# Patient Record
Sex: FEMALE | Race: WHITE | Employment: PART TIME | URBAN - METROPOLITAN AREA
[De-identification: names, ages, dates, MRNs, and addresses within clinical notes are randomized per-mention and may not be internally consistent; named-entity substitution may affect disease eponyms.]

---

## 2017-01-26 ENCOUNTER — TELEPHONE (OUTPATIENT)
Dept: FAMILY MEDICINE CLINIC | Facility: CLINIC | Age: 72
End: 2017-01-26

## 2017-01-26 DIAGNOSIS — E78.2 MIXED HYPERLIPIDEMIA: Primary | ICD-10-CM

## 2017-01-26 RX ORDER — ROSUVASTATIN CALCIUM 20 MG/1
20 TABLET, COATED ORAL
Qty: 90 TABLET | Refills: 0 | Status: SHIPPED | OUTPATIENT
Start: 2017-01-26 | End: 2017-04-14

## 2017-01-26 NOTE — TELEPHONE ENCOUNTER
Pt is requesting re-fills for medication listed below  Pt is completely out.    Pt is new to pharmacy ([facility]) Please advise      Current outpatient prescriptions:   •  Rosuvastatin Calcium (CRESTOR) 20 MG Oral Tab, Take 1 tablet (20 mg total) by mouth o

## 2017-01-26 NOTE — TELEPHONE ENCOUNTER
Hypertensive Medications, Refill protocol passed because the patient met the following protocol for ANTI HYPERTENSIVES, Medication refilled for 90 days per protocol.   Protocol Criteria:  · Appointment scheduled in the past 6 months or in the next 3 months

## 2017-02-22 ENCOUNTER — OFFICE VISIT (OUTPATIENT)
Dept: OTOLARYNGOLOGY | Facility: CLINIC | Age: 72
End: 2017-02-22

## 2017-02-22 ENCOUNTER — OFFICE VISIT (OUTPATIENT)
Dept: AUDIOLOGY | Facility: CLINIC | Age: 72
End: 2017-02-22

## 2017-02-22 VITALS
HEIGHT: 64 IN | SYSTOLIC BLOOD PRESSURE: 153 MMHG | DIASTOLIC BLOOD PRESSURE: 69 MMHG | TEMPERATURE: 99 F | WEIGHT: 240 LBS | BODY MASS INDEX: 40.97 KG/M2

## 2017-02-22 DIAGNOSIS — H93.11 TINNITUS, RIGHT: ICD-10-CM

## 2017-02-22 DIAGNOSIS — H91.91 HEARING LOSS OF RIGHT EAR, UNSPECIFIED HEARING LOSS TYPE: Primary | ICD-10-CM

## 2017-02-22 DIAGNOSIS — H90.3 SENSORINEURAL HEARING LOSS, BILATERAL: Primary | ICD-10-CM

## 2017-02-22 PROCEDURE — 92567 TYMPANOMETRY: CPT | Performed by: AUDIOLOGIST

## 2017-02-22 PROCEDURE — 99203 OFFICE O/P NEW LOW 30 MIN: CPT | Performed by: OTOLARYNGOLOGY

## 2017-02-22 PROCEDURE — 92557 COMPREHENSIVE HEARING TEST: CPT | Performed by: AUDIOLOGIST

## 2017-02-22 PROCEDURE — 99212 OFFICE O/P EST SF 10 MIN: CPT | Performed by: OTOLARYNGOLOGY

## 2017-02-22 RX ORDER — OFLOXACIN 3 MG/ML
SOLUTION/ DROPS OPHTHALMIC
Refills: 0 | COMMUNITY
Start: 2016-12-08 | End: 2017-02-22 | Stop reason: ALTCHOICE

## 2017-02-22 RX ORDER — METHYLPREDNISOLONE 4 MG/1
TABLET ORAL
Qty: 1 PACKAGE | Refills: 0 | Status: SHIPPED | OUTPATIENT
Start: 2017-02-22 | End: 2017-03-23

## 2017-02-22 RX ORDER — AMOXICILLIN 500 MG/1
CAPSULE ORAL
Refills: 1 | COMMUNITY
Start: 2016-12-30 | End: 2017-02-22 | Stop reason: ALTCHOICE

## 2017-02-22 RX ORDER — ACETAMINOPHEN AND CODEINE PHOSPHATE 300; 30 MG/1; MG/1
TABLET ORAL
Refills: 0 | COMMUNITY
Start: 2016-12-30 | End: 2017-03-23

## 2017-02-22 NOTE — PROGRESS NOTES
Matheus Win is a 73year old female. Patient presents with:  Hearing Loss: right ear for 1.5 weeks        HISTORY OF PRESENT ILLNESS  2/22/2017   Here for evaluation of right sided  hearing loss.  Patient feels this has worsened over the last 2  week Past Medical History   Diagnosis Date   • Diverticulosis    • Arthritis    • High cholesterol    • Tinnitus    • Unspecified essential hypertension    • SCC (squamous cell carcinoma) 2016     right jawline         Past Surgical History    CHOLECYSTECTO ear canals are nl tympanic membranes are nl     Audiogram was done today revealing no hearing in the left ear and sensorineural hearing loss in the right ear           Current outpatient prescriptions:   •  methylPREDNISolone 4 MG Oral Tablet Therapy Pack, AND PLAN  1.  Hearing loss of right ear, unspecified hearing loss type  I think this does represent a reflection of the salt intake and possibly the slight change in her right hearing loss is because of this I would like to start her on steroids asked her t

## 2017-02-22 NOTE — PROGRESS NOTES
AUDIOGRAM     Ronit Gonzalez was referred for testing by An Gregory for decreased hearing and tinnitus in the right ear.    11/8/1945  [id number]      Patient reports a long standing profound hearing loss in the left ear      Otoscopic inspection: r

## 2017-03-08 ENCOUNTER — OFFICE VISIT (OUTPATIENT)
Dept: AUDIOLOGY | Facility: CLINIC | Age: 72
End: 2017-03-08

## 2017-03-08 ENCOUNTER — OFFICE VISIT (OUTPATIENT)
Dept: OTOLARYNGOLOGY | Facility: CLINIC | Age: 72
End: 2017-03-08

## 2017-03-08 VITALS
WEIGHT: 240 LBS | SYSTOLIC BLOOD PRESSURE: 143 MMHG | TEMPERATURE: 97 F | HEIGHT: 64 IN | DIASTOLIC BLOOD PRESSURE: 68 MMHG | BODY MASS INDEX: 40.97 KG/M2

## 2017-03-08 DIAGNOSIS — H93.11 TINNITUS, RIGHT: Primary | ICD-10-CM

## 2017-03-08 DIAGNOSIS — H90.A21 SENSORINEURAL HEARING LOSS (SNHL) OF RIGHT EAR WITH RESTRICTED HEARING OF LEFT EAR: Primary | ICD-10-CM

## 2017-03-08 PROCEDURE — 92557 COMPREHENSIVE HEARING TEST: CPT | Performed by: AUDIOLOGIST

## 2017-03-08 PROCEDURE — 99212 OFFICE O/P EST SF 10 MIN: CPT | Performed by: OTOLARYNGOLOGY

## 2017-03-08 PROCEDURE — 99213 OFFICE O/P EST LOW 20 MIN: CPT | Performed by: OTOLARYNGOLOGY

## 2017-03-08 NOTE — PROGRESS NOTES
AUDIOGRAM     Chai Quezada was referred for testing by Tati Olivia for follow-up of right sided tinnitus and hearing loss.    11/8/1945  [id number]      Right ear testing only per Dr. Phillips Setting  Patient has a known profound hearing loss in the left ea

## 2017-03-08 NOTE — PROGRESS NOTES
Mary Grossman is a 73year old female. Patient presents with: Follow - Up: 2 wek follow up- right ear hearing loss- per pt she can hear better now        HISTORY OF PRESENT ILLNESS  2/22/2017   Here for evaluation of right sided  hearing loss.  Lydia Age of Onset   • Colon Cancer Father    • Diabetes Mother    • Hypertension Mother    • Diabetes Paternal Grandmother    • Glaucoma Neg      No Family h/o Glaucoma   • Macular degeneration Neg      No Family h/o Macular degeneration     Past Medical Histor inspection   Psychiatric Normal   Appropriate mood and affect. Lymph Detail Normal     Eyes Normal Conjunctiva - Right: Normal, Left: Normal. Pupil - Right: Normal, Left: Normal. EOMI.  RIK   Ears abNormal  normal to inspection ear canals are nl tympanic (unknown strength), Disp: , Rfl:   •  Acetaminophen-Codeine #3 300-30 MG Oral Tab, TK 1 T PO Q 4 TO 6 H PRN FOR PAIN, Disp: , Rfl: 0  •  methylPREDNISolone 4 MG Oral Tablet Therapy Pack, Take by oral route., Disp: 1 Package, Rfl: 0    ASSESSMENT AND PLAN

## 2017-03-23 ENCOUNTER — APPOINTMENT (OUTPATIENT)
Dept: LAB | Age: 72
End: 2017-03-23
Attending: FAMILY MEDICINE
Payer: MEDICARE

## 2017-03-23 ENCOUNTER — OFFICE VISIT (OUTPATIENT)
Dept: FAMILY MEDICINE CLINIC | Facility: CLINIC | Age: 72
End: 2017-03-23

## 2017-03-23 VITALS
DIASTOLIC BLOOD PRESSURE: 63 MMHG | HEIGHT: 64 IN | BODY MASS INDEX: 40.63 KG/M2 | TEMPERATURE: 98 F | SYSTOLIC BLOOD PRESSURE: 136 MMHG | HEART RATE: 70 BPM | WEIGHT: 238 LBS

## 2017-03-23 DIAGNOSIS — R09.82 POST-NASAL DRIP: ICD-10-CM

## 2017-03-23 DIAGNOSIS — R05.9 COUGH: ICD-10-CM

## 2017-03-23 DIAGNOSIS — G89.29 CHRONIC BILATERAL LOW BACK PAIN WITHOUT SCIATICA: ICD-10-CM

## 2017-03-23 DIAGNOSIS — J30.89 OTHER ALLERGIC RHINITIS: ICD-10-CM

## 2017-03-23 DIAGNOSIS — M54.50 CHRONIC BILATERAL LOW BACK PAIN WITHOUT SCIATICA: ICD-10-CM

## 2017-03-23 DIAGNOSIS — J30.1 SEASONAL ALLERGIC RHINITIS DUE TO POLLEN: ICD-10-CM

## 2017-03-23 DIAGNOSIS — R05.9 COUGH: Primary | ICD-10-CM

## 2017-03-23 DIAGNOSIS — M48.061 SPINAL STENOSIS OF LUMBAR REGION: ICD-10-CM

## 2017-03-23 PROCEDURE — 86003 ALLG SPEC IGE CRUDE XTRC EA: CPT

## 2017-03-23 PROCEDURE — 99214 OFFICE O/P EST MOD 30 MIN: CPT | Performed by: FAMILY MEDICINE

## 2017-03-23 PROCEDURE — 36415 COLL VENOUS BLD VENIPUNCTURE: CPT

## 2017-03-23 PROCEDURE — 99212 OFFICE O/P EST SF 10 MIN: CPT | Performed by: FAMILY MEDICINE

## 2017-03-23 PROCEDURE — 82785 ASSAY OF IGE: CPT

## 2017-03-23 RX ORDER — PROMETHAZINE HYDROCHLORIDE AND CODEINE PHOSPHATE 6.25; 1 MG/5ML; MG/5ML
5 SYRUP ORAL EVERY 6 HOURS PRN
Qty: 240 ML | Refills: 0 | Status: SHIPPED | OUTPATIENT
Start: 2017-03-23 | End: 2017-05-16

## 2017-03-23 RX ORDER — FLUTICASONE PROPIONATE 50 MCG
2 SPRAY, SUSPENSION (ML) NASAL DAILY
Qty: 1 BOTTLE | Refills: 3 | Status: SHIPPED | OUTPATIENT
Start: 2017-03-23 | End: 2017-06-16

## 2017-03-23 RX ORDER — MONTELUKAST SODIUM 10 MG/1
10 TABLET ORAL
Qty: 90 TABLET | Refills: 3 | Status: SHIPPED | OUTPATIENT
Start: 2017-03-23 | End: 2017-06-16

## 2017-03-23 RX ORDER — HYDROCODONE BITARTRATE AND ACETAMINOPHEN 5; 325 MG/1; MG/1
TABLET ORAL
Qty: 60 TABLET | Refills: 0 | Status: SHIPPED | OUTPATIENT
Start: 2017-03-23 | End: 2017-06-16

## 2017-03-23 RX ORDER — FEXOFENADINE HCL 180 MG/1
180 TABLET ORAL DAILY
Qty: 30 TABLET | Refills: 11 | Status: SHIPPED | OUTPATIENT
Start: 2017-03-23 | End: 2017-07-21

## 2017-03-23 NOTE — PROGRESS NOTES
Patient ID: Leopold Shearer is a 73year old female. HPI  Patient presents with:  Chest Congestion  Runny Nose  Cough    This started on Sunday night, March 19, 2017. NyQuil helped a little bit.         Wt Readings from Last 6 Encounters:  03/23/17 1 tablet (20 mg total) by mouth once daily.  Disp: 90 tablet Rfl: 0   HYDROcodone-acetaminophen (NORCO) 5-325 MG Oral Tab take 1 - 2 tablet by ORAL route 3 times every day as needed for pain Disp: 60 tablet Rfl: 0   Montelukast Sodium 10 MG Oral Tab Take 1 and time. Skin: Skin is warm. Psychiatric: has a normal mood and affect. Vitals reviewed. ASSESSMENT/PLAN:     Diagnoses and all orders for this visit:    Cough  -     ALLERGENS, ZONE 8 [39671]VKS;  Future  -     promethazine-codeine 6.25-10 M Deysi    Seasonal allergic rhinitis due to pollen  -     Montelukast Sodium 10 MG Oral Tab; Take 1 tablet (10 mg total) by mouth once daily.  -     Fexofenadine HCl 180 MG Oral Tab; Take 1 tablet (180 mg total) by mouth daily.         Follow up if symptoms pe

## 2017-03-31 ENCOUNTER — OFFICE VISIT (OUTPATIENT)
Dept: FAMILY MEDICINE CLINIC | Facility: CLINIC | Age: 72
End: 2017-03-31

## 2017-03-31 VITALS
SYSTOLIC BLOOD PRESSURE: 143 MMHG | DIASTOLIC BLOOD PRESSURE: 79 MMHG | HEART RATE: 71 BPM | WEIGHT: 238 LBS | RESPIRATION RATE: 16 BRPM | BODY MASS INDEX: 40.63 KG/M2 | TEMPERATURE: 98 F | HEIGHT: 64 IN

## 2017-03-31 DIAGNOSIS — R05.9 COUGH: Primary | ICD-10-CM

## 2017-03-31 PROCEDURE — G0463 HOSPITAL OUTPT CLINIC VISIT: HCPCS | Performed by: FAMILY MEDICINE

## 2017-03-31 PROCEDURE — 99213 OFFICE O/P EST LOW 20 MIN: CPT | Performed by: FAMILY MEDICINE

## 2017-03-31 RX ORDER — ALBUTEROL SULFATE 90 UG/1
2 AEROSOL, METERED RESPIRATORY (INHALATION) EVERY 6 HOURS PRN
Qty: 1 INHALER | Refills: 1 | Status: SHIPPED | OUTPATIENT
Start: 2017-03-31

## 2017-03-31 RX ORDER — MELOXICAM 15 MG/1
15 TABLET ORAL DAILY
Refills: 0 | COMMUNITY
Start: 2017-03-02 | End: 2017-06-13

## 2017-03-31 RX ORDER — LEVOFLOXACIN 750 MG/1
750 TABLET ORAL DAILY
Qty: 5 TABLET | Refills: 0 | Status: SHIPPED | OUTPATIENT
Start: 2017-03-31 | End: 2017-04-05

## 2017-03-31 NOTE — PROGRESS NOTES
Patient ID: Kerry Olivares is a 73year old female. HPI  Patient presents with:  Cough      She has had a hacking cough since she last saw me.   She states it takes a little while but she is able to finally bring up some white mucus and feels somewh Fluticasone Propionate 50 MCG/ACT Nasal Suspension 2 sprays by Nasal route daily.  Disp: 1 Bottle Rfl: 3   HYDROcodone-acetaminophen (NORCO) 5-325 MG Oral Tab take 1 - 2 tablet by ORAL route 3 times every day as needed for pain Disp: 60 tablet Rfl: 0   Mo normal   Eyes: Conjunctivae are normal.   Neck: Normal range of motion. Neck supple. No thyromegaly present. Cardiovascular: Normal rate, regular rhythm and normal heart sounds. Lymphadenopathy:     Patient has no  cervical adenopathy.     Pulmonary/Danielle

## 2017-04-08 ENCOUNTER — TELEPHONE (OUTPATIENT)
Dept: FAMILY MEDICINE CLINIC | Facility: CLINIC | Age: 72
End: 2017-04-08

## 2017-04-08 RX ORDER — LISINOPRIL AND HYDROCHLOROTHIAZIDE 25; 20 MG/1; MG/1
1 TABLET ORAL
Qty: 90 TABLET | Refills: 0 | Status: SHIPPED | OUTPATIENT
Start: 2017-04-08 | End: 2017-06-16

## 2017-04-08 NOTE — TELEPHONE ENCOUNTER
Pharmacy calling to request refill request for patient.    Pharmacy stating this is a new patient and does not have RX on file     RX for Lisinopril Hydrochlorothiazide 20-25 MG    Confirmed pharmacy Sally/Mohan

## 2017-04-08 NOTE — TELEPHONE ENCOUNTER
Hypertensive Medications  Protocol Criteria:  · Appointment scheduled in the past 6 months or in the next 3 months  · BMP or CMP in the past 12 months  · Creatinine result < 2  Recent Visits       Provider Department Primary Dx    1 week ago Ples Anderson

## 2017-04-17 RX ORDER — ROSUVASTATIN CALCIUM 20 MG/1
TABLET, COATED ORAL
Qty: 90 TABLET | Refills: 1 | Status: SHIPPED | OUTPATIENT
Start: 2017-04-17 | End: 2017-06-16

## 2017-05-08 ENCOUNTER — OFFICE VISIT (OUTPATIENT)
Dept: OTOLARYNGOLOGY | Facility: CLINIC | Age: 72
End: 2017-05-08

## 2017-05-08 ENCOUNTER — OFFICE VISIT (OUTPATIENT)
Dept: AUDIOLOGY | Facility: CLINIC | Age: 72
End: 2017-05-08

## 2017-05-08 VITALS
BODY MASS INDEX: 40.97 KG/M2 | TEMPERATURE: 97 F | DIASTOLIC BLOOD PRESSURE: 73 MMHG | SYSTOLIC BLOOD PRESSURE: 145 MMHG | HEIGHT: 64 IN | WEIGHT: 240 LBS

## 2017-05-08 DIAGNOSIS — H81.01 MENIERE'S DISEASE (COCHLEAR HYDROPS), RIGHT: Primary | ICD-10-CM

## 2017-05-08 DIAGNOSIS — H91.8X3 OTHER SPECIFIED HEARING LOSS, BILATERAL: Primary | ICD-10-CM

## 2017-05-08 PROCEDURE — G0463 HOSPITAL OUTPT CLINIC VISIT: HCPCS | Performed by: OTOLARYNGOLOGY

## 2017-05-08 PROCEDURE — 92557 COMPREHENSIVE HEARING TEST: CPT | Performed by: AUDIOLOGIST

## 2017-05-08 PROCEDURE — 99213 OFFICE O/P EST LOW 20 MIN: CPT | Performed by: OTOLARYNGOLOGY

## 2017-05-08 RX ORDER — PREDNISONE 20 MG/1
TABLET ORAL
Qty: 27 TABLET | Refills: 0 | Status: SHIPPED | OUTPATIENT
Start: 2017-05-08 | End: 2017-06-16 | Stop reason: ALTCHOICE

## 2017-05-08 RX ORDER — TRIAMTERENE AND HYDROCHLOROTHIAZIDE 37.5; 25 MG/1; MG/1
1 CAPSULE ORAL EVERY MORNING
Qty: 30 CAPSULE | Refills: 11 | Status: SHIPPED | OUTPATIENT
Start: 2017-05-08 | End: 2017-06-07

## 2017-05-08 NOTE — PROGRESS NOTES
Bright Jo is a 73year old female. Patient presents with:   Follow - Up: 2 month follow up- hearing loss of right ear- per pt she can not hear again, c/o ringing in the right ear started last [address]  2/22/2017   He Concern    Caffeine Concern Yes    Comment: Coffee, 2 cups daily    Exercise No    Pt has a pacemaker No    Pt has a defibrillator No    Reaction to local anesthetic No     Social History Narrative    The patient does not use an assistive device. .      The suspicious lesions bruises or masses.    Constitutional Normal Overall appearance - Normal.   Head/Face Normal Facial features - Normal. Eyebrows - Normal. Skull - Normal.   Nasopharynx Normal External nose - Normal.  .   Neurological Normal Memory - Normal Oral Tab, Take 1 tablet (10 mg total) by mouth once daily. , Disp: 90 tablet, Rfl: 3  •  Fexofenadine HCl 180 MG Oral Tab, Take 1 tablet (180 mg total) by mouth daily. , Disp: 30 tablet, Rfl: 11  •  Sertraline HCl 100 MG Oral Tab, Take 1 tablet (100 mg total

## 2017-05-09 NOTE — PROGRESS NOTES
AUDIOGRAM     Reginaldo Epps was referred for testing by Mallory Villatoro to evaluate fluctuating hearing loss in the right ear. 11/8/1945  [id number]    Pt has known left profound sensorineural hearing loss. The left ear was not retested today.     Pr

## 2017-05-15 ENCOUNTER — TELEPHONE (OUTPATIENT)
Dept: OTOLARYNGOLOGY | Facility: CLINIC | Age: 72
End: 2017-05-15

## 2017-05-15 DIAGNOSIS — H81.01 MENIERE'S DISEASE (COCHLEAR HYDROPS), RIGHT: Primary | ICD-10-CM

## 2017-05-15 NOTE — TELEPHONE ENCOUNTER
Per Randolph Carvalho ([facility]  @ [facility], Dr. Beth Primes office), pt should contact registration dept @ [phone] to be registered and then scheduled.   If pt is given an appt too far out, pt may call [phone] and ask for Dr. Debi Nicole  to

## 2017-05-15 NOTE — TELEPHONE ENCOUNTER
Referral to Dr. Ardean Buerger entered, Dx:  Meniere's disease. Called and LM w/ [facility]  @ [address] [place] [address], Dr. Yadira Calvillo office, [phone].

## 2017-05-15 NOTE — TELEPHONE ENCOUNTER
, pt seen on 05/08 for menieres diease , pt prescribed prednisone and is still taking it. Per pt states hearing loss is worse, per pt states sounds are distorted.  Per pt states you had advised if steroid did not help pt would need to be seen by  S

## 2017-05-16 ENCOUNTER — OFFICE VISIT (OUTPATIENT)
Dept: NEUROLOGY | Facility: CLINIC | Age: 72
End: 2017-05-16

## 2017-05-16 ENCOUNTER — HOSPITAL ENCOUNTER (OUTPATIENT)
Dept: GENERAL RADIOLOGY | Facility: HOSPITAL | Age: 72
Discharge: HOME | End: 2017-05-16
Attending: PHYSICAL MEDICINE & REHABILITATION
Payer: MEDICARE

## 2017-05-16 VITALS
DIASTOLIC BLOOD PRESSURE: 62 MMHG | RESPIRATION RATE: 17 BRPM | HEIGHT: 64 IN | OXYGEN SATURATION: 95 % | BODY MASS INDEX: 40.12 KG/M2 | HEART RATE: 94 BPM | WEIGHT: 235 LBS | SYSTOLIC BLOOD PRESSURE: 126 MMHG

## 2017-05-16 DIAGNOSIS — M48.061 LUMBAR STENOSIS: ICD-10-CM

## 2017-05-16 DIAGNOSIS — M47.816 LUMBAR FACET ARTHROPATHY: ICD-10-CM

## 2017-05-16 DIAGNOSIS — M41.25 OTHER IDIOPATHIC SCOLIOSIS, THORACOLUMBAR REGION: ICD-10-CM

## 2017-05-16 DIAGNOSIS — M48.061 LUMBAR STENOSIS: Primary | ICD-10-CM

## 2017-05-16 PROCEDURE — 99214 OFFICE O/P EST MOD 30 MIN: CPT | Performed by: PHYSICAL MEDICINE & REHABILITATION

## 2017-05-16 PROCEDURE — 72110 X-RAY EXAM L-2 SPINE 4/>VWS: CPT | Performed by: PHYSICAL MEDICINE & REHABILITATION

## 2017-05-16 NOTE — PATIENT INSTRUCTIONS
- Consider the back injection - epidural.  Stop for 7 days before injection- all aspirin, advil, aleve, fish oil, vitamin E and similar products. Including any medications combined with ibuprofen. Call Dr Kayleen Gordon office to schedule.    You need a  to substances: Written prescriptions  · Written prescriptions must be picked up in office. · Please allow the office 48-72 hours to fill the prescription as our physicians rotate between multiple offices and procedure days in the hospitals.   · Patient must p

## 2017-05-16 NOTE — TELEPHONE ENCOUNTER
MARTHATCB.  TRANSFER TO RN. Spoke w/ Dr. Zoila Villalba office; they are able to hold an appointment slot for pt for Monday 5/22 @ 1:45 in [facility], [address]. Pt is currently scheduled for June appt w/ Dr. Patti Howe.     RN will need to call [phone]

## 2017-05-16 NOTE — PROGRESS NOTES
Patient has been scheduled for a L5-S1 ILESI (local) on 5/26/17 at the [facility]. Medications and allergies reviewed. Patient informed to hold aspirins, nsaids, blood thinners, vitamins and fish oils 5-7 days prior to procedure.  Patient informed we will need ve

## 2017-05-17 NOTE — TELEPHONE ENCOUNTER
Per [facility]), pt was scheduled for Monday 5/22 @ 1:45 pm w/ Dr. Shannan Bauman at [address] [place] [address], in [place]. Pt has been informed. Called [facility] to request fax number for Dr. Arlette Walls office to fax OV notes and HTs.

## 2017-05-22 ENCOUNTER — TELEPHONE (OUTPATIENT)
Dept: OTOLARYNGOLOGY | Facility: CLINIC | Age: 72
End: 2017-05-22

## 2017-05-22 NOTE — TELEPHONE ENCOUNTER
asking for copy of audiogram from May if one was done - [facility] fax to [phone]- [facility] call Rn and let her know if pt did not have one done in May

## 2017-05-26 ENCOUNTER — OFFICE VISIT (OUTPATIENT)
Dept: SURGERY | Facility: CLINIC | Age: 72
End: 2017-05-26

## 2017-05-26 DIAGNOSIS — M48.061 LUMBAR STENOSIS: Primary | ICD-10-CM

## 2017-05-26 PROCEDURE — 62323 NJX INTERLAMINAR LMBR/SAC: CPT | Performed by: PHYSICAL MEDICINE & REHABILITATION

## 2017-05-26 NOTE — PROGRESS NOTES
L5-S1 ilesi local. Under fluoro - right facet arthropathy more obvious and juts into interlaminar space. Patient has worse pain with standing for work. She has half day 4 hr work tomorrow. She will see how the injection worked. -vahid

## 2017-05-26 NOTE — PROGRESS NOTES
History of Present Illness:   Patient presents with:  Low Back Pain: pt here for follow up with continued lower back pain that is localized when standing/walking only. denies pain when sitting.  pt completed physical therapy last year with relief in strengt tablet by mouth once daily. Disp: 90 tablet Rfl: 0   Meloxicam 15 MG Oral Tab Take 15 mg by mouth daily.    Disp:  Rfl: 0   Albuterol Sulfate  (90 Base) MCG/ACT Inhalation Aero Soln Inhale 2 puffs into the lungs every 6 (six) hours as needed for [facility] symptoms  Allergic/Immunologic: negative for anaphylaxis    Physical Exam:  Vital Signs:  /62 mmHg  Pulse 94  Resp 17  Ht 64\"  Wt 235 lb  BMI 40.32 kg/m2  SpO2 95%    General: Alert, oriented x3. Cooperative. No apparent distress.   HEENT:  No disc

## 2017-05-26 NOTE — PROCEDURES
[facility] OUTPATIENT SURGERY CENTER      PATIENT'S NAME:  Mari Irby RECORD #:   [id number]       DATE OF BIRTH:  11/8/1945  PROCEDURE DATE:  5/26/2017  OPERATING PHYSICIAN:  Wyatt Paul MD      OPERATIVE REPORT    PREOPERATIVE RADHA

## 2017-06-04 RX ORDER — SERTRALINE HYDROCHLORIDE 100 MG/1
TABLET, FILM COATED ORAL
Qty: 90 TABLET | Refills: 0 | Status: SHIPPED | OUTPATIENT
Start: 2017-06-04 | End: 2017-06-16

## 2017-06-04 NOTE — TELEPHONE ENCOUNTER
Refilled per protocol.   Refill Protocol Appointment Criteria  · Appointment scheduled in the past 6 months or in the next 3 months  Recent Visits       Provider Department Primary Dx    2 months ago [facility], [place], [address] [place] [address] 04, [facility]

## 2017-06-07 NOTE — PROGRESS NOTES
That should be fine. The Dyazide will only increase the hydrochlorothiazide dose but if it makes her dizziness better, go ahead and try it. Please let her know to follow-up with me about 3-4 weeks after you place her on the Dyazide.

## 2017-06-13 RX ORDER — AMLODIPINE BESYLATE 5 MG/1
TABLET ORAL
Qty: 90 TABLET | Refills: 0 | Status: CANCELLED | OUTPATIENT
Start: 2017-06-13

## 2017-06-16 ENCOUNTER — OFFICE VISIT (OUTPATIENT)
Dept: FAMILY MEDICINE CLINIC | Facility: CLINIC | Age: 72
End: 2017-06-16

## 2017-06-16 VITALS
HEART RATE: 68 BPM | SYSTOLIC BLOOD PRESSURE: 119 MMHG | TEMPERATURE: 98 F | WEIGHT: 240 LBS | RESPIRATION RATE: 16 BRPM | BODY MASS INDEX: 40.97 KG/M2 | HEIGHT: 64 IN | DIASTOLIC BLOOD PRESSURE: 62 MMHG

## 2017-06-16 DIAGNOSIS — E78.2 MIXED HYPERLIPIDEMIA: ICD-10-CM

## 2017-06-16 DIAGNOSIS — J30.1 SEASONAL ALLERGIC RHINITIS DUE TO POLLEN: ICD-10-CM

## 2017-06-16 DIAGNOSIS — I10 ESSENTIAL HYPERTENSION: ICD-10-CM

## 2017-06-16 DIAGNOSIS — Z00.00 ENCOUNTER FOR ANNUAL HEALTH EXAMINATION: ICD-10-CM

## 2017-06-16 DIAGNOSIS — M54.50 CHRONIC BILATERAL LOW BACK PAIN WITHOUT SCIATICA: ICD-10-CM

## 2017-06-16 DIAGNOSIS — R09.82 POST-NASAL DRIP: ICD-10-CM

## 2017-06-16 DIAGNOSIS — J30.89 OTHER ALLERGIC RHINITIS: ICD-10-CM

## 2017-06-16 DIAGNOSIS — Z00.00 ADULT GENERAL MEDICAL EXAM: Primary | ICD-10-CM

## 2017-06-16 DIAGNOSIS — H25.12 AGE-RELATED NUCLEAR CATARACT OF LEFT EYE: ICD-10-CM

## 2017-06-16 DIAGNOSIS — Z78.0 MENOPAUSE PRESENT: ICD-10-CM

## 2017-06-16 DIAGNOSIS — I10 HYPERTENSION, BENIGN: ICD-10-CM

## 2017-06-16 DIAGNOSIS — E55.9 VITAMIN D DEFICIENCY: ICD-10-CM

## 2017-06-16 DIAGNOSIS — Z13.820 SCREENING FOR OSTEOPOROSIS: ICD-10-CM

## 2017-06-16 DIAGNOSIS — R05.9 COUGH: ICD-10-CM

## 2017-06-16 DIAGNOSIS — Z23 NEED FOR VACCINATION: ICD-10-CM

## 2017-06-16 DIAGNOSIS — Z11.59 NEED FOR HEPATITIS C SCREENING TEST: ICD-10-CM

## 2017-06-16 DIAGNOSIS — I70.90 ATHEROSCLEROSIS: ICD-10-CM

## 2017-06-16 DIAGNOSIS — R73.9 HYPERGLYCEMIA: ICD-10-CM

## 2017-06-16 DIAGNOSIS — R91.8 PULMONARY NODULES: ICD-10-CM

## 2017-06-16 DIAGNOSIS — M48.061 SPINAL STENOSIS OF LUMBAR REGION: ICD-10-CM

## 2017-06-16 DIAGNOSIS — G89.29 CHRONIC BILATERAL LOW BACK PAIN WITHOUT SCIATICA: ICD-10-CM

## 2017-06-16 DIAGNOSIS — F41.1 ANXIETY STATE: ICD-10-CM

## 2017-06-16 PROCEDURE — 99213 OFFICE O/P EST LOW 20 MIN: CPT | Performed by: FAMILY MEDICINE

## 2017-06-16 PROCEDURE — 90670 PCV13 VACCINE IM: CPT | Performed by: FAMILY MEDICINE

## 2017-06-16 PROCEDURE — G0009 ADMIN PNEUMOCOCCAL VACCINE: HCPCS | Performed by: FAMILY MEDICINE

## 2017-06-16 PROCEDURE — G0439 PPPS, SUBSEQ VISIT: HCPCS | Performed by: FAMILY MEDICINE

## 2017-06-16 RX ORDER — FLUTICASONE PROPIONATE 50 MCG
2 SPRAY, SUSPENSION (ML) NASAL DAILY
Qty: 1 BOTTLE | Refills: 3 | Status: SHIPPED | OUTPATIENT
Start: 2017-06-16 | End: 2017-10-14

## 2017-06-16 RX ORDER — ROSUVASTATIN CALCIUM 20 MG/1
TABLET, COATED ORAL
Qty: 90 TABLET | Refills: 2 | Status: SHIPPED | OUTPATIENT
Start: 2017-06-16 | End: 2018-04-06

## 2017-06-16 RX ORDER — LISINOPRIL AND HYDROCHLOROTHIAZIDE 25; 20 MG/1; MG/1
1 TABLET ORAL
Qty: 90 TABLET | Refills: 2 | Status: SHIPPED | OUTPATIENT
Start: 2017-06-16 | End: 2018-07-19

## 2017-06-16 RX ORDER — AMLODIPINE BESYLATE 5 MG/1
5 TABLET ORAL
Qty: 90 TABLET | Refills: 2 | Status: SHIPPED | OUTPATIENT
Start: 2017-06-16 | End: 2018-03-15

## 2017-06-16 RX ORDER — SERTRALINE HYDROCHLORIDE 100 MG/1
TABLET, FILM COATED ORAL
Qty: 90 TABLET | Refills: 2 | Status: SHIPPED | OUTPATIENT
Start: 2017-06-16 | End: 2018-03-25

## 2017-06-16 RX ORDER — HYDROCODONE BITARTRATE AND ACETAMINOPHEN 5; 325 MG/1; MG/1
TABLET ORAL
Qty: 60 TABLET | Refills: 0 | Status: SHIPPED | OUTPATIENT
Start: 2017-06-16 | End: 2017-08-28

## 2017-06-16 RX ORDER — MONTELUKAST SODIUM 10 MG/1
10 TABLET ORAL
Qty: 90 TABLET | Refills: 3 | Status: SHIPPED | OUTPATIENT
Start: 2017-06-16 | End: 2018-06-20

## 2017-06-16 NOTE — PATIENT INSTRUCTIONS
Elsie Gaspar's SCREENING SCHEDULE   Tests on this list are recommended by your physician but may not be covered, or covered at this frequency, by your insurer. Please check with your insurance carrier before scheduling to verify coverage.    PREVENT Colonoscopy Screen   Covered every 10 years- more often if abnormal Colonoscopy,5 Years due on 11/07/2018 Update Health Maintenance if applicable    Flex Sigmoidoscopy Screen  Covered every 5 years No results found for this or any previous visit.  No flowsh get once after your 65th birthday    Pneumococcal 23 (Pneumovax)  Covered Once after 65 No orders found for this or any previous visit.  Please get once after your 65th birthday    Hepatitis B for Moderate/High Risk       No orders found for this or any pre

## 2017-06-16 NOTE — PROGRESS NOTES
HPI:   Domingo Estrada is a 73year old female who presents for a Medicare Subsequent Annual Wellness visit (Pt already had Initial Annual Wellness).       Her last annual assessment has been over 1 year: Annual Physical due on 01/27/2016       She Carolin Walker encounter (Office Visit) with Chang More DO:  SERTRALINE  MG Oral Tab TAKE 1 TABLET (100 MG TOTAL) BY MOUTH ONCE DAILY. ROSUVASTATIN CALCIUM 20 MG Oral Tab TAKE 1 TABLET (20 MG TOTAL) BY MOUTH ONCE DAILY.    Lisinopril-Hydrochlorothiazide 20-2 in her mother. There is no history of Glaucoma or Macular degeneration. SOCIAL HISTORY:   She  reports that she quit smoking about 15 years ago. Her smoking use included Cigarettes.  She has never used smokeless tobacco. She reports that she drinks alcoho themselves:  Yes   I especially have trouble understanding the speech of women and children:    Sometimes I have trouble understanding the speaker in a large room such as   at a meeting or place of Mu-ism:  No   Many people I talk to seem to mumble (or do • Pneumococcal Vaccine, Conjugate 12/12/2013       ASSESSMENT AND OTHER RELEVANT CHRONIC CONDITIONS:   Leopold Shearer is a 73year old female who presents for a Medicare Assessment.      PLAN SUMMARY:      Diagnoses and all orders for this visit:    A 20-25 MG Oral Tab; Take 1 tablet by mouth once daily. -     AmLODIPine Besylate 5 MG Oral Tab; Take 1 tablet (5 mg total) by mouth once daily. Vitamin D deficiency  -     Vitamin D, 25-Hydroxy (total) [E];  Future    Screening for osteoporosis  -     XR Pneumococcal?: No     Functional Ability     Bathing or Showering: Able without help    Toileting: Able without help    Dressing: Able without help    Eating: Able without help    Driving: Able without help    Preparing your meals: Able without help    Man review of chart, separate sheet to patient  [address] Internal Lab or Procedure External Lab or Procedure   Diabetes Screening      HbgA1C   Annually   Lab Results  Component Value Date   A1C 5.9 09/26/2014    No flows No vaccine history found Please get once after your 65th birthday    Hepatitis B for Moderate/High Risk No vaccine history found Medium/high risk factors:   End-stage renal disease   Hemophiliacs who received Factor VIII or IX concentrates   Clients of ins

## 2017-06-16 NOTE — TELEPHONE ENCOUNTER
Pharm is calling to check status of request   Pt is out of meds   Pharm will be getting a new computer system on Saturday   Asking to have this faxed manually

## 2017-06-19 RX ORDER — MELOXICAM 15 MG/1
TABLET ORAL
Qty: 90 TABLET | Refills: 0 | Status: SHIPPED | OUTPATIENT
Start: 2017-06-19 | End: 2017-09-17

## 2017-06-22 ENCOUNTER — LAB ENCOUNTER (OUTPATIENT)
Dept: LAB | Age: 72
End: 2017-06-22
Attending: FAMILY MEDICINE
Payer: MEDICARE

## 2017-06-22 DIAGNOSIS — Z11.59 NEED FOR HEPATITIS C SCREENING TEST: ICD-10-CM

## 2017-06-22 DIAGNOSIS — E55.9 VITAMIN D DEFICIENCY: ICD-10-CM

## 2017-06-22 DIAGNOSIS — R73.9 HYPERGLYCEMIA: ICD-10-CM

## 2017-06-22 DIAGNOSIS — Z00.00 ROUTINE GENERAL MEDICAL EXAMINATION AT A HEALTH CARE FACILITY: Primary | ICD-10-CM

## 2017-06-22 DIAGNOSIS — E78.2 MIXED HYPERLIPIDEMIA: ICD-10-CM

## 2017-06-22 DIAGNOSIS — I70.90 ATHEROSCLEROSIS: ICD-10-CM

## 2017-06-22 PROCEDURE — 86803 HEPATITIS C AB TEST: CPT

## 2017-06-22 PROCEDURE — 80053 COMPREHEN METABOLIC PANEL: CPT

## 2017-06-22 PROCEDURE — 83036 HEMOGLOBIN GLYCOSYLATED A1C: CPT

## 2017-06-22 PROCEDURE — 93005 ELECTROCARDIOGRAM TRACING: CPT

## 2017-06-22 PROCEDURE — 36415 COLL VENOUS BLD VENIPUNCTURE: CPT

## 2017-06-22 PROCEDURE — 85025 COMPLETE CBC W/AUTO DIFF WBC: CPT

## 2017-06-22 PROCEDURE — 93010 ELECTROCARDIOGRAM REPORT: CPT | Performed by: FAMILY MEDICINE

## 2017-06-22 PROCEDURE — 80061 LIPID PANEL: CPT

## 2017-06-22 PROCEDURE — 84443 ASSAY THYROID STIM HORMONE: CPT

## 2017-06-22 PROCEDURE — 82306 VITAMIN D 25 HYDROXY: CPT

## 2017-06-28 ENCOUNTER — TELEPHONE (OUTPATIENT)
Dept: NEUROLOGY | Facility: CLINIC | Age: 72
End: 2017-06-28

## 2017-06-28 ENCOUNTER — OFFICE VISIT (OUTPATIENT)
Dept: NEUROLOGY | Facility: CLINIC | Age: 72
End: 2017-06-28

## 2017-06-28 ENCOUNTER — HOSPITAL ENCOUNTER (OUTPATIENT)
Dept: GENERAL RADIOLOGY | Facility: HOSPITAL | Age: 72
Discharge: HOME | End: 2017-06-28
Attending: PHYSICAL MEDICINE & REHABILITATION
Payer: MEDICARE

## 2017-06-28 VITALS
RESPIRATION RATE: 19 BRPM | DIASTOLIC BLOOD PRESSURE: 58 MMHG | BODY MASS INDEX: 40.97 KG/M2 | HEART RATE: 81 BPM | HEIGHT: 64 IN | OXYGEN SATURATION: 94 % | SYSTOLIC BLOOD PRESSURE: 102 MMHG | WEIGHT: 240 LBS

## 2017-06-28 DIAGNOSIS — M25.551 HIP PAIN, BILATERAL: ICD-10-CM

## 2017-06-28 DIAGNOSIS — R10.31 GROIN PAIN, RIGHT: Primary | ICD-10-CM

## 2017-06-28 DIAGNOSIS — M25.552 HIP PAIN, BILATERAL: ICD-10-CM

## 2017-06-28 DIAGNOSIS — M54.16 LUMBAR RADICULOPATHY: ICD-10-CM

## 2017-06-28 DIAGNOSIS — M48.061 LUMBAR STENOSIS: ICD-10-CM

## 2017-06-28 DIAGNOSIS — R10.31 GROIN PAIN, RIGHT: ICD-10-CM

## 2017-06-28 PROCEDURE — 73522 X-RAY EXAM HIPS BI 3-4 VIEWS: CPT | Performed by: PHYSICAL MEDICINE & REHABILITATION

## 2017-06-28 PROCEDURE — 99214 OFFICE O/P EST MOD 30 MIN: CPT | Performed by: PHYSICAL MEDICINE & REHABILITATION

## 2017-06-28 RX ORDER — GABAPENTIN 100 MG/1
100 CAPSULE ORAL 3 TIMES DAILY
Qty: 90 CAPSULE | Refills: 0 | Status: SHIPPED | OUTPATIENT
Start: 2017-06-28 | End: 2017-09-14 | Stop reason: DRUGHIGH

## 2017-06-28 NOTE — TELEPHONE ENCOUNTER
Medicare Online for insurance coverage of right L5, sS1 TFESIs cpt codes [id number], [id number], [id number]. Insurance was verified and procedure is a covered benefit. Will inform Nursing.

## 2017-06-28 NOTE — PROGRESS NOTES
At end of visit, patient received the AVS and a printed instruction sheet for epidural injection. Patient said she needs to check her schedule, and will call back about scheduling injection.

## 2017-06-28 NOTE — PROGRESS NOTES
History of Present Illness:   Patient presents with:  Low Back Pain: pt here for follow up after L5-S1 interlaminar epidural steroid injection on 5/26/17 with 75% relief for one week. pt had Lumbar Spine Xrays done 5/16/17.  pt c/o lower back pain radiating Disp: 90 tablet Rfl: 0   HYDROcodone-acetaminophen (NORCO) 5-325 MG Oral Tab take 1 - 2 tablet by ORAL route 3 times every day as needed for pain Disp: 60 tablet Rfl: 0   Sertraline HCl 100 MG Oral Tab TAKE 1 TABLET (100 MG TOTAL) BY MOUTH ONCE DAILY.  Disp symptoms  Allergic/Immunologic: negative for anaphylaxis    Physical Exam:  Vital Signs:  /58 (BP Location: Right arm, Patient Position: Sitting, Cuff Size: large)   Pulse 81   Resp 19   Ht 64\"   Wt 240 lb   SpO2 94%   BMI 41.20 kg/m²     General: A

## 2017-06-28 NOTE — TELEPHONE ENCOUNTER
Medicare Online for insurance coverage of right hip steroid injection cpt codes 65978, [id number], . Insurance was verified and procedure is a covered benefit and does not require authorization. Will inform Nursing.

## 2017-06-28 NOTE — PATIENT INSTRUCTIONS
- get hip xrays  - Consider the back injection - epidural.  Stop for 7 days before injection- all aspirin, advil, aleve, fish oil, vitamin E and similar products. Including any medications combined with ibuprofen.    You need a  to get home even if yo for controlled substances: Written prescriptions  · Written prescriptions must be picked up in office. · Please allow the office 48-72 hours to fill the prescription as our physicians rotate between multiple offices and procedure days in the hospitals.   ·

## 2017-06-29 ENCOUNTER — HOSPITAL ENCOUNTER (OUTPATIENT)
Dept: BONE DENSITY | Age: 72
Discharge: HOME | End: 2017-06-29
Attending: FAMILY MEDICINE
Payer: MEDICARE

## 2017-06-29 ENCOUNTER — TELEPHONE (OUTPATIENT)
Dept: NEUROLOGY | Facility: CLINIC | Age: 72
End: 2017-06-29

## 2017-06-29 DIAGNOSIS — Z13.820 SCREENING FOR OSTEOPOROSIS: ICD-10-CM

## 2017-06-29 DIAGNOSIS — M48.061 SPINAL STENOSIS OF LUMBAR REGION: ICD-10-CM

## 2017-06-29 DIAGNOSIS — Z78.0 MENOPAUSE PRESENT: ICD-10-CM

## 2017-06-29 PROCEDURE — 77080 DXA BONE DENSITY AXIAL: CPT | Performed by: FAMILY MEDICINE

## 2017-06-29 NOTE — TELEPHONE ENCOUNTER
Patient was scheduled for right L5 + S1 transforaminal epidural steroid injection under fluoroscopy on Monday, July 10, 2017. Medications and allergies reviewed.  Patient informed to hold aspirins, nsaids, blood thinners, vitamins and fish oils 7 days sen

## 2017-07-07 ENCOUNTER — OFFICE VISIT (OUTPATIENT)
Dept: FAMILY MEDICINE CLINIC | Facility: CLINIC | Age: 72
End: 2017-07-07

## 2017-07-07 VITALS
HEIGHT: 64 IN | WEIGHT: 239.38 LBS | TEMPERATURE: 99 F | SYSTOLIC BLOOD PRESSURE: 137 MMHG | RESPIRATION RATE: 16 BRPM | DIASTOLIC BLOOD PRESSURE: 73 MMHG | BODY MASS INDEX: 40.87 KG/M2 | HEART RATE: 75 BPM

## 2017-07-07 DIAGNOSIS — J02.9 ACUTE PHARYNGITIS, UNSPECIFIED ETIOLOGY: Primary | ICD-10-CM

## 2017-07-07 DIAGNOSIS — J06.9 URI, ACUTE: ICD-10-CM

## 2017-07-07 LAB
CONTROL LINE PRESENT WITH A CLEAR BACKGROUND (YES/NO): YES YES/NO
KIT LOT #: NORMAL NUMERIC
STREP GRP A CUL-SCR: NEGATIVE

## 2017-07-07 PROCEDURE — 99213 OFFICE O/P EST LOW 20 MIN: CPT | Performed by: FAMILY MEDICINE

## 2017-07-07 PROCEDURE — G0463 HOSPITAL OUTPT CLINIC VISIT: HCPCS | Performed by: FAMILY MEDICINE

## 2017-07-07 PROCEDURE — 87880 STREP A ASSAY W/OPTIC: CPT | Performed by: FAMILY MEDICINE

## 2017-07-07 NOTE — PROGRESS NOTES
Patient ID: Taj Rosado is a 73year old female. HPI  Patient presents with:  Sore Throat    July 1, 2017 she started having a very sore throat. She states it was much worse yesterday but think a better last night and even better today.   She st tablet by ORAL route 3 times every day as needed for pain Disp: 60 tablet Rfl: 0   Sertraline HCl 100 MG Oral Tab TAKE 1 TABLET (100 MG TOTAL) BY MOUTH ONCE DAILY.  Disp: 90 tablet Rfl: 2   Rosuvastatin Calcium 20 MG Oral Tab TAKE 1 TABLET (20 MG TOTAL) BY drainage without cobblestoning. Tonsils: normal   Eyes: Conjunctivae are normal.   Neck: Normal range of motion. Neck supple. No thyromegaly present. Cardiovascular: Normal rate, regular rhythm and normal heart sounds.    Lymphadenopathy:     Patient has

## 2017-07-10 ENCOUNTER — OFFICE VISIT (OUTPATIENT)
Dept: SURGERY | Facility: CLINIC | Age: 72
End: 2017-07-10

## 2017-07-10 DIAGNOSIS — M48.061 LUMBAR STENOSIS: Primary | ICD-10-CM

## 2017-07-10 DIAGNOSIS — M54.16 LUMBAR RADICULOPATHY: ICD-10-CM

## 2017-07-10 PROCEDURE — 64484 NJX AA&/STRD TFRM EPI L/S EA: CPT | Performed by: PHYSICAL MEDICINE & REHABILITATION

## 2017-07-10 PROCEDURE — 64483 NJX AA&/STRD TFRM EPI L/S 1: CPT | Performed by: PHYSICAL MEDICINE & REHABILITATION

## 2017-07-10 NOTE — PROCEDURES
[facility] ([place], [place]) OUTPATIENT SURGERY CENTER      PATIENT'S NAME:  Mari Irby RECORD #:   [id number]      DATE OF BIRTH:  11/8/1945  PROCEDURE DATE:  7/10/2017  OPERATING PHYSICIAN:  Sharmila Lofton MD      OPERATIVE REPORT      PREOPERATIVE DI the recovery area and discharged from there when stable.

## 2017-08-22 ENCOUNTER — OFFICE VISIT (OUTPATIENT)
Dept: NEUROLOGY | Facility: CLINIC | Age: 72
End: 2017-08-22

## 2017-08-22 ENCOUNTER — TELEPHONE (OUTPATIENT)
Dept: NEUROLOGY | Facility: CLINIC | Age: 72
End: 2017-08-22

## 2017-08-22 VITALS
RESPIRATION RATE: 19 BRPM | BODY MASS INDEX: 41 KG/M2 | WEIGHT: 241 LBS | DIASTOLIC BLOOD PRESSURE: 72 MMHG | SYSTOLIC BLOOD PRESSURE: 142 MMHG | HEART RATE: 74 BPM

## 2017-08-22 DIAGNOSIS — M54.16 SPINAL STENOSIS OF LUMBAR REGION WITH RADICULOPATHY: Primary | ICD-10-CM

## 2017-08-22 DIAGNOSIS — M47.816 LUMBAR FACET ARTHROPATHY: ICD-10-CM

## 2017-08-22 DIAGNOSIS — M41.25 OTHER IDIOPATHIC SCOLIOSIS, THORACOLUMBAR REGION: ICD-10-CM

## 2017-08-22 DIAGNOSIS — M48.061 SPINAL STENOSIS OF LUMBAR REGION WITH RADICULOPATHY: Primary | ICD-10-CM

## 2017-08-22 PROCEDURE — 99214 OFFICE O/P EST MOD 30 MIN: CPT | Performed by: PHYSICAL MEDICINE & REHABILITATION

## 2017-08-22 RX ORDER — GABAPENTIN 600 MG/1
600 TABLET ORAL EVERY EVENING
Qty: 30 TABLET | Refills: 0 | Status: SHIPPED | OUTPATIENT
Start: 2017-08-22 | End: 2017-09-26

## 2017-08-22 NOTE — PROGRESS NOTES
History of Present Illness:   Patient presents with:  Low Back Pain: pt here for follow up after  right L4 + L5  transforaminal epidural steroid injection on 7/10/17 with 75% relief for 2 weeks. had Hip Xrays done 6/28/17.  pt c/o continued lower back pain Outpatient Prescriptions:  gabapentin 100 MG Oral Cap Take 1 capsule (100 mg total) by mouth 3 (three) times daily.  (Patient taking differently: Take 200 mg by mouth nightly.  ) Disp: 90 capsule Rfl: 0   MELOXICAM 15 MG Oral Tab TAKE ONE TABLET BY MOUTH EV diarrhea  Genitourinary:negative for urinary incontinence  Integument/breast: negative for rash  Hematologic/lymphatic: negative for bleeding  Musculoskeletal: see hpi  Neurological: negative for tremors  Behavioral/Psych: negative for sleep disturbance  E bad and only intermittent. 2. New mri lumbar for new changes. Recheck stenosis  3. Increase gabapentin to 600mg qhs.   4. Follow up after mri.       Lexi Fernández MD  8/22/2017

## 2017-08-22 NOTE — PATIENT INSTRUCTIONS
- take one pill of the gabapentin 600mg at night  - get new mri of the low back  - follow up after mri.   - consider 3rd back injection after mri  - if right groin hurts more, call to schedule injection to right hip      As of October 6th 2014, the Drug Enf will be picking up prescription, office must be given name of individual in advance and they must present an ID as well. · The name of the person picking up your prescription must be documented in your chart.

## 2017-08-22 NOTE — TELEPHONE ENCOUNTER
Pt. informed insurance was verified and procedure MRI L-spine wo is a covered benefit and does not require authorization. L/m advising no authorization is required for MRI. Can proceed with scheduling appt.

## 2017-08-28 ENCOUNTER — HOSPITAL ENCOUNTER (OUTPATIENT)
Dept: MRI IMAGING | Age: 72
Discharge: HOME | End: 2017-08-28
Attending: PHYSICAL MEDICINE & REHABILITATION
Payer: MEDICARE

## 2017-08-28 DIAGNOSIS — M48.061 SPINAL STENOSIS OF LUMBAR REGION WITH RADICULOPATHY: ICD-10-CM

## 2017-08-28 DIAGNOSIS — M41.25 OTHER IDIOPATHIC SCOLIOSIS, THORACOLUMBAR REGION: ICD-10-CM

## 2017-08-28 DIAGNOSIS — M54.50 CHRONIC BILATERAL LOW BACK PAIN WITHOUT SCIATICA: ICD-10-CM

## 2017-08-28 DIAGNOSIS — G89.29 CHRONIC BILATERAL LOW BACK PAIN WITHOUT SCIATICA: ICD-10-CM

## 2017-08-28 DIAGNOSIS — M54.16 SPINAL STENOSIS OF LUMBAR REGION WITH RADICULOPATHY: ICD-10-CM

## 2017-08-28 DIAGNOSIS — M47.816 LUMBAR FACET ARTHROPATHY: ICD-10-CM

## 2017-08-28 PROCEDURE — 72148 MRI LUMBAR SPINE W/O DYE: CPT | Performed by: PHYSICAL MEDICINE & REHABILITATION

## 2017-08-29 RX ORDER — HYDROCODONE BITARTRATE AND ACETAMINOPHEN 5; 325 MG/1; MG/1
TABLET ORAL
Qty: 60 TABLET | Refills: 0 | Status: SHIPPED | OUTPATIENT
Start: 2017-08-29 | End: 2017-11-29

## 2017-08-29 NOTE — TELEPHONE ENCOUNTER
Please advise regarding refill request    Refill Protocol Appointment Criteria  · Appointment scheduled in the past 6 months or in the next 3 months  Recent Outpatient Visits            1 week ago Spinal stenosis of lumbar region with radiculopathy    [facility]

## 2017-08-29 NOTE — TELEPHONE ENCOUNTER
From: Rodriguez Hutchison  Sent: 8/28/2017 11:25 AM CDT  Subject: Medication Renewal Request    Rodriguez Hutchison would like a refill of the following medications:  HYDROcodone-acetaminophen (NORCO) 5-325 MG Oral Tab MAIA Chery    Preferred pharm

## 2017-09-11 ENCOUNTER — TELEPHONE (OUTPATIENT)
Dept: NEUROLOGY | Facility: CLINIC | Age: 72
End: 2017-09-11

## 2017-09-11 DIAGNOSIS — M54.16 LUMBAR RADICULOPATHY: ICD-10-CM

## 2017-09-11 DIAGNOSIS — M51.26 BULGING LUMBAR DISC: Primary | ICD-10-CM

## 2017-09-11 DIAGNOSIS — M48.061 LUMBAR STENOSIS: ICD-10-CM

## 2017-09-11 NOTE — TELEPHONE ENCOUNTER
S/w pt to notify her of results stated below pt would like to schedule f/u w/ Dr. Kevin Reed. Per  route call to [place].

## 2017-09-11 NOTE — TELEPHONE ENCOUNTER
----- Message from Cornelia Saenz MD sent at 9/9/2017 12:41 PM CDT -----  pls call viewed- new right sided disc bulge at L4-5 -mk

## 2017-09-13 ENCOUNTER — TELEPHONE (OUTPATIENT)
Dept: NEUROLOGY | Facility: CLINIC | Age: 72
End: 2017-09-13

## 2017-09-13 NOTE — TELEPHONE ENCOUNTER
Per phone encounter 9/11/17 Dr Mana Chacko will discuss injection at [facility]  9/14/17. Patient not called at this time.

## 2017-09-13 NOTE — TELEPHONE ENCOUNTER
Medicare Online for insurance coverage of injection procedure Right L4 + L5 transforaminal epidural steroid injection under fluoroscopy with CPT code 22834 / 57818 / 29747 .  Insurance was verified and procedure  is a covered benefit and does not require a

## 2017-09-14 ENCOUNTER — OFFICE VISIT (OUTPATIENT)
Dept: NEUROLOGY | Facility: CLINIC | Age: 72
End: 2017-09-14

## 2017-09-14 VITALS
BODY MASS INDEX: 39.61 KG/M2 | RESPIRATION RATE: 16 BRPM | DIASTOLIC BLOOD PRESSURE: 70 MMHG | SYSTOLIC BLOOD PRESSURE: 118 MMHG | WEIGHT: 232 LBS | HEART RATE: 80 BPM | HEIGHT: 64 IN

## 2017-09-14 DIAGNOSIS — M48.061 LUMBAR STENOSIS: Primary | ICD-10-CM

## 2017-09-14 DIAGNOSIS — M47.816 LUMBAR FACET ARTHROPATHY: ICD-10-CM

## 2017-09-14 DIAGNOSIS — M51.26 BULGING LUMBAR DISC: ICD-10-CM

## 2017-09-14 DIAGNOSIS — M16.11 PRIMARY OSTEOARTHRITIS OF RIGHT HIP: ICD-10-CM

## 2017-09-14 DIAGNOSIS — M54.16 LUMBAR RADICULOPATHY: ICD-10-CM

## 2017-09-14 DIAGNOSIS — M41.25 OTHER IDIOPATHIC SCOLIOSIS, THORACOLUMBAR REGION: ICD-10-CM

## 2017-09-14 DIAGNOSIS — M25.552 HIP PAIN, BILATERAL: ICD-10-CM

## 2017-09-14 DIAGNOSIS — M25.551 HIP PAIN, BILATERAL: ICD-10-CM

## 2017-09-14 PROCEDURE — 99214 OFFICE O/P EST MOD 30 MIN: CPT | Performed by: PHYSICAL MEDICINE & REHABILITATION

## 2017-09-14 NOTE — PATIENT INSTRUCTIONS
- call one week after the diverticulitis symptoms are gone and make sure you are off any antibiotics for one week  - if before oct 4, will do right hip in the office. (groin).  You don't have to stop meloxicam for your hip.  - if after oct 4, then schedule prescription is due for a refill, you may be due for a follow up appointment. · To best provide you care, patients receiving routine medications need to be seen at least once a year.        protocol for controlled substances: Written prescriptions

## 2017-09-15 NOTE — PROGRESS NOTES
History of Present Illness:   Patient presents with:  Low Back Pain: LOV: 8/22/17. F/U on lower back pain. Patient states that her right lower back has worsened since last visit.  Patient states that the pain is 10/10 in right lower back when she is up and HISTORY      Comment: Ear surgery  No date: TUBAL LIGATION  2/12/14: YAG CAPSULOTOMY - OD - RIGHT EYE Right      Comment: RJM    No Known Allergies      Current Outpatient Prescriptions:  HYDROcodone-acetaminophen (NORCO) 5-325 MG Oral Tab take 1 - 2 table forconstipation, diarrhea  Genitourinary:negative for urinary incontinence  Integument/breast: negative for rash  Hematologic/lymphatic: negative for bleeding  Musculoskeletal: see hpi  Neurological: negative for tremors  Behavioral/Psych: negative for sle

## 2017-09-21 RX ORDER — MELOXICAM 15 MG/1
TABLET ORAL
Qty: 90 TABLET | Refills: 0 | Status: SHIPPED | OUTPATIENT
Start: 2017-09-21 | End: 2017-12-21

## 2017-09-21 NOTE — TELEPHONE ENCOUNTER
Rx request for Meloxicam 15 mg, PASSED Non-Narcotic Pain med Protocol. Rx filled per protocol.     Refill Protocol Appointment Criteria  · Appointment scheduled in the past 6 months or in the next 3 months  Recent Outpatient Visits            1 week ago Clair Nyhan

## 2017-09-26 RX ORDER — GABAPENTIN 600 MG/1
TABLET ORAL
Qty: 30 TABLET | Refills: 1 | Status: SHIPPED | OUTPATIENT
Start: 2017-09-26 | End: 2017-11-22

## 2017-09-26 NOTE — TELEPHONE ENCOUNTER
Refill request for gabapentin 600 mg, take 1 tab nightly, #30, no refills    LOV: 9/14/17  NOV: None  Last refilled on 8/22/17

## 2017-09-28 ENCOUNTER — TELEPHONE (OUTPATIENT)
Dept: NEUROLOGY | Facility: CLINIC | Age: 72
End: 2017-09-28

## 2017-09-28 NOTE — TELEPHONE ENCOUNTER
Medicare Online for insurance coverage of right hip joint steroid injection cpt codes 94532, [id number], . Insurance was verified and procedure is a covered benefit and does not require authorization. Will inform Nursing.

## 2017-09-28 NOTE — TELEPHONE ENCOUNTER
Spoke to pateint, states still has slight pain from diverticulitis. On no antibiotic, is afebrile. Patient request hip injection before back injection. Hip injection authorization  17.  Will resubmit to insurance, after authorization will sche

## 2017-10-06 ENCOUNTER — OFFICE VISIT (OUTPATIENT)
Dept: NEUROLOGY | Facility: CLINIC | Age: 72
End: 2017-10-06

## 2017-10-06 VITALS
RESPIRATION RATE: 16 BRPM | HEART RATE: 68 BPM | BODY MASS INDEX: 38.93 KG/M2 | SYSTOLIC BLOOD PRESSURE: 132 MMHG | HEIGHT: 64 IN | DIASTOLIC BLOOD PRESSURE: 70 MMHG | WEIGHT: 228 LBS

## 2017-10-06 DIAGNOSIS — M25.551 HIP PAIN, BILATERAL: ICD-10-CM

## 2017-10-06 DIAGNOSIS — M25.552 HIP PAIN, BILATERAL: ICD-10-CM

## 2017-10-06 DIAGNOSIS — M16.11 PRIMARY OSTEOARTHRITIS OF RIGHT HIP: Primary | ICD-10-CM

## 2017-10-06 DIAGNOSIS — R10.31 GROIN PAIN, RIGHT: ICD-10-CM

## 2017-10-06 PROCEDURE — 20611 DRAIN/INJ JOINT/BURSA W/US: CPT | Performed by: PHYSICAL MEDICINE & REHABILITATION

## 2017-10-06 RX ORDER — TRIAMCINOLONE ACETONIDE 40 MG/ML
60 INJECTION, SUSPENSION INTRA-ARTICULAR; INTRAMUSCULAR ONCE
Status: COMPLETED | OUTPATIENT
Start: 2017-10-06 | End: 2017-10-06

## 2017-10-06 RX ORDER — BUPIVACAINE HYDROCHLORIDE 5 MG/ML
2 INJECTION, SOLUTION EPIDURAL; INTRACAUDAL ONCE
Status: COMPLETED | OUTPATIENT
Start: 2017-10-06 | End: 2017-10-06

## 2017-10-06 RX ORDER — LIDOCAINE HYDROCHLORIDE 10 MG/ML
3 INJECTION, SOLUTION INFILTRATION; PERINEURAL ONCE
Status: COMPLETED | OUTPATIENT
Start: 2017-10-06 | End: 2017-10-06

## 2017-10-06 RX ORDER — LIDOCAINE HYDROCHLORIDE 10 MG/ML
2 INJECTION, SOLUTION INFILTRATION; PERINEURAL ONCE
Status: COMPLETED | OUTPATIENT
Start: 2017-10-06 | End: 2017-10-06

## 2017-10-06 NOTE — PATIENT INSTRUCTIONS
- in 4 weeks if your back and leg are still hurting, schedule the back epidural.  Then schedule 4 weeks follow up after the injection. - otherwise check back if needed. - - ice to injection area today if needed. 10-15 minutes at a time.   - tylenol extra the [facility]. · Patient must present photo ID at time of . If a designated family member will be picking up prescription, office must be given name of individual in advance and they must present an ID as well.   · The name of the person picking up

## 2017-10-07 NOTE — PROGRESS NOTES
Verbal and written consent given. See procedure note. Right hip joint steroid injection under us guidance today. Post inj instructions given. Patient will see how she does.  In 2 weeks she will see whether she wants to do right lumbar tfesi for the ne

## 2017-10-07 NOTE — PROCEDURES
10/6/2017  Verbal and written consent given. The patient is here for a right  hip joint injection done under ultrasound guidance. Patient was placed in supine with pillow under knees for comfort. The skin was cleaned with poviodine swabs x 3.   Under

## 2017-11-06 ENCOUNTER — OFFICE VISIT (OUTPATIENT)
Dept: OBGYN CLINIC | Facility: CLINIC | Age: 72
End: 2017-11-06

## 2017-11-06 VITALS
BODY MASS INDEX: 39 KG/M2 | SYSTOLIC BLOOD PRESSURE: 145 MMHG | HEART RATE: 68 BPM | WEIGHT: 226 LBS | DIASTOLIC BLOOD PRESSURE: 77 MMHG

## 2017-11-06 DIAGNOSIS — Z12.31 ENCOUNTER FOR SCREENING MAMMOGRAM FOR BREAST CANCER: ICD-10-CM

## 2017-11-06 DIAGNOSIS — Z01.419 ENCOUNTER FOR GYNECOLOGICAL EXAMINATION: Primary | ICD-10-CM

## 2017-11-06 PROCEDURE — G0101 CA SCREEN;PELVIC/BREAST EXAM: HCPCS | Performed by: OBSTETRICS & GYNECOLOGY

## 2017-11-06 NOTE — PROGRESS NOTES
Sima Martinez is a 73year old female  No LMP recorded. Patient is postmenopausal. here for annual exam.       Last seen 7/24/15. Last pap 2015 normal with neg HPV. No gyne issues.     OBSTETRICS HISTORY:  Obstetric History     T1  P ONE TIME DAILY IN THE P.M.   Disp: 30 tablet Rfl: 1   MELOXICAM 15 MG Oral Tab TAKE ONE TABLET BY MOUTH EVERY DAY Disp: 90 tablet Rfl: 0   HYDROcodone-acetaminophen (NORCO) 5-325 MG Oral Tab take 1 - 2 tablet by ORAL route 3 times every day as needed for pa urination. Heme/Lymph:  easy bruising or bleeding.     PHYSICAL EXAM:   /77   Pulse 68   Wt 226 lb (102.5 kg)   BMI 38.79 kg/m²   Wt Readings from Last 2 Encounters:  11/06/17 : 226 lb (102.5 kg)  10/06/17 : 228 lb (103.4 kg)    Body mass index is 38

## 2017-11-16 ENCOUNTER — HOSPITAL ENCOUNTER (OUTPATIENT)
Dept: MAMMOGRAPHY | Age: 72
Discharge: HOME | End: 2017-11-16
Attending: OBSTETRICS & GYNECOLOGY
Payer: MEDICARE

## 2017-11-16 DIAGNOSIS — Z12.31 ENCOUNTER FOR SCREENING MAMMOGRAM FOR BREAST CANCER: ICD-10-CM

## 2017-11-16 PROCEDURE — 77067 SCR MAMMO BI INCL CAD: CPT | Performed by: OBSTETRICS & GYNECOLOGY

## 2017-11-22 RX ORDER — GABAPENTIN 600 MG/1
TABLET ORAL
Qty: 90 TABLET | Refills: 0 | Status: SHIPPED | OUTPATIENT
Start: 2017-11-22 | End: 2018-03-21

## 2017-11-22 NOTE — TELEPHONE ENCOUNTER
Refill request for Gabapentin 600 mg take 1 tab once in the evening, qt:90 0 refills    LOV: 10/6/17  NOV: None  Last refill: 9/26/17 qt:30 1 refill

## 2017-11-29 DIAGNOSIS — M54.50 CHRONIC BILATERAL LOW BACK PAIN WITHOUT SCIATICA: ICD-10-CM

## 2017-11-29 DIAGNOSIS — G89.29 CHRONIC BILATERAL LOW BACK PAIN WITHOUT SCIATICA: ICD-10-CM

## 2017-12-01 RX ORDER — HYDROCODONE BITARTRATE AND ACETAMINOPHEN 5; 325 MG/1; MG/1
TABLET ORAL
Qty: 60 TABLET | Refills: 0 | Status: SHIPPED | OUTPATIENT
Start: 2017-12-01 | End: 2018-01-31

## 2017-12-08 ENCOUNTER — OFFICE VISIT (OUTPATIENT)
Dept: DERMATOLOGY CLINIC | Facility: CLINIC | Age: 72
End: 2017-12-08

## 2017-12-08 DIAGNOSIS — L82.1 SEBORRHEIC KERATOSES: ICD-10-CM

## 2017-12-08 DIAGNOSIS — D23.30 BENIGN NEOPLASM OF SKIN OF FACE: ICD-10-CM

## 2017-12-08 DIAGNOSIS — D23.4 BENIGN NEOPLASM OF SCALP AND SKIN OF NECK: ICD-10-CM

## 2017-12-08 DIAGNOSIS — D23.5 BENIGN NEOPLASM OF SKIN OF TRUNK, EXCEPT SCROTUM: ICD-10-CM

## 2017-12-08 DIAGNOSIS — D23.60 BENIGN NEOPLASM OF SKIN OF UPPER LIMB, INCLUDING SHOULDER, UNSPECIFIED LATERALITY: ICD-10-CM

## 2017-12-08 DIAGNOSIS — L82.0 INFLAMED SEBORRHEIC KERATOSIS: Primary | ICD-10-CM

## 2017-12-08 DIAGNOSIS — Z85.828 HISTORY OF SKIN CANCER: ICD-10-CM

## 2017-12-08 PROCEDURE — 99213 OFFICE O/P EST LOW 20 MIN: CPT | Performed by: DERMATOLOGY

## 2017-12-08 PROCEDURE — G0463 HOSPITAL OUTPT CLINIC VISIT: HCPCS | Performed by: DERMATOLOGY

## 2017-12-18 NOTE — PROGRESS NOTES
Thedora Lanes is a 81year old female. HPI:     CC:  Patient presents with:  Upper Body Exam: \"Established pt\"- LOV- 10-28-16.  Pt presenting today with brown lesion of concern to left temple region, raised brown lesions to bilateral hands and right THE EVENING Disp: 90 tablet Rfl: 0   MELOXICAM 15 MG Oral Tab TAKE ONE TABLET BY MOUTH EVERY DAY Disp: 90 tablet Rfl: 0   Sertraline HCl 100 MG Oral Tab TAKE 1 TABLET (100 MG TOTAL) BY MOUTH ONCE DAILY.  Disp: 90 tablet Rfl: 2   Rosuvastatin Calcium 20 MG O file     Social History Main Topics   Smoking status: Former Smoker     Types: Cigarettes    Quit date: 1/1/2002    Smokeless tobacco: Never Used    Alcohol use Yes  0.0 oz/week     Comment: wine 3-4 times week    Drug use: No    Sexual activity: Not on fi legs, palms. Multiple light to medium brown, well marginated, uniformly pigmented, macules and papules 6 mm and less scattered on exam. pigmented lesions examined with dermoscopy benign-appearing patterns.      Waxy tannish keratotic papules scattered,

## 2017-12-27 RX ORDER — MELOXICAM 15 MG/1
TABLET ORAL
Qty: 90 TABLET | Refills: 0 | Status: SHIPPED | OUTPATIENT
Start: 2017-12-27 | End: 2018-03-25

## 2017-12-27 NOTE — TELEPHONE ENCOUNTER
Refill Protocol Appointment Criteria  · Appointment scheduled in the past 6 months or in the next 3 months  Recent Outpatient Visits            2 weeks ago Inflamed seborrheic keratosis    [facility] Lm Salgado MD    Office

## 2018-01-09 ENCOUNTER — OFFICE VISIT (OUTPATIENT)
Dept: FAMILY MEDICINE CLINIC | Facility: CLINIC | Age: 73
End: 2018-01-09

## 2018-01-09 VITALS
DIASTOLIC BLOOD PRESSURE: 77 MMHG | TEMPERATURE: 98 F | BODY MASS INDEX: 36.37 KG/M2 | SYSTOLIC BLOOD PRESSURE: 135 MMHG | WEIGHT: 213 LBS | HEIGHT: 64 IN | HEART RATE: 68 BPM

## 2018-01-09 DIAGNOSIS — R06.2 WHEEZING: ICD-10-CM

## 2018-01-09 DIAGNOSIS — K63.5 POLYP OF COLON, UNSPECIFIED PART OF COLON, UNSPECIFIED TYPE: ICD-10-CM

## 2018-01-09 DIAGNOSIS — R05.9 COUGH: Primary | ICD-10-CM

## 2018-01-09 PROCEDURE — G0463 HOSPITAL OUTPT CLINIC VISIT: HCPCS | Performed by: FAMILY MEDICINE

## 2018-01-09 PROCEDURE — 99213 OFFICE O/P EST LOW 20 MIN: CPT | Performed by: FAMILY MEDICINE

## 2018-01-09 RX ORDER — AZITHROMYCIN 250 MG/1
TABLET, FILM COATED ORAL
Qty: 6 TABLET | Refills: 0 | Status: SHIPPED | OUTPATIENT
Start: 2018-01-09 | End: 2018-01-14

## 2018-01-09 RX ORDER — PREDNISONE 20 MG/1
TABLET ORAL
Qty: 10 TABLET | Refills: 0 | Status: SHIPPED | OUTPATIENT
Start: 2018-01-09 | End: 2018-04-23 | Stop reason: ALTCHOICE

## 2018-01-09 NOTE — PROGRESS NOTES
Patient ID: Arch Sacks is a 81year old female. HPI  Patient presents with:  Cold  Medication Request    Her friend is following a diet with a weight loss specialist.  Patient is following this friend informally.   She states she has been decrea - OD - RIGHT EYE Right      Comment: JOEY       Current Outpatient Prescriptions:  MELOXICAM 15 MG Oral Tab TAKE ONE TABLET BY MOUTH ONE TIME DAILY  Disp: 90 tablet Rfl: 0   HYDROcodone-acetaminophen (NORCO) 5-325 MG Oral Tab take 1 - 2 tablet by ORAL route Nose: Mucosal edema and rhinorrhea present. Nose: mild congestion with clear rhinorrhea. OP: clear post nasal drainage without cobblestoning. Tonsils: normal   Eyes: Conjunctivae are normal.   Neck: Normal range of motion. Neck supple.  No thyromegaly

## 2018-01-10 ENCOUNTER — PATIENT OUTREACH (OUTPATIENT)
Dept: CASE MANAGEMENT | Age: 73
End: 2018-01-10

## 2018-01-10 NOTE — PROGRESS NOTES
Outreached to patient in regards to enrollment to Chronic Care Management program. Patient stated she works full time and considers Dr. Milady Fritz family and no need to enroll at this time.   I informed patient I will mail out letter for future reference and pa

## 2018-01-31 DIAGNOSIS — M54.50 CHRONIC BILATERAL LOW BACK PAIN WITHOUT SCIATICA: ICD-10-CM

## 2018-01-31 DIAGNOSIS — G89.29 CHRONIC BILATERAL LOW BACK PAIN WITHOUT SCIATICA: ICD-10-CM

## 2018-02-06 RX ORDER — HYDROCODONE BITARTRATE AND ACETAMINOPHEN 5; 325 MG/1; MG/1
TABLET ORAL
Qty: 60 TABLET | Refills: 0 | Status: SHIPPED | OUTPATIENT
Start: 2018-02-06 | End: 2018-03-23

## 2018-02-20 ENCOUNTER — PATIENT MESSAGE (OUTPATIENT)
Dept: FAMILY MEDICINE CLINIC | Facility: CLINIC | Age: 73
End: 2018-02-20

## 2018-02-22 NOTE — TELEPHONE ENCOUNTER
From: Bright Jo  To: Carisa Cook DO  Sent: 2/20/2018 11:43 AM CST  Subject: Referral Request    I had an appt. with Dr. Laquetta Severe last Wed. but Monday the called and said she is no longer in this office. I am trying to find her.  Kanu Chen didn't w

## 2018-03-15 DIAGNOSIS — I10 ESSENTIAL HYPERTENSION: ICD-10-CM

## 2018-03-16 RX ORDER — AMLODIPINE BESYLATE 5 MG/1
5 TABLET ORAL
Qty: 90 TABLET | Refills: 1 | Status: SHIPPED | OUTPATIENT
Start: 2018-03-16 | End: 2018-08-02

## 2018-03-16 NOTE — TELEPHONE ENCOUNTER
Hypertensive Medications  Protocol Criteria:  · Appointment scheduled in the past 6 months or in the next 3 months  · BMP or CMP in the past 12 months  · Creatinine result < 2  Recent Outpatient Visits            2 months ago Cough    Aleena Washburn  6-16-17 # 90 + 2    Refill approved per protocol.

## 2018-03-21 ENCOUNTER — MED REC SCAN ONLY (OUTPATIENT)
Dept: NEUROLOGY | Facility: CLINIC | Age: 73
End: 2018-03-21

## 2018-03-21 NOTE — TELEPHONE ENCOUNTER
Former Dr. Avinash Barcenas patient    Medication request: Gabapentin 600mg qhs     LOV 10/6/17  NOV 4/23/18    Last refill: 11/22/17 #90

## 2018-03-22 RX ORDER — GABAPENTIN 600 MG/1
600 TABLET ORAL EVERY EVENING
Qty: 30 TABLET | Refills: 0 | Status: SHIPPED | OUTPATIENT
Start: 2018-03-22 | End: 2018-05-08

## 2018-03-23 DIAGNOSIS — G89.29 CHRONIC BILATERAL LOW BACK PAIN WITHOUT SCIATICA: ICD-10-CM

## 2018-03-23 DIAGNOSIS — M54.50 CHRONIC BILATERAL LOW BACK PAIN WITHOUT SCIATICA: ICD-10-CM

## 2018-03-23 NOTE — TELEPHONE ENCOUNTER
Dr. Olson Distance:  Last rx for norco given 2/6/18.    LOV 1/9/18    Please respond to pool: EM FM ADO LPN/CMA

## 2018-03-23 NOTE — TELEPHONE ENCOUNTER
From: Yanick Champion  Sent: 3/23/2018 7:34 AM CDT  Subject: Medication Renewal Request    Yanick Champion would like a refill of the following medications:     HYDROcodone-acetaminophen ([facility]) 5-325 MG Oral Tab Gertrude Johnson DO]   Patient Comment: need prescription    Preferred pharmacy: [id number] - [address] [phone], [phone]

## 2018-03-25 DIAGNOSIS — F41.1 ANXIETY STATE: ICD-10-CM

## 2018-03-26 RX ORDER — HYDROCODONE BITARTRATE AND ACETAMINOPHEN 5; 325 MG/1; MG/1
TABLET ORAL
Qty: 60 TABLET | Refills: 0 | Status: SHIPPED | OUTPATIENT
Start: 2018-03-26 | End: 2018-05-18

## 2018-03-28 RX ORDER — SERTRALINE HYDROCHLORIDE 100 MG/1
TABLET, FILM COATED ORAL
Qty: 90 TABLET | Refills: 1 | Status: SHIPPED | OUTPATIENT
Start: 2018-03-28 | End: 2018-08-02

## 2018-03-28 NOTE — TELEPHONE ENCOUNTER
Refill Protocol Appointment Criteria  · Appointment scheduled in the past 6 months or in the next 3 months  Recent Outpatient Visits            2 months ago Cough    [address], [address], [place],     Office Visit    3 months ago I

## 2018-03-29 RX ORDER — MELOXICAM 15 MG/1
TABLET ORAL
Qty: 90 TABLET | Refills: 0 | Status: SHIPPED | OUTPATIENT
Start: 2018-03-29 | End: 2018-06-25

## 2018-04-06 DIAGNOSIS — I70.90 ATHEROSCLEROSIS: ICD-10-CM

## 2018-04-09 RX ORDER — ROSUVASTATIN CALCIUM 20 MG/1
TABLET, COATED ORAL
Qty: 90 TABLET | Refills: 0 | Status: SHIPPED | OUTPATIENT
Start: 2018-04-09 | End: 2018-07-04

## 2018-04-09 NOTE — TELEPHONE ENCOUNTER
Refilled per protocol    Cholesterol Medications  Protocol Criteria:  · Appointment scheduled in the past 12 months or in the next 3 months  · ALT & LDL on file in the past 12 months  · ALT result < 80  · LDL result <130   Recent Outpatient Visits

## 2018-04-23 ENCOUNTER — OFFICE VISIT (OUTPATIENT)
Dept: NEUROLOGY | Facility: CLINIC | Age: 73
End: 2018-04-23

## 2018-04-23 ENCOUNTER — TELEPHONE (OUTPATIENT)
Dept: NEUROLOGY | Facility: CLINIC | Age: 73
End: 2018-04-23

## 2018-04-23 VITALS — RESPIRATION RATE: 15 BRPM | SYSTOLIC BLOOD PRESSURE: 120 MMHG | DIASTOLIC BLOOD PRESSURE: 62 MMHG | HEART RATE: 79 BPM

## 2018-04-23 DIAGNOSIS — M25.551 RIGHT HIP PAIN: ICD-10-CM

## 2018-04-23 DIAGNOSIS — M54.16 LUMBAR RADICULOPATHY: ICD-10-CM

## 2018-04-23 DIAGNOSIS — G89.29 CHRONIC BILATERAL LOW BACK PAIN WITH RIGHT-SIDED SCIATICA: Primary | ICD-10-CM

## 2018-04-23 DIAGNOSIS — M51.26 LUMBAR HERNIATED DISC: ICD-10-CM

## 2018-04-23 DIAGNOSIS — M51.9 LUMBAR DISC DISEASE: ICD-10-CM

## 2018-04-23 DIAGNOSIS — M48.061 SPINAL STENOSIS OF LUMBAR REGION WITHOUT NEUROGENIC CLAUDICATION: ICD-10-CM

## 2018-04-23 DIAGNOSIS — M54.41 CHRONIC BILATERAL LOW BACK PAIN WITH RIGHT-SIDED SCIATICA: Primary | ICD-10-CM

## 2018-04-23 DIAGNOSIS — M16.0 PRIMARY OSTEOARTHRITIS OF BOTH HIPS: ICD-10-CM

## 2018-04-23 DIAGNOSIS — M48.061 LUMBAR FORAMINAL STENOSIS: ICD-10-CM

## 2018-04-23 PROCEDURE — 99214 OFFICE O/P EST MOD 30 MIN: CPT | Performed by: PHYSICAL MEDICINE & REHABILITATION

## 2018-04-23 NOTE — TELEPHONE ENCOUNTER
Medicare Online for insurance coverage of right hip injection cpt codes 82974, 29961,. Insurance was verified and procedure is a covered benefit and does not require authorization. Will inform Nursing.

## 2018-04-23 NOTE — PATIENT INSTRUCTIONS
As of October 6th 2014, the Drug Enforcement Agency [facility]) is reclassifying all hydrocodone combination medications from Schedule III to Schedule II. This includes medications such as Norco, Vicodin, Lortab, Zohydro, and Vicoprofen.     What this means for y will do a right hip injection under ultrasound guidance once I have insurance approval.    She will start PT on the lumbar spine and the right hip. She will continue with the Mobic for the pain as needed.     The patient will follow up in 2-3 months, but

## 2018-04-23 NOTE — PROGRESS NOTES
Low Back Pain H & P    Chief Complaint: Patient presents with:  Low Back Pain: former Dr. Guy Kapoor patient. pt c/o lower back pain and right hip pain radiating in the right groin area worse when walking/standing/sitting.  LOV 10/6/17 had right hip injection with SC ( unknown type)  [OTHER] Father    • Diabetes Mother    • Hypertension Mother    • Diabetes Paternal Grandmother    • Glaucoma Neg      No Family h/o Glaucoma   • Macular degeneration Neg      No Family h/o Macular degeneration       Social History bilateral Greater Trochanteric Bursa     Vascular lower extremity:   Dorsalis pedis pulse-RIGHT 2+   Dorsalis pedis pulse-LEFT 2+   Tibialis posterior pulse-RIGHT 2+   Tibialis posterior pulse-LEFT 2+     Neurological Lower Extremity:    Light Touch Sensat a right hip injection under ultrasound guidance once I have insurance approval.    She will start PT on the lumbar spine and the right hip. She will continue with the Mobic for the pain as needed.     The patient will follow up in 2-3 months, but the herson

## 2018-05-01 ENCOUNTER — OFFICE VISIT (OUTPATIENT)
Dept: NEUROLOGY | Facility: CLINIC | Age: 73
End: 2018-05-01

## 2018-05-01 VITALS
BODY MASS INDEX: 38.41 KG/M2 | RESPIRATION RATE: 16 BRPM | SYSTOLIC BLOOD PRESSURE: 126 MMHG | HEART RATE: 68 BPM | WEIGHT: 225 LBS | DIASTOLIC BLOOD PRESSURE: 86 MMHG | HEIGHT: 64 IN

## 2018-05-01 DIAGNOSIS — M54.41 CHRONIC BILATERAL LOW BACK PAIN WITH RIGHT-SIDED SCIATICA: ICD-10-CM

## 2018-05-01 DIAGNOSIS — M16.0 PRIMARY OSTEOARTHRITIS OF BOTH HIPS: ICD-10-CM

## 2018-05-01 DIAGNOSIS — G89.29 CHRONIC BILATERAL LOW BACK PAIN WITH RIGHT-SIDED SCIATICA: ICD-10-CM

## 2018-05-01 DIAGNOSIS — M25.551 RIGHT HIP PAIN: Primary | ICD-10-CM

## 2018-05-01 PROCEDURE — 20611 DRAIN/INJ JOINT/BURSA W/US: CPT | Performed by: PHYSICAL MEDICINE & REHABILITATION

## 2018-05-01 RX ORDER — BUPIVACAINE HYDROCHLORIDE 5 MG/ML
2.5 INJECTION, SOLUTION EPIDURAL; INTRACAUDAL ONCE
Status: COMPLETED | OUTPATIENT
Start: 2018-05-01 | End: 2018-05-01

## 2018-05-01 RX ORDER — TRIAMCINOLONE ACETONIDE 40 MG/ML
60 INJECTION, SUSPENSION INTRA-ARTICULAR; INTRAMUSCULAR ONCE
Status: COMPLETED | OUTPATIENT
Start: 2018-05-01 | End: 2018-05-01

## 2018-05-01 RX ORDER — LIDOCAINE HYDROCHLORIDE 10 MG/ML
3 INJECTION, SOLUTION INFILTRATION; PERINEURAL ONCE
Status: COMPLETED | OUTPATIENT
Start: 2018-05-01 | End: 2018-05-01

## 2018-05-01 RX ORDER — LIDOCAINE HYDROCHLORIDE 10 MG/ML
1 INJECTION, SOLUTION INFILTRATION; PERINEURAL ONCE
Status: COMPLETED | OUTPATIENT
Start: 2018-05-01 | End: 2018-05-01

## 2018-05-03 ENCOUNTER — MED REC SCAN ONLY (OUTPATIENT)
Dept: NEUROLOGY | Facility: CLINIC | Age: 73
End: 2018-05-03

## 2018-05-03 NOTE — PROCEDURES
The patient is here for a right hip injection done under ultrasound guidance. Under ultrasound guidance the right femoral-acetabular joint was identified and a glenroy was placed on the patient's skin. The skin was cleaned with betadyne swabs x 3 and anesthet

## 2018-05-10 RX ORDER — GABAPENTIN 600 MG/1
TABLET ORAL
Qty: 30 TABLET | Refills: 2 | Status: SHIPPED | OUTPATIENT
Start: 2018-05-10 | End: 2018-07-31

## 2018-05-18 DIAGNOSIS — M54.50 CHRONIC BILATERAL LOW BACK PAIN WITHOUT SCIATICA: ICD-10-CM

## 2018-05-18 DIAGNOSIS — G89.29 CHRONIC BILATERAL LOW BACK PAIN WITHOUT SCIATICA: ICD-10-CM

## 2018-05-18 NOTE — TELEPHONE ENCOUNTER
Last Rx: 3/26/18 #60    Medication pending for review. If approved, please print, sign, and ask on-site staff to inform pt when ready for .  Please respond to pool: EM FM ADO LPN/CMA    Recent Outpatient Visits            2 weeks ago Right hip pain

## 2018-05-18 NOTE — TELEPHONE ENCOUNTER
From: Haile Gruber  Sent: 5/18/2018 10:31 AM CDT  Subject: Medication Renewal Request    Haile Gruber would like a refill of the following medications:     HYDROcodone-acetaminophen ([facility]) 5-325 MG Oral Tab Deonte Sorenson DO]   Patient Junior Doll

## 2018-05-21 RX ORDER — HYDROCODONE BITARTRATE AND ACETAMINOPHEN 5; 325 MG/1; MG/1
TABLET ORAL
Qty: 60 TABLET | Refills: 0 | Status: SHIPPED | OUTPATIENT
Start: 2018-05-21 | End: 2018-07-25

## 2018-06-20 DIAGNOSIS — J30.1 SEASONAL ALLERGIC RHINITIS DUE TO POLLEN: ICD-10-CM

## 2018-06-20 RX ORDER — MONTELUKAST SODIUM 10 MG/1
10 TABLET ORAL
Qty: 90 TABLET | Refills: 0 | Status: SHIPPED | OUTPATIENT
Start: 2018-06-20 | End: 2018-08-02

## 2018-06-21 NOTE — TELEPHONE ENCOUNTER
Refill Protocol Appointment Criteria  · Appointment scheduled in the past 6 months or in the next 3 months  Recent Outpatient Visits            1 month ago Right hip pain    [facility], [address], [address], [facility]

## 2018-06-25 RX ORDER — MELOXICAM 15 MG/1
TABLET ORAL
Qty: 90 TABLET | Refills: 0 | Status: ON HOLD | OUTPATIENT
Start: 2018-06-25 | End: 2018-09-20

## 2018-06-26 NOTE — TELEPHONE ENCOUNTER
Refill Protocol Appointment Criteria  · Appointment scheduled in the past 6 months or in the next 3 months  Recent Outpatient Visits            1 month ago Right hip pain    [facility], [address], [address], [place], [place]

## 2018-07-04 DIAGNOSIS — I70.90 ATHEROSCLEROSIS: ICD-10-CM

## 2018-07-08 RX ORDER — ROSUVASTATIN CALCIUM 20 MG/1
TABLET, COATED ORAL
Qty: 90 TABLET | Refills: 0 | Status: SHIPPED | OUTPATIENT
Start: 2018-07-08 | End: 2018-08-02

## 2018-07-17 ENCOUNTER — HOSPITAL ENCOUNTER (OUTPATIENT)
Dept: GENERAL RADIOLOGY | Age: 73
Discharge: HOME | End: 2018-07-17
Attending: ORTHOPAEDIC SURGERY
Payer: MEDICARE

## 2018-07-17 DIAGNOSIS — M25.551 RIGHT HIP PAIN: ICD-10-CM

## 2018-07-17 PROCEDURE — 73502 X-RAY EXAM HIP UNI 2-3 VIEWS: CPT | Performed by: ORTHOPAEDIC SURGERY

## 2018-07-19 DIAGNOSIS — I10 ESSENTIAL HYPERTENSION: ICD-10-CM

## 2018-07-20 RX ORDER — LISINOPRIL AND HYDROCHLOROTHIAZIDE 25; 20 MG/1; MG/1
1 TABLET ORAL
Qty: 30 TABLET | Refills: 0 | Status: SHIPPED | OUTPATIENT
Start: 2018-07-20 | End: 2018-08-02

## 2018-07-21 NOTE — TELEPHONE ENCOUNTER
Hypertensive Medications  Protocol Criteria:  · Appointment scheduled in the past 6 months or in the next 3 months  · BMP or CMP in the past 12 months  · Creatinine result < 2  Recent Outpatient Visits            2 months ago Right hip pain    Berta

## 2018-07-25 DIAGNOSIS — M54.50 CHRONIC BILATERAL LOW BACK PAIN WITHOUT SCIATICA: ICD-10-CM

## 2018-07-25 DIAGNOSIS — G89.29 CHRONIC BILATERAL LOW BACK PAIN WITHOUT SCIATICA: ICD-10-CM

## 2018-07-25 RX ORDER — HYDROCODONE BITARTRATE AND ACETAMINOPHEN 5; 325 MG/1; MG/1
TABLET ORAL
Qty: 60 TABLET | Refills: 0 | Status: ON HOLD | OUTPATIENT
Start: 2018-09-21 | End: 2018-09-20

## 2018-07-25 RX ORDER — HYDROCODONE BITARTRATE AND ACETAMINOPHEN 5; 325 MG/1; MG/1
TABLET ORAL
Qty: 60 TABLET | Refills: 0 | Status: SHIPPED | OUTPATIENT
Start: 2018-07-25 | End: 2018-08-14

## 2018-07-25 RX ORDER — HYDROCODONE BITARTRATE AND ACETAMINOPHEN 5; 325 MG/1; MG/1
TABLET ORAL
Qty: 60 TABLET | Refills: 0 | Status: SHIPPED | OUTPATIENT
Start: 2018-08-23 | End: 2018-08-14

## 2018-07-25 NOTE — TELEPHONE ENCOUNTER
From: Sid Lerma  Sent: 7/25/2018 8:16 AM CDT  Subject: Medication Renewal Request    Sid Lerma would like a refill of the following medications:     HYDROcodone-acetaminophen ([address]) 5-325 MG Oral Tab Lucy Reeves DO]    Preferred pha

## 2018-07-25 NOTE — TELEPHONE ENCOUNTER
LOV: 1-9-18 Last Rx: 5-21-18     Please advise in regards to refill request. Thank You    Please respond to pool: RICHARD BAKERO LPN/NIC

## 2018-07-27 ENCOUNTER — HOSPITAL ENCOUNTER (OUTPATIENT)
Dept: GENERAL RADIOLOGY | Age: 73
Discharge: HOME | End: 2018-07-27
Attending: FAMILY MEDICINE | Admitting: FAMILY MEDICINE
Payer: MEDICARE

## 2018-07-27 ENCOUNTER — OFFICE VISIT (OUTPATIENT)
Dept: FAMILY MEDICINE CLINIC | Facility: CLINIC | Age: 73
End: 2018-07-27
Payer: MEDICARE

## 2018-07-27 VITALS
TEMPERATURE: 98 F | DIASTOLIC BLOOD PRESSURE: 62 MMHG | HEIGHT: 64 IN | WEIGHT: 225 LBS | SYSTOLIC BLOOD PRESSURE: 122 MMHG | BODY MASS INDEX: 38.41 KG/M2 | HEART RATE: 63 BPM

## 2018-07-27 DIAGNOSIS — H25.9 AGE-RELATED CATARACT OF BOTH EYES, UNSPECIFIED AGE-RELATED CATARACT TYPE: ICD-10-CM

## 2018-07-27 DIAGNOSIS — Z00.00 ENCOUNTER FOR ANNUAL HEALTH EXAMINATION: ICD-10-CM

## 2018-07-27 DIAGNOSIS — Z00.00 ADULT GENERAL MEDICAL EXAM: Primary | ICD-10-CM

## 2018-07-27 DIAGNOSIS — Z23 NEED FOR VACCINATION: ICD-10-CM

## 2018-07-27 DIAGNOSIS — R05.9 COUGH: ICD-10-CM

## 2018-07-27 DIAGNOSIS — R09.89 RHONCHI: ICD-10-CM

## 2018-07-27 DIAGNOSIS — R73.9 HYPERGLYCEMIA: ICD-10-CM

## 2018-07-27 DIAGNOSIS — M16.0 PRIMARY OSTEOARTHRITIS OF BOTH HIPS: ICD-10-CM

## 2018-07-27 DIAGNOSIS — I10 HYPERTENSION, BENIGN: ICD-10-CM

## 2018-07-27 DIAGNOSIS — M16.11 ARTHRITIS OF RIGHT HIP: ICD-10-CM

## 2018-07-27 DIAGNOSIS — F41.1 ANXIETY STATE: ICD-10-CM

## 2018-07-27 DIAGNOSIS — K63.5 POLYP OF COLON, UNSPECIFIED PART OF COLON, UNSPECIFIED TYPE: ICD-10-CM

## 2018-07-27 DIAGNOSIS — E78.2 MIXED HYPERLIPIDEMIA: ICD-10-CM

## 2018-07-27 PROCEDURE — 71046 X-RAY EXAM CHEST 2 VIEWS: CPT | Performed by: FAMILY MEDICINE

## 2018-07-27 PROCEDURE — G0463 HOSPITAL OUTPT CLINIC VISIT: HCPCS | Performed by: FAMILY MEDICINE

## 2018-07-27 PROCEDURE — 99213 OFFICE O/P EST LOW 20 MIN: CPT | Performed by: FAMILY MEDICINE

## 2018-07-27 PROCEDURE — 90732 PPSV23 VACC 2 YRS+ SUBQ/IM: CPT | Performed by: FAMILY MEDICINE

## 2018-07-27 PROCEDURE — G0439 PPPS, SUBSEQ VISIT: HCPCS | Performed by: FAMILY MEDICINE

## 2018-07-27 PROCEDURE — G0009 ADMIN PNEUMOCOCCAL VACCINE: HCPCS | Performed by: FAMILY MEDICINE

## 2018-07-27 RX ORDER — AZITHROMYCIN 250 MG/1
TABLET, FILM COATED ORAL
Qty: 6 TABLET | Refills: 0 | Status: SHIPPED | OUTPATIENT
Start: 2018-07-27 | End: 2018-08-01

## 2018-07-27 NOTE — PATIENT INSTRUCTIONS
Paty Gaspar's SCREENING SCHEDULE   Tests on this list are recommended by your physician but may not be covered, or covered at this frequency, by your insurer. Please check with your insurance carrier before scheduling to verify coverage.    PREVENT criteria:   • Men who are 73-68 years old and have smoked more than 100 cigarettes in their lifetime   • Anyone with a family history    Colorectal Cancer Screening  Covered up to Age 76     Colonoscopy Screen   Covered every 10 years- more often if abnorm for this or any previous visit.  Please get every year    Pneumococcal 13 (Prevnar)  Covered Once after 65   Orders placed or performed in visit on 06/16/17  -PNEUMOCOCCAL VACC, 13 RADHA IM    Please get once after your 65th birthday    Pneumococcal 23 (Pneum this list are recommended by your physician but may not be covered, or covered at this frequency, by your insurer. Please check with your insurance carrier before scheduling to verify coverage.    [address] Internal Lab more than 100 cigarettes in their lifetime   • Anyone with a family history    Colorectal Cancer Screening  Covered up to Age 76     Colonoscopy Screen   Covered every 10 years- more often if abnormal Colonoscopy due on 11/07/2018 Update [facility] Pneumococcal 13 (Prevnar)  Covered Once after 65   Orders placed or performed in visit on 06/16/17  -PNEUMOCOCCAL VACC, 13 RADHA IM    Please get once after your 65th birthday    Pneumococcal 23 (Pneumovax)  Covered Once after 65   Orders placed or performed

## 2018-07-27 NOTE — PROGRESS NOTES
HPI:   Janet Ibarra is a 81year old female who presents for a Medicare Subsequent Annual Wellness visit (Pt already had Initial Annual Wellness). She is a sister my father-in-law. She has 1 son who is a grown adult and is .   She is divorc Oralia Griffiths has some abnormal functions as listed below:  She has no issues with dressing and bathing based on screening of functional status. She has Hearing problems based on screening of functional status.    Hearing Probl Atherosclerosis     Hyperglycemia     Low back pain     Senile cataract     Anxiety state     Hypertension, benign     Age-related nuclear cataract of left eye     right L5-S1 radiculopathy     L1-2 left > right mild-mod diffuse, L2-3 left mod & left mod f LISINOPRIL-HYDROCHLOROTHIAZIDE 20-25 MG Oral Tab TAKE 1 TABLET BY MOUTH ONCE DAILY.    ROSUVASTATIN CALCIUM 20 MG Oral Tab TAKE ONE TABLET BY MOUTH ONE TIME DAILY    MELOXICAM 15 MG Oral Tab TAKE ONE TABLET BY MOUTH ONE TIME DAILY    MONTELUKAST SODIUM 10 Negative for congestion, sneezing and sore throat. Respiratory: Positive for cough. Negative for choking, chest tightness, shortness of breath and wheezing. Cardiovascular: Negative for chest pain, palpitations and leg swelling.    Musculoskeletal: Po what they say:  No    I avoid social activities because I cannot hear well and fear I will reply improperly:  No   Family members and friends have told me they think I may have hearing loss:   Yes               Visual Acuity                           Physic Devyn Ward is a 81year old female who presents for a Medicare Assessment. PLAN SUMMARY:      Diagnoses and all orders for this visit:    Adult general medical exam  Medicare exam done.   Mixed hyperlipidemia  -     COMP METABOLIC PANEL (14); treatment.         Referrals (if applicable)    Orders Placed This Encounter      GI- Dr Angel Palumbo Comments:              Jailene Covarrubias          Referral Priority: Routine          Referral Type: OFFICE VISIT          Referred to date06/22/2017     Colorectal Cancer Screening      Colonoscopy Screen every 10 years Colonoscopy due on 11/07/2018 Update Health Maintenance if applicable    Flex Sigmoidoscopy Screen every 10 years No results found for this or any previous visit.  No flow Medicare does not cover unless Medically needed    Zoster   Not covered by Medicare Part B No vaccine history found This may be covered with your pharmacy  prescription benefits      SPECIFIC DISEASE MONITORING Internal Lab or Procedure External Lab or Pro

## 2018-07-31 ENCOUNTER — APPOINTMENT (OUTPATIENT)
Dept: LAB | Age: 73
End: 2018-07-31
Attending: FAMILY MEDICINE
Payer: MEDICARE

## 2018-07-31 ENCOUNTER — LAB ENCOUNTER (OUTPATIENT)
Dept: LAB | Age: 73
End: 2018-07-31
Attending: FAMILY MEDICINE
Payer: MEDICARE

## 2018-07-31 DIAGNOSIS — I10 HYPERTENSION, BENIGN: ICD-10-CM

## 2018-07-31 DIAGNOSIS — E78.2 MIXED HYPERLIPIDEMIA: ICD-10-CM

## 2018-07-31 DIAGNOSIS — R73.9 HYPERGLYCEMIA: ICD-10-CM

## 2018-07-31 LAB
ALBUMIN SERPL BCP-MCNC: 4 G/DL (ref 3.5–4.8)
ALBUMIN/GLOB SERPL: 1.5 {RATIO} (ref 1–2)
ALP SERPL-CCNC: 71 U/L (ref 32–100)
ALT SERPL-CCNC: 34 U/L (ref 14–54)
ANION GAP SERPL CALC-SCNC: 7 MMOL/L (ref 0–18)
AST SERPL-CCNC: 27 U/L (ref 15–41)
BASOPHILS # BLD: 0.1 K/UL (ref 0–0.2)
BASOPHILS NFR BLD: 1 %
BILIRUB SERPL-MCNC: 0.5 MG/DL (ref 0.3–1.2)
BUN SERPL-MCNC: 21 MG/DL (ref 8–20)
BUN/CREAT SERPL: 26.6 (ref 10–20)
CALCIUM SERPL-MCNC: 9.6 MG/DL (ref 8.5–10.5)
CHLORIDE SERPL-SCNC: 105 MMOL/L (ref 95–110)
CHOLEST SERPL-MCNC: 137 MG/DL (ref 110–200)
CO2 SERPL-SCNC: 31 MMOL/L (ref 22–32)
CREAT SERPL-MCNC: 0.79 MG/DL (ref 0.5–1.5)
EOSINOPHIL # BLD: 0.2 K/UL (ref 0–0.7)
EOSINOPHIL NFR BLD: 3 %
ERYTHROCYTE [DISTWIDTH] IN BLOOD BY AUTOMATED COUNT: 14.5 % (ref 11–15)
GLOBULIN PLAS-MCNC: 2.7 G/DL (ref 2.5–3.7)
GLUCOSE SERPL-MCNC: 89 MG/DL (ref 70–99)
HCT VFR BLD AUTO: 38.3 % (ref 35–48)
HDLC SERPL-MCNC: 60 MG/DL
HGB BLD-MCNC: 12.9 G/DL (ref 12–16)
LDLC SERPL CALC-MCNC: 69 MG/DL (ref 0–99)
LYMPHOCYTES # BLD: 1.7 K/UL (ref 1–4)
LYMPHOCYTES NFR BLD: 23 %
MCH RBC QN AUTO: 31.7 PG (ref 27–32)
MCHC RBC AUTO-ENTMCNC: 33.7 G/DL (ref 32–37)
MCV RBC AUTO: 94.1 FL (ref 80–100)
MONOCYTES # BLD: 0.6 K/UL (ref 0–1)
MONOCYTES NFR BLD: 7 %
NEUTROPHILS # BLD AUTO: 5 K/UL (ref 1.8–7.7)
NEUTROPHILS NFR BLD: 66 %
NONHDLC SERPL-MCNC: 77 MG/DL
OSMOLALITY UR CALC.SUM OF ELEC: 298 MOSM/KG (ref 275–295)
PATIENT FASTING: YES
PLATELET # BLD AUTO: 286 K/UL (ref 140–400)
PMV BLD AUTO: 8 FL (ref 7.4–10.3)
POTASSIUM SERPL-SCNC: 3.7 MMOL/L (ref 3.3–5.1)
PROT SERPL-MCNC: 6.7 G/DL (ref 5.9–8.4)
RBC # BLD AUTO: 4.07 M/UL (ref 3.7–5.4)
SODIUM SERPL-SCNC: 143 MMOL/L (ref 136–144)
TRIGL SERPL-MCNC: 38 MG/DL (ref 1–149)
TSH SERPL-ACNC: 1.79 UIU/ML (ref 0.45–5.33)
WBC # BLD AUTO: 7.6 K/UL (ref 4–11)

## 2018-07-31 PROCEDURE — 83036 HEMOGLOBIN GLYCOSYLATED A1C: CPT

## 2018-07-31 PROCEDURE — 93010 ELECTROCARDIOGRAM REPORT: CPT | Performed by: FAMILY MEDICINE

## 2018-07-31 PROCEDURE — 93005 ELECTROCARDIOGRAM TRACING: CPT

## 2018-07-31 PROCEDURE — 36415 COLL VENOUS BLD VENIPUNCTURE: CPT

## 2018-07-31 PROCEDURE — 84443 ASSAY THYROID STIM HORMONE: CPT

## 2018-07-31 PROCEDURE — 85025 COMPLETE CBC W/AUTO DIFF WBC: CPT

## 2018-07-31 PROCEDURE — 80061 LIPID PANEL: CPT

## 2018-07-31 PROCEDURE — 80053 COMPREHEN METABOLIC PANEL: CPT

## 2018-07-31 RX ORDER — GABAPENTIN 600 MG/1
TABLET ORAL
Qty: 30 TABLET | Refills: 1 | Status: SHIPPED | OUTPATIENT
Start: 2018-07-31 | End: 2018-11-12

## 2018-07-31 NOTE — TELEPHONE ENCOUNTER
Medication request: Gabapentin 600mg. Take one tablet by mouth every evening. #30.  Refill 1    LOV-5/1/2018 NOV-8/14/2018    /Last refill:5/10/2018    Order pended  Routed to [facility]

## 2018-08-01 LAB — HBA1C MFR BLD: 5.8 % (ref 4–6)

## 2018-08-14 ENCOUNTER — OFFICE VISIT (OUTPATIENT)
Dept: NEUROLOGY | Facility: CLINIC | Age: 73
End: 2018-08-14
Payer: MEDICARE

## 2018-08-14 VITALS — RESPIRATION RATE: 17 BRPM | HEART RATE: 80 BPM | DIASTOLIC BLOOD PRESSURE: 68 MMHG | SYSTOLIC BLOOD PRESSURE: 124 MMHG

## 2018-08-14 DIAGNOSIS — M51.9 LUMBAR DISC DISEASE: ICD-10-CM

## 2018-08-14 DIAGNOSIS — M54.50 CHRONIC RIGHT-SIDED LOW BACK PAIN WITHOUT SCIATICA: Primary | ICD-10-CM

## 2018-08-14 DIAGNOSIS — M48.061 SPINAL STENOSIS OF LUMBAR REGION WITHOUT NEUROGENIC CLAUDICATION: ICD-10-CM

## 2018-08-14 DIAGNOSIS — M54.16 LUMBAR RADICULOPATHY: ICD-10-CM

## 2018-08-14 DIAGNOSIS — M51.26 LUMBAR HERNIATED DISC: ICD-10-CM

## 2018-08-14 DIAGNOSIS — G89.29 CHRONIC BILATERAL LOW BACK PAIN WITH RIGHT-SIDED SCIATICA: ICD-10-CM

## 2018-08-14 DIAGNOSIS — M25.551 RIGHT HIP PAIN: ICD-10-CM

## 2018-08-14 DIAGNOSIS — G89.29 CHRONIC RIGHT-SIDED LOW BACK PAIN WITHOUT SCIATICA: Primary | ICD-10-CM

## 2018-08-14 DIAGNOSIS — M54.41 CHRONIC BILATERAL LOW BACK PAIN WITH RIGHT-SIDED SCIATICA: ICD-10-CM

## 2018-08-14 PROCEDURE — 99214 OFFICE O/P EST MOD 30 MIN: CPT | Performed by: PHYSICAL MEDICINE & REHABILITATION

## 2018-08-14 RX ORDER — ECHINACEA 400 MG
CAPSULE ORAL DAILY
Status: ON HOLD | COMMUNITY
End: 2019-02-07

## 2018-08-14 NOTE — PATIENT INSTRUCTIONS
As of October 6th 2014, the Drug Enforcement Agency [facility]) is reclassifying all hydrocodone combination medications from Schedule III to Schedule II. This includes medications such as Norco, Vicodin, Lortab, Zohydro, and Vicoprofen.     What this means for y will have the right hip surgery as scheduled. The patient does not need any injections at this time. The patient does not need any pain medications at this time. She will continue with the Neurontin at night time.     She will follow up in 4 months if

## 2018-08-14 NOTE — PROGRESS NOTES
Low Back Pain H & P    Chief Complaint: Patient presents with:  Hip Pain: pt here for follow up after right hip injection on 5/1/18 with 80% relief. patient to have hip replacement 9/17/18 with Dr. King Napoles.  pt c/o lower back pain that is localized increasing History     Social History  Social History   Marital status:    Spouse name: N/A    Years of education: N/A  Number of children: N/A     Occupational History  None on file     Social History Main Topics   Smoking status: Former Smoker     Types: Ciga Tibialis posterior pulse-LEFT 2+     Neurological Lower Extremity:    Light Touch Sensation: Intact in bilateral Lower Extremities   LE Muscle Strength:  All LE strength measurements 5/5 except:  Hamstring RIGHT:   5-/5   RIGHT plantar reflexes: downward

## 2018-08-28 ENCOUNTER — LAB ENCOUNTER (OUTPATIENT)
Dept: LAB | Facility: HOSPITAL | Age: 73
End: 2018-08-28
Attending: ORTHOPAEDIC SURGERY
Payer: MEDICARE

## 2018-08-28 DIAGNOSIS — M16.11 OSTEOARTHRITIS OF RIGHT HIP: Primary | ICD-10-CM

## 2018-08-28 LAB
ANTIBODY SCREEN: NEGATIVE
RH BLOOD TYPE: POSITIVE

## 2018-08-28 PROCEDURE — 87641 MR-STAPH DNA AMP PROBE: CPT

## 2018-08-28 PROCEDURE — 36415 COLL VENOUS BLD VENIPUNCTURE: CPT

## 2018-08-28 PROCEDURE — 86901 BLOOD TYPING SEROLOGIC RH(D): CPT

## 2018-08-28 PROCEDURE — 86850 RBC ANTIBODY SCREEN: CPT

## 2018-08-28 PROCEDURE — 86900 BLOOD TYPING SEROLOGIC ABO: CPT

## 2018-08-29 LAB — MRSA DNA SPEC QL NAA+PROBE: NEGATIVE

## 2018-09-01 RX ORDER — METHYLPREDNISOLONE 4 MG/1
4 TABLET ORAL DAILY
Status: ON HOLD | COMMUNITY
End: 2018-09-20

## 2018-09-01 RX ORDER — TIZANIDINE 2 MG/1
2 TABLET ORAL EVERY 8 HOURS PRN
Status: ON HOLD | COMMUNITY
End: 2018-09-20

## 2018-09-01 RX ORDER — VIT A/VIT C/VIT E/ZINC/COPPER 2148-113
TABLET ORAL
COMMUNITY

## 2018-09-06 ENCOUNTER — HOSPITAL ENCOUNTER (OUTPATIENT)
Dept: GENERAL RADIOLOGY | Age: 73
Discharge: HOME | End: 2018-09-06
Attending: ORTHOPAEDIC SURGERY
Payer: MEDICARE

## 2018-09-06 DIAGNOSIS — M25.562 KNEE PAIN, LEFT: ICD-10-CM

## 2018-09-06 PROCEDURE — 73564 X-RAY EXAM KNEE 4 OR MORE: CPT | Performed by: ORTHOPAEDIC SURGERY

## 2018-09-17 ENCOUNTER — HOSPITAL ENCOUNTER (INPATIENT)
Facility: HOSPITAL | Age: 73
LOS: 3 days | Discharge: NURSING, REHAB OR LONG-TERM CARE | DRG: 470 | End: 2018-09-20
Attending: ORTHOPAEDIC SURGERY | Admitting: ORTHOPAEDIC SURGERY
Payer: MEDICARE

## 2018-09-17 ENCOUNTER — ANESTHESIA (OUTPATIENT)
Dept: SURGERY | Facility: HOSPITAL | Age: 73
DRG: 470 | End: 2018-09-17
Payer: MEDICARE

## 2018-09-17 ENCOUNTER — ANESTHESIA EVENT (OUTPATIENT)
Dept: SURGERY | Facility: HOSPITAL | Age: 73
DRG: 470 | End: 2018-09-17
Payer: MEDICARE

## 2018-09-17 ENCOUNTER — APPOINTMENT (OUTPATIENT)
Dept: GENERAL RADIOLOGY | Facility: HOSPITAL | Age: 73
DRG: 470 | End: 2018-09-17
Attending: ORTHOPAEDIC SURGERY
Payer: MEDICARE

## 2018-09-17 DIAGNOSIS — M16.11 PRIMARY OSTEOARTHRITIS OF RIGHT HIP: Primary | ICD-10-CM

## 2018-09-17 DIAGNOSIS — Z96.641 HISTORY OF TOTAL HIP ARTHROPLASTY, RIGHT: ICD-10-CM

## 2018-09-17 PROBLEM — I10 ESSENTIAL HYPERTENSION: Chronic | Status: ACTIVE | Noted: 2018-09-17

## 2018-09-17 PROCEDURE — 73501 X-RAY EXAM HIP UNI 1 VIEW: CPT | Performed by: ORTHOPAEDIC SURGERY

## 2018-09-17 PROCEDURE — 99232 SBSQ HOSP IP/OBS MODERATE 35: CPT | Performed by: HOSPITALIST

## 2018-09-17 PROCEDURE — 0SR90J9 REPLACEMENT OF RIGHT HIP JOINT WITH SYNTHETIC SUBSTITUTE, CEMENTED, OPEN APPROACH: ICD-10-PCS | Performed by: ORTHOPAEDIC SURGERY

## 2018-09-17 DEVICE — TM MOD CUP 48MM CLUSTER: Type: IMPLANTABLE DEVICE | Site: HIP | Status: FUNCTIONAL

## 2018-09-17 DEVICE — 12/14 COCR FEM HEAD 32MM -3.5: Type: IMPLANTABLE DEVICE | Site: HIP | Status: FUNCTIONAL

## 2018-09-17 DEVICE — IMPLANTABLE DEVICE: Type: IMPLANTABLE DEVICE | Site: HIP | Status: FUNCTIONAL

## 2018-09-17 DEVICE — BONE SCREW 6.5X30 SELF-TAP: Type: IMPLANTABLE DEVICE | Site: HIP | Status: FUNCTIONAL

## 2018-09-17 DEVICE — MOD CUP 20 DEG LNR LG 48X32: Type: IMPLANTABLE DEVICE | Site: HIP | Status: FUNCTIONAL

## 2018-09-17 DEVICE — BONE SCREW 6.5X25 SELF-TAP: Type: IMPLANTABLE DEVICE | Site: HIP | Status: FUNCTIONAL

## 2018-09-17 RX ORDER — MORPHINE SULFATE 4 MG/ML
4 INJECTION, SOLUTION INTRAMUSCULAR; INTRAVENOUS EVERY 10 MIN PRN
Status: DISCONTINUED | OUTPATIENT
Start: 2018-09-17 | End: 2018-09-17 | Stop reason: HOSPADM

## 2018-09-17 RX ORDER — EPHEDRINE SULFATE 50 MG/ML
INJECTION, SOLUTION INTRAVENOUS AS NEEDED
Status: DISCONTINUED | OUTPATIENT
Start: 2018-09-17 | End: 2018-09-17 | Stop reason: SURG

## 2018-09-17 RX ORDER — ONDANSETRON 2 MG/ML
4 INJECTION INTRAMUSCULAR; INTRAVENOUS EVERY 6 HOURS PRN
Status: DISCONTINUED | OUTPATIENT
Start: 2018-09-17 | End: 2018-09-20

## 2018-09-17 RX ORDER — ALBUTEROL SULFATE 90 UG/1
2 AEROSOL, METERED RESPIRATORY (INHALATION) EVERY 6 HOURS PRN
Status: DISCONTINUED | OUTPATIENT
Start: 2018-09-17 | End: 2018-09-20

## 2018-09-17 RX ORDER — MAGNESIUM HYDROXIDE 1200 MG/15ML
LIQUID ORAL CONTINUOUS PRN
Status: COMPLETED | OUTPATIENT
Start: 2018-09-17 | End: 2018-09-17

## 2018-09-17 RX ORDER — FAMOTIDINE 20 MG/1
20 TABLET ORAL ONCE
Status: COMPLETED | OUTPATIENT
Start: 2018-09-17 | End: 2018-09-17

## 2018-09-17 RX ORDER — SODIUM CHLORIDE 0.9 % (FLUSH) 0.9 %
10 SYRINGE (ML) INJECTION AS NEEDED
Status: DISCONTINUED | OUTPATIENT
Start: 2018-09-17 | End: 2018-09-20

## 2018-09-17 RX ORDER — SODIUM CHLORIDE, SODIUM LACTATE, POTASSIUM CHLORIDE, CALCIUM CHLORIDE 600; 310; 30; 20 MG/100ML; MG/100ML; MG/100ML; MG/100ML
INJECTION, SOLUTION INTRAVENOUS CONTINUOUS
Status: DISCONTINUED | OUTPATIENT
Start: 2018-09-17 | End: 2018-09-19

## 2018-09-17 RX ORDER — SERTRALINE HYDROCHLORIDE 100 MG/1
100 TABLET, FILM COATED ORAL DAILY
Status: DISCONTINUED | OUTPATIENT
Start: 2018-09-18 | End: 2018-09-20

## 2018-09-17 RX ORDER — ONDANSETRON 2 MG/ML
4 INJECTION INTRAMUSCULAR; INTRAVENOUS ONCE AS NEEDED
Status: COMPLETED | OUTPATIENT
Start: 2018-09-17 | End: 2018-09-17

## 2018-09-17 RX ORDER — GLYCOPYRROLATE 0.2 MG/ML
INJECTION INTRAMUSCULAR; INTRAVENOUS AS NEEDED
Status: DISCONTINUED | OUTPATIENT
Start: 2018-09-17 | End: 2018-09-17 | Stop reason: SURG

## 2018-09-17 RX ORDER — HYDROCODONE BITARTRATE AND ACETAMINOPHEN 5; 325 MG/1; MG/1
1 TABLET ORAL AS NEEDED
Status: DISCONTINUED | OUTPATIENT
Start: 2018-09-17 | End: 2018-09-17 | Stop reason: HOSPADM

## 2018-09-17 RX ORDER — WARFARIN SODIUM 5 MG/1
5 TABLET ORAL ONCE
Status: COMPLETED | OUTPATIENT
Start: 2018-09-17 | End: 2018-09-17

## 2018-09-17 RX ORDER — DOCUSATE SODIUM 100 MG/1
100 CAPSULE, LIQUID FILLED ORAL 2 TIMES DAILY
Status: DISCONTINUED | OUTPATIENT
Start: 2018-09-17 | End: 2018-09-20

## 2018-09-17 RX ORDER — MORPHINE SULFATE 2 MG/ML
1 INJECTION, SOLUTION INTRAMUSCULAR; INTRAVENOUS EVERY 2 HOUR PRN
Status: DISCONTINUED | OUTPATIENT
Start: 2018-09-17 | End: 2018-09-20

## 2018-09-17 RX ORDER — MORPHINE SULFATE 10 MG/ML
6 INJECTION, SOLUTION INTRAMUSCULAR; INTRAVENOUS EVERY 10 MIN PRN
Status: DISCONTINUED | OUTPATIENT
Start: 2018-09-17 | End: 2018-09-17 | Stop reason: HOSPADM

## 2018-09-17 RX ORDER — LISINOPRIL AND HYDROCHLOROTHIAZIDE 25; 20 MG/1; MG/1
1 TABLET ORAL
Status: DISCONTINUED | OUTPATIENT
Start: 2018-09-17 | End: 2018-09-17

## 2018-09-17 RX ORDER — POLYETHYLENE GLYCOL 3350 17 G/17G
17 POWDER, FOR SOLUTION ORAL DAILY PRN
Status: DISCONTINUED | OUTPATIENT
Start: 2018-09-17 | End: 2018-09-20

## 2018-09-17 RX ORDER — CEFAZOLIN SODIUM/WATER 2 G/20 ML
2 SYRINGE (ML) INTRAVENOUS ONCE
Status: COMPLETED | OUTPATIENT
Start: 2018-09-17 | End: 2018-09-17

## 2018-09-17 RX ORDER — OXYCODONE HCL 20 MG/1
20 TABLET, FILM COATED, EXTENDED RELEASE ORAL EVERY 12 HOURS
Status: DISCONTINUED | OUTPATIENT
Start: 2018-09-17 | End: 2018-09-19

## 2018-09-17 RX ORDER — ROSUVASTATIN CALCIUM 20 MG/1
20 TABLET, COATED ORAL
Status: DISCONTINUED | OUTPATIENT
Start: 2018-09-18 | End: 2018-09-20

## 2018-09-17 RX ORDER — ROCURONIUM BROMIDE 10 MG/ML
INJECTION, SOLUTION INTRAVENOUS AS NEEDED
Status: DISCONTINUED | OUTPATIENT
Start: 2018-09-17 | End: 2018-09-17 | Stop reason: SURG

## 2018-09-17 RX ORDER — CALCIUM CARBONATE 500(1250)
500 TABLET ORAL DAILY
Status: DISCONTINUED | OUTPATIENT
Start: 2018-09-17 | End: 2018-09-20

## 2018-09-17 RX ORDER — NEOSTIGMINE METHYLSULFATE 0.5 MG/ML
INJECTION INTRAVENOUS AS NEEDED
Status: DISCONTINUED | OUTPATIENT
Start: 2018-09-17 | End: 2018-09-17 | Stop reason: SURG

## 2018-09-17 RX ORDER — SODIUM CHLORIDE, SODIUM LACTATE, POTASSIUM CHLORIDE, CALCIUM CHLORIDE 600; 310; 30; 20 MG/100ML; MG/100ML; MG/100ML; MG/100ML
INJECTION, SOLUTION INTRAVENOUS CONTINUOUS
Status: DISCONTINUED | OUTPATIENT
Start: 2018-09-17 | End: 2018-09-17 | Stop reason: HOSPADM

## 2018-09-17 RX ORDER — BISACODYL 10 MG
10 SUPPOSITORY, RECTAL RECTAL
Status: DISCONTINUED | OUTPATIENT
Start: 2018-09-17 | End: 2018-09-20

## 2018-09-17 RX ORDER — MORPHINE SULFATE 4 MG/ML
4 INJECTION, SOLUTION INTRAMUSCULAR; INTRAVENOUS EVERY 2 HOUR PRN
Status: DISCONTINUED | OUTPATIENT
Start: 2018-09-17 | End: 2018-09-20

## 2018-09-17 RX ORDER — DEXAMETHASONE SODIUM PHOSPHATE 4 MG/ML
VIAL (ML) INJECTION AS NEEDED
Status: DISCONTINUED | OUTPATIENT
Start: 2018-09-17 | End: 2018-09-17 | Stop reason: SURG

## 2018-09-17 RX ORDER — HALOPERIDOL 5 MG/ML
0.25 INJECTION INTRAMUSCULAR ONCE AS NEEDED
Status: DISCONTINUED | OUTPATIENT
Start: 2018-09-17 | End: 2018-09-17 | Stop reason: HOSPADM

## 2018-09-17 RX ORDER — CEFAZOLIN SODIUM/WATER 2 G/20 ML
2 SYRINGE (ML) INTRAVENOUS EVERY 8 HOURS
Status: COMPLETED | OUTPATIENT
Start: 2018-09-17 | End: 2018-09-18

## 2018-09-17 RX ORDER — TIZANIDINE 2 MG/1
2 TABLET ORAL 3 TIMES DAILY
Status: DISCONTINUED | OUTPATIENT
Start: 2018-09-17 | End: 2018-09-20

## 2018-09-17 RX ORDER — AMLODIPINE BESYLATE 5 MG/1
5 TABLET ORAL
Status: DISCONTINUED | OUTPATIENT
Start: 2018-09-17 | End: 2018-09-20

## 2018-09-17 RX ORDER — SODIUM PHOSPHATE, DIBASIC AND SODIUM PHOSPHATE, MONOBASIC 7; 19 G/133ML; G/133ML
1 ENEMA RECTAL ONCE AS NEEDED
Status: DISCONTINUED | OUTPATIENT
Start: 2018-09-17 | End: 2018-09-20

## 2018-09-17 RX ORDER — SODIUM CHLORIDE 9 MG/ML
INJECTION, SOLUTION INTRAVENOUS CONTINUOUS PRN
Status: DISCONTINUED | OUTPATIENT
Start: 2018-09-17 | End: 2018-09-17 | Stop reason: SURG

## 2018-09-17 RX ORDER — MONTELUKAST SODIUM 10 MG/1
10 TABLET ORAL NIGHTLY
Status: DISCONTINUED | OUTPATIENT
Start: 2018-09-17 | End: 2018-09-20

## 2018-09-17 RX ORDER — MORPHINE SULFATE 4 MG/ML
2 INJECTION, SOLUTION INTRAMUSCULAR; INTRAVENOUS EVERY 10 MIN PRN
Status: DISCONTINUED | OUTPATIENT
Start: 2018-09-17 | End: 2018-09-17 | Stop reason: HOSPADM

## 2018-09-17 RX ORDER — VITS A,C,E/LUTEIN/MINERALS 300MCG-200
1 TABLET ORAL DAILY
Status: DISCONTINUED | OUTPATIENT
Start: 2018-09-17 | End: 2018-09-20

## 2018-09-17 RX ORDER — ONDANSETRON 2 MG/ML
INJECTION INTRAMUSCULAR; INTRAVENOUS AS NEEDED
Status: DISCONTINUED | OUTPATIENT
Start: 2018-09-17 | End: 2018-09-17 | Stop reason: SURG

## 2018-09-17 RX ORDER — PHENYLEPHRINE HCL 10 MG/ML
VIAL (ML) INJECTION AS NEEDED
Status: DISCONTINUED | OUTPATIENT
Start: 2018-09-17 | End: 2018-09-17 | Stop reason: SURG

## 2018-09-17 RX ORDER — GABAPENTIN 300 MG/1
600 CAPSULE ORAL NIGHTLY
Status: DISCONTINUED | OUTPATIENT
Start: 2018-09-17 | End: 2018-09-20

## 2018-09-17 RX ORDER — NALOXONE HYDROCHLORIDE 0.4 MG/ML
80 INJECTION, SOLUTION INTRAMUSCULAR; INTRAVENOUS; SUBCUTANEOUS AS NEEDED
Status: DISCONTINUED | OUTPATIENT
Start: 2018-09-17 | End: 2018-09-17 | Stop reason: HOSPADM

## 2018-09-17 RX ORDER — MIDAZOLAM HYDROCHLORIDE 1 MG/ML
INJECTION INTRAMUSCULAR; INTRAVENOUS AS NEEDED
Status: DISCONTINUED | OUTPATIENT
Start: 2018-09-17 | End: 2018-09-17 | Stop reason: SURG

## 2018-09-17 RX ORDER — ENOXAPARIN SODIUM 100 MG/ML
30 INJECTION SUBCUTANEOUS EVERY 12 HOURS SCHEDULED
Status: DISCONTINUED | OUTPATIENT
Start: 2018-09-18 | End: 2018-09-20

## 2018-09-17 RX ORDER — HYDROCODONE BITARTRATE AND ACETAMINOPHEN 5; 325 MG/1; MG/1
2 TABLET ORAL AS NEEDED
Status: DISCONTINUED | OUTPATIENT
Start: 2018-09-17 | End: 2018-09-17 | Stop reason: HOSPADM

## 2018-09-17 RX ORDER — METOCLOPRAMIDE 10 MG/1
10 TABLET ORAL ONCE
Status: COMPLETED | OUTPATIENT
Start: 2018-09-17 | End: 2018-09-17

## 2018-09-17 RX ORDER — MORPHINE SULFATE 2 MG/ML
2 INJECTION, SOLUTION INTRAMUSCULAR; INTRAVENOUS
Status: DISCONTINUED | OUTPATIENT
Start: 2018-09-17 | End: 2018-09-20

## 2018-09-17 RX ADMIN — SODIUM CHLORIDE, SODIUM LACTATE, POTASSIUM CHLORIDE, CALCIUM CHLORIDE: 600; 310; 30; 20 INJECTION, SOLUTION INTRAVENOUS at 07:29:00

## 2018-09-17 RX ADMIN — CEFAZOLIN SODIUM/WATER 2 G: 2 G/20 ML SYRINGE (ML) INTRAVENOUS at 07:40:00

## 2018-09-17 RX ADMIN — GLYCOPYRROLATE 1 MG: 0.2 INJECTION INTRAMUSCULAR; INTRAVENOUS at 10:13:00

## 2018-09-17 RX ADMIN — EPHEDRINE SULFATE 5 MG: 50 INJECTION, SOLUTION INTRAVENOUS at 09:00:00

## 2018-09-17 RX ADMIN — ONDANSETRON 4 MG: 2 INJECTION INTRAMUSCULAR; INTRAVENOUS at 10:07:00

## 2018-09-17 RX ADMIN — NEOSTIGMINE METHYLSULFATE 5 MG: 0.5 INJECTION INTRAVENOUS at 10:13:00

## 2018-09-17 RX ADMIN — ROCURONIUM BROMIDE 10 MG: 10 INJECTION, SOLUTION INTRAVENOUS at 07:37:00

## 2018-09-17 RX ADMIN — GLYCOPYRROLATE 0.2 MG: 0.2 INJECTION INTRAMUSCULAR; INTRAVENOUS at 07:34:00

## 2018-09-17 RX ADMIN — PHENYLEPHRINE HCL 100 MCG: 10 MG/ML VIAL (ML) INJECTION at 09:15:00

## 2018-09-17 RX ADMIN — EPHEDRINE SULFATE 10 MG: 50 INJECTION, SOLUTION INTRAVENOUS at 08:00:00

## 2018-09-17 RX ADMIN — SODIUM CHLORIDE, SODIUM LACTATE, POTASSIUM CHLORIDE, CALCIUM CHLORIDE: 600; 310; 30; 20 INJECTION, SOLUTION INTRAVENOUS at 10:23:00

## 2018-09-17 RX ADMIN — SODIUM CHLORIDE: 9 INJECTION, SOLUTION INTRAVENOUS at 08:00:00

## 2018-09-17 RX ADMIN — MIDAZOLAM HYDROCHLORIDE 2 MG: 1 INJECTION INTRAMUSCULAR; INTRAVENOUS at 07:37:00

## 2018-09-17 RX ADMIN — ROCURONIUM BROMIDE 20 MG: 10 INJECTION, SOLUTION INTRAVENOUS at 08:10:00

## 2018-09-17 RX ADMIN — DEXAMETHASONE SODIUM PHOSPHATE 4 MG: 4 MG/ML VIAL (ML) INJECTION at 08:00:00

## 2018-09-17 RX ADMIN — SODIUM CHLORIDE: 9 INJECTION, SOLUTION INTRAVENOUS at 10:23:00

## 2018-09-17 NOTE — ANESTHESIA POSTPROCEDURE EVALUATION
Patient: Ronit Gonzalez    Procedure Summary     Date:  09/17/18 Room / Location:  [address] [place] [address] MAIN OR 12 / [address] [place] [address] MAIN OR    Anesthesia Start:  8566 Anesthesia Stop:  1101    Procedure:  HIP TOTAL REPLACEMENT (Right Hip) Diagnosis:  (Primary osteoarthritis right

## 2018-09-17 NOTE — ANESTHESIA PROCEDURE NOTES
ANESTHESIA INTUBATION  Urgency: elective      General Information and Staff    Patient location during procedure: OR  Anesthesiologist: Hanh Sanchez MD  Resident/CRNA: Dawit Crook CRNA  Performed: CRNA     Indications and Patient Condition  Ind

## 2018-09-17 NOTE — CM/SW NOTE
TRA met with the patient at bedside. The patient lives in a condo with an elevator access. The patient responds being independent prior to admission with all ADL's, ambulation and driving. The patient has family in the area, her son visits her often and her

## 2018-09-17 NOTE — BRIEF OP NOTE
[address] UNIT  Brief Op Note     Lauro Driver Location: OR   [facility] [id number] N [id number]   Admission Date 9/17/2018 Operation Date 9/17/2018   Attending Physician Fernando Mcintosh MD Operating Physician Randal Starkey MD

## 2018-09-17 NOTE — H&P
History & Physical Examination    Patient Name: Sagrario Kwong  MRN: [id number]  [facility]: [id number]  YOB: 1945    Diagnosis: PRIMARY OSTEOARTHRITIS OF RIGHT HIP  Present Illness: [address] DIAGNOSIS WHO HAS ELECTED Veverly Sri (Patient taking differently: Take 1 tablet by mouth daily as needed.  ) Disp: 60 tablet Rfl: 0 9/16/2018 at Unknown time   MELOXICAM 15 MG Oral Tab TAKE ONE TABLET BY MOUTH ONE TIME DAILY  Disp: 90 tablet Rfl: 0 Past Week at Unknown time   Calcium Carbonat surgery  No date: SPINE SURGERY PROCEDURE UNLISTED      Comment:  for right sciatica issue  No date: TUBAL LIGATION  2/12/14: YAG CAPSULOTOMY - OD - RIGHT EYE; Right      Comment:  JOEY  Family History   Problem Relation Age of Onset   • Colon Cancer Father

## 2018-09-17 NOTE — ANESTHESIA PREPROCEDURE EVALUATION
Anesthesia PreOp Note    HPI:     Jaylin Hernandez is a 81year old female who presents for preoperative consultation requested by: Chau Wilkes MD    Date of Surgery: 9/17/2018    Procedure(s):  HIP TOTAL REPLACEMENT  Indication: Primary osteoarthriti discs   • Calculus of kidney    • Deaf     Left ear   • Diverticulosis    • Hearing impairment    • Hearing loss    • High blood pressure    • High cholesterol    • History of shingles     1980's   • Meniere disease    • Osteoarthritis    • Pneumonia due t 9/17/2018 at Unknown time   AmLODIPine Besylate 5 MG Oral Tab Take 1 tablet (5 mg total) by mouth once daily.  Disp: 90 tablet Rfl: 2 9/17/2018 at Unknown time   GABAPENTIN 600 MG Oral Tab TAKE ONE TABLET BY MOUTH ONE TIME DAILY IN THE EVENING (Patient curtis 09/17/18 0737   dexamethasone Sodium Phosphate (DECADRON) 4 MG/ML injection  Intravenous PRN Mylinda Greaser, CRNA 4 mg at 09/17/18 0800   glycopyrrolate (ROBINUL) 0.2 MG/ML injection  Intravenous PRN Mylinda Greaser, CRNA 0.2 mg at 09/17/18 0734     No curr Seat Belt: Not Asked        Self-Exams: Not Asked        Grew up on a farm: Not Asked        History of tanning: Not Asked        Outdoor occupation: Not Asked        Pt has a pacemaker: No        Pt has a defibrillator: No        Breast feeding: Not A General  Airway:  ETT  Post-op Pain Management: IV PCA and IV analgesics  Informed Consent Plan and Risks Discussed With:  Patient  Use of Blood Products Discussed With:  Patient  Blood Product Use Consented    Discussed plan with:  Surgeon and CRNA

## 2018-09-17 NOTE — PLAN OF CARE
DISCHARGE PLANNING    • Discharge to [address] [place] [address]    • Verbalizes/displays adequate comfort level or patient's stated pain goal Progressing        Patient Centered Care    • Patient preferences are identified and integrate

## 2018-09-17 NOTE — PROGRESS NOTES
[facility]P - [facility]    Progress Note    Masoud Dayana Patient Status:  Surgery Admit    1945 MRN [id number]   Location [address] [facility] Attending Ben Johnston MD   Hosp Day # 0 KELLIE Espinosa DO PROPHYLAXIS, PT/OT. Mixed hyperlipidemia  CONT HOME MEDS. Essential hypertension  CONT HOME MEDS, MONITOR.              Results:     Lab Results   Component Value Date    WBC 7.6 07/31/2018    HGB 12.9 07/31/2018    HCT 38.3 07/31/2018    PLT 28

## 2018-09-18 PROCEDURE — 99233 SBSQ HOSP IP/OBS HIGH 50: CPT | Performed by: HOSPITALIST

## 2018-09-18 RX ORDER — 0.9 % SODIUM CHLORIDE 0.9 %
VIAL (ML) INJECTION
Status: COMPLETED
Start: 2018-09-18 | End: 2018-09-18

## 2018-09-18 RX ORDER — WARFARIN SODIUM 5 MG/1
5 TABLET ORAL
Status: COMPLETED | OUTPATIENT
Start: 2018-09-18 | End: 2018-09-18

## 2018-09-18 NOTE — OCCUPATIONAL THERAPY NOTE
OCCUPATIONAL THERAPY EVALUATION - INPATIENT      Room Number: 417/417-A  Evaluation Date: 9/18/2018  Type of Evaluation: Initial  Presenting Problem: R JUAN, posterior    Physician Order: IP Consult to Occupational Therapy  Reason for Therapy: ADL/IADL Dysf Back problem     Hx herniated discs   • Calculus of kidney    • Deaf     Left ear   • Diverticulosis    • Hearing impairment    • Hearing loss    • High blood pressure    • High cholesterol    • History of shingles     1980's   • Meniere disease    • Osteo soreness. Management Techniques:  Activity promotion;Repositioning    ACTIVITY TOLERANCE  O2 Device: Nasal cannula  Liters of O2:  1.5L    COGNITION  WFL for ADL    RANGE OF MOTION   Upper extremity ROM is within functional limits     STRENGTH ASSESSMENT will complete LE dressing with SBA  Comment:     Patient will complete toilet transfer with SBA  Comment:     Patient will complete self care task at sink level with SBA   Comment:    Patient will independently recall posterior hip precautions  Comment:

## 2018-09-18 NOTE — PHYSICAL THERAPY NOTE
PHYSICAL THERAPY HIP EVALUATION - INPATIENT     Room Number: 417/417-A  Evaluation Date: 9/18/2018  Type of Evaluation: Initial  Physician Order: PT Eval and Treat    Presenting Problem: R JUAN  Reason for Therapy: Mobility Dysfunction and Discharge Maryn MEDICAL/SOCIAL HISTORY   Problem List  Principal Problem:    Primary osteoarthritis of right hip  Active Problems:    Mixed hyperlipidemia    Essential hypertension      Past Medical History  Past Medical History:   Diagnosis Date   • Anesthesia complicati abduction pillow  Fall Risk: High fall risk    WEIGHT BEARING RESTRICTION  Weight Bearing Restriction: R lower extremity        R Lower Extremity: Weight Bearing as Tolerated       PAIN ASSESSMENT  Rating: Unable to rate  Location: [facility]  Management Techniqu training  Strengthening  Transfer training    Patient End of Session: Up in chair;Needs met;Call light within reach;RN aware of session/findings; All patient questions and concerns addressed    CURRENT GOALS    Goals to be met by: 10/10/18  Patient Goal Toni Julien

## 2018-09-18 NOTE — PHYSICAL THERAPY NOTE
PHYSICAL THERAPY HIP TREATMENT NOTE - INPATIENT    Room Number: 359/584-H            Presenting Problem: R JUAN    Problem List  Principal Problem:    Primary osteoarthritis of right hip  Active Problems:    Mixed hyperlipidemia    Essential hypertension cannula  Liters of O2:  1.5  Heart Rate: 69    AM-PAC '6-Clicks' INPATIENT SHORT FORM - BASIC MOBILITY  How much difficulty does the patient currently have. ..  -   Turning over in bed (including adjusting bedclothes, sheets and blankets)?: A Little   -   S demonstrates all precautions and safety concerns independently   Goal #5   Current Status In progress   Goal #6 Patient independently performs home exercise program for ROM/strengthening per the instructions provided in preparation for discharge.    Goal #6

## 2018-09-18 NOTE — CM/SW NOTE
Gregorio [place]. [email or web address]) - ROLE_TCS - added a note on 09/18/2018  Met with patient to explain the BPCI/Medicare program. agreed with phone follow up for 3 months from [address] [place] [address] after discharge from [address] [place] [address].  Patient was enrolled under

## 2018-09-18 NOTE — PROGRESS NOTES
ORTHO SURG: PO#1  Tmax = 99.0. AM LABS: INR = 1.1, WBC = 10,400, H/H = 10.1 / 29.4, PLATELETS = 831,779. HEMOVAC OUTPUT OVER LAST TWO 8 HOUR SHIFTS: 10 ML / 100ML. NO NAUSEA. PAIN IS CONTROLLED.  PE: UP IN BEDSIDE CHAIR , ALERT, NAD, ORIENTED X3. ASSESS:

## 2018-09-19 PROCEDURE — 99232 SBSQ HOSP IP/OBS MODERATE 35: CPT | Performed by: HOSPITALIST

## 2018-09-19 RX ORDER — SODIUM CHLORIDE 9 MG/ML
INJECTION, SOLUTION INTRAVENOUS CONTINUOUS
Status: ACTIVE | OUTPATIENT
Start: 2018-09-19 | End: 2018-09-19

## 2018-09-19 RX ORDER — WARFARIN SODIUM 5 MG/1
5 TABLET ORAL NIGHTLY
Status: DISCONTINUED | OUTPATIENT
Start: 2018-09-19 | End: 2018-09-20

## 2018-09-19 RX ORDER — 0.9 % SODIUM CHLORIDE 0.9 %
VIAL (ML) INJECTION
Status: DISCONTINUED
Start: 2018-09-19 | End: 2018-09-20

## 2018-09-19 RX ORDER — MELATONIN
325 2 TIMES DAILY WITH MEALS
Status: DISCONTINUED | OUTPATIENT
Start: 2018-09-19 | End: 2018-09-20

## 2018-09-19 RX ORDER — SODIUM CHLORIDE 9 MG/ML
INJECTION, SOLUTION INTRAVENOUS
Status: COMPLETED
Start: 2018-09-19 | End: 2018-09-19

## 2018-09-19 RX ORDER — HYDROCODONE BITARTRATE AND ACETAMINOPHEN 5; 325 MG/1; MG/1
1 TABLET ORAL EVERY 6 HOURS PRN
Status: DISCONTINUED | OUTPATIENT
Start: 2018-09-19 | End: 2018-09-20

## 2018-09-19 RX ORDER — SODIUM CHLORIDE 9 MG/ML
INJECTION, SOLUTION INTRAVENOUS CONTINUOUS
Status: DISCONTINUED | OUTPATIENT
Start: 2018-09-19 | End: 2018-09-20

## 2018-09-19 NOTE — PHYSICAL THERAPY NOTE
PHYSICAL THERAPY HIP TREATMENT NOTE - INPATIENT    Room Number: 026/644-Y            Presenting Problem: R JUAN    Problem List  Principal Problem:    Primary osteoarthritis of right hip  Active Problems:    Mixed hyperlipidemia    Essential hypertension BEARING RESTRICTION  Weight Bearing Restriction: R lower extremity        R Lower Extremity: Weight Bearing as Tolerated       PAIN ASSESSMENT   Ratin  Location: RLE  Management Techniques: Activity promotion; Body mechanics;Repositioning    BALANCE  St able to demonstrate transfers Sit to/from Stand at assistance level: modified independent with rolling walker       Goal #2  Current Status Min A x 1    Goal #3 Patient is able to ambulate 300 feet with assistive device at assistance level: modified elaina

## 2018-09-19 NOTE — PHYSICAL THERAPY NOTE
PHYSICAL THERAPY HIP TREATMENT NOTE - INPATIENT    Room Number: 121/814-P            Presenting Problem: R JUAN    Problem List  Principal Problem:    Primary osteoarthritis of right hip  Active Problems:    Mixed hyperlipidemia    Essential hypertension promotion; Body mechanics;Repositioning    BALANCE  Static Sitting: Good  Dynamic Sitting: Good  Static Standing: Fair +  Dynamic Standing: Fair -  ACTIVITY TOLERANCE  O2 Device: Nasal cannula  Liters of O2:  1.5    AM-PAC '6-Clicks' INPATIENT SHORT FORM - walker    Goal #3   Current Status 45ft with rolling walker min A X 1 and chair follow   Goal #4     Goal #4   Current Status     Goal #5 Patient verbalizes and/or demonstrates all precautions and safety concerns independently   Goal #5   Current Status In

## 2018-09-19 NOTE — OCCUPATIONAL THERAPY NOTE
OCCUPATIONAL THERAPY TREATMENT NOTE - INPATIENT    Room Number: 417/417-A    Presenting Problem: R JUAN, posterior     Problem List  Principal Problem:    Primary osteoarthritis of right hip  Active Problems:    Mixed hyperlipidemia    Essential hypertensio ASSESSMENT  Ratin  Location: R hip soreness. Management Techniques:  Activity promotion;Repositioning     ACTIVITY TOLERANCE  O2 Saturation: 98%  O2 Device: Nasal cannula  Liters of O2:  1  Shortness of breath  Heart Rate: 81  Blood Pressure: 111/59 at independently recall posterior hip precautions  Comment: recalls 2/3          Goals  on: 18  Frequency: 5-7 x/week    9 [facility] MOT/R  [address] [facility]  #[id number]

## 2018-09-19 NOTE — PROGRESS NOTES
ORTHO SURG: PO#2  Tmax = 99.3. AM LABS: INR = 1.2, WBC = 11,600, H/H = 8.6 / 26.1, PLATELETS = 764,946.  PE: UP IN BEDSIDE CHAIR, SLEEPING, EASILY AWAKENED, C/O DIZZINESS WITH TRANSFERS / GAIT, DRAIN SITE IS CLEAN/ DRY AND WITHOUT ERYTHEMA OR INDURATION,

## 2018-09-19 NOTE — PROGRESS NOTES
[place] [facility]    Progress Note    Tai Larios Patient Status:  Inpatient    1945 MRN [id number]   Location [facility] 4W/SW/SE Attending Erin Sanchez MD   [facility] Day # 2 PCP Cari Guillen DO       Subjective:     Pain

## 2018-09-20 VITALS
TEMPERATURE: 99 F | RESPIRATION RATE: 16 BRPM | HEIGHT: 64 IN | DIASTOLIC BLOOD PRESSURE: 38 MMHG | SYSTOLIC BLOOD PRESSURE: 113 MMHG | BODY MASS INDEX: 38.41 KG/M2 | WEIGHT: 225 LBS | HEART RATE: 71 BPM | OXYGEN SATURATION: 92 %

## 2018-09-20 PROCEDURE — 99239 HOSP IP/OBS DSCHRG MGMT >30: CPT | Performed by: HOSPITALIST

## 2018-09-20 RX ORDER — HYDROCODONE BITARTRATE AND ACETAMINOPHEN 5; 325 MG/1; MG/1
1 TABLET ORAL EVERY 4 HOURS PRN
Qty: 40 TABLET | Refills: 0 | Status: SHIPPED | OUTPATIENT
Start: 2018-09-20 | End: 2018-12-09

## 2018-09-20 RX ORDER — POLYETHYLENE GLYCOL 3350 17 G/17G
17 POWDER, FOR SOLUTION ORAL DAILY PRN
Qty: 30 EACH | Refills: 0 | Status: SHIPPED | OUTPATIENT
Start: 2018-09-20 | End: 2019-01-25

## 2018-09-20 RX ORDER — WARFARIN SODIUM 5 MG/1
5 TABLET ORAL NIGHTLY
Qty: 30 TABLET | Refills: 0 | Status: SHIPPED | OUTPATIENT
Start: 2018-09-20 | End: 2018-11-08

## 2018-09-20 RX ORDER — MELATONIN
325 2 TIMES DAILY WITH MEALS
Qty: 60 TABLET | Refills: 1 | Status: SHIPPED | OUTPATIENT
Start: 2018-09-20 | End: 2019-01-25

## 2018-09-20 RX ORDER — ENOXAPARIN SODIUM 100 MG/ML
30 INJECTION SUBCUTANEOUS EVERY 12 HOURS SCHEDULED
Qty: 3 ML | Refills: 1 | Status: SHIPPED | OUTPATIENT
Start: 2018-09-20 | End: 2018-12-13

## 2018-09-20 NOTE — CM/SW NOTE
The pt. Is scheduled to discharge to [facility] today 9/20 at 3p, via [address]. The pt. Is aware of payment at time of service. The pt. Will pay the  directly ([employer] quote $42). The pt. Is aware and agreeable.       Report [phone]

## 2018-09-20 NOTE — PROGRESS NOTES
ORTHO SURG: PO#3  Tmax = 98.8. AM LABS: INR = 1.6, WBC = 11,80, H/H = 7.8 / 22.8, PLATELETS = 828,339. PAIN CONTROLLED WITH NORCO 5/325. PE: ALERT, NAD, RIGHT HIP WOUND AND DRAIN SITE ARE CLEAN , DRY AND WITHOUT ERYTHEMA OR INDURATION.  NEW WOUND DRESSING

## 2018-09-20 NOTE — PHYSICAL THERAPY NOTE
PHYSICAL THERAPY HIP TREATMENT NOTE - INPATIENT    Room Number: 794/350-C            Presenting Problem: R JUAN    Problem List  Principal Problem:    Primary osteoarthritis of right hip  Active Problems:    Mixed hyperlipidemia    Essential hypertension bed?: A Little   How much help from another person does the patient currently need. ..   -   Moving to and from a bed to a chair (including a wheelchair)?: A Little   -   Need to walk in hospital room?: A Little   -   Climbing 3-5 steps with a railing?: A L Status In progress   Goal #6 Patient independently performs home exercise program for ROM/strengthening per the instructions provided in preparation for discharge.    Goal #6  Current Status In progress

## 2018-09-20 NOTE — PLAN OF CARE
DISCHARGE PLANNING    • Discharge to home or other facility with appropriate resources Progressing        HEMATOLOGIC - ADULT    • Free from bleeding injury Progressing        MUSCULOSKELETAL - ADULT    • Return mobility to safest level of function Progres room air. Voiding freely without difficulties. IVF maintained.

## 2018-09-21 ENCOUNTER — SNF/IP PROF CHARGE ONLY (OUTPATIENT)
Dept: INTERNAL MEDICINE CLINIC | Facility: CLINIC | Age: 73
End: 2018-09-21

## 2018-09-21 DIAGNOSIS — Z96.641 STATUS POST RIGHT HIP REPLACEMENT: ICD-10-CM

## 2018-09-21 DIAGNOSIS — M16.11 PRIMARY OSTEOARTHRITIS OF RIGHT HIP: ICD-10-CM

## 2018-09-21 DIAGNOSIS — I10 ESSENTIAL HYPERTENSION: Chronic | ICD-10-CM

## 2018-09-21 DIAGNOSIS — D64.9 ANEMIA FOLLOWING SURGERY: ICD-10-CM

## 2018-09-21 PROCEDURE — 99305 1ST NF CARE MODERATE MDM 35: CPT | Performed by: INTERNAL MEDICINE

## 2018-09-21 NOTE — DISCHARGE SUMMARY
[place] [facility] - [facility]    Discharge Summary    Cristal Mcdermott Patient Status:  Inpatient    1945 MRN [id number]   Location [facility] 4W/SW/SE Attending No att. providers found   Hosp Day # 3 PCP Anne-Marie Cosme DO     Date of Admis JUAN.  Pt tolerated procedure well. Pain controlled. Worked well with PT/OT. On lovenox transitioning to coumadin for dvt proph. Discharged to SNF in good condition. INR check tomorrow. Goal INR 2-3.   Stop lovenox when INR >2.0       Acute blood lo [address]  What changed:    · how much to take  · how to take this  · when to take this  · reasons to take this  · additional instructions      Take 1 tablet by mouth every 4 (four) hours as needed for Pain.    Quantity:  40 tablet  Refills:  0        CONTINUE MG/0.3ML Soln  · PEG 3350 Pack  · Warfarin Sodium 5 MG Tabs     Please  your prescriptions at the location directed by your doctor or nurse    Bring a paper prescription for each of these medications  · ferrous sulfate 325 (65 FE) MG Tbec  · HYDROco

## 2018-09-24 ENCOUNTER — SNF/IP PROF CHARGE ONLY (OUTPATIENT)
Dept: INTERNAL MEDICINE CLINIC | Facility: CLINIC | Age: 73
End: 2018-09-24

## 2018-09-24 DIAGNOSIS — R26.2 DIFFICULTY WALKING: ICD-10-CM

## 2018-09-24 DIAGNOSIS — D64.9 ANEMIA FOLLOWING SURGERY: ICD-10-CM

## 2018-09-24 DIAGNOSIS — Z96.641 STATUS POST RIGHT HIP REPLACEMENT: ICD-10-CM

## 2018-09-24 DIAGNOSIS — I10 ESSENTIAL HYPERTENSION: Chronic | ICD-10-CM

## 2018-09-24 PROCEDURE — 99308 SBSQ NF CARE LOW MDM 20: CPT | Performed by: INTERNAL MEDICINE

## 2018-09-26 ENCOUNTER — SNF/IP PROF CHARGE ONLY (OUTPATIENT)
Dept: INTERNAL MEDICINE CLINIC | Facility: CLINIC | Age: 73
End: 2018-09-26

## 2018-09-26 DIAGNOSIS — G89.29 CHRONIC BILATERAL LOW BACK PAIN WITH RIGHT-SIDED SCIATICA: ICD-10-CM

## 2018-09-26 DIAGNOSIS — I10 ESSENTIAL HYPERTENSION: Chronic | ICD-10-CM

## 2018-09-26 DIAGNOSIS — M25.551 RIGHT HIP PAIN: ICD-10-CM

## 2018-09-26 DIAGNOSIS — M54.41 CHRONIC BILATERAL LOW BACK PAIN WITH RIGHT-SIDED SCIATICA: ICD-10-CM

## 2018-09-26 DIAGNOSIS — Z96.641 STATUS POST RIGHT HIP REPLACEMENT: ICD-10-CM

## 2018-09-26 PROCEDURE — 99308 SBSQ NF CARE LOW MDM 20: CPT | Performed by: INTERNAL MEDICINE

## 2018-10-03 ENCOUNTER — LAB ENCOUNTER (OUTPATIENT)
Dept: LAB | Age: 73
End: 2018-10-03
Attending: ORTHOPAEDIC SURGERY
Payer: MEDICARE

## 2018-10-03 ENCOUNTER — HOSPITAL ENCOUNTER (OUTPATIENT)
Dept: GENERAL RADIOLOGY | Age: 73
Discharge: HOME | End: 2018-10-03
Attending: ORTHOPAEDIC SURGERY
Payer: MEDICARE

## 2018-10-03 DIAGNOSIS — M25.569 KNEE PAIN: ICD-10-CM

## 2018-10-03 DIAGNOSIS — Z79.01 ENCOUNTER FOR MONITORING COUMADIN THERAPY: Primary | ICD-10-CM

## 2018-10-03 DIAGNOSIS — Z51.81 ENCOUNTER FOR MONITORING COUMADIN THERAPY: Primary | ICD-10-CM

## 2018-10-03 PROCEDURE — 73560 X-RAY EXAM OF KNEE 1 OR 2: CPT | Performed by: ORTHOPAEDIC SURGERY

## 2018-10-03 PROCEDURE — 85610 PROTHROMBIN TIME: CPT

## 2018-10-03 PROCEDURE — 36415 COLL VENOUS BLD VENIPUNCTURE: CPT

## 2018-10-03 PROCEDURE — 73564 X-RAY EXAM KNEE 4 OR MORE: CPT | Performed by: ORTHOPAEDIC SURGERY

## 2018-10-27 DIAGNOSIS — J30.1 SEASONAL ALLERGIC RHINITIS DUE TO POLLEN: ICD-10-CM

## 2018-10-27 RX ORDER — MONTELUKAST SODIUM 10 MG/1
10 TABLET ORAL
Qty: 90 TABLET | Refills: 0 | Status: SHIPPED | OUTPATIENT
Start: 2018-10-27 | End: 2019-04-09

## 2018-10-27 NOTE — TELEPHONE ENCOUNTER
Refill Protocol Appointment Criteria  · Appointment scheduled in the past 6 months or in the next 3 months  Recent Outpatient Visits            2 months ago Chronic right-sided low back pain without sciatica    [address]

## 2018-10-27 NOTE — TELEPHONE ENCOUNTER
Patient stated she has been taking everyday. At the 9/17/18 discharge papers from right hip surgery she was to discontinue. Please advise. The patient stated if she not to take she will NOT take.

## 2018-10-29 RX ORDER — MELOXICAM 15 MG/1
TABLET ORAL
Qty: 90 TABLET | Refills: 0 | Status: SHIPPED | OUTPATIENT
Start: 2018-10-29 | End: 2019-01-11

## 2018-11-08 ENCOUNTER — OFFICE VISIT (OUTPATIENT)
Dept: FAMILY MEDICINE CLINIC | Facility: CLINIC | Age: 73
End: 2018-11-08
Payer: MEDICARE

## 2018-11-08 VITALS
WEIGHT: 227 LBS | SYSTOLIC BLOOD PRESSURE: 135 MMHG | HEART RATE: 65 BPM | DIASTOLIC BLOOD PRESSURE: 76 MMHG | HEIGHT: 64 IN | BODY MASS INDEX: 38.76 KG/M2

## 2018-11-08 DIAGNOSIS — I10 ESSENTIAL HYPERTENSION: Chronic | ICD-10-CM

## 2018-11-08 DIAGNOSIS — J06.9 VIRAL UPPER RESPIRATORY TRACT INFECTION: Primary | ICD-10-CM

## 2018-11-08 PROCEDURE — 99213 OFFICE O/P EST LOW 20 MIN: CPT | Performed by: NURSE PRACTITIONER

## 2018-11-08 RX ORDER — GUAIFENESIN AND CODEINE PHOSPHATE 100; 10 MG/5ML; MG/5ML
SOLUTION ORAL
Qty: 118 ML | Refills: 0 | Status: SHIPPED | OUTPATIENT
Start: 2018-11-08 | End: 2018-12-13

## 2018-11-08 RX ORDER — ALBUTEROL SULFATE 90 UG/1
2 AEROSOL, METERED RESPIRATORY (INHALATION) EVERY 4 HOURS PRN
Qty: 18 G | Refills: 5 | Status: SHIPPED | OUTPATIENT
Start: 2018-11-08 | End: 2018-12-13

## 2018-11-08 RX ORDER — METHYLPREDNISOLONE 4 MG/1
TABLET ORAL
Qty: 1 KIT | Refills: 0 | Status: SHIPPED | OUTPATIENT
Start: 2018-11-08 | End: 2018-12-13

## 2018-11-08 RX ORDER — FLUTICASONE PROPIONATE AND SALMETEROL 232; 14 UG/1; UG/1
1 POWDER, METERED RESPIRATORY (INHALATION) 2 TIMES DAILY
Qty: 1 EACH | Refills: 3 | Status: SHIPPED | OUTPATIENT
Start: 2018-11-08 | End: 2019-01-25

## 2018-11-08 NOTE — PROGRESS NOTES
HPI  Pt here for cough, congestion for 7 days ago. Cough is worse at night-has adjustable bed but coughs all night. Has inhaler at home but thinks it is quite old-doesn't use often. Has h/o uri turning into bronchitis.      Review of Systems   Constitution extraction w/  intraocular lens implant Right 9/12/11    RJM   • Cholecystectomy     • Knee surgery     • Oophorectomy Bilateral     per [employer]:  \"laparoscopic bilateral oophorectomy\"   • Other surgical history      Ear surgery   • Spine surgery procedu Asked        Exercise: No        Bike Helmet: Not Asked        Seat Belt: Not Asked        Self-Exams: Not Asked        Grew up on a farm: Not Asked        History of tanning: Not Asked        Outdoor occupation: Not Asked        Pt has a pacemaker: No Multiple Vitamins-Minerals (PRESERVISION AREDS) Oral Tab Take by mouth. Disp:  Rfl:    Flaxseed, Linseed, (FLAXSEED OIL) 1000 MG Oral Cap Take by mouth daily.  Disp:  Rfl:    Lisinopril-Hydrochlorothiazide 20-25 MG Oral Tab Take 1 tablet by mouth once ronald respiratory distress. She has no wheezes. She has no rales. She exhibits no tenderness. Abdominal: Soft. Bowel sounds are normal. She exhibits no distension. There is no tenderness. Musculoskeletal: Normal range of motion. She exhibits no tenderness.

## 2018-11-09 NOTE — ASSESSMENT & PLAN NOTE
Supportive care discussed  Refill albuterol hfa with spacer  Start airduo 2 puffs bid-rinse mouth after use  cheratussin ac 1-2 tsp po q 6 hrs prn-may cause drowsiness    rx for medrol dose pack give if s/s worsen or not resolving within next 4-5 days    P

## 2018-11-12 NOTE — TELEPHONE ENCOUNTER
Refill request for gabapentin 600 mg, take 1 tab nightly, #30, 1 refill    LOV: 8/14/18  NOV: 12/13/18  Last refilled on 7/31/18 with 1 refill

## 2018-11-13 RX ORDER — GABAPENTIN 600 MG/1
TABLET ORAL
Qty: 30 TABLET | Refills: 1 | Status: SHIPPED | OUTPATIENT
Start: 2018-11-13 | End: 2019-01-28

## 2018-12-11 NOTE — TELEPHONE ENCOUNTER
No Protocol on this med.      Requested Prescriptions     Pending Prescriptions Disp Refills   • HYDROCODONE-ACETAMINOPHEN 5-325 MG Oral Tab [Pharmacy Med Name: Hydrocodone-Acetaminophen Oral Tablet 5-325 MG] 60 tablet 0     Sig: TAKE ONE TO TWO TABLETS BY

## 2018-12-12 RX ORDER — HYDROCODONE BITARTRATE AND ACETAMINOPHEN 5; 325 MG/1; MG/1
TABLET ORAL
Qty: 60 TABLET | Refills: 0 | Status: SHIPPED | OUTPATIENT
Start: 2018-12-12 | End: 2019-01-17

## 2018-12-12 RX ORDER — HYDROCODONE BITARTRATE AND ACETAMINOPHEN 5; 325 MG/1; MG/1
1 TABLET ORAL EVERY 4 HOURS PRN
Qty: 40 TABLET | Refills: 0 | OUTPATIENT
Start: 2018-12-12

## 2018-12-13 ENCOUNTER — OFFICE VISIT (OUTPATIENT)
Dept: NEUROLOGY | Facility: CLINIC | Age: 73
End: 2018-12-13
Payer: MEDICARE

## 2018-12-13 VITALS
DIASTOLIC BLOOD PRESSURE: 64 MMHG | BODY MASS INDEX: 39.27 KG/M2 | HEART RATE: 68 BPM | HEIGHT: 64 IN | WEIGHT: 230 LBS | SYSTOLIC BLOOD PRESSURE: 138 MMHG

## 2018-12-13 DIAGNOSIS — M54.16 LUMBAR RADICULOPATHY: Primary | ICD-10-CM

## 2018-12-13 DIAGNOSIS — M51.9 LUMBAR DISC DISEASE: ICD-10-CM

## 2018-12-13 DIAGNOSIS — G89.29 CHRONIC RIGHT-SIDED LOW BACK PAIN WITHOUT SCIATICA: ICD-10-CM

## 2018-12-13 DIAGNOSIS — M48.061 LUMBAR FORAMINAL STENOSIS: ICD-10-CM

## 2018-12-13 DIAGNOSIS — M54.50 CHRONIC RIGHT-SIDED LOW BACK PAIN WITHOUT SCIATICA: ICD-10-CM

## 2018-12-13 DIAGNOSIS — M48.061 SPINAL STENOSIS OF LUMBAR REGION WITHOUT NEUROGENIC CLAUDICATION: ICD-10-CM

## 2018-12-13 DIAGNOSIS — M51.26 LUMBAR HERNIATED DISC: ICD-10-CM

## 2018-12-13 PROBLEM — M16.11 PRIMARY OSTEOARTHRITIS OF RIGHT HIP: Status: RESOLVED | Noted: 2018-09-17 | Resolved: 2018-12-13

## 2018-12-13 PROBLEM — M25.551 RIGHT HIP PAIN: Status: RESOLVED | Noted: 2018-04-23 | Resolved: 2018-12-13

## 2018-12-13 PROCEDURE — 99214 OFFICE O/P EST MOD 30 MIN: CPT | Performed by: PHYSICAL MEDICINE & REHABILITATION

## 2018-12-13 RX ORDER — AMLODIPINE BESYLATE 5 MG/1
1 TABLET ORAL DAILY
Refills: 1 | COMMUNITY
Start: 2018-11-13 | End: 2019-01-28

## 2018-12-13 NOTE — PATIENT INSTRUCTIONS
Plan  I will perform right L5 TFESI(s). The patient will follow up in 3 months, but the patient will call me 2 weeks after having the injection to let me know how the injection worked.     If the above injection does not help, then she might benefit fr

## 2018-12-13 NOTE — OPERATIVE REPORT
[facility]    PATIENT'S NAME: Barney HUI   ATTENDING PHYSICIAN: Fidel Nino MD   OPERATING PHYSICIAN: Nick Buck.  MD Tomasz   PATIENT ACCOUNT#:   [de-identified]    LOCATION:  [facility] [address] [facility] #:   [id number]       DATE OF BIR skin was then painted with DuraPrep which was allowed to dry thoroughly. A \"time-out\" process was completed in this timeframe.     Planned surgical incision in the pattern of a modified \"southern\" approach was outlined with a skin marker and draping Advanced osteoarthritic involvement of the right hip joint was promptly demonstrated. Soft tissues about the periphery of the femoral neck were released.   A cutting guide from the [employer] Trabecular metal system was applied over the proximal femur a reaming at 9 mm, 10 mm, and 11 mm was carried out. This was followed then by broaching at 10 and 11 mm. Further preparation was indicated and was carried out with manual IM reaming and then broaching at 12 mm, 13 mm, and finally at 14 mm size.   Appropria impacted to a locked position. The hip was reduced and stability was demonstrated to be equal to that of the prior trials. The iliac pin was removed.   A puncture wound through the anterior hip capsule was made with a curved clamp and a wound drain was

## 2019-01-05 ENCOUNTER — HOSPITAL ENCOUNTER (OUTPATIENT)
Age: 74
Discharge: HOME | End: 2019-01-05
Attending: FAMILY MEDICINE
Payer: MEDICARE

## 2019-01-05 VITALS
OXYGEN SATURATION: 94 % | HEIGHT: 64 IN | HEART RATE: 76 BPM | RESPIRATION RATE: 18 BRPM | DIASTOLIC BLOOD PRESSURE: 77 MMHG | WEIGHT: 235 LBS | TEMPERATURE: 99 F | SYSTOLIC BLOOD PRESSURE: 127 MMHG | BODY MASS INDEX: 40.12 KG/M2

## 2019-01-05 DIAGNOSIS — J40 BRONCHITIS: Primary | ICD-10-CM

## 2019-01-05 PROCEDURE — 99203 OFFICE O/P NEW LOW 30 MIN: CPT

## 2019-01-05 PROCEDURE — 99213 OFFICE O/P EST LOW 20 MIN: CPT

## 2019-01-05 RX ORDER — METHYLPREDNISOLONE 4 MG/1
TABLET ORAL
Qty: 1 PACKAGE | Refills: 0 | Status: SHIPPED | OUTPATIENT
Start: 2019-01-05 | End: 2019-01-10

## 2019-01-05 RX ORDER — ALBUTEROL SULFATE 90 UG/1
2 AEROSOL, METERED RESPIRATORY (INHALATION) EVERY 4 HOURS PRN
Qty: 1 INHALER | Refills: 0 | Status: SHIPPED | OUTPATIENT
Start: 2019-01-05 | End: 2019-01-25

## 2019-01-05 RX ORDER — BENZONATATE 100 MG/1
100 CAPSULE ORAL 3 TIMES DAILY PRN
Qty: 30 CAPSULE | Refills: 0 | Status: SHIPPED | OUTPATIENT
Start: 2019-01-05 | End: 2019-01-25

## 2019-01-05 NOTE — ED PROVIDER NOTES
Patient Seen in: [facility] Immediate Care In [address] [place] [address]    History   Patient presents with:  Cough/URI    Stated Complaint: cough    HPI    Pt is a 73 yo with a 2 day h/o nasal congestion and cough and wheezing. Started her mucinex.  Did not start a other systems reviewed and negative except as noted above.     Physical Exam     ED Triage Vitals [01/05/19 1123]   /77   Pulse 76   Resp 18   Temp 98.7 °F (37.1 °C)   Temp src Oral   SpO2 94 %   O2 Device None (Room air)       Current:/77   Pul total) by mouth 3 (three) times daily as needed for cough. Qty: 30 capsule Refills: 0    !! Albuterol Sulfate  (90 Base) MCG/ACT Inhalation Aero Soln  Inhale 2 puffs into the lungs every 4 (four) hours as needed for Wheezing.   Qty: 1 Inhaler Refill

## 2019-01-05 NOTE — ED INITIAL ASSESSMENT (HPI)
Cough with green phlegm since this morning. Denies fever. States she frequently gets bronchitis and is treated with a z-hetal.

## 2019-01-11 RX ORDER — MELOXICAM 15 MG/1
TABLET ORAL
Qty: 30 TABLET | Refills: 0 | Status: ON HOLD | OUTPATIENT
Start: 2019-01-11 | End: 2019-02-07

## 2019-01-17 ENCOUNTER — HOSPITAL ENCOUNTER (OUTPATIENT)
Dept: GENERAL RADIOLOGY | Age: 74
Discharge: HOME | End: 2019-01-17
Attending: FAMILY MEDICINE | Admitting: FAMILY MEDICINE
Payer: MEDICARE

## 2019-01-17 ENCOUNTER — OFFICE VISIT (OUTPATIENT)
Dept: FAMILY MEDICINE CLINIC | Facility: CLINIC | Age: 74
End: 2019-01-17
Payer: MEDICARE

## 2019-01-17 VITALS
HEIGHT: 64 IN | SYSTOLIC BLOOD PRESSURE: 140 MMHG | RESPIRATION RATE: 16 BRPM | DIASTOLIC BLOOD PRESSURE: 68 MMHG | TEMPERATURE: 97 F | BODY MASS INDEX: 39.78 KG/M2 | WEIGHT: 233 LBS | HEART RATE: 67 BPM

## 2019-01-17 DIAGNOSIS — S90.121A CONTUSION OF SECOND TOE OF RIGHT FOOT, INITIAL ENCOUNTER: ICD-10-CM

## 2019-01-17 DIAGNOSIS — R05.9 COUGH: Primary | ICD-10-CM

## 2019-01-17 DIAGNOSIS — R09.89 RHONCHI: ICD-10-CM

## 2019-01-17 DIAGNOSIS — R05.9 COUGH: ICD-10-CM

## 2019-01-17 DIAGNOSIS — K14.8 LESION OF TONGUE: ICD-10-CM

## 2019-01-17 DIAGNOSIS — M16.0 PRIMARY OSTEOARTHRITIS OF BOTH HIPS: ICD-10-CM

## 2019-01-17 DIAGNOSIS — R06.2 WHEEZING: ICD-10-CM

## 2019-01-17 DIAGNOSIS — M17.11 PRIMARY OSTEOARTHRITIS OF RIGHT KNEE: ICD-10-CM

## 2019-01-17 PROCEDURE — 99214 OFFICE O/P EST MOD 30 MIN: CPT | Performed by: FAMILY MEDICINE

## 2019-01-17 PROCEDURE — G0463 HOSPITAL OUTPT CLINIC VISIT: HCPCS | Performed by: FAMILY MEDICINE

## 2019-01-17 PROCEDURE — 71046 X-RAY EXAM CHEST 2 VIEWS: CPT | Performed by: FAMILY MEDICINE

## 2019-01-17 RX ORDER — AZITHROMYCIN 250 MG/1
TABLET, FILM COATED ORAL
Qty: 6 TABLET | Refills: 0 | Status: SHIPPED | OUTPATIENT
Start: 2019-01-17 | End: 2019-01-22

## 2019-01-17 RX ORDER — HYDROCODONE BITARTRATE AND ACETAMINOPHEN 5; 325 MG/1; MG/1
TABLET ORAL
Qty: 60 TABLET | Refills: 0 | Status: SHIPPED | OUTPATIENT
Start: 2019-01-17 | End: 2019-02-27

## 2019-01-17 NOTE — PROGRESS NOTES
Patient ID: Sagrario Kwong is a 72year old female.     HPI  Patient presents with:  Cough: pt was in U/C for cough and cold, doing much better    Was in  immediate care on January 5, 2019 with 2-day history of nasal congestion and cough and some wheez Respiratory: Positive for cough (mild now. ) and wheezing. Negative for shortness of breath and stridor. Cardiovascular: Negative for chest pain.          Past Medical History:   Diagnosis Date   • Anesthesia complication    • Anxiety state    • Arthri GABAPENTIN 600 MG Oral Tab TAKE ONE TABLET BY MOUTH EVERY EVENING Disp: 30 tablet Rfl: 1   Spacer/Aero Chamber Mouthpiece Does not apply Misc Use with HFA inhaler as directed Disp: 1 each Rfl: 0   Fluticasone-Salmeterol (Uday uW 176/78) 914-90 distress. HENT:   Head: Normocephalic. Right Ear: Tympanic membrane, external ear and ear canal normal.   Left Ear: Tympanic membrane, external ear and ear canal normal.   Nose: No mucosal edema or rhinorrhea.    THROAT: Oropharynx is clear without exud azithromycin (ZITHROMAX Z-REJI) 250 MG Oral Tab; Take 2 tabs by mouth on day 1 and then 1 tab by mouth each day for the next 4 days for 5 days of total treatment.     Lesion of tongue  -     ENT - INTERNAL  She does state perhaps 1 month ago she may have b

## 2019-01-28 ENCOUNTER — OFFICE VISIT (OUTPATIENT)
Dept: FAMILY MEDICINE CLINIC | Facility: CLINIC | Age: 74
End: 2019-01-28
Payer: MEDICARE

## 2019-01-28 VITALS
TEMPERATURE: 98 F | SYSTOLIC BLOOD PRESSURE: 120 MMHG | BODY MASS INDEX: 39.55 KG/M2 | DIASTOLIC BLOOD PRESSURE: 64 MMHG | HEIGHT: 64 IN | WEIGHT: 231.63 LBS | HEART RATE: 75 BPM | RESPIRATION RATE: 16 BRPM

## 2019-01-28 DIAGNOSIS — M25.561 CHRONIC PAIN OF RIGHT KNEE: ICD-10-CM

## 2019-01-28 DIAGNOSIS — M17.11 PRIMARY OSTEOARTHRITIS OF RIGHT KNEE: Primary | ICD-10-CM

## 2019-01-28 DIAGNOSIS — E78.2 MIXED HYPERLIPIDEMIA: ICD-10-CM

## 2019-01-28 DIAGNOSIS — Z01.818 PREOP EXAMINATION: ICD-10-CM

## 2019-01-28 DIAGNOSIS — M51.26 LUMBAR HERNIATED DISC: ICD-10-CM

## 2019-01-28 DIAGNOSIS — G89.29 CHRONIC PAIN OF RIGHT KNEE: ICD-10-CM

## 2019-01-28 DIAGNOSIS — I10 ESSENTIAL HYPERTENSION: ICD-10-CM

## 2019-01-28 PROCEDURE — 99214 OFFICE O/P EST MOD 30 MIN: CPT | Performed by: FAMILY MEDICINE

## 2019-01-28 PROCEDURE — G0463 HOSPITAL OUTPT CLINIC VISIT: HCPCS | Performed by: FAMILY MEDICINE

## 2019-01-28 RX ORDER — AMLODIPINE BESYLATE 5 MG/1
5 TABLET ORAL DAILY
Qty: 90 TABLET | Refills: 1 | Status: SHIPPED | OUTPATIENT
Start: 2019-01-28 | End: 2019-07-24

## 2019-01-28 RX ORDER — GABAPENTIN 600 MG/1
TABLET ORAL
Qty: 90 TABLET | Refills: 1 | Status: SHIPPED | OUTPATIENT
Start: 2019-01-28 | End: 2019-07-24

## 2019-01-28 NOTE — PROGRESS NOTES
Patient ID: Jaylin Hernandez is a 72year old female. HPI  Patient presents with:  Pre-Op Exam: right knee replacement feb 4      She is going for a total knee replacement on February 4, 2019 for the right knee.     She is here for preoperative cleara MON 0.5 09/21/2016    EOS 0.2 09/21/2016    BASO 0.1 09/21/2016    NEPERCENT 71 09/20/2018    LYPERCENT 17 09/20/2018    MOPERCENT 9 09/20/2018    EOPERCENT 3 09/20/2018    BAPERCENT 1 09/20/2018    NE 8.4 (H) 09/20/2018    LYMABS 2.0 09/20/2018    MOAB 07/10/2014     TSH (uIU/mL)   Date Value   07/31/2018 1.79     TSH (S) (uIU/mL)   Date Value   09/21/2016 1.95       No results found for: B12, VITB12    No results found for: IRON, IRONTOT    No results found for: SAT    No results found for: Lawson Isaac by Sangita Palmer MD at [facility] MAIN OR   • KNEE SURGERY     • OOPHORECTOMY Bilateral     per [employer]:  \"laparoscopic bilateral oophorectomy\"   • OTHER SURGICAL HISTORY      Ear surgery   • [address]      for right sciatica issue   • T History Narrative      The patient does not use an assistive device. .        The patient does not live in a home with stairs.            Current Outpatient Medications:  HYDROcodone-acetaminophen 5-325 MG Oral Tab TAKE ONE TO TWO TABLETS BY MOUTH THREE TIME round, and reactive to light. Neck: Normal range of motion. Neck supple. No thyromegaly present. Cardiovascular: Normal rate, regular rhythm and no murmur heard. Pulmonary/Chest: Effort normal and breath sounds normal. No respiratory distress.    Abdom gabapentin really does help her pain. Referrals (if applicable)  No orders of the defined types were placed in this encounter. Follow up if symptoms persist.  Take medicine (if given) as prescribed.   Approach to treatment discussed and patient/

## 2019-01-29 ENCOUNTER — LAB ENCOUNTER (OUTPATIENT)
Dept: LAB | Facility: HOSPITAL | Age: 74
End: 2019-01-29
Attending: ORTHOPAEDIC SURGERY
Payer: MEDICARE

## 2019-01-29 DIAGNOSIS — Z01.818 PREOP TESTING: ICD-10-CM

## 2019-01-29 LAB
ALBUMIN SERPL BCP-MCNC: 4.1 G/DL (ref 3.5–4.8)
ALBUMIN/GLOB SERPL: 1.5 {RATIO} (ref 1–2)
ALP SERPL-CCNC: 70 U/L (ref 32–100)
ALT SERPL-CCNC: 21 U/L (ref 14–54)
ANION GAP SERPL CALC-SCNC: 12 MMOL/L (ref 0–18)
ANTIBODY SCREEN: NEGATIVE
AST SERPL-CCNC: 29 U/L (ref 15–41)
BASOPHILS # BLD AUTO: 0.07 X10(3) UL (ref 0–0.2)
BASOPHILS NFR BLD AUTO: 1.1 %
BILIRUB SERPL-MCNC: 0.6 MG/DL (ref 0.3–1.2)
BUN SERPL-MCNC: 11 MG/DL (ref 8–20)
BUN/CREAT SERPL: 14.7 (ref 10–20)
CALCIUM SERPL-MCNC: 9.4 MG/DL (ref 8.5–10.5)
CHLORIDE SERPL-SCNC: 104 MMOL/L (ref 95–110)
CO2 SERPL-SCNC: 26 MMOL/L (ref 22–32)
CREAT SERPL-MCNC: 0.75 MG/DL (ref 0.5–1.5)
DEPRECATED RDW RBC AUTO: 49.7 FL (ref 35.1–46.3)
EOSINOPHIL # BLD AUTO: 0.35 X10(3) UL (ref 0–0.7)
EOSINOPHIL NFR BLD AUTO: 5.6 %
ERYTHROCYTE [DISTWIDTH] IN BLOOD BY AUTOMATED COUNT: 14.1 % (ref 11–15)
GLOBULIN PLAS-MCNC: 2.7 G/DL (ref 2.5–3.7)
GLUCOSE SERPL-MCNC: 95 MG/DL (ref 70–99)
HCT VFR BLD AUTO: 39.7 % (ref 35–48)
HGB BLD-MCNC: 12.8 G/DL (ref 12–16)
IMM GRANULOCYTES # BLD AUTO: 0.02 X10(3) UL (ref 0–1)
IMM GRANULOCYTES NFR BLD: 0.3 %
LYMPHOCYTES # BLD AUTO: 1.97 X10(3) UL (ref 1–4)
LYMPHOCYTES NFR BLD AUTO: 31.7 %
MCH RBC QN AUTO: 30.9 PG (ref 26–34)
MCHC RBC AUTO-ENTMCNC: 32.2 G/DL (ref 31–37)
MCV RBC AUTO: 95.9 FL (ref 80–100)
MONOCYTES # BLD AUTO: 0.55 X10(3) UL (ref 0.1–1)
MONOCYTES NFR BLD AUTO: 8.8 %
MRSA DNA SPEC QL NAA+PROBE: NEGATIVE
NEUTROPHILS # BLD AUTO: 3.26 X10 (3) UL (ref 1.5–7.7)
NEUTROPHILS # BLD AUTO: 3.26 X10(3) UL (ref 1.5–7.7)
NEUTROPHILS NFR BLD AUTO: 52.5 %
OSMOLALITY UR CALC.SUM OF ELEC: 293 MOSM/KG (ref 275–295)
PATIENT FASTING: YES
PLATELET # BLD AUTO: 322 10(3)UL (ref 150–450)
POTASSIUM SERPL-SCNC: 3.2 MMOL/L (ref 3.3–5.1)
PROT SERPL-MCNC: 6.8 G/DL (ref 5.9–8.4)
RBC # BLD AUTO: 4.14 X10(6)UL (ref 3.8–5.3)
RH BLOOD TYPE: POSITIVE
SODIUM SERPL-SCNC: 142 MMOL/L (ref 136–144)
WBC # BLD AUTO: 6.2 X10(3) UL (ref 4–11)

## 2019-01-29 PROCEDURE — 85025 COMPLETE CBC W/AUTO DIFF WBC: CPT

## 2019-01-29 PROCEDURE — 80053 COMPREHEN METABOLIC PANEL: CPT

## 2019-01-29 PROCEDURE — 87641 MR-STAPH DNA AMP PROBE: CPT

## 2019-01-29 PROCEDURE — 36415 COLL VENOUS BLD VENIPUNCTURE: CPT

## 2019-01-29 PROCEDURE — 86900 BLOOD TYPING SEROLOGIC ABO: CPT

## 2019-01-29 PROCEDURE — 86901 BLOOD TYPING SEROLOGIC RH(D): CPT

## 2019-01-29 PROCEDURE — 86850 RBC ANTIBODY SCREEN: CPT

## 2019-02-04 ENCOUNTER — ANESTHESIA (OUTPATIENT)
Dept: SURGERY | Facility: HOSPITAL | Age: 74
DRG: 470 | End: 2019-02-04
Payer: MEDICARE

## 2019-02-04 ENCOUNTER — APPOINTMENT (OUTPATIENT)
Dept: GENERAL RADIOLOGY | Facility: HOSPITAL | Age: 74
DRG: 470 | End: 2019-02-04
Attending: ORTHOPAEDIC SURGERY
Payer: MEDICARE

## 2019-02-04 ENCOUNTER — HOSPITAL ENCOUNTER (INPATIENT)
Facility: HOSPITAL | Age: 74
LOS: 3 days | Discharge: NURSING, REHAB OR LONG-TERM CARE | DRG: 470 | End: 2019-02-07
Attending: ORTHOPAEDIC SURGERY | Admitting: ORTHOPAEDIC SURGERY
Payer: MEDICARE

## 2019-02-04 ENCOUNTER — ANESTHESIA EVENT (OUTPATIENT)
Dept: SURGERY | Facility: HOSPITAL | Age: 74
DRG: 470 | End: 2019-02-04
Payer: MEDICARE

## 2019-02-04 DIAGNOSIS — Z01.818 PREOP TESTING: Primary | ICD-10-CM

## 2019-02-04 PROBLEM — Z96.651 STATUS POST TOTAL RIGHT KNEE REPLACEMENT USING CEMENT: Status: ACTIVE | Noted: 2019-02-04

## 2019-02-04 PROBLEM — M17.11 PRIMARY OSTEOARTHRITIS OF RIGHT KNEE: Status: ACTIVE | Noted: 2019-02-04

## 2019-02-04 PROCEDURE — 73560 X-RAY EXAM OF KNEE 1 OR 2: CPT | Performed by: ORTHOPAEDIC SURGERY

## 2019-02-04 PROCEDURE — 3E0T3BZ INTRODUCTION OF ANESTHETIC AGENT INTO PERIPHERAL NERVES AND PLEXI, PERCUTANEOUS APPROACH: ICD-10-PCS | Performed by: ANESTHESIOLOGY

## 2019-02-04 PROCEDURE — 99232 SBSQ HOSP IP/OBS MODERATE 35: CPT | Performed by: HOSPITALIST

## 2019-02-04 PROCEDURE — 0SRC0JA REPLACEMENT OF RIGHT KNEE JOINT WITH SYNTHETIC SUBSTITUTE, UNCEMENTED, OPEN APPROACH: ICD-10-PCS | Performed by: ORTHOPAEDIC SURGERY

## 2019-02-04 DEVICE — IMPLANTABLE DEVICE: Type: IMPLANTABLE DEVICE | Site: KNEE | Status: FUNCTIONAL

## 2019-02-04 DEVICE — COMPONENT PTLR 28MM 1 PG WRE: Type: IMPLANTABLE DEVICE | Site: KNEE | Status: FUNCTIONAL

## 2019-02-04 DEVICE — IMPLANTABLE DEVICE: Type: IMPLANTABLE DEVICE | Status: FUNCTIONAL

## 2019-02-04 DEVICE — BONE CEMENT HI VISCOSITY R: Type: IMPLANTABLE DEVICE | Site: KNEE | Status: FUNCTIONAL

## 2019-02-04 RX ORDER — MORPHINE SULFATE 4 MG/ML
4 INJECTION, SOLUTION INTRAMUSCULAR; INTRAVENOUS EVERY 2 HOUR PRN
Status: DISCONTINUED | OUTPATIENT
Start: 2019-02-04 | End: 2019-02-05

## 2019-02-04 RX ORDER — CEFAZOLIN SODIUM/WATER 2 G/20 ML
SYRINGE (ML) INTRAVENOUS AS NEEDED
Status: DISCONTINUED | OUTPATIENT
Start: 2019-02-04 | End: 2019-02-04 | Stop reason: SURG

## 2019-02-04 RX ORDER — ACETAMINOPHEN 500 MG
1000 TABLET ORAL EVERY 8 HOURS
Status: DISPENSED | OUTPATIENT
Start: 2019-02-04 | End: 2019-02-05

## 2019-02-04 RX ORDER — BISACODYL 10 MG
10 SUPPOSITORY, RECTAL RECTAL
Status: DISCONTINUED | OUTPATIENT
Start: 2019-02-04 | End: 2019-02-07

## 2019-02-04 RX ORDER — MORPHINE SULFATE 15 MG/1
15 TABLET ORAL EVERY 4 HOURS PRN
Status: DISCONTINUED | OUTPATIENT
Start: 2019-02-04 | End: 2019-02-05

## 2019-02-04 RX ORDER — MIDAZOLAM HYDROCHLORIDE 1 MG/ML
INJECTION INTRAMUSCULAR; INTRAVENOUS
Status: COMPLETED | OUTPATIENT
Start: 2019-02-04 | End: 2019-02-04

## 2019-02-04 RX ORDER — MORPHINE SULFATE 10 MG/ML
6 INJECTION, SOLUTION INTRAMUSCULAR; INTRAVENOUS EVERY 10 MIN PRN
Status: DISCONTINUED | OUTPATIENT
Start: 2019-02-04 | End: 2019-02-04 | Stop reason: HOSPADM

## 2019-02-04 RX ORDER — HYDROCODONE BITARTRATE AND ACETAMINOPHEN 5; 325 MG/1; MG/1
2 TABLET ORAL AS NEEDED
Status: DISCONTINUED | OUTPATIENT
Start: 2019-02-04 | End: 2019-02-04 | Stop reason: HOSPADM

## 2019-02-04 RX ORDER — MONTELUKAST SODIUM 10 MG/1
10 TABLET ORAL DAILY
Status: DISCONTINUED | OUTPATIENT
Start: 2019-02-05 | End: 2019-02-07

## 2019-02-04 RX ORDER — OXYCODONE HYDROCHLORIDE 5 MG/1
10 TABLET ORAL EVERY 4 HOURS PRN
Status: DISCONTINUED | OUTPATIENT
Start: 2019-02-04 | End: 2019-02-05

## 2019-02-04 RX ORDER — ROPIVACAINE HYDROCHLORIDE 5 MG/ML
INJECTION, SOLUTION EPIDURAL; INFILTRATION; PERINEURAL
Status: COMPLETED | OUTPATIENT
Start: 2019-02-04 | End: 2019-02-04

## 2019-02-04 RX ORDER — SODIUM CHLORIDE, SODIUM LACTATE, POTASSIUM CHLORIDE, CALCIUM CHLORIDE 600; 310; 30; 20 MG/100ML; MG/100ML; MG/100ML; MG/100ML
INJECTION, SOLUTION INTRAVENOUS CONTINUOUS
Status: DISCONTINUED | OUTPATIENT
Start: 2019-02-04 | End: 2019-02-04 | Stop reason: HOSPADM

## 2019-02-04 RX ORDER — OXYCODONE HYDROCHLORIDE 5 MG/1
5 TABLET ORAL EVERY 4 HOURS PRN
Status: DISCONTINUED | OUTPATIENT
Start: 2019-02-04 | End: 2019-02-05

## 2019-02-04 RX ORDER — DEXAMETHASONE SODIUM PHOSPHATE 4 MG/ML
VIAL (ML) INJECTION AS NEEDED
Status: DISCONTINUED | OUTPATIENT
Start: 2019-02-04 | End: 2019-02-04 | Stop reason: SURG

## 2019-02-04 RX ORDER — METOCLOPRAMIDE 10 MG/1
10 TABLET ORAL ONCE
Status: DISCONTINUED | OUTPATIENT
Start: 2019-02-04 | End: 2019-02-04 | Stop reason: HOSPADM

## 2019-02-04 RX ORDER — ENOXAPARIN SODIUM 100 MG/ML
30 INJECTION SUBCUTANEOUS EVERY 12 HOURS SCHEDULED
Status: DISCONTINUED | OUTPATIENT
Start: 2019-02-05 | End: 2019-02-07

## 2019-02-04 RX ORDER — LISINOPRIL AND HYDROCHLOROTHIAZIDE 25; 20 MG/1; MG/1
1 TABLET ORAL
Status: DISCONTINUED | OUTPATIENT
Start: 2019-02-04 | End: 2019-02-04

## 2019-02-04 RX ORDER — WARFARIN SODIUM 5 MG/1
5 TABLET ORAL ONCE
Status: COMPLETED | OUTPATIENT
Start: 2019-02-04 | End: 2019-02-04

## 2019-02-04 RX ORDER — FAMOTIDINE 20 MG/1
20 TABLET ORAL ONCE
Status: COMPLETED | OUTPATIENT
Start: 2019-02-04 | End: 2019-02-04

## 2019-02-04 RX ORDER — SODIUM CHLORIDE, SODIUM LACTATE, POTASSIUM CHLORIDE, CALCIUM CHLORIDE 600; 310; 30; 20 MG/100ML; MG/100ML; MG/100ML; MG/100ML
INJECTION, SOLUTION INTRAVENOUS CONTINUOUS
Status: DISCONTINUED | OUTPATIENT
Start: 2019-02-04 | End: 2019-02-07

## 2019-02-04 RX ORDER — HYDROCODONE BITARTRATE AND ACETAMINOPHEN 5; 325 MG/1; MG/1
1 TABLET ORAL AS NEEDED
Status: DISCONTINUED | OUTPATIENT
Start: 2019-02-04 | End: 2019-02-04 | Stop reason: HOSPADM

## 2019-02-04 RX ORDER — DOCUSATE SODIUM 100 MG/1
100 CAPSULE, LIQUID FILLED ORAL 2 TIMES DAILY
Status: DISCONTINUED | OUTPATIENT
Start: 2019-02-04 | End: 2019-02-07

## 2019-02-04 RX ORDER — CEFAZOLIN SODIUM/WATER 2 G/20 ML
2 SYRINGE (ML) INTRAVENOUS EVERY 8 HOURS
Status: COMPLETED | OUTPATIENT
Start: 2019-02-04 | End: 2019-02-05

## 2019-02-04 RX ORDER — MORPHINE SULFATE 4 MG/ML
6 INJECTION, SOLUTION INTRAMUSCULAR; INTRAVENOUS EVERY 2 HOUR PRN
Status: DISCONTINUED | OUTPATIENT
Start: 2019-02-04 | End: 2019-02-05

## 2019-02-04 RX ORDER — ONDANSETRON 2 MG/ML
4 INJECTION INTRAMUSCULAR; INTRAVENOUS EVERY 4 HOURS PRN
Status: DISCONTINUED | OUTPATIENT
Start: 2019-02-04 | End: 2019-02-07

## 2019-02-04 RX ORDER — MAGNESIUM HYDROXIDE 1200 MG/15ML
LIQUID ORAL CONTINUOUS PRN
Status: DISCONTINUED | OUTPATIENT
Start: 2019-02-04 | End: 2019-02-04 | Stop reason: HOSPADM

## 2019-02-04 RX ORDER — ALBUTEROL SULFATE 90 UG/1
2 AEROSOL, METERED RESPIRATORY (INHALATION) EVERY 6 HOURS PRN
Status: DISCONTINUED | OUTPATIENT
Start: 2019-02-04 | End: 2019-02-07

## 2019-02-04 RX ORDER — ONDANSETRON 2 MG/ML
4 INJECTION INTRAMUSCULAR; INTRAVENOUS ONCE AS NEEDED
Status: DISCONTINUED | OUTPATIENT
Start: 2019-02-04 | End: 2019-02-04 | Stop reason: HOSPADM

## 2019-02-04 RX ORDER — CEFAZOLIN SODIUM/WATER 2 G/20 ML
2 SYRINGE (ML) INTRAVENOUS ONCE
Status: DISCONTINUED | OUTPATIENT
Start: 2019-02-04 | End: 2019-02-04 | Stop reason: HOSPADM

## 2019-02-04 RX ORDER — SODIUM PHOSPHATE, DIBASIC AND SODIUM PHOSPHATE, MONOBASIC 7; 19 G/133ML; G/133ML
1 ENEMA RECTAL ONCE AS NEEDED
Status: DISCONTINUED | OUTPATIENT
Start: 2019-02-04 | End: 2019-02-07

## 2019-02-04 RX ORDER — MORPHINE SULFATE 2 MG/ML
2 INJECTION, SOLUTION INTRAMUSCULAR; INTRAVENOUS EVERY 2 HOUR PRN
Status: DISCONTINUED | OUTPATIENT
Start: 2019-02-04 | End: 2019-02-05

## 2019-02-04 RX ORDER — LIDOCAINE HYDROCHLORIDE 10 MG/ML
INJECTION, SOLUTION EPIDURAL; INFILTRATION; INTRACAUDAL; PERINEURAL AS NEEDED
Status: DISCONTINUED | OUTPATIENT
Start: 2019-02-04 | End: 2019-02-04 | Stop reason: SURG

## 2019-02-04 RX ORDER — SERTRALINE HYDROCHLORIDE 100 MG/1
100 TABLET, FILM COATED ORAL DAILY
Status: DISCONTINUED | OUTPATIENT
Start: 2019-02-05 | End: 2019-02-07

## 2019-02-04 RX ORDER — NALOXONE HYDROCHLORIDE 0.4 MG/ML
80 INJECTION, SOLUTION INTRAMUSCULAR; INTRAVENOUS; SUBCUTANEOUS AS NEEDED
Status: DISCONTINUED | OUTPATIENT
Start: 2019-02-04 | End: 2019-02-04 | Stop reason: HOSPADM

## 2019-02-04 RX ORDER — ONDANSETRON 2 MG/ML
INJECTION INTRAMUSCULAR; INTRAVENOUS AS NEEDED
Status: DISCONTINUED | OUTPATIENT
Start: 2019-02-04 | End: 2019-02-04 | Stop reason: SURG

## 2019-02-04 RX ORDER — MORPHINE SULFATE 4 MG/ML
4 INJECTION, SOLUTION INTRAMUSCULAR; INTRAVENOUS EVERY 10 MIN PRN
Status: DISCONTINUED | OUTPATIENT
Start: 2019-02-04 | End: 2019-02-04 | Stop reason: HOSPADM

## 2019-02-04 RX ORDER — DEXAMETHASONE SODIUM PHOSPHATE 10 MG/ML
INJECTION, SOLUTION INTRAMUSCULAR; INTRAVENOUS
Status: COMPLETED | OUTPATIENT
Start: 2019-02-04 | End: 2019-02-04

## 2019-02-04 RX ORDER — SODIUM CHLORIDE 0.9 % (FLUSH) 0.9 %
10 SYRINGE (ML) INJECTION AS NEEDED
Status: DISCONTINUED | OUTPATIENT
Start: 2019-02-04 | End: 2019-02-07

## 2019-02-04 RX ORDER — POLYETHYLENE GLYCOL 3350 17 G/17G
17 POWDER, FOR SOLUTION ORAL DAILY PRN
Status: DISCONTINUED | OUTPATIENT
Start: 2019-02-04 | End: 2019-02-07

## 2019-02-04 RX ORDER — AMLODIPINE BESYLATE 5 MG/1
5 TABLET ORAL DAILY
Status: DISCONTINUED | OUTPATIENT
Start: 2019-02-05 | End: 2019-02-07

## 2019-02-04 RX ORDER — MORPHINE SULFATE 4 MG/ML
2 INJECTION, SOLUTION INTRAMUSCULAR; INTRAVENOUS EVERY 10 MIN PRN
Status: DISCONTINUED | OUTPATIENT
Start: 2019-02-04 | End: 2019-02-04 | Stop reason: HOSPADM

## 2019-02-04 RX ORDER — ROSUVASTATIN CALCIUM 20 MG/1
20 TABLET, COATED ORAL DAILY
Status: DISCONTINUED | OUTPATIENT
Start: 2019-02-05 | End: 2019-02-07

## 2019-02-04 RX ADMIN — SODIUM CHLORIDE, SODIUM LACTATE, POTASSIUM CHLORIDE, CALCIUM CHLORIDE: 600; 310; 30; 20 INJECTION, SOLUTION INTRAVENOUS at 13:55:00

## 2019-02-04 RX ADMIN — CEFAZOLIN SODIUM/WATER 2 G: 2 G/20 ML SYRINGE (ML) INTRAVENOUS at 11:32:00

## 2019-02-04 RX ADMIN — LIDOCAINE HYDROCHLORIDE 50 MG: 10 INJECTION, SOLUTION EPIDURAL; INFILTRATION; INTRACAUDAL; PERINEURAL at 11:16:00

## 2019-02-04 RX ADMIN — DEXAMETHASONE SODIUM PHOSPHATE 10 MG: 10 INJECTION, SOLUTION INTRAMUSCULAR; INTRAVENOUS at 10:14:00

## 2019-02-04 RX ADMIN — SODIUM CHLORIDE, SODIUM LACTATE, POTASSIUM CHLORIDE, CALCIUM CHLORIDE: 600; 310; 30; 20 INJECTION, SOLUTION INTRAVENOUS at 11:40:00

## 2019-02-04 RX ADMIN — SODIUM CHLORIDE, SODIUM LACTATE, POTASSIUM CHLORIDE, CALCIUM CHLORIDE: 600; 310; 30; 20 INJECTION, SOLUTION INTRAVENOUS at 11:12:00

## 2019-02-04 RX ADMIN — DEXAMETHASONE SODIUM PHOSPHATE 4 MG: 4 MG/ML VIAL (ML) INJECTION at 11:25:00

## 2019-02-04 RX ADMIN — ONDANSETRON 4 MG: 2 INJECTION INTRAMUSCULAR; INTRAVENOUS at 13:09:00

## 2019-02-04 RX ADMIN — MIDAZOLAM HYDROCHLORIDE 2 MG: 1 INJECTION INTRAMUSCULAR; INTRAVENOUS at 10:14:00

## 2019-02-04 RX ADMIN — ROPIVACAINE HYDROCHLORIDE 30 ML: 5 INJECTION, SOLUTION EPIDURAL; INFILTRATION; PERINEURAL at 10:14:00

## 2019-02-04 NOTE — ANESTHESIA PROCEDURE NOTES
ANESTHESIA INTUBATION  Date/Time: 2/4/2019 11:19 AM  Urgency: elective    Airway not difficult    General Information and Staff    Patient location during procedure: OR  Anesthesiologist: Ирина Esparza MD  Resident/CRNA: Tyrell Vital CRNA Pe

## 2019-02-04 NOTE — PROGRESS NOTES
[facility] - [facility]    Progress Note    Bright Jo Patient Status:  Surgery Admit    1945 MRN [id number]   Location [address] [facility] Attending Capo Banks MD   Hosp Day # 0 PCP Danielle Barbosa DO PROPHYLAXIS, PT/OT. Mixed hyperlipidemia  CONT HOME MEDS. Essential hypertension  CONT HOME MEDS, MONITOR.              Results:     Lab Results   Component Value Date    WBC 6.2 01/29/2019    HGB 12.8 01/29/2019    HCT 39.7 01/29/2019    PLT 32

## 2019-02-04 NOTE — ANESTHESIA PROCEDURE NOTES
Peripheral Block  Date/Time: 2/4/2019 10:14 AM    Anesthesiologist:  Aracelis Dao MD  Performed by:   Anesthesiologist  Patient Location:  PACU  Start Time:  2/4/2019 10:14 AM  End Time:  2/4/2019 10:20 AM  Site Identification: ultrasound guided, real

## 2019-02-04 NOTE — ANESTHESIA PREPROCEDURE EVALUATION
Anesthesia PreOp Note    HPI:     Ronit Gonzalez is a 72year old female who presents for preoperative consultation requested by: Letitia Feliciano MD    Date of Surgery: 2/4/2019    Procedure(s):  KNEE TOTAL REPLACEMENT  Indication: Primary osteoarthriti • Back problem     Hx herniated discs   • Calculus of kidney    • Deaf     Left ear   • Diverticulosis    • Hearing impairment    • Hearing loss    • High blood pressure    • High cholesterol    • History of shingles     1980's   • Meniere disease    • Annalisa Barreto Lisinopril-Hydrochlorothiazide 20-25 MG Oral Tab Take 1 tablet by mouth once daily. Disp: 90 tablet Rfl: 2 2/4/2019 at 0545   Rosuvastatin Calcium 20 MG Oral Tab Take 1 tablet (20 mg total) by mouth once daily.  Disp: 90 tablet Rfl: 2 2/4/2019 at 0545   Ser Tobacco Use      Smoking status: Former Smoker        Years: 36.00        Types: Cigarettes        Quit date: 2002        Years since quittin.1      Smokeless tobacco: Never Used    Substance and Sexual Activity      Alcohol use:  Yes        Alco height is 1.626 m (5' 4\") and weight is 104.3 kg (230 lb). Her oral temperature is 98.3 °F (36.8 °C). Her blood pressure is 116/55 and her pulse is 64. Her respiration is 22 and oxygen saturation is 95%.     02/04/19  0911 02/04/19  1007 02/04/19  1017 02 2/4/2019 10:30 AM

## 2019-02-04 NOTE — INTERVAL H&P NOTE
Pre-op Diagnosis: Primary osteoarthritis, right knee  ADMITTED FOR ELECTIVE RIGHT TOTAL KNEE REPLACEMENT. HAS BEEN IN [facility] SINCE SEEN BY HER PCP LAST WEEK. PE: ALERT, NAD, NO COUGH, MOTORES TO THE RIGHT ANKLE / HINDFOOT / TOES ARE ALL 5/5.  P

## 2019-02-04 NOTE — ANESTHESIA POSTPROCEDURE EVALUATION
Patient: Carol Cruz    Procedure Summary     Date:  02/04/19 Room / Location:  [facility] OR 06 / [facility] OR    Anesthesia Start:  7048 Anesthesia Stop:  5373    Procedure:  KNEE TOTAL REPLACEMENT (Right Knee) Diagnosis:  (Primary osteoarthritis, ri

## 2019-02-05 LAB
BASOPHILS # BLD AUTO: 0.03 X10(3) UL (ref 0–0.2)
BASOPHILS NFR BLD AUTO: 0.3 %
DEPRECATED RDW RBC AUTO: 49.2 FL (ref 35.1–46.3)
EOSINOPHIL # BLD AUTO: 0 X10(3) UL (ref 0–0.7)
EOSINOPHIL NFR BLD AUTO: 0 %
ERYTHROCYTE [DISTWIDTH] IN BLOOD BY AUTOMATED COUNT: 14 % (ref 11–15)
HCT VFR BLD AUTO: 30.3 % (ref 35–48)
HGB BLD-MCNC: 9.9 G/DL (ref 12–16)
IMM GRANULOCYTES # BLD AUTO: 0.04 X10(3) UL (ref 0–1)
IMM GRANULOCYTES NFR BLD: 0.4 %
INR BLD: 1.2 (ref 0.9–1.2)
LYMPHOCYTES # BLD AUTO: 1.14 X10(3) UL (ref 1–4)
LYMPHOCYTES NFR BLD AUTO: 10.3 %
MCH RBC QN AUTO: 31.1 PG (ref 26–34)
MCHC RBC AUTO-ENTMCNC: 32.7 G/DL (ref 31–37)
MCV RBC AUTO: 95.3 FL (ref 80–100)
MONOCYTES # BLD AUTO: 1.21 X10(3) UL (ref 0.1–1)
MONOCYTES NFR BLD AUTO: 10.9 %
NEUTROPHILS # BLD AUTO: 8.64 X10 (3) UL (ref 1.5–7.7)
NEUTROPHILS # BLD AUTO: 8.64 X10(3) UL (ref 1.5–7.7)
NEUTROPHILS NFR BLD AUTO: 78.1 %
PLATELET # BLD AUTO: 234 10(3)UL (ref 150–450)
PROTHROMBIN TIME: 15.3 SECONDS (ref 11.8–14.5)
RBC # BLD AUTO: 3.18 X10(6)UL (ref 3.8–5.3)
WBC # BLD AUTO: 11.1 X10(3) UL (ref 4–11)

## 2019-02-05 PROCEDURE — 99233 SBSQ HOSP IP/OBS HIGH 50: CPT | Performed by: HOSPITALIST

## 2019-02-05 RX ORDER — MORPHINE SULFATE 2 MG/ML
2 INJECTION, SOLUTION INTRAMUSCULAR; INTRAVENOUS
Status: DISCONTINUED | OUTPATIENT
Start: 2019-02-05 | End: 2019-02-05

## 2019-02-05 RX ORDER — HYDROCODONE BITARTRATE AND ACETAMINOPHEN 5; 325 MG/1; MG/1
1 TABLET ORAL
Status: DISCONTINUED | OUTPATIENT
Start: 2019-02-05 | End: 2019-02-07

## 2019-02-05 RX ORDER — HYDROCODONE BITARTRATE AND ACETAMINOPHEN 10; 325 MG/1; MG/1
1 TABLET ORAL
Status: DISCONTINUED | OUTPATIENT
Start: 2019-02-05 | End: 2019-02-07

## 2019-02-05 RX ORDER — WARFARIN SODIUM 5 MG/1
5 TABLET ORAL NIGHTLY
Status: DISCONTINUED | OUTPATIENT
Start: 2019-02-05 | End: 2019-02-07

## 2019-02-05 RX ORDER — MORPHINE SULFATE 2 MG/ML
2 INJECTION, SOLUTION INTRAMUSCULAR; INTRAVENOUS
Status: DISCONTINUED | OUTPATIENT
Start: 2019-02-05 | End: 2019-02-07

## 2019-02-05 RX ORDER — MORPHINE SULFATE 2 MG/ML
INJECTION, SOLUTION INTRAMUSCULAR; INTRAVENOUS
Status: DISPENSED
Start: 2019-02-05 | End: 2019-02-06

## 2019-02-05 RX ORDER — HYDROCODONE BITARTRATE AND ACETAMINOPHEN 7.5; 325 MG/1; MG/1
1 TABLET ORAL
Status: DISCONTINUED | OUTPATIENT
Start: 2019-02-05 | End: 2019-02-07

## 2019-02-05 NOTE — PROGRESS NOTES
[facility]  Anesthesiology Epidural Follow-up Note  2019    Patient name: Ambrocio Acosta 72year old female  : 1945  MRN: [id number]    Diagnosis: [unfilled]    S/P: total knee arthroplasty right    Pain treatment modality: Femo

## 2019-02-05 NOTE — OCCUPATIONAL THERAPY NOTE
OCCUPATIONAL THERAPY EVALUATION - INPATIENT      Room Number: 636/583-L  Evaluation Date: 2/5/2019  Type of Evaluation: Initial  Presenting Problem: (RT TKA)    Physician Order: IP Consult to Occupational Therapy  Reason for Therapy: ADL/IADL Dysfunction a herniated discs   • Calculus of kidney    • Deaf     Left ear   • Diverticulosis    • Hearing impairment    • Hearing loss    • High blood pressure    • High cholesterol    • History of shingles     1980's   • Meniere disease    • Osteoarthritis    • Pneum ASSESSMENT  Rating: 3  Location: (RT knee)  Management Techniques: Repositioning      RANGE OF MOTION   Upper extremity ROM is within functional limits     STRENGTH ASSESSMENT  Upper extremity strength is within functional limits     ACTIVITIES OF DAILY LI

## 2019-02-05 NOTE — PROGRESS NOTES
ORTHO SURG: PO#1 Tmax = 99.1. AM LABS: INR = 1.2, WBC = 11,100, H/H = 9.9 / 30.3, PLATELETS = 737,430. HEMOVAC OUTPUT OVER LAST TWO 8 HOUR SHIFTS: 50 ML AND 0 ML. PAIN IS CONTROLLED.  PE: ALERT,NAD, MOTORS TO RIGHT ANKLE / HINDFOOT / TOES ARE ALL 5/5, NORM

## 2019-02-05 NOTE — PROGRESS NOTES
[facility]    Progress Note    Masoud Anne Patient Status:  Inpatient    1945 MRN [id number]   Location [facility] 4W/SW/SE Attending Julissa Mac DO   Hosp Day # 1 PCP Ana Laura Espinosa DO       Subjective:   Robin Kenny docusate sodium (COLACE) cap 100 mg 100 mg Oral BID   PEG 3350 (MIRALAX) powder packet 17 g 17 g Oral Daily PRN   magnesium hydroxide (MILK OF MAGNESIA) 400 MG/5ML suspension 30 mL 30 mL Oral Daily PRN   bisacodyl (DULCOLAX) rectal suppository 10 mg 10 m osteoarthritis of right knee  S/P R KNEE ARTHROPLASTY post-op day #1; s/p FEMORAL NERVE BLOCK, PAIN CONTROL, NEURO VASCULAR CHECKS, DVT PROPHYLAXIS, PT/OT. - cont coumadin and lovenox .  inr in am        Mixed hyperlipidemia  CONT HOME MEDS.        Essent

## 2019-02-05 NOTE — PHYSICAL THERAPY NOTE
PHYSICAL THERAPY KNEE EVALUATION - INPATIENT       Room Number: 594/820-P  Evaluation Date: 2/5/2019  Type of Evaluation: Initial  Physician Order: PT Eval and Treat    Presenting Problem: R TKA  Reason for Therapy: Mobility Dysfunction and Discharge Plann Recommendations: Sub-acute rehabilitation    PLAN  PT Treatment Plan: Bed mobility;Transfer training;Gait training;Strengthening;Range of motion;Patient education; Body mechanics  Rehab Potential : Fair  Frequency (Obs): BID       PHYSICAL THERAPY MEDICAL/S UNLISTED      for right sciatica issue   • TUBAL LIGATION     • YAG CAPSULOTOMY - OD - RIGHT EYE Right 2/12/14    RJM       HOME SITUATION  Type of Home: Condo   Home Layout: One level  Stairs to Enter : 0     Stairs to Bedroom: 0       Lives With: Alone Stoop/Curb Assistance: Not tested       Bed Mobility: Min A (in AM)    Transfers: Min A (in AM)    Exercise/Education Provided:  Bed mobility  Gait training  Lower therapeutic exercise:   Ankle pumps  Heel slides  LAQ  Leg slides  Quad sets  Transfer norris

## 2019-02-06 LAB
BASOPHILS # BLD AUTO: 0.06 X10(3) UL (ref 0–0.2)
BASOPHILS NFR BLD AUTO: 0.5 %
DEPRECATED RDW RBC AUTO: 51.1 FL (ref 35.1–46.3)
EOSINOPHIL # BLD AUTO: 0.29 X10(3) UL (ref 0–0.7)
EOSINOPHIL NFR BLD AUTO: 2.6 %
ERYTHROCYTE [DISTWIDTH] IN BLOOD BY AUTOMATED COUNT: 14.5 % (ref 11–15)
HCT VFR BLD AUTO: 30 % (ref 35–48)
HGB BLD-MCNC: 9.7 G/DL (ref 12–16)
IMM GRANULOCYTES # BLD AUTO: 0.06 X10(3) UL (ref 0–1)
IMM GRANULOCYTES NFR BLD: 0.5 %
INR BLD: 1.4 (ref 0.9–1.2)
LYMPHOCYTES # BLD AUTO: 1.94 X10(3) UL (ref 1–4)
LYMPHOCYTES NFR BLD AUTO: 17.7 %
MCH RBC QN AUTO: 31.1 PG (ref 26–34)
MCHC RBC AUTO-ENTMCNC: 32.3 G/DL (ref 31–37)
MCV RBC AUTO: 96.2 FL (ref 80–100)
MONOCYTES # BLD AUTO: 1.33 X10(3) UL (ref 0.1–1)
MONOCYTES NFR BLD AUTO: 12.1 %
NEUTROPHILS # BLD AUTO: 7.31 X10 (3) UL (ref 1.5–7.7)
NEUTROPHILS # BLD AUTO: 7.31 X10(3) UL (ref 1.5–7.7)
NEUTROPHILS NFR BLD AUTO: 66.6 %
PLATELET # BLD AUTO: 247 10(3)UL (ref 150–450)
PROTHROMBIN TIME: 16.4 SECONDS (ref 11.8–14.5)
RBC # BLD AUTO: 3.12 X10(6)UL (ref 3.8–5.3)
WBC # BLD AUTO: 11 X10(3) UL (ref 4–11)

## 2019-02-06 PROCEDURE — 99233 SBSQ HOSP IP/OBS HIGH 50: CPT | Performed by: HOSPITALIST

## 2019-02-06 NOTE — PROGRESS NOTES
[place] [facility]    Progress Note    Thedora Lanes Patient Status:  Inpatient    1945 MRN [id number]   Location [facility] 4W/SW/SE Attending Barrett Long Day # 2 PCP Juliana Min, DO        Subjective: coordinating care with nurse and counseling pt about rehab              Results:     Lab Results   Component Value Date    WBC 11.0 02/06/2019    HGB 9.7 (L) 02/06/2019    HCT 30.0 (L) 02/06/2019    .0 02/06/2019    CREATSERUM 0.75 01/29/2019    BUN

## 2019-02-06 NOTE — PHYSICAL THERAPY NOTE
PHYSICAL THERAPY KNEE TREATMENT NOTE - INPATIENT     Room Number: 577/681-A             Presenting Problem: R TKA    Problem List  Principal Problem:    Primary osteoarthritis of right knee  Active Problems:    Mixed hyperlipidemia    Essential hypertensio chair (including a wheelchair)?: A Little   -   Need to walk in hospital room?: A Little   -   Climbing 3-5 steps with a railing?: A Lot     AM-PAC Score:  Raw Score: 17   Approx Degree of Impairment: 50.57%   Standardized Score (AM-PAC Scale): 42.13   CMS

## 2019-02-06 NOTE — PROGRESS NOTES
ORTHO SURG: PO#2  Tmax = 98.8. AM LABS: INR = 1.4, WBC = 11,000, H/H = 9.7 / 30.0, PLATELETS = 596,962. PAIN IS CONTROLLED. VOIDING WITHOUT DIFFICULTY.   PE: UP IN RECLINER, ALERT, NAD, RIGHT KNEE WOUND AND DRAIN SITE ARE CLEAN, DRY AND WITHOUT ERYTHEMA OR

## 2019-02-06 NOTE — CM/SW NOTE
Patient has been accepted to [facility] for rehab. They will have a bed available when patient is ready for DC. SW will continue to follow up.       Michelle Clarke, DAPHNE., [phone]

## 2019-02-06 NOTE — CM/SW NOTE
Care Management/BPCI:    Met with patient at bedside to explain the BPCI/Medicare program. Patient agreed with phone f/u for 3 months from 0 [facility] after discharge from [facility]. Patient was enrolled under .  BPCI/Medicare Letter and [place]

## 2019-02-07 VITALS
WEIGHT: 230 LBS | HEIGHT: 64 IN | BODY MASS INDEX: 39.27 KG/M2 | OXYGEN SATURATION: 94 % | TEMPERATURE: 99 F | DIASTOLIC BLOOD PRESSURE: 49 MMHG | RESPIRATION RATE: 18 BRPM | SYSTOLIC BLOOD PRESSURE: 147 MMHG | HEART RATE: 89 BPM

## 2019-02-07 LAB
ANION GAP SERPL CALC-SCNC: 10 MMOL/L (ref 0–18)
BASOPHILS # BLD AUTO: 0.06 X10(3) UL (ref 0–0.2)
BASOPHILS NFR BLD AUTO: 0.6 %
BUN SERPL-MCNC: 11 MG/DL (ref 8–20)
BUN/CREAT SERPL: 19.6 (ref 10–20)
CALCIUM SERPL-MCNC: 8.5 MG/DL (ref 8.5–10.5)
CHLORIDE SERPL-SCNC: 98 MMOL/L (ref 95–110)
CO2 SERPL-SCNC: 28 MMOL/L (ref 22–32)
CREAT SERPL-MCNC: 0.56 MG/DL (ref 0.5–1.5)
DEPRECATED RDW RBC AUTO: 50 FL (ref 35.1–46.3)
EOSINOPHIL # BLD AUTO: 0.49 X10(3) UL (ref 0–0.7)
EOSINOPHIL NFR BLD AUTO: 5.2 %
ERYTHROCYTE [DISTWIDTH] IN BLOOD BY AUTOMATED COUNT: 14.4 % (ref 11–15)
GLUCOSE SERPL-MCNC: 114 MG/DL (ref 70–99)
HCT VFR BLD AUTO: 28.9 % (ref 35–48)
HGB BLD-MCNC: 9.3 G/DL (ref 12–16)
IMM GRANULOCYTES # BLD AUTO: 0.08 X10(3) UL (ref 0–1)
IMM GRANULOCYTES NFR BLD: 0.8 %
INR BLD: 1.9 (ref 0.9–1.2)
LYMPHOCYTES # BLD AUTO: 1.4 X10(3) UL (ref 1–4)
LYMPHOCYTES NFR BLD AUTO: 14.7 %
MAGNESIUM SERPL-MCNC: 2.1 MG/DL (ref 1.8–2.5)
MCH RBC QN AUTO: 31 PG (ref 26–34)
MCHC RBC AUTO-ENTMCNC: 32.2 G/DL (ref 31–37)
MCV RBC AUTO: 96.3 FL (ref 80–100)
MONOCYTES # BLD AUTO: 0.88 X10(3) UL (ref 0.1–1)
MONOCYTES NFR BLD AUTO: 9.3 %
NEUTROPHILS # BLD AUTO: 6.6 X10 (3) UL (ref 1.5–7.7)
NEUTROPHILS # BLD AUTO: 6.6 X10(3) UL (ref 1.5–7.7)
NEUTROPHILS NFR BLD AUTO: 69.4 %
OSMOLALITY UR CALC.SUM OF ELEC: 282 MOSM/KG (ref 275–295)
PLATELET # BLD AUTO: 234 10(3)UL (ref 150–450)
POTASSIUM SERPL-SCNC: 3.6 MMOL/L (ref 3.3–5.1)
PROTHROMBIN TIME: 21.5 SECONDS (ref 11.8–14.5)
RBC # BLD AUTO: 3 X10(6)UL (ref 3.8–5.3)
SODIUM SERPL-SCNC: 136 MMOL/L (ref 136–144)
WBC # BLD AUTO: 9.5 X10(3) UL (ref 4–11)

## 2019-02-07 PROCEDURE — 99239 HOSP IP/OBS DSCHRG MGMT >30: CPT | Performed by: HOSPITALIST

## 2019-02-07 RX ORDER — ENOXAPARIN SODIUM 100 MG/ML
30 INJECTION SUBCUTANEOUS EVERY 12 HOURS SCHEDULED
Qty: 4 VIAL | Refills: 0 | Status: SHIPPED | OUTPATIENT
Start: 2019-02-07 | End: 2019-07-25

## 2019-02-07 RX ORDER — PSEUDOEPHEDRINE HCL 30 MG
100 TABLET ORAL 2 TIMES DAILY PRN
Qty: 30 CAPSULE | Refills: 0 | Status: SHIPPED | OUTPATIENT
Start: 2019-02-07 | End: 2019-07-25

## 2019-02-07 RX ORDER — WARFARIN SODIUM 3 MG/1
3 TABLET ORAL NIGHTLY
Qty: 30 TABLET | Refills: 0 | Status: SHIPPED | OUTPATIENT
Start: 2019-02-07 | End: 2019-07-25

## 2019-02-07 NOTE — PROGRESS NOTES
ORTHO SURG: PO#3  Tmax = 98.8. AM LABS: INR = 1.9, WBC = 9,500, H/H = 9.3 / 28.9, PLATELETS  = 226,834. NO COMPLAINTS. PE: ALERT, NAD, RIGHT KNEE WOUND AND DRAIN SITE  ARE CLEAN, DRY AND WITHOUT ERYTHEMA OR INDURATION. TEGADERM TO WOUND SITE.  ASSESS: SATIS

## 2019-02-07 NOTE — PLAN OF CARE
DISCHARGE PLANNING    • Discharge to home or other facility with appropriate resources Progressing      Plan to D[facility]    HEMATOLOGIC - ADULT    • Maintains hematologic stability Progressing      lovenox and coumadin ordered per MD. No s/

## 2019-02-07 NOTE — CM/SW NOTE
Patient has been cleared for DC. Medi-car transfer has been scheduled for 1 pm transfer to [facility]. Quote $39.     Report [phone]    PCS form in the chart.       Jamia Taylor, DAPHNE., [id number]

## 2019-02-07 NOTE — PHYSICAL THERAPY NOTE
PHYSICAL THERAPY KNEE TREATMENT NOTE - INPATIENT     Room Number: 714/277-E             Presenting Problem: R TKA    Problem List  Principal Problem:    Primary osteoarthritis of right knee  Active Problems:    Mixed hyperlipidemia    Essential hypertensio a chair (including a wheelchair)?: A Little   -   Need to walk in hospital room?: A Little   -   Climbing 3-5 steps with a railing?: A Lot     AM-PAC Score:  Raw Score: 17   Approx Degree of Impairment: 50.57%   Standardized Score (AM-PAC Scale): 42.13   C

## 2019-02-07 NOTE — DISCHARGE SUMMARY
Dc summary #[id number]  > 30 min spent on [facility] Discharge Diagnoses: tka    Lace+ Score: 35  59-90 High Risk  29-58 Medium Risk  0-28   Low Risk.     TCM Follow-Up Recommendation:  LACE < 29: Low Risk of readmission after discharge from the hospital;

## 2019-02-08 ENCOUNTER — SNF/IP PROF CHARGE ONLY (OUTPATIENT)
Dept: INTERNAL MEDICINE CLINIC | Facility: CLINIC | Age: 74
End: 2019-02-08

## 2019-02-08 DIAGNOSIS — I10 ESSENTIAL HYPERTENSION: Chronic | ICD-10-CM

## 2019-02-08 DIAGNOSIS — F41.1 ANXIETY STATE: ICD-10-CM

## 2019-02-08 DIAGNOSIS — Z96.651 S/P TOTAL KNEE ARTHROPLASTY, RIGHT: ICD-10-CM

## 2019-02-08 PROCEDURE — 99305 1ST NF CARE MODERATE MDM 35: CPT | Performed by: INTERNAL MEDICINE

## 2019-02-08 NOTE — DISCHARGE SUMMARY
[facility]    PATIENT'S NAME: Danielle Danielwayne HUI   ATTENDING PHYSICIAN: Marybeth Long MD   PATIENT ACCOUNT#:   [id number]    LOCATION:  [address] [place] [address] RECORD #:   [id number]       YOB: 1945  ADMISSION DATE:       0 puffs 4 times a day as needed. 2.   Amlodipine 5 mg daily. 3.   Colace 100 mg twice a day as needed for constipation. 4.   Lovenox 30 mg every 12 hours. Stop when the INR is 2 or more. 5.   Gabapentin 600 mg every evening.     6.   Norco 5/325, 1 to 2

## 2019-02-12 PROCEDURE — 99308 SBSQ NF CARE LOW MDM 20: CPT | Performed by: INTERNAL MEDICINE

## 2019-02-14 ENCOUNTER — SNF/IP PROF CHARGE ONLY (OUTPATIENT)
Dept: INTERNAL MEDICINE CLINIC | Facility: CLINIC | Age: 74
End: 2019-02-14

## 2019-02-14 DIAGNOSIS — M54.50 CHRONIC RIGHT-SIDED LOW BACK PAIN WITHOUT SCIATICA: ICD-10-CM

## 2019-02-14 DIAGNOSIS — M51.26 LUMBAR HERNIATED DISC: ICD-10-CM

## 2019-02-14 DIAGNOSIS — M17.11 PRIMARY OSTEOARTHRITIS OF RIGHT KNEE: ICD-10-CM

## 2019-02-14 DIAGNOSIS — E78.2 MIXED HYPERLIPIDEMIA: ICD-10-CM

## 2019-02-14 DIAGNOSIS — H25.12 AGE-RELATED NUCLEAR CATARACT OF LEFT EYE: ICD-10-CM

## 2019-02-14 DIAGNOSIS — I70.90 ATHEROSCLEROSIS: ICD-10-CM

## 2019-02-14 DIAGNOSIS — G89.29 CHRONIC BILATERAL LOW BACK PAIN WITH RIGHT-SIDED SCIATICA: ICD-10-CM

## 2019-02-14 DIAGNOSIS — I10 ESSENTIAL HYPERTENSION: Chronic | ICD-10-CM

## 2019-02-14 DIAGNOSIS — M54.41 CHRONIC BILATERAL LOW BACK PAIN WITH RIGHT-SIDED SCIATICA: ICD-10-CM

## 2019-02-14 DIAGNOSIS — G89.29 CHRONIC RIGHT-SIDED LOW BACK PAIN WITHOUT SCIATICA: ICD-10-CM

## 2019-02-14 DIAGNOSIS — F41.1 ANXIETY STATE: ICD-10-CM

## 2019-02-14 DIAGNOSIS — M54.16 LUMBAR RADICULOPATHY: ICD-10-CM

## 2019-02-14 DIAGNOSIS — Z96.651 STATUS POST TOTAL RIGHT KNEE REPLACEMENT USING CEMENT: ICD-10-CM

## 2019-02-14 DIAGNOSIS — R73.9 HYPERGLYCEMIA: ICD-10-CM

## 2019-02-14 PROCEDURE — 99308 SBSQ NF CARE LOW MDM 20: CPT | Performed by: INTERNAL MEDICINE

## 2019-02-20 ENCOUNTER — APPOINTMENT (OUTPATIENT)
Dept: LAB | Age: 74
End: 2019-02-20
Attending: ORTHOPAEDIC SURGERY
Payer: MEDICARE

## 2019-02-20 DIAGNOSIS — Z96.651 STATUS POST RIGHT KNEE REPLACEMENT: ICD-10-CM

## 2019-02-20 LAB
INR BLD: 1.8 (ref 0.9–1.2)
PROTHROMBIN TIME: 20.7 SECONDS (ref 11.8–14.5)

## 2019-02-20 PROCEDURE — 85610 PROTHROMBIN TIME: CPT

## 2019-02-20 PROCEDURE — 36415 COLL VENOUS BLD VENIPUNCTURE: CPT

## 2019-02-25 ENCOUNTER — APPOINTMENT (OUTPATIENT)
Dept: LAB | Age: 74
End: 2019-02-25
Attending: ORTHOPAEDIC SURGERY
Payer: MEDICARE

## 2019-02-25 DIAGNOSIS — Z96.651 STATUS POST TOTAL KNEE REPLACEMENT, RIGHT: ICD-10-CM

## 2019-02-25 DIAGNOSIS — Z79.01 WARFARIN ANTICOAGULATION: ICD-10-CM

## 2019-02-25 LAB
INR BLD: 2.3 (ref 0.9–1.2)
PROTHROMBIN TIME: 24.7 SECONDS (ref 11.8–14.5)

## 2019-02-25 PROCEDURE — 85610 PROTHROMBIN TIME: CPT

## 2019-02-25 PROCEDURE — 36415 COLL VENOUS BLD VENIPUNCTURE: CPT

## 2019-02-26 ENCOUNTER — OFFICE VISIT (OUTPATIENT)
Dept: OTOLARYNGOLOGY | Facility: CLINIC | Age: 74
End: 2019-02-26
Payer: MEDICARE

## 2019-02-26 VITALS
WEIGHT: 231.69 LBS | TEMPERATURE: 98 F | BODY MASS INDEX: 39.55 KG/M2 | DIASTOLIC BLOOD PRESSURE: 60 MMHG | SYSTOLIC BLOOD PRESSURE: 112 MMHG | HEIGHT: 64 IN

## 2019-02-26 DIAGNOSIS — K14.8 TONGUE LESION: Primary | ICD-10-CM

## 2019-02-26 PROCEDURE — G0463 HOSPITAL OUTPT CLINIC VISIT: HCPCS | Performed by: OTOLARYNGOLOGY

## 2019-02-26 PROCEDURE — 99213 OFFICE O/P EST LOW 20 MIN: CPT | Performed by: OTOLARYNGOLOGY

## 2019-02-26 RX ORDER — WARFARIN SODIUM 4 MG/1
TABLET ORAL
Refills: 0 | COMMUNITY
Start: 2019-02-16 | End: 2019-07-25

## 2019-02-26 NOTE — PROGRESS NOTES
Sagrario Kwong is a 72year old female. Patient presents with:  Mouth/Lip Problem: bump/lesion at her tongue    HPI:   She was noted by her dentist to have a red spot on the side of her tongue.   She is not having any discomfort or any symptoms related Anxiety state    • Arthritis    • Back problem     Hx herniated discs   • Calculus of kidney    • Deaf     Left ear   • Diverticulosis    • Hearing impairment    • Hearing loss    • High blood pressure    • High cholesterol    • History of shingles     198 Orientation - Oriented to time, place, person & situation. Appropriate mood and affect. Lymph Detail Normal Submental. Submandibular. Anterior cervical. Posterior cervical. Supraclavicular.    Eyes Normal Conjunctiva - Right: Normal, Left: Normal. Pupil -

## 2019-02-27 ENCOUNTER — OFFICE VISIT (OUTPATIENT)
Dept: FAMILY MEDICINE CLINIC | Facility: CLINIC | Age: 74
End: 2019-02-27
Payer: MEDICARE

## 2019-02-27 VITALS
HEIGHT: 64 IN | HEART RATE: 73 BPM | WEIGHT: 231 LBS | SYSTOLIC BLOOD PRESSURE: 111 MMHG | BODY MASS INDEX: 39.44 KG/M2 | DIASTOLIC BLOOD PRESSURE: 65 MMHG

## 2019-02-27 DIAGNOSIS — N30.90 CYSTITIS: Primary | ICD-10-CM

## 2019-02-27 PROBLEM — Z96.651 STATUS POST RIGHT KNEE REPLACEMENT: Status: ACTIVE | Noted: 2019-02-27

## 2019-02-27 LAB
MULTISTIX LOT#: NORMAL NUMERIC
PH, URINE: 7 (ref 4.5–8)
SPECIFIC GRAVITY: 1.03 (ref 1–1.03)
UROBILINOGEN,SEMI-QN: 0.2 MG/DL (ref 0–1.9)

## 2019-02-27 PROCEDURE — 81003 URINALYSIS AUTO W/O SCOPE: CPT | Performed by: NURSE PRACTITIONER

## 2019-02-27 PROCEDURE — G0463 HOSPITAL OUTPT CLINIC VISIT: HCPCS | Performed by: NURSE PRACTITIONER

## 2019-02-27 PROCEDURE — 99213 OFFICE O/P EST LOW 20 MIN: CPT | Performed by: NURSE PRACTITIONER

## 2019-02-27 RX ORDER — AMOXICILLIN AND CLAVULANATE POTASSIUM 875; 125 MG/1; MG/1
1 TABLET, FILM COATED ORAL 2 TIMES DAILY
Qty: 14 TABLET | Refills: 0 | Status: SHIPPED | OUTPATIENT
Start: 2019-02-27 | End: 2019-03-06

## 2019-02-27 NOTE — PROGRESS NOTES
HPI  Pt here for pain and burning with urination for past 4 days. Has knee replacement at beginning of February. Review of Systems   Constitutional: Negative for activity change, appetite change and fever. Genitourinary: Positive for dysuria.  Aba Redd Family History   Problem Relation Age of Onset   • Colon Cancer Father    • Other (hx of SC ( unknown type)) Father    • Diabetes Mother    • Hypertension Mother    • Diabetes Paternal Grandmother    • Glaucoma Neg         No Family h/o Glaucoma   • Coffee, 2 cups daily        Occupational Exposure: Not Asked        Hobby Hazards: Not Asked        Sleep Concern: Not Asked        Stress Concern: Not Asked        Weight Concern: Not Asked        Special Diet: Not Asked        Back Care: Not Asked daily. Disp: 90 tablet Rfl: 2   Sertraline HCl 100 MG Oral Tab TAKE 1 TABLET (100 MG TOTAL) BY MOUTH ONCE DAILY.  Disp: 90 tablet Rfl: 2   Albuterol Sulfate  (90 Base) MCG/ACT Inhalation Aero Soln Inhale 2 puffs into the lungs every 6 (six) hours as ROUTINE                      Discussed plan of care with pt and pt is in agreement. All questions answered. Pt to call with questions or concerns. Encouraged to sign up for My Chart if not already registered.

## 2019-02-27 NOTE — ASSESSMENT & PLAN NOTE
Urine dip and c/s  Start augmentin 875 mg I po bid x 7 days  Azo prn  Supportive care discussed    Please call if symptoms worsen or are not resolving.

## 2019-03-15 NOTE — OPERATIVE REPORT
[facility]    PATIENT'S NAME: Meredith HUI   ATTENDING PHYSICIAN: Marybeth Long MD   OPERATING PHYSICIAN: Luiz Tolbert MD   PATIENT ACCOUNT#:   [de-identified]    LOCATION:  [address] [place] [address] #:   [id number]       DATE OF operating table and general anesthesia with LMA was established. A Stallworth urinary catheter was placed by operating room nursing staff. A left short-leg sequential compression device was placed.   Cast padding and pneumatic tourniquet were secured over th was promptly identified. The anterior horn and body of the medial meniscus was incised at the periphery and transected across the body and excised. The anterior cruciate ligament was released at its tibial insertion and resected proximally.   The anterior of same dimension. This was impacted to a flat finished surface and secured with medial and lateral bone nails.   An oscillating saw then performed a resection cut of the anterior femoral condyle, anterior chamfer, posterior femoral condyles and posterior and accepted. The patellar trial was removed. Drill holes were made in the distal femoral trial.  The final position of the tibial component was referenced with cautery markings on the proximal anterior tibia.     With the knee flexed and the tibia expose was irrigated with pulsed saline lavage. Pneumatic tourniquet was deflated. Total tourniquet time was 67 minutes. All accessible bleeding points were controlled by electrocautery.   Wound drain was placed through a proximal lateral stab wound and was sub

## 2019-03-16 NOTE — TELEPHONE ENCOUNTER
Please advise in regards to refill request. Thank You  Refill Protocol Appointment Criteria  · Appointment scheduled in the past 6 months or in the next 3 months  Recent Outpatient Visits            2 weeks ago [address], [place] [address], [place]

## 2019-03-17 RX ORDER — MELOXICAM 15 MG/1
TABLET ORAL
Qty: 30 TABLET | Refills: 0 | Status: SHIPPED | OUTPATIENT
Start: 2019-03-17 | End: 2019-04-13

## 2019-03-26 DIAGNOSIS — M17.11 PRIMARY OSTEOARTHRITIS OF RIGHT KNEE: ICD-10-CM

## 2019-03-26 DIAGNOSIS — M16.0 PRIMARY OSTEOARTHRITIS OF BOTH HIPS: ICD-10-CM

## 2019-03-26 NOTE — TELEPHONE ENCOUNTER
Pt called requesting refill for   HYDROcodone-acetaminophen (NORCO) 5-325 MG per tab 1 tablet [[id number]]

## 2019-03-27 RX ORDER — HYDROCODONE BITARTRATE AND ACETAMINOPHEN 5; 325 MG/1; MG/1
TABLET ORAL
Qty: 60 TABLET | Refills: 0 | Status: SHIPPED | OUTPATIENT
Start: 2019-03-27 | End: 2019-06-05

## 2019-04-09 DIAGNOSIS — J30.1 SEASONAL ALLERGIC RHINITIS DUE TO POLLEN: ICD-10-CM

## 2019-04-09 RX ORDER — MONTELUKAST SODIUM 10 MG/1
10 TABLET ORAL
Qty: 90 TABLET | Refills: 1 | Status: SHIPPED | OUTPATIENT
Start: 2019-04-09 | End: 2019-12-24

## 2019-04-16 NOTE — TELEPHONE ENCOUNTER
pls advise if rx refill appropriate? 30 day supply only given last month.     Requested Prescriptions   Pending Prescriptions Disp Refills   • MELOXICAM 15 MG Oral Tab [Pharmacy Med Name: Meloxicam 15 Mg Tab Zydu] 30 tablet 0     Sig: TAKE ONE TABLET BY MO

## 2019-04-17 NOTE — TELEPHONE ENCOUNTER
I see that she wants meloxicam but is she on Lovenox or Coumadin at this time still to prevent blood clots per surgeon?

## 2019-04-17 NOTE — TELEPHONE ENCOUNTER
Spoke to pt. Name and  verified. Pt stts that she is no longer taking coumadin since 19. She still has 2 weeks of meloxicam left. Please advise. Thanks.

## 2019-04-18 RX ORDER — MELOXICAM 15 MG/1
TABLET ORAL
Qty: 30 TABLET | Refills: 0 | Status: SHIPPED | OUTPATIENT
Start: 2019-04-18 | End: 2019-05-16

## 2019-05-01 DIAGNOSIS — I10 ESSENTIAL HYPERTENSION: ICD-10-CM

## 2019-05-01 RX ORDER — LISINOPRIL AND HYDROCHLOROTHIAZIDE 25; 20 MG/1; MG/1
TABLET ORAL
Qty: 90 TABLET | Refills: 1 | Status: SHIPPED | OUTPATIENT
Start: 2019-05-01 | End: 2019-07-25

## 2019-05-17 NOTE — TELEPHONE ENCOUNTER
Refill passed per [facility], [facility] protocol.   Refill Protocol Appointment Criteria  · Appointment scheduled in the past 6 months or in the next 3 months  Recent Outpatient Visits            2 months ago Cystitis    [address], [place]

## 2019-05-19 RX ORDER — MELOXICAM 15 MG/1
TABLET ORAL
Qty: 30 TABLET | Refills: 0 | Status: SHIPPED | OUTPATIENT
Start: 2019-05-19 | End: 2019-06-23

## 2019-06-05 DIAGNOSIS — M16.0 PRIMARY OSTEOARTHRITIS OF BOTH HIPS: ICD-10-CM

## 2019-06-05 DIAGNOSIS — M17.11 PRIMARY OSTEOARTHRITIS OF RIGHT KNEE: ICD-10-CM

## 2019-06-05 NOTE — TELEPHONE ENCOUNTER
Pt would like a refill on her hydrocodone medication. Please, call pt when the script is ready for .        Current Outpatient Medications:  HYDROcodone-acetaminophen 5-325 MG Oral Tab TAKE ONE TO TWO TABLETS BY MOUTH THREE TIMES A DAY AS NEEDED Disp: 60 tablet Rfl: 0

## 2019-06-07 RX ORDER — HYDROCODONE BITARTRATE AND ACETAMINOPHEN 5; 325 MG/1; MG/1
TABLET ORAL
Qty: 60 TABLET | Refills: 0 | Status: SHIPPED | OUTPATIENT
Start: 2019-06-07 | End: 2019-07-22

## 2019-06-25 RX ORDER — MELOXICAM 15 MG/1
TABLET ORAL
Qty: 30 TABLET | Refills: 0 | Status: SHIPPED | OUTPATIENT
Start: 2019-06-25 | End: 2019-07-19

## 2019-06-25 NOTE — TELEPHONE ENCOUNTER
Refill passed per [facility], [facility] protocol.   Refill Protocol Appointment Criteria  · Appointment scheduled in the past 6 months or in the next 3 months  Recent Outpatient Visits            3 months ago [place], [place]

## 2019-07-20 RX ORDER — MELOXICAM 15 MG/1
TABLET ORAL
Qty: 30 TABLET | Refills: 1 | Status: SHIPPED | OUTPATIENT
Start: 2019-07-20 | End: 2019-09-11

## 2019-07-20 NOTE — TELEPHONE ENCOUNTER
6-25-19 #30    Refill Protocol Appointment Criteria  · Appointment scheduled in the past 12 months or in the next 3 months  Recent Outpatient Visits            4 months ago Cystitis    [address] APN, FNP-C Assessment  1  Psoriasis (696 1) (L40 9)   2  Bilateral hip pain (719 45) (M25 551,M25 552)   3  Chronic pain of both knees (928 00,136 64) (M25 561,M25 562,G89 29)   4  Depression with anxiety (300 4) (F41 8)   5  Obesity surgery status (V45 86) (Z98 84)   6  History of Cellulitis of unspecified part of limb (L03 119)   7  Iron deficiency (280 9) (E61 1)    Plan  Bilateral hip pain    · * XR HIPS BILATERAL 2 VW W PELVIS IF PERFORMED; Status:Active; Requested  for:20Oct2017;   Chronic pain of both knees    · * XR KNEE 3 VW LEFT NON INJURY; Status:Active; Requested for:20Oct2017;    · * XR KNEE 3 VW RIGHT NON INJURY; Status:Active; Requested for:20Oct2017;    · XR KNEE BILATERAL AP STANDING; Status:Active; Requested FHH:[id number];   Need for influenza vaccination    · Fluzone Quadrivalent Intramuscular Suspension  Screening for genitourinary condition    · *VB - Urinary Incontinence Screen (Dx Z13 89 Screen for UI); Status:Complete -  Retrospective Authorization;   Done: 01GZJ5277 09:56AM    Discussion/Summary  Counseling Documentation With Imm: The patient was counseled regarding diagnostic results,-- prognosis  Medication SE Review and Pt Understands Tx: Possible side effects of new medications were reviewed with the patient/guardian today  Chief Complaint  Chief Complaint Free Text Note Form: New patient here to establish relationship  Med list reviewed  All info entered/up todate  CR      History of Present Illness  HPI: new pt due to insurance issues- [place]- last year had slight drop in ferritin- now on vitrin c 2 tabs daily for iron had surgery in august 2012-rare dumping issues with any rich foods      Review of Systems  Complete-Female:   ENT: nasal discharge-- and-- chronic seasonal allergies- spring and worse in fall- using prn nasal sprays  Cardiovascular: had stress test before surgery- had fals e positive- had normal cath   sister had aortic dissection at age 36, but-- no chest pain-- and-- no palpitations  Respiratory: no cough-- and-- no shortness of breath during exertion  Gastrointestinal: had colonoscopy at age 50- father had hx of colonic polyps- had small diverticuli on scope, but-- No complaints of abdominal pain, no constipation, no nausea or vomiting, no diarrhea, no bloody stools,-- no constipation-- and-- no diarrhea  Genitourinary: perimenopausal- May was last period  rare hot flashes- using vinflaxin- for PMS, but-- no dysuria-- and-- no incontinence  Musculoskeletal: joint stiffness-- and-- has hx of psoriasi- has some hip pain in am - has bilateral hip pain most times  Integumentary: skin lesion-- and-- some chornic hand and elbows- intial breakout in teens- uses gold bond  Active Problems  1  Arthralgia (719 40) (M25 50)   2  Depression with anxiety (300 4) (F41 8)   3  Iron deficiency (280 9) (E61 1)   4  Obesity surgery status (V45 86) (Z98 84)   5  Polycystic ovarian syndrome (256 4) (E28 2)   6  Postgastrectomy malabsorption (579 3) (K91 2,Z90 3)   7  Psoriasis (696 1) (L40 9)   8  Screening for genitourinary condition (V81 6) (Z13 89)   9  Superficial thrombophlebitis of leg, unspecified laterality   10  Takes iron supplements (V49 89) (Z78 9)   11  Vitamin A deficiency (264 9) (E50 9)   12  Vitamin B12 deficiency (266 2) (E53 8)   13  Vitamin D deficiency (268 9) (E55 9)    Past Medical History  1  History of Cellulitis of unspecified part of limb (L03 119)   2  History of edema (V13 89) (Z87 898)   3  History of hypercholesterolemia (V12 29) (Z86 39)   4  History of shortness of breath (V13 89) (Z87 898)   5  History of sinus problem   6  History of Morbid or severe obesity due to excess calories (278 01) (E66 01)   7  History of Sleep apnea (780 57) (G47 30)    Surgical History  1  History of Ablation Of Vaginal Lesion(S)   2  History of  Section   3   History of Gastric Surgery For Morbid Obesity Gastric Bypass    Family History  Mother    1  Family history of Congenital Heart Disease  Father    2  Family history of Glaucoma  Sister    3  Family history of Asthma (V17 5)   4  Family history of Congestive Heart Failure   5  Family history of Nonorganic Sleep Apnea    Social History   · Always uses seat belt   · Being A Social Drinker   · Does not use illicit drugs (O37 58) (Z78 9)   · Drinks coffee   · Denied: History of Drug Use   · Employed   · Former smoker (I52 71) (G16 642)   · Lives with family   · No advance directives (V49 89) (Z78 9)   · No living will   · No secondhand smoke exposure (V49 89) (Z78 9)   · Non-smoker (V49 89) (Z78 9)   · Takes iron supplements (V49 89) (Z78 9)  Social History Reviewed: The social history was reviewed and updated today  Current Meds   1  [employer] Calcium Citrate TABS; take 2 tablet twice daily; Therapy: (Recorded:34Dkf9476) to Recorded   2  D 1000 1000 UNIT Oral Tablet; TAKE 1 TABLET DAILY; Therapy: (Recorded:21Itm5465) to Recorded   3  MiraLax PACK; Therapy: (30-62-69-73) to Recorded   4  Multi-Vitamin TABS; TAKE 1 TABLET DAILY; Therapy: (Recorded:87Rwd3126) to Recorded   5  Opurity Bypass Optimized CHEW;   Therapy: (FIFFBIBU:86DMR8837) to Recorded   6  Venlafaxine HCl - 37 5 MG Oral Tablet; TAKE 1 TABLET TWICE DAILY; Therapy: 80SZA2854 to Recorded   7  Vitamin A 8000 UNIT Oral Capsule; Therapy: (Recorded:28Nft1489) to Recorded   8  Vitamin B-12 1000 MCG Sublingual Tablet Sublingual; DISSOLVE MCG; Therapy: (Recorded:12Xaf4654) to Recorded   9  Vitron-C  MG Oral Tablet; take 2 tablet daily; Therapy: (Recorded:34Uph4777) to Recorded    Allergies  1   No Known Drug Allergies    Vitals  Signs   Recorded: 82BML8666 10:10AM   Heart Rate: 78, L Radial  Pulse Quality: Normal, L Radial  Systolic: 742, LUE, Sitting  Diastolic: 78, LUE, Sitting  Height: 5 ft 7 in  Weight: 245 lb   BMI Calculated: 38 37  BSA Calculated: 2 2    Physical Exam    Constitutional General appearance: No acute distress, well appearing and well nourished  Ears, Nose, Mouth, and Throat   External inspection of ears and nose: Normal     Nasal mucosa, septum, and turbinates: Normal without edema or erythema  Oropharynx: Normal with no erythema, edema, exudate or lesions  Pulmonary   Auscultation of lungs: Clear to auscultation  Cardiovascular   Auscultation of heart: Normal rate and rhythm, normal S1 and S2, without murmurs  Examination of extremities for edema and/or varicosities: Normal     Abdomen   Abdomen: Non-tender, no masses  Musculoskeletal   Gait and station: Normal     Inspection/palpation of joints, bones, and muscles: Abnormal     Skin   Skin and subcutaneous tissue: Abnormal  -- some rash on hands and elbows  Neurologic   Cranial nerves: Cranial nerves 2-12 intact  Reflexes: 2+ and symmetric  Sensation: No sensory loss  Psychiatric   Orientation to person, place, and time: Normal     Mood and affect: Normal          Results/Data  *VB - Urinary Incontinence Screen (Dx Z13 89 Screen for UI) 20Oct2017 09:56AM Stapleton Chimera     Test Name Result Flag Reference   Urinary Incontinence Assessment 20Oct2017       PHQ-2 Adult Depression Screening 44AYJ3457 09:55AM User, Ahs     Test Name Result Flag Reference   PHQ-2 Adult Depression Score 0     Over the last two weeks, how often have you been bothered by any of the following problems?   Little interest or pleasure in doing things: Not at all - 0  Feeling down, depressed, or hopeless: Not at all - 0   PHQ-2 Adult Depression Screening Negative         Future Appointments    Date/Time Provider Specialty Site   12/05/2017 03:00 PM Charissa Franz, [address] [facility]     Signatures   Electronically signed by : Sven Kurtz DO; Oct 20 2017 11:01AM EST                       (Author)

## 2019-07-22 DIAGNOSIS — M16.0 PRIMARY OSTEOARTHRITIS OF BOTH HIPS: ICD-10-CM

## 2019-07-22 DIAGNOSIS — M17.11 PRIMARY OSTEOARTHRITIS OF RIGHT KNEE: ICD-10-CM

## 2019-07-22 RX ORDER — HYDROCODONE BITARTRATE AND ACETAMINOPHEN 5; 325 MG/1; MG/1
TABLET ORAL
Qty: 60 TABLET | Refills: 0 | Status: SHIPPED | OUTPATIENT
Start: 2019-07-22 | End: 2019-09-18

## 2019-07-24 DIAGNOSIS — I10 ESSENTIAL HYPERTENSION: ICD-10-CM

## 2019-07-24 DIAGNOSIS — M51.26 LUMBAR HERNIATED DISC: ICD-10-CM

## 2019-07-24 RX ORDER — GABAPENTIN 600 MG/1
TABLET ORAL
Qty: 90 TABLET | Refills: 1 | Status: SHIPPED | OUTPATIENT
Start: 2019-07-24 | End: 2019-07-25

## 2019-07-24 RX ORDER — AMLODIPINE BESYLATE 5 MG/1
5 TABLET ORAL DAILY
Qty: 90 TABLET | Refills: 1 | Status: SHIPPED | OUTPATIENT
Start: 2019-07-24 | End: 2019-07-25

## 2019-07-24 NOTE — TELEPHONE ENCOUNTER
Refill passed per [facility], [facility] protocol.   Hypertensive Medications  Protocol Criteria:  · Appointment scheduled in the past 6 months or in the next 3 months  · BMP or CMP in the past 12 months  · Creatinine result < 2  Recent Outpatient Visits ago Primary osteoarthritis of right knee    [facility], [facility], [address], [address], [facility]    Office Visit    6 months ago [place]    [address], [place], [place]    Office Visit    7 months ago right L5-S1 radiculopathy

## 2019-07-25 ENCOUNTER — OFFICE VISIT (OUTPATIENT)
Dept: FAMILY MEDICINE CLINIC | Facility: CLINIC | Age: 74
End: 2019-07-25
Payer: MEDICARE

## 2019-07-25 VITALS
HEIGHT: 64 IN | SYSTOLIC BLOOD PRESSURE: 143 MMHG | BODY MASS INDEX: 39.78 KG/M2 | TEMPERATURE: 97 F | WEIGHT: 233 LBS | HEART RATE: 62 BPM | DIASTOLIC BLOOD PRESSURE: 70 MMHG

## 2019-07-25 DIAGNOSIS — R60.0 BILATERAL LEG EDEMA: ICD-10-CM

## 2019-07-25 DIAGNOSIS — M25.50 ARTHRALGIA, UNSPECIFIED JOINT: ICD-10-CM

## 2019-07-25 DIAGNOSIS — H25.9 AGE-RELATED CATARACT OF BOTH EYES, UNSPECIFIED AGE-RELATED CATARACT TYPE: ICD-10-CM

## 2019-07-25 DIAGNOSIS — Z12.11 SCREENING FOR COLON CANCER: ICD-10-CM

## 2019-07-25 DIAGNOSIS — J30.89 OTHER ALLERGIC RHINITIS: ICD-10-CM

## 2019-07-25 DIAGNOSIS — Z00.00 ENCOUNTER FOR ANNUAL HEALTH EXAMINATION: ICD-10-CM

## 2019-07-25 DIAGNOSIS — F41.1 ANXIETY STATE: ICD-10-CM

## 2019-07-25 DIAGNOSIS — Z12.31 VISIT FOR SCREENING MAMMOGRAM: ICD-10-CM

## 2019-07-25 DIAGNOSIS — E78.2 MIXED HYPERLIPIDEMIA: ICD-10-CM

## 2019-07-25 DIAGNOSIS — I70.90 ATHEROSCLEROSIS: ICD-10-CM

## 2019-07-25 DIAGNOSIS — K63.5 POLYP OF COLON, UNSPECIFIED PART OF COLON, UNSPECIFIED TYPE: ICD-10-CM

## 2019-07-25 DIAGNOSIS — I10 ESSENTIAL HYPERTENSION: ICD-10-CM

## 2019-07-25 DIAGNOSIS — M51.26 LUMBAR HERNIATED DISC: ICD-10-CM

## 2019-07-25 DIAGNOSIS — Z00.00 ADULT GENERAL MEDICAL EXAM: Primary | ICD-10-CM

## 2019-07-25 PROCEDURE — G0439 PPPS, SUBSEQ VISIT: HCPCS | Performed by: FAMILY MEDICINE

## 2019-07-25 RX ORDER — ROSUVASTATIN CALCIUM 20 MG/1
20 TABLET, COATED ORAL
Qty: 90 TABLET | Refills: 2 | Status: SHIPPED | OUTPATIENT
Start: 2019-07-25 | End: 2020-03-13

## 2019-07-25 RX ORDER — GABAPENTIN 600 MG/1
600 TABLET ORAL 2 TIMES DAILY
Qty: 180 TABLET | Refills: 1 | Status: ON HOLD | OUTPATIENT
Start: 2019-07-25 | End: 2020-01-10

## 2019-07-25 RX ORDER — SERTRALINE HYDROCHLORIDE 100 MG/1
TABLET, FILM COATED ORAL
Qty: 90 TABLET | Refills: 2 | Status: SHIPPED | OUTPATIENT
Start: 2019-07-25 | End: 2020-03-13

## 2019-07-25 RX ORDER — TELMISARTAN AND HYDROCHLORTHIAZIDE 80; 25 MG/1; MG/1
1 TABLET ORAL DAILY
Qty: 90 TABLET | Refills: 1 | Status: SHIPPED | OUTPATIENT
Start: 2019-07-25 | End: 2019-12-27

## 2019-07-25 RX ORDER — FLUTICASONE PROPIONATE 50 MCG
2 SPRAY, SUSPENSION (ML) NASAL DAILY
Qty: 3 BOTTLE | Refills: 1 | Status: SHIPPED | OUTPATIENT
Start: 2019-07-25 | End: 2019-11-22

## 2019-07-25 NOTE — PATIENT INSTRUCTIONS
Ask your pharmacist for the new Shingrix injection.    ========================================================================    Continue the 600 mg of gabapentin at bedtime but you may want to cut it in half and only take 300 mg in the morning for the n and non-screening if indicated for medical reasons Electrocardiogram date07/31/2018 Routine EKG is not a screening covered service except at the [place] to Medicare Visit    Abdominal aortic aneurysm screening (once between ages 73-68) IPPE only No results for this patient. Chlamydia  Annually if high risk No results found for: CHLAMYDIA No flowsheet data found.     Screening Mammogram      Mammogram    Recommend Annually to at least age 76, and as needed after 76 Mammogram due on 11/16/2019 Please get th also has the State forms available on it's website for anyone to review and print using their home computer and printer. (the forms are also available in [address])  [email or web address]  This link also has information from the [address]

## 2019-07-25 NOTE — PROGRESS NOTES
HPI:   Kerry Olivares is a 72year old female who presents for a Medicare Subsequent Annual Wellness visit (Pt already had Initial Annual Wellness). Pt quit smoking in 2002. She works in an office.  Her son, Garfield Ryan will take medical responsibility of th Tamika Oneal has some abnormal functions as listed below:  She has no issues with dressing and bathing based on screening of functional status. She has Walking problems based on screening of functional status.    Difficulty walking?: L2-3 left mod & left mod foraminal, L3-4 mod diffuse, L4-5 right large far lateral & right mod diffuse, L5-S1 right mod-large far lateral bulging discs     L5-S1 right paracentral mild herniated disc     L5-S1 right mod, L4-5 right mod, L3-4 left mod forrest DAILY    MONTELUKAST SODIUM 10 MG Oral Tab Take 1 tablet (10 mg total) by mouth once daily. Multiple Vitamins-Minerals (PRESERVISION AREDS) Oral Tab Take by mouth. Rosuvastatin Calcium 20 MG Oral Tab Take 1 tablet (20 mg total) by mouth once daily.    Citlaly Quezada tightness and shortness of breath. Cardiovascular: Negative for chest pain. Gastrointestinal: Negative for abdominal pain and blood in stool. Genitourinary: Negative for hematuria.    Musculoskeletal: Positive for joint swelling (bilateral ankles) an avoid social activities because I cannot hear well and fear I will reply improperly:  No   Family members and friends have told me they think I may have hearing loss:   No                Physical Exam     Physical Exam   Constitutional: Patient is oriented recent past.  We will hold off on doing labs now as we need to switch her blood pressure medications as Norvasc is causing edema. We will take her off the Zestoretic and try Micardis HCT has that could be stronger.   Essential hypertension  -     Telmisart Encounter      GI- Dr Gordon Grace          Referral Priority:Routine          Referral Type:OFFICE VISIT          Referred to Provider:Seamus Puri MD          Requested Specialty:G Glucose (mg/dL)   Date Value   02/07/2019 114 (H)     GLUCOSE (P) (mg/dL)   Date Value   09/21/2016 108 (H)    Medicare covers annually or at 6-month intervals for prediabetic patients        Cardiovascular Disease Screening     Cholesterol, covere term Glucocorticoid medication (Steroids) Requires diagnosis related to osteoporosis or estrogen deficiency.    Biennial benefit unless patient has history of long-term glucocorticoid use for medical condition    Last Dexa Scan:   XR DEXA BONE DENSITOMETRY Zoster (Not covered by Medicare Part B) No orders found for this or any previous visit. This may be covered with your pharmacy  prescription benefits     Recommended Websites for Advanced Directives    [email or web address] Date Value   02/07/2019 114 (H)     GLUCOSE (P) (mg/dL)   Date Value   09/21/2016 108 (H)          Cardiovascular Disease Screening     LDL Annually LDL Cholesterol (mg/dL)   Date Value   07/31/2018 69   09/21/2016 88        EKG - w/ Initial Preventative of institutions for the mentally retarded   Persons who live in the same house as a HepB virus carrier   Homosexual men   Illicit injectable drug abusers     Tetanus Toxoid  Only covered with a cut with metal- TD and TDaP Not covered by Medicare Part B) No

## 2019-08-20 ENCOUNTER — HOSPITAL ENCOUNTER (OUTPATIENT)
Dept: MAMMOGRAPHY | Age: 74
Discharge: HOME | End: 2019-08-20
Attending: FAMILY MEDICINE
Payer: MEDICARE

## 2019-08-20 DIAGNOSIS — Z12.31 VISIT FOR SCREENING MAMMOGRAM: ICD-10-CM

## 2019-08-20 PROCEDURE — 77067 SCR MAMMO BI INCL CAD: CPT | Performed by: FAMILY MEDICINE

## 2019-08-20 PROCEDURE — 77063 BREAST TOMOSYNTHESIS BI: CPT | Performed by: FAMILY MEDICINE

## 2019-08-28 ENCOUNTER — NURSE ONLY (OUTPATIENT)
Dept: GASTROENTEROLOGY | Facility: CLINIC | Age: 74
End: 2019-08-28

## 2019-08-28 DIAGNOSIS — Z80.0 FAMILY HISTORY OF COLON CANCER: Primary | ICD-10-CM

## 2019-08-28 RX ORDER — GLUCOSAMINE HCL 500 MG
3000 TABLET ORAL DAILY
COMMUNITY

## 2019-08-28 NOTE — PROGRESS NOTES
Last Procedure:  Colonoscopy on 11/07/2013  Last Diagnosis:  Single 6 mm ascending colon polyp,mild ascending and left sided diverticulosis,small internal hemorrhoids  Recalled for (months or years): 5 years  Sedation used previously:  MAC  Last Prep Used

## 2019-09-04 RX ORDER — POLYETHYLENE GLYCOL 3350, SODIUM CHLORIDE, POTASSIUM CHLORIDE, SODIUM BICARBONATE, AND SODIUM SULFATE 240; 5.84; 2.98; 6.72; 22.72 G/4L; G/4L; G/4L; G/4L; G/4L
POWDER, FOR SOLUTION ORAL
Qty: 1 BOTTLE | Refills: 0 | Status: SHIPPED | OUTPATIENT
Start: 2019-09-04 | End: 2019-12-27

## 2019-09-04 NOTE — PROGRESS NOTES
Thank you Quoc Goncalves!!! Recent adult general medical exam with Dr. Iveth Medellin of 7/25/2019 reviewed. Please Schedule colonoscopy exam at [facility])/ [place]    This patient IS?  appropriate for the [facility] endoscopy cent

## 2019-09-05 ENCOUNTER — TELEPHONE (OUTPATIENT)
Dept: GASTROENTEROLOGY | Facility: CLINIC | Age: 74
End: 2019-09-05

## 2019-09-05 NOTE — TELEPHONE ENCOUNTER
Pt requesting Prep instructions for 10/1/2019 CLN to be sent to [address] For add'l questions pls call. Thank you.

## 2019-09-05 NOTE — PROGRESS NOTES
Scheduled for:  Colonoscopy - [address]  Provider Name:  Dr. Elvie Duran  Date:  10/1/19  Location:  [facility]  Sedation:  MAC  Time:  1:30 pm (pt is aware to arrive at 12:30 pm)  Prep:  Golytely, Prep instructions were given to pt over the phone, pt verbalized [place]

## 2019-09-12 RX ORDER — MELOXICAM 15 MG/1
TABLET ORAL
Qty: 90 TABLET | Refills: 0 | Status: SHIPPED | OUTPATIENT
Start: 2019-09-12 | End: 2019-12-24

## 2019-09-13 NOTE — TELEPHONE ENCOUNTER
VS please advise on High alert message per epic, pended #90, usually #30 sent    Toxic effects may be increased with concurrent administration of NSAIDs and Selective Serotonin Reuptake Inhibitors.  The risk of upper gastrointestinal bleeding may be increas

## 2019-09-18 DIAGNOSIS — M17.11 PRIMARY OSTEOARTHRITIS OF RIGHT KNEE: ICD-10-CM

## 2019-09-18 DIAGNOSIS — M16.0 PRIMARY OSTEOARTHRITIS OF BOTH HIPS: ICD-10-CM

## 2019-09-19 RX ORDER — HYDROCODONE BITARTRATE AND ACETAMINOPHEN 5; 325 MG/1; MG/1
TABLET ORAL
Qty: 60 TABLET | Refills: 0 | Status: SHIPPED | OUTPATIENT
Start: 2019-09-19 | End: 2019-11-13

## 2019-09-19 NOTE — TELEPHONE ENCOUNTER
Call was placed to inform patient that Rx for Hydrocodone-acetaminophen 5-325mg is available for  at .  Patient stated that she will  Rx today 9-19-19

## 2019-09-19 NOTE — TELEPHONE ENCOUNTER
Controlled medication pending for review. If approved needs to be called in or faxed by on-site staff.      Last Rx: 7/22/19  LOV: 1 month ago

## 2019-10-01 ENCOUNTER — TELEPHONE (OUTPATIENT)
Dept: GASTROENTEROLOGY | Facility: CLINIC | Age: 74
End: 2019-10-01

## 2019-10-01 ENCOUNTER — HOSPITAL ENCOUNTER (OUTPATIENT)
Facility: HOSPITAL | Age: 74
Setting detail: HOSPITAL OUTPATIENT SURGERY
Discharge: HOME | End: 2019-10-01
Attending: INTERNAL MEDICINE | Admitting: INTERNAL MEDICINE
Payer: MEDICARE

## 2019-10-01 ENCOUNTER — ANESTHESIA (OUTPATIENT)
Dept: ENDOSCOPY | Facility: HOSPITAL | Age: 74
End: 2019-10-01
Payer: MEDICARE

## 2019-10-01 ENCOUNTER — ANESTHESIA EVENT (OUTPATIENT)
Dept: ENDOSCOPY | Facility: HOSPITAL | Age: 74
End: 2019-10-01
Payer: MEDICARE

## 2019-10-01 DIAGNOSIS — Z80.0 FAMILY HISTORY OF COLON CANCER: ICD-10-CM

## 2019-10-01 PROCEDURE — G0105 COLORECTAL SCRN; HI RISK IND: HCPCS | Performed by: INTERNAL MEDICINE

## 2019-10-01 PROCEDURE — 0DJD8ZZ INSPECTION OF LOWER INTESTINAL TRACT, VIA NATURAL OR ARTIFICIAL OPENING ENDOSCOPIC: ICD-10-PCS | Performed by: INTERNAL MEDICINE

## 2019-10-01 RX ORDER — SODIUM CHLORIDE, SODIUM LACTATE, POTASSIUM CHLORIDE, CALCIUM CHLORIDE 600; 310; 30; 20 MG/100ML; MG/100ML; MG/100ML; MG/100ML
INJECTION, SOLUTION INTRAVENOUS CONTINUOUS
Status: DISCONTINUED | OUTPATIENT
Start: 2019-10-01 | End: 2019-10-01

## 2019-10-01 RX ORDER — LIDOCAINE HYDROCHLORIDE 10 MG/ML
INJECTION, SOLUTION EPIDURAL; INFILTRATION; INTRACAUDAL; PERINEURAL AS NEEDED
Status: DISCONTINUED | OUTPATIENT
Start: 2019-10-01 | End: 2019-10-01 | Stop reason: SURG

## 2019-10-01 RX ORDER — NALOXONE HYDROCHLORIDE 0.4 MG/ML
80 INJECTION, SOLUTION INTRAMUSCULAR; INTRAVENOUS; SUBCUTANEOUS AS NEEDED
Status: DISCONTINUED | OUTPATIENT
Start: 2019-10-01 | End: 2019-10-01 | Stop reason: HOSPADM

## 2019-10-01 RX ADMIN — SODIUM CHLORIDE, SODIUM LACTATE, POTASSIUM CHLORIDE, CALCIUM CHLORIDE: 600; 310; 30; 20 INJECTION, SOLUTION INTRAVENOUS at 14:29:00

## 2019-10-01 RX ADMIN — LIDOCAINE HYDROCHLORIDE 50 MG: 10 INJECTION, SOLUTION EPIDURAL; INFILTRATION; INTRACAUDAL; PERINEURAL at 13:47:00

## 2019-10-01 RX ADMIN — SODIUM CHLORIDE, SODIUM LACTATE, POTASSIUM CHLORIDE, CALCIUM CHLORIDE: 600; 310; 30; 20 INJECTION, SOLUTION INTRAVENOUS at 13:47:00

## 2019-10-01 NOTE — ANESTHESIA PREPROCEDURE EVALUATION
Anesthesia PreOp Note    HPI:     Carol Cruz is a 72year old female who presents for preoperative consultation requested by: Tash Barrera MD    Date of Surgery: 10/1/2019    Procedure(s):  COLONOSCOPY  Indication: Family history of co 07/18/2014      Low back pain         Date Noted: 07/18/2014      Anxiety state         Date Noted: 08/06/2013      Senile cataract         Date Noted: 08/09/2011        Past Medical History:   Diagnosis Date   • Anesthesia complication    • Anxiety state 600 MG Oral Tab Take 1 tablet (600 mg total) by mouth 2 (two) times daily. Disp: 180 tablet Rfl: 1 Taking   Telmisartan-HCTZ 80-25 MG Oral Tab Take 1 tablet by mouth daily. Stop taking the lisinopril/hydrochlorothiazide and the amlodipine.   This will be yo education level: Not on file    Occupational History      Not on file    Social Needs      Financial resource strain: Not on file      Food insecurity:        Worry: Not on file        Inability: Not on file      Transportation needs:        Medical: Not o Asked        Pt has a pacemaker: No        Pt has a defibrillator: No        Breast feeding: Not Asked        Reaction to local anesthetic: No    Social History Narrative      The patient does not use an assistive device. .        The patient does not live

## 2019-10-01 NOTE — H&P
History & Physical Examination    Patient Name: Lesli Willis  MRN: [id number]  CSN: [id number]  YOB: 1945    Diagnosis:   1. Personal history of adenomatous colon polyps. 2.  Family history of colon cancer.     Present Illness:     Sharla 3350-KCl-NaBcb-NaCl-NaSulf (PEG 3350/ELECTROLYTES) 240 g Oral Recon Soln Take as directed according to colonoscopy instructions Disp: 1 Bottle Rfl: 0    Rosuvastatin Calcium 20 MG Oral Tab Take 1 tablet (20 mg total) by mouth once daily.  Disp: 90 tablet Rf Cancer Father    • Other (hx of SC ( unknown type)) Father    • Diabetes Mother    • Hypertension Mother    • Diabetes Paternal Grandmother    • Cancer Self    • Glaucoma Neg         No Family h/o Glaucoma   • Macular degeneration Neg         No Family h/o

## 2019-10-01 NOTE — OPERATIVE REPORT
COLONOSCOPY PROCEDURE REPORT     DATE OF PROCEDURE:  10/1/2019     PCP: Barbie Huff DO     PREOPERATIVE DIAGNOSIS:    1. Personal history of adenomatous colon polyps. 2.  Family history of colon cancer. POSTOPERATIVE DIAGNOSIS:  See impression.

## 2019-10-01 NOTE — ANESTHESIA POSTPROCEDURE EVALUATION
Patient: Manny Chavez    Procedure Summary     Date:  10/01/19 Room / Location:  [facility] ENDOSCOPY 05 / [facility] ENDOSCOPY    Anesthesia Start:  8081 Anesthesia Stop:  4958    Procedure:  COLONOSCOPY (N/A ) Diagnosis:       Family history of colon cancer

## 2019-10-01 NOTE — TELEPHONE ENCOUNTER
[address] [facility] [address] Tyrel Candelaria  MR #:   [id number] :   1945 [facility]  PHYSICIAN:  Kaya New M.D. Operative Report    DATE OF PROCEDURE: 2013    PREOPERATIVE DIAGNOSIS:    1.   Personal history of adenom the descending and sigmoid. Retroflexion of the rectum showed small internal hemorrhoids. The scope was straightened, air was suctioned out, and the scope withdrawn from the patient. She tolerated the procedure well. IMPRESSION:    1.   Single 6-

## 2019-10-02 VITALS
HEIGHT: 65 IN | BODY MASS INDEX: 38.32 KG/M2 | OXYGEN SATURATION: 97 % | HEART RATE: 55 BPM | WEIGHT: 230 LBS | DIASTOLIC BLOOD PRESSURE: 54 MMHG | SYSTOLIC BLOOD PRESSURE: 158 MMHG | RESPIRATION RATE: 14 BRPM

## 2019-11-13 DIAGNOSIS — M16.0 PRIMARY OSTEOARTHRITIS OF BOTH HIPS: ICD-10-CM

## 2019-11-13 DIAGNOSIS — M17.11 PRIMARY OSTEOARTHRITIS OF RIGHT KNEE: ICD-10-CM

## 2019-11-15 RX ORDER — HYDROCODONE BITARTRATE AND ACETAMINOPHEN 5; 325 MG/1; MG/1
TABLET ORAL
Qty: 60 TABLET | Refills: 0 | Status: SHIPPED | OUTPATIENT
Start: 2019-11-15 | End: 2019-12-30

## 2019-11-15 NOTE — TELEPHONE ENCOUNTER
Controlled medication pending for review. Please change to phone in, fax, or print script if not being sent electronically.     Requested Prescriptions     Pending Prescriptions Disp Refills   • HYDROcodone-acetaminophen 5-325 MG Oral Tab 60 tablet 0     S

## 2019-12-02 ENCOUNTER — TELEPHONE (OUTPATIENT)
Dept: FAMILY MEDICINE CLINIC | Facility: CLINIC | Age: 74
End: 2019-12-02

## 2019-12-02 NOTE — TELEPHONE ENCOUNTER
Patient is scheduled to have knee replacement surgery on 1-7-20 with Dr. Emmanuel Parks. Pt did  Make an appointment to see Dr. Vinny Negron on 12-12-19 for her pre op clearance.

## 2019-12-12 ENCOUNTER — HOSPITAL ENCOUNTER (OUTPATIENT)
Dept: GENERAL RADIOLOGY | Age: 74
Discharge: HOME | End: 2019-12-12
Attending: FAMILY MEDICINE
Payer: MEDICARE

## 2019-12-12 ENCOUNTER — OFFICE VISIT (OUTPATIENT)
Dept: FAMILY MEDICINE CLINIC | Facility: CLINIC | Age: 74
End: 2019-12-12
Payer: MEDICARE

## 2019-12-12 ENCOUNTER — APPOINTMENT (OUTPATIENT)
Dept: LAB | Age: 74
End: 2019-12-12
Attending: FAMILY MEDICINE
Payer: MEDICARE

## 2019-12-12 ENCOUNTER — LAB ENCOUNTER (OUTPATIENT)
Dept: LAB | Age: 74
End: 2019-12-12
Attending: FAMILY MEDICINE
Payer: MEDICARE

## 2019-12-12 VITALS
SYSTOLIC BLOOD PRESSURE: 154 MMHG | DIASTOLIC BLOOD PRESSURE: 60 MMHG | WEIGHT: 236 LBS | BODY MASS INDEX: 39.32 KG/M2 | TEMPERATURE: 97 F | HEART RATE: 72 BPM | HEIGHT: 65 IN

## 2019-12-12 DIAGNOSIS — Z79.01 MONITORING FOR ANTICOAGULANT USE: ICD-10-CM

## 2019-12-12 DIAGNOSIS — R60.0 BILATERAL LEG EDEMA: ICD-10-CM

## 2019-12-12 DIAGNOSIS — I70.90 ATHEROSCLEROSIS: ICD-10-CM

## 2019-12-12 DIAGNOSIS — G89.29 CHRONIC PAIN OF LEFT KNEE: Primary | ICD-10-CM

## 2019-12-12 DIAGNOSIS — M25.562 CHRONIC PAIN OF LEFT KNEE: ICD-10-CM

## 2019-12-12 DIAGNOSIS — Z51.81 MONITORING FOR ANTICOAGULANT USE: ICD-10-CM

## 2019-12-12 DIAGNOSIS — I10 ESSENTIAL HYPERTENSION: ICD-10-CM

## 2019-12-12 DIAGNOSIS — M25.562 CHRONIC PAIN OF LEFT KNEE: Primary | ICD-10-CM

## 2019-12-12 DIAGNOSIS — M17.12 PRIMARY OSTEOARTHRITIS OF LEFT KNEE: ICD-10-CM

## 2019-12-12 DIAGNOSIS — Z01.818 PREOP EXAMINATION: ICD-10-CM

## 2019-12-12 DIAGNOSIS — G89.29 CHRONIC PAIN OF LEFT KNEE: ICD-10-CM

## 2019-12-12 DIAGNOSIS — E78.2 MIXED HYPERLIPIDEMIA: ICD-10-CM

## 2019-12-12 PROCEDURE — 93005 ELECTROCARDIOGRAM TRACING: CPT

## 2019-12-12 PROCEDURE — 85025 COMPLETE CBC W/AUTO DIFF WBC: CPT

## 2019-12-12 PROCEDURE — 87081 CULTURE SCREEN ONLY: CPT

## 2019-12-12 PROCEDURE — 93010 ELECTROCARDIOGRAM REPORT: CPT | Performed by: FAMILY MEDICINE

## 2019-12-12 PROCEDURE — 36415 COLL VENOUS BLD VENIPUNCTURE: CPT

## 2019-12-12 PROCEDURE — 85610 PROTHROMBIN TIME: CPT

## 2019-12-12 PROCEDURE — 71046 X-RAY EXAM CHEST 2 VIEWS: CPT | Performed by: FAMILY MEDICINE

## 2019-12-12 PROCEDURE — 99215 OFFICE O/P EST HI 40 MIN: CPT | Performed by: FAMILY MEDICINE

## 2019-12-12 PROCEDURE — 85730 THROMBOPLASTIN TIME PARTIAL: CPT

## 2019-12-12 PROCEDURE — G0463 HOSPITAL OUTPT CLINIC VISIT: HCPCS | Performed by: FAMILY MEDICINE

## 2019-12-12 PROCEDURE — 81003 URINALYSIS AUTO W/O SCOPE: CPT

## 2019-12-12 PROCEDURE — 80053 COMPREHEN METABOLIC PANEL: CPT

## 2019-12-12 RX ORDER — TELMISARTAN AND HYDROCHLORTHIAZIDE 80; 25 MG/1; MG/1
1 TABLET ORAL DAILY
Qty: 90 TABLET | Refills: 1 | Status: SHIPPED | OUTPATIENT
Start: 2019-12-12 | End: 2020-03-13

## 2019-12-12 NOTE — PATIENT INSTRUCTIONS
I Increased the telmisartan hydrochlorothiazide dose to the 80/25 mg. Take this every day. See me back in about 2 to 3 weeks so we can see if your blood pressure is better.

## 2019-12-12 NOTE — PROGRESS NOTES
Patient ID: Sid Lerma is a 69year old female. HPI  Patient presents with:  Pre-Op Exam    Pt is present today for pre-operative clearance.  She is scheduled for left knee replacement surgery on 1/7/19 with Dr. Sylwia Sanders at [facility] pain) and joint swelling (right ankle). Skin: Negative for color change, rash and wound. Neurological: Negative for dizziness and speech difficulty. Hematological: Negative for adenopathy. Does not bruise/bleed easily.    Psychiatric/Behavioral: Negat BY MOUTH ONE TIME DAILY 90 tablet 0   • Cholecalciferol (VITAMIN D3) 3000 units Oral Tab Take by mouth. • aspirin 81 MG Oral Tab Take 81 mg by mouth daily. • Sertraline HCl 100 MG Oral Tab TAKE 1 TABLET (100 MG TOTAL) BY MOUTH ONCE DAILY.  90 tablet pulses. Pulmonary/Chest: Effort normal and breath sounds normal. No respiratory distress. Lymphadenopathy: Patient has no cervical adenopathy. Lower legs: 1+ edema of the right leg. Trace to 1+ edema of the left leg.   No calf tenderness or redness of Future  -     PROTHROMBIN TIME (PT); Future  -     XR CHEST PA + LAT CHEST (CPT=71046); Future  -     EKG 12-LEAD; Future  -     MRSA CULTURE ONLY; Future    Mixed hyperlipidemia  Continue your cholesterol medication.   Bilateral leg edema  -     PTT, ACTIV

## 2019-12-19 ENCOUNTER — HOSPITAL ENCOUNTER (OUTPATIENT)
Dept: NUCLEAR MEDICINE | Facility: HOSPITAL | Age: 74
Discharge: HOME | End: 2019-12-19
Attending: FAMILY MEDICINE
Payer: MEDICARE

## 2019-12-19 ENCOUNTER — HOSPITAL ENCOUNTER (OUTPATIENT)
Dept: CV DIAGNOSTICS | Facility: HOSPITAL | Age: 74
Discharge: HOME | End: 2019-12-19
Attending: FAMILY MEDICINE
Payer: MEDICARE

## 2019-12-19 DIAGNOSIS — Z96.651 STATUS POST TOTAL RIGHT KNEE REPLACEMENT USING CEMENT: ICD-10-CM

## 2019-12-19 DIAGNOSIS — I70.90 ATHEROSCLEROSIS: ICD-10-CM

## 2019-12-19 DIAGNOSIS — I10 ESSENTIAL HYPERTENSION: ICD-10-CM

## 2019-12-19 DIAGNOSIS — R94.31 ABNORMAL EKG: ICD-10-CM

## 2019-12-19 DIAGNOSIS — E78.2 MIXED HYPERLIPIDEMIA: ICD-10-CM

## 2019-12-19 PROCEDURE — 93018 CV STRESS TEST I&R ONLY: CPT | Performed by: FAMILY MEDICINE

## 2019-12-19 PROCEDURE — 93017 CV STRESS TEST TRACING ONLY: CPT | Performed by: FAMILY MEDICINE

## 2019-12-19 PROCEDURE — 78452 HT MUSCLE IMAGE SPECT MULT: CPT | Performed by: FAMILY MEDICINE

## 2019-12-19 PROCEDURE — 93016 CV STRESS TEST SUPVJ ONLY: CPT | Performed by: FAMILY MEDICINE

## 2019-12-24 DIAGNOSIS — J30.1 SEASONAL ALLERGIC RHINITIS DUE TO POLLEN: ICD-10-CM

## 2019-12-24 RX ORDER — MONTELUKAST SODIUM 10 MG/1
10 TABLET ORAL
Qty: 90 TABLET | Refills: 1 | Status: SHIPPED | OUTPATIENT
Start: 2019-12-24 | End: 2020-03-13

## 2019-12-25 NOTE — TELEPHONE ENCOUNTER
HIGH WARNING between Meloxicam and Sertraline. Refill passed per [facility] protocol.     Refill Protocol Appointment Criteria  · Appointment scheduled in the past 6 months or in the next 3 months  Recent Outpatient Visits            1 week ago Ch

## 2019-12-26 RX ORDER — MELOXICAM 15 MG/1
TABLET ORAL
Qty: 90 TABLET | Refills: 1 | Status: SHIPPED | OUTPATIENT
Start: 2019-12-26 | End: 2020-03-13

## 2019-12-26 NOTE — TELEPHONE ENCOUNTER
Do not take the meloxicam the 1 week prior to her procedure for her knee. Does she have a follow-up appointment in the near future to recheck blood pressure? If not make sure she does.

## 2019-12-27 ENCOUNTER — OFFICE VISIT (OUTPATIENT)
Dept: FAMILY MEDICINE CLINIC | Facility: CLINIC | Age: 74
End: 2019-12-27
Payer: MEDICARE

## 2019-12-27 ENCOUNTER — TELEPHONE (OUTPATIENT)
Dept: FAMILY MEDICINE CLINIC | Facility: CLINIC | Age: 74
End: 2019-12-27

## 2019-12-27 VITALS
SYSTOLIC BLOOD PRESSURE: 136 MMHG | TEMPERATURE: 98 F | WEIGHT: 230.81 LBS | BODY MASS INDEX: 38.45 KG/M2 | HEIGHT: 65 IN | DIASTOLIC BLOOD PRESSURE: 65 MMHG | HEART RATE: 77 BPM

## 2019-12-27 DIAGNOSIS — M17.12 PRIMARY OSTEOARTHRITIS OF LEFT KNEE: ICD-10-CM

## 2019-12-27 DIAGNOSIS — I10 ESSENTIAL HYPERTENSION: Primary | ICD-10-CM

## 2019-12-27 DIAGNOSIS — R60.0 BILATERAL LEG EDEMA: ICD-10-CM

## 2019-12-27 PROCEDURE — 99214 OFFICE O/P EST MOD 30 MIN: CPT | Performed by: FAMILY MEDICINE

## 2019-12-27 PROCEDURE — G0463 HOSPITAL OUTPT CLINIC VISIT: HCPCS | Performed by: FAMILY MEDICINE

## 2019-12-27 NOTE — PROGRESS NOTES
Patient ID: Lesli Willis is a 69year old female. HPI  Patient presents with:  Hypertension: follow up    Pt is present today for a pre-operative follow up visit.  Last saw me on 12/12/19 for pre-operative clearance and had uncontrolled hypertensi color change. Neurological: Negative for speech difficulty. Hematological: Does not bruise/bleed easily. Psychiatric/Behavioral: The patient is not nervous/anxious.           Past Medical History:   Diagnosis Date   • Anxiety state    • Arthritis    • HYDROcodone-acetaminophen 5-325 MG Oral Tab TAKE ONE TO TWO TABLETS BY MOUTH THREE TIMES A DAY AS NEEDED 60 tablet 0   • Cholecalciferol (VITAMIN D3) 3000 units Oral Tab Take by mouth. • aspirin 81 MG Oral Tab Take 81 mg by mouth daily.      • Sertralin hypertension  Doing very well with the 2 blood pressure medications. Even her edema is better. Patient is medically optimized for her procedure now and can proceed. We will send a message to her surgeon.   We discussed what medications to hold off prior

## 2019-12-27 NOTE — PATIENT INSTRUCTIONS
Hold all aspirin products and meloxicam along with vitamins 7 days prior to your procedure. Do not take the blood pressure pills as they have a diuretic in them the morning of your procedure.

## 2019-12-29 ENCOUNTER — LAB ENCOUNTER (OUTPATIENT)
Dept: LAB | Facility: HOSPITAL | Age: 74
End: 2019-12-29
Attending: ORTHOPAEDIC SURGERY
Payer: MEDICARE

## 2019-12-29 DIAGNOSIS — M17.12 PRIMARY OSTEOARTHRITIS OF LEFT KNEE: ICD-10-CM

## 2019-12-29 DIAGNOSIS — Z01.818 PREOP TESTING: ICD-10-CM

## 2019-12-29 PROCEDURE — 86900 BLOOD TYPING SEROLOGIC ABO: CPT

## 2019-12-29 PROCEDURE — 36415 COLL VENOUS BLD VENIPUNCTURE: CPT

## 2019-12-29 PROCEDURE — 86850 RBC ANTIBODY SCREEN: CPT

## 2019-12-29 PROCEDURE — 86901 BLOOD TYPING SEROLOGIC RH(D): CPT

## 2019-12-30 ENCOUNTER — TELEPHONE (OUTPATIENT)
Dept: FAMILY MEDICINE CLINIC | Facility: CLINIC | Age: 74
End: 2019-12-30

## 2019-12-30 DIAGNOSIS — M17.11 PRIMARY OSTEOARTHRITIS OF RIGHT KNEE: ICD-10-CM

## 2019-12-30 DIAGNOSIS — M16.0 PRIMARY OSTEOARTHRITIS OF BOTH HIPS: ICD-10-CM

## 2019-12-30 RX ORDER — HYDROCODONE BITARTRATE AND ACETAMINOPHEN 5; 325 MG/1; MG/1
TABLET ORAL
Qty: 60 TABLET | Refills: 0 | Status: ON HOLD | OUTPATIENT
Start: 2019-12-30 | End: 2020-01-08

## 2019-12-30 NOTE — TELEPHONE ENCOUNTER
Esme Malik from Dr. Remus Primrose requesting medical clearance for surgery 01/07.  Office notes ok but they have to say patient is clear for surgery    Fax  [phone]

## 2019-12-31 NOTE — TELEPHONE ENCOUNTER
Controlled medication pending for review. If approved needs to be called in or faxed by on-site staff.      Last Rx: 11/15/19  LOV: 3 days ago

## 2020-01-06 NOTE — H&P
[facility] HOSP - [facility]    History & Physical    Dana Ori Patient Status:  Surgery Admit - Inpt    1945 MRN [id number]   Location [place] [address] Attending Taras Osman, *   Hosp Day # 0 PCP Vladimir Coombs DO TOTAL KNEE REPLACEMENT Right    • TUBAL LIGATION     • YAG CAPSULOTOMY - OD - RIGHT EYE Right 2/12/14    RJM     Family History   Problem Relation Age of Onset   • Colon Cancer Father    • Other (hx of SC ( unknown type)) Father    • Diabetes Mother    • H 12/12/2019    BILT 0.5 12/12/2019    TP 7.0 12/12/2019    AST 23 12/12/2019    ALT 32 12/12/2019    PTT 29.8 12/12/2019    INR 0.92 12/12/2019    TSH 1.79 07/31/2018    MG 2.1 02/07/2019         X-RAYS:  Standing AP of both knees with lateral & skyline vie

## 2020-01-07 ENCOUNTER — HOSPITAL ENCOUNTER (INPATIENT)
Facility: HOSPITAL | Age: 75
LOS: 3 days | Discharge: NURSING, REHAB OR LONG-TERM CARE | DRG: 470 | End: 2020-01-10
Attending: ORTHOPAEDIC SURGERY | Admitting: ORTHOPAEDIC SURGERY
Payer: MEDICARE

## 2020-01-07 ENCOUNTER — APPOINTMENT (OUTPATIENT)
Dept: GENERAL RADIOLOGY | Facility: HOSPITAL | Age: 75
DRG: 470 | End: 2020-01-07
Attending: PHYSICIAN ASSISTANT
Payer: MEDICARE

## 2020-01-07 ENCOUNTER — ANESTHESIA (OUTPATIENT)
Dept: SURGERY | Facility: HOSPITAL | Age: 75
DRG: 470 | End: 2020-01-07
Payer: MEDICARE

## 2020-01-07 ENCOUNTER — ANESTHESIA EVENT (OUTPATIENT)
Dept: SURGERY | Facility: HOSPITAL | Age: 75
DRG: 470 | End: 2020-01-07
Payer: MEDICARE

## 2020-01-07 DIAGNOSIS — Z01.818 PREOP TESTING: ICD-10-CM

## 2020-01-07 DIAGNOSIS — M17.12 PRIMARY OSTEOARTHRITIS OF LEFT KNEE: Primary | ICD-10-CM

## 2020-01-07 LAB
DEPRECATED RDW RBC AUTO: 48.5 FL (ref 35.1–46.3)
ERYTHROCYTE [DISTWIDTH] IN BLOOD BY AUTOMATED COUNT: 13.4 % (ref 11–15)
HCT VFR BLD AUTO: 34.4 % (ref 35–48)
HGB BLD-MCNC: 11.2 G/DL (ref 12–16)
MCH RBC QN AUTO: 32.1 PG (ref 26–34)
MCHC RBC AUTO-ENTMCNC: 32.6 G/DL (ref 31–37)
MCV RBC AUTO: 98.6 FL (ref 80–100)
PLATELET # BLD AUTO: 231 10(3)UL (ref 150–450)
RBC # BLD AUTO: 3.49 X10(6)UL (ref 3.8–5.3)
WBC # BLD AUTO: 6.9 X10(3) UL (ref 4–11)

## 2020-01-07 PROCEDURE — 73560 X-RAY EXAM OF KNEE 1 OR 2: CPT | Performed by: PHYSICIAN ASSISTANT

## 2020-01-07 PROCEDURE — 0SRD0J9 REPLACEMENT OF LEFT KNEE JOINT WITH SYNTHETIC SUBSTITUTE, CEMENTED, OPEN APPROACH: ICD-10-PCS | Performed by: ORTHOPAEDIC SURGERY

## 2020-01-07 PROCEDURE — 99232 SBSQ HOSP IP/OBS MODERATE 35: CPT | Performed by: HOSPITALIST

## 2020-01-07 DEVICE — FEMUR SPHERE CEMENTED LEFT, SIZE 3+
Type: IMPLANTABLE DEVICE | Site: KNEE | Status: FUNCTIONAL
Brand: GMK SPHERE TOTAL KNEE SYSTEM

## 2020-01-07 DEVICE — RESURFACING PATELLA SIZE 2
Type: IMPLANTABLE DEVICE | Site: KNEE | Status: FUNCTIONAL
Brand: GMK PRIMARY TOTAL KNEE SYSTEM

## 2020-01-07 DEVICE — GMK SPHERE KNEE: Type: IMPLANTABLE DEVICE | Status: FUNCTIONAL

## 2020-01-07 DEVICE — FIXED TIBIAL TRAY CEMENTED LEFT, SIZE 2
Type: IMPLANTABLE DEVICE | Site: KNEE | Status: FUNCTIONAL
Brand: GMK PRIMARY TOTAL KNEE SYSTEM

## 2020-01-07 DEVICE — TIBIAL INSERT FIXED SPHERE FLEX #2/12 MM L
Type: IMPLANTABLE DEVICE | Site: KNEE | Status: FUNCTIONAL
Brand: GMK SPHERE TOTAL KNEE SYSTEM

## 2020-01-07 DEVICE — REFOBACIN BC R 1X40 US: Type: IMPLANTABLE DEVICE | Site: KNEE | Status: FUNCTIONAL

## 2020-01-07 RX ORDER — CELECOXIB 200 MG/1
400 CAPSULE ORAL ONCE
Status: COMPLETED | OUTPATIENT
Start: 2020-01-07 | End: 2020-01-07

## 2020-01-07 RX ORDER — GABAPENTIN 300 MG/1
300 CAPSULE ORAL NIGHTLY
Status: DISCONTINUED | OUTPATIENT
Start: 2020-01-07 | End: 2020-01-10

## 2020-01-07 RX ORDER — SODIUM PHOSPHATE, DIBASIC AND SODIUM PHOSPHATE, MONOBASIC 7; 19 G/133ML; G/133ML
1 ENEMA RECTAL ONCE AS NEEDED
Status: DISCONTINUED | OUTPATIENT
Start: 2020-01-07 | End: 2020-01-10

## 2020-01-07 RX ORDER — CEFAZOLIN SODIUM/WATER 2 G/20 ML
2 SYRINGE (ML) INTRAVENOUS ONCE
Status: COMPLETED | OUTPATIENT
Start: 2020-01-07 | End: 2020-01-07

## 2020-01-07 RX ORDER — MELATONIN
325
Status: DISCONTINUED | OUTPATIENT
Start: 2020-01-08 | End: 2020-01-10

## 2020-01-07 RX ORDER — FAMOTIDINE 10 MG/ML
20 INJECTION, SOLUTION INTRAVENOUS 2 TIMES DAILY
Status: DISCONTINUED | OUTPATIENT
Start: 2020-01-07 | End: 2020-01-10

## 2020-01-07 RX ORDER — GLYCOPYRROLATE 0.2 MG/ML
INJECTION, SOLUTION INTRAMUSCULAR; INTRAVENOUS AS NEEDED
Status: DISCONTINUED | OUTPATIENT
Start: 2020-01-07 | End: 2020-01-07 | Stop reason: SURG

## 2020-01-07 RX ORDER — DIPHENHYDRAMINE HYDROCHLORIDE 50 MG/ML
12.5 INJECTION INTRAMUSCULAR; INTRAVENOUS EVERY 4 HOURS PRN
Status: DISCONTINUED | OUTPATIENT
Start: 2020-01-07 | End: 2020-01-10

## 2020-01-07 RX ORDER — MORPHINE SULFATE 2 MG/ML
2 INJECTION, SOLUTION INTRAMUSCULAR; INTRAVENOUS EVERY 2 HOUR PRN
Status: DISCONTINUED | OUTPATIENT
Start: 2020-01-07 | End: 2020-01-10

## 2020-01-07 RX ORDER — LIDOCAINE HYDROCHLORIDE 20 MG/ML
INJECTION, SOLUTION EPIDURAL; INFILTRATION; INTRACAUDAL; PERINEURAL AS NEEDED
Status: DISCONTINUED | OUTPATIENT
Start: 2020-01-07 | End: 2020-01-07 | Stop reason: SURG

## 2020-01-07 RX ORDER — ALBUTEROL SULFATE 90 UG/1
2 AEROSOL, METERED RESPIRATORY (INHALATION) EVERY 6 HOURS PRN
Status: DISCONTINUED | OUTPATIENT
Start: 2020-01-07 | End: 2020-01-10

## 2020-01-07 RX ORDER — SODIUM CHLORIDE, SODIUM LACTATE, POTASSIUM CHLORIDE, CALCIUM CHLORIDE 600; 310; 30; 20 MG/100ML; MG/100ML; MG/100ML; MG/100ML
INJECTION, SOLUTION INTRAVENOUS CONTINUOUS
Status: DISCONTINUED | OUTPATIENT
Start: 2020-01-07 | End: 2020-01-10

## 2020-01-07 RX ORDER — MONTELUKAST SODIUM 10 MG/1
10 TABLET ORAL
Status: DISCONTINUED | OUTPATIENT
Start: 2020-01-08 | End: 2020-01-10

## 2020-01-07 RX ORDER — DIPHENHYDRAMINE HYDROCHLORIDE 50 MG/ML
25 INJECTION INTRAMUSCULAR; INTRAVENOUS ONCE AS NEEDED
Status: ACTIVE | OUTPATIENT
Start: 2020-01-07 | End: 2020-01-07

## 2020-01-07 RX ORDER — MIDAZOLAM HYDROCHLORIDE 1 MG/ML
INJECTION INTRAMUSCULAR; INTRAVENOUS AS NEEDED
Status: DISCONTINUED | OUTPATIENT
Start: 2020-01-07 | End: 2020-01-07 | Stop reason: SURG

## 2020-01-07 RX ORDER — HYDROMORPHONE HYDROCHLORIDE 1 MG/ML
0.2 INJECTION, SOLUTION INTRAMUSCULAR; INTRAVENOUS; SUBCUTANEOUS EVERY 5 MIN PRN
Status: DISCONTINUED | OUTPATIENT
Start: 2020-01-07 | End: 2020-01-07 | Stop reason: HOSPADM

## 2020-01-07 RX ORDER — MORPHINE SULFATE 4 MG/ML
4 INJECTION, SOLUTION INTRAMUSCULAR; INTRAVENOUS EVERY 10 MIN PRN
Status: DISCONTINUED | OUTPATIENT
Start: 2020-01-07 | End: 2020-01-07 | Stop reason: HOSPADM

## 2020-01-07 RX ORDER — HYDROCODONE BITARTRATE AND ACETAMINOPHEN 5; 325 MG/1; MG/1
2 TABLET ORAL EVERY 4 HOURS PRN
Status: DISCONTINUED | OUTPATIENT
Start: 2020-01-07 | End: 2020-01-10

## 2020-01-07 RX ORDER — POLYETHYLENE GLYCOL 3350 17 G/17G
17 POWDER, FOR SOLUTION ORAL DAILY PRN
Status: DISCONTINUED | OUTPATIENT
Start: 2020-01-07 | End: 2020-01-10

## 2020-01-07 RX ORDER — CELECOXIB 200 MG/1
200 CAPSULE ORAL EVERY 12 HOURS SCHEDULED
Status: DISCONTINUED | OUTPATIENT
Start: 2020-01-07 | End: 2020-01-10

## 2020-01-07 RX ORDER — ONDANSETRON 2 MG/ML
4 INJECTION INTRAMUSCULAR; INTRAVENOUS EVERY 4 HOURS PRN
Status: DISPENSED | OUTPATIENT
Start: 2020-01-07 | End: 2020-01-09

## 2020-01-07 RX ORDER — ACETAMINOPHEN 500 MG
1000 TABLET ORAL ONCE
Status: DISCONTINUED | OUTPATIENT
Start: 2020-01-07 | End: 2020-01-07

## 2020-01-07 RX ORDER — ROSUVASTATIN CALCIUM 20 MG/1
20 TABLET, COATED ORAL
Status: DISCONTINUED | OUTPATIENT
Start: 2020-01-08 | End: 2020-01-10

## 2020-01-07 RX ORDER — BUPIVACAINE HYDROCHLORIDE 7.5 MG/ML
INJECTION, SOLUTION INTRASPINAL AS NEEDED
Status: DISCONTINUED | OUTPATIENT
Start: 2020-01-07 | End: 2020-01-07 | Stop reason: SURG

## 2020-01-07 RX ORDER — MORPHINE SULFATE 1 MG/ML
INJECTION, SOLUTION EPIDURAL; INTRATHECAL; INTRAVENOUS AS NEEDED
Status: DISCONTINUED | OUTPATIENT
Start: 2020-01-07 | End: 2020-01-07 | Stop reason: SURG

## 2020-01-07 RX ORDER — SODIUM CHLORIDE 0.9 % (FLUSH) 0.9 %
10 SYRINGE (ML) INJECTION AS NEEDED
Status: DISCONTINUED | OUTPATIENT
Start: 2020-01-07 | End: 2020-01-10

## 2020-01-07 RX ORDER — HYDROCODONE BITARTRATE AND ACETAMINOPHEN 5; 325 MG/1; MG/1
2 TABLET ORAL AS NEEDED
Status: DISCONTINUED | OUTPATIENT
Start: 2020-01-07 | End: 2020-01-07 | Stop reason: HOSPADM

## 2020-01-07 RX ORDER — DEXTROSE, SODIUM CHLORIDE, AND POTASSIUM CHLORIDE 5; .45; .15 G/100ML; G/100ML; G/100ML
INJECTION INTRAVENOUS CONTINUOUS
Status: DISCONTINUED | OUTPATIENT
Start: 2020-01-07 | End: 2020-01-10

## 2020-01-07 RX ORDER — HYDROMORPHONE HYDROCHLORIDE 1 MG/ML
0.4 INJECTION, SOLUTION INTRAMUSCULAR; INTRAVENOUS; SUBCUTANEOUS EVERY 5 MIN PRN
Status: DISCONTINUED | OUTPATIENT
Start: 2020-01-07 | End: 2020-01-07 | Stop reason: HOSPADM

## 2020-01-07 RX ORDER — ACETAMINOPHEN 325 MG/1
650 TABLET ORAL EVERY 4 HOURS PRN
Status: DISCONTINUED | OUTPATIENT
Start: 2020-01-07 | End: 2020-01-10

## 2020-01-07 RX ORDER — MORPHINE SULFATE 4 MG/ML
4 INJECTION, SOLUTION INTRAMUSCULAR; INTRAVENOUS EVERY 2 HOUR PRN
Status: DISCONTINUED | OUTPATIENT
Start: 2020-01-07 | End: 2020-01-10

## 2020-01-07 RX ORDER — ASPIRIN 325 MG
325 TABLET ORAL 2 TIMES DAILY
Status: DISCONTINUED | OUTPATIENT
Start: 2020-01-07 | End: 2020-01-10

## 2020-01-07 RX ORDER — HYDROCODONE BITARTRATE AND ACETAMINOPHEN 5; 325 MG/1; MG/1
1 TABLET ORAL EVERY 4 HOURS PRN
Status: DISCONTINUED | OUTPATIENT
Start: 2020-01-07 | End: 2020-01-10

## 2020-01-07 RX ORDER — PROCHLORPERAZINE EDISYLATE 5 MG/ML
10 INJECTION INTRAMUSCULAR; INTRAVENOUS EVERY 6 HOURS PRN
Status: ACTIVE | OUTPATIENT
Start: 2020-01-07 | End: 2020-01-09

## 2020-01-07 RX ORDER — METOCLOPRAMIDE HYDROCHLORIDE 5 MG/ML
10 INJECTION INTRAMUSCULAR; INTRAVENOUS EVERY 6 HOURS PRN
Status: DISPENSED | OUTPATIENT
Start: 2020-01-07 | End: 2020-01-09

## 2020-01-07 RX ORDER — DEXAMETHASONE SODIUM PHOSPHATE 4 MG/ML
VIAL (ML) INJECTION AS NEEDED
Status: DISCONTINUED | OUTPATIENT
Start: 2020-01-07 | End: 2020-01-07 | Stop reason: SURG

## 2020-01-07 RX ORDER — BUPIVACAINE HYDROCHLORIDE 5 MG/ML
INJECTION, SOLUTION EPIDURAL; INTRACAUDAL AS NEEDED
Status: DISCONTINUED | OUTPATIENT
Start: 2020-01-07 | End: 2020-01-07 | Stop reason: HOSPADM

## 2020-01-07 RX ORDER — NALOXONE HYDROCHLORIDE 0.4 MG/ML
80 INJECTION, SOLUTION INTRAMUSCULAR; INTRAVENOUS; SUBCUTANEOUS AS NEEDED
Status: DISCONTINUED | OUTPATIENT
Start: 2020-01-07 | End: 2020-01-07 | Stop reason: HOSPADM

## 2020-01-07 RX ORDER — PROCHLORPERAZINE EDISYLATE 5 MG/ML
5 INJECTION INTRAMUSCULAR; INTRAVENOUS ONCE AS NEEDED
Status: DISCONTINUED | OUTPATIENT
Start: 2020-01-07 | End: 2020-01-07 | Stop reason: HOSPADM

## 2020-01-07 RX ORDER — GABAPENTIN 600 MG/1
600 TABLET ORAL 2 TIMES DAILY
Status: DISCONTINUED | OUTPATIENT
Start: 2020-01-07 | End: 2020-01-10

## 2020-01-07 RX ORDER — BISACODYL 10 MG
10 SUPPOSITORY, RECTAL RECTAL
Status: DISCONTINUED | OUTPATIENT
Start: 2020-01-07 | End: 2020-01-10

## 2020-01-07 RX ORDER — MAGNESIUM HYDROXIDE 1200 MG/15ML
LIQUID ORAL CONTINUOUS PRN
Status: COMPLETED | OUTPATIENT
Start: 2020-01-07 | End: 2020-01-07

## 2020-01-07 RX ORDER — MORPHINE SULFATE 10 MG/ML
6 INJECTION, SOLUTION INTRAMUSCULAR; INTRAVENOUS EVERY 10 MIN PRN
Status: DISCONTINUED | OUTPATIENT
Start: 2020-01-07 | End: 2020-01-07 | Stop reason: HOSPADM

## 2020-01-07 RX ORDER — ONDANSETRON 2 MG/ML
INJECTION INTRAMUSCULAR; INTRAVENOUS AS NEEDED
Status: DISCONTINUED | OUTPATIENT
Start: 2020-01-07 | End: 2020-01-07 | Stop reason: SURG

## 2020-01-07 RX ORDER — FAMOTIDINE 20 MG/1
20 TABLET ORAL 2 TIMES DAILY
Status: DISCONTINUED | OUTPATIENT
Start: 2020-01-07 | End: 2020-01-10

## 2020-01-07 RX ORDER — ACETAMINOPHEN 500 MG
1000 TABLET ORAL ONCE
Status: COMPLETED | OUTPATIENT
Start: 2020-01-07 | End: 2020-01-07

## 2020-01-07 RX ORDER — SENNOSIDES 8.6 MG
17.2 TABLET ORAL NIGHTLY
Status: DISCONTINUED | OUTPATIENT
Start: 2020-01-07 | End: 2020-01-10

## 2020-01-07 RX ORDER — SODIUM CHLORIDE, SODIUM LACTATE, POTASSIUM CHLORIDE, CALCIUM CHLORIDE 600; 310; 30; 20 MG/100ML; MG/100ML; MG/100ML; MG/100ML
INJECTION, SOLUTION INTRAVENOUS CONTINUOUS
Status: DISCONTINUED | OUTPATIENT
Start: 2020-01-07 | End: 2020-01-07 | Stop reason: HOSPADM

## 2020-01-07 RX ORDER — MORPHINE SULFATE 2 MG/ML
1 INJECTION, SOLUTION INTRAMUSCULAR; INTRAVENOUS EVERY 2 HOUR PRN
Status: DISCONTINUED | OUTPATIENT
Start: 2020-01-07 | End: 2020-01-10

## 2020-01-07 RX ORDER — HYDROMORPHONE HYDROCHLORIDE 1 MG/ML
0.6 INJECTION, SOLUTION INTRAMUSCULAR; INTRAVENOUS; SUBCUTANEOUS EVERY 5 MIN PRN
Status: DISCONTINUED | OUTPATIENT
Start: 2020-01-07 | End: 2020-01-07 | Stop reason: HOSPADM

## 2020-01-07 RX ORDER — DIPHENHYDRAMINE HCL 25 MG
25 CAPSULE ORAL EVERY 4 HOURS PRN
Status: DISCONTINUED | OUTPATIENT
Start: 2020-01-07 | End: 2020-01-10

## 2020-01-07 RX ORDER — SERTRALINE HYDROCHLORIDE 100 MG/1
100 TABLET, FILM COATED ORAL DAILY
Status: DISCONTINUED | OUTPATIENT
Start: 2020-01-08 | End: 2020-01-10

## 2020-01-07 RX ORDER — DOCUSATE SODIUM 100 MG/1
100 CAPSULE, LIQUID FILLED ORAL 2 TIMES DAILY
Status: DISCONTINUED | OUTPATIENT
Start: 2020-01-07 | End: 2020-01-10

## 2020-01-07 RX ORDER — GABAPENTIN 600 MG/1
600 TABLET ORAL ONCE
Status: COMPLETED | OUTPATIENT
Start: 2020-01-07 | End: 2020-01-07

## 2020-01-07 RX ORDER — HALOPERIDOL 5 MG/ML
0.25 INJECTION INTRAMUSCULAR ONCE AS NEEDED
Status: DISCONTINUED | OUTPATIENT
Start: 2020-01-07 | End: 2020-01-07 | Stop reason: HOSPADM

## 2020-01-07 RX ORDER — FAMOTIDINE 20 MG/1
20 TABLET ORAL ONCE
Status: COMPLETED | OUTPATIENT
Start: 2020-01-07 | End: 2020-01-07

## 2020-01-07 RX ORDER — HYDROCODONE BITARTRATE AND ACETAMINOPHEN 5; 325 MG/1; MG/1
1 TABLET ORAL AS NEEDED
Status: DISCONTINUED | OUTPATIENT
Start: 2020-01-07 | End: 2020-01-07 | Stop reason: HOSPADM

## 2020-01-07 RX ORDER — TRANEXAMIC ACID 10 MG/ML
1000 INJECTION, SOLUTION INTRAVENOUS ONCE
Status: COMPLETED | OUTPATIENT
Start: 2020-01-07 | End: 2020-01-07

## 2020-01-07 RX ORDER — SPIRONOLACTONE 25 MG/1
25 TABLET ORAL DAILY
Status: DISCONTINUED | OUTPATIENT
Start: 2020-01-08 | End: 2020-01-10

## 2020-01-07 RX ORDER — MORPHINE SULFATE 4 MG/ML
2 INJECTION, SOLUTION INTRAMUSCULAR; INTRAVENOUS EVERY 10 MIN PRN
Status: DISCONTINUED | OUTPATIENT
Start: 2020-01-07 | End: 2020-01-07 | Stop reason: HOSPADM

## 2020-01-07 RX ORDER — TELMISARTAN AND HYDROCHLORTHIAZIDE 80; 25 MG/1; MG/1
1 TABLET ORAL DAILY
Status: DISCONTINUED | OUTPATIENT
Start: 2020-01-08 | End: 2020-01-07

## 2020-01-07 RX ORDER — ONDANSETRON 2 MG/ML
4 INJECTION INTRAMUSCULAR; INTRAVENOUS ONCE AS NEEDED
Status: DISCONTINUED | OUTPATIENT
Start: 2020-01-07 | End: 2020-01-07 | Stop reason: HOSPADM

## 2020-01-07 RX ADMIN — MORPHINE SULFATE 0.3 MG: 1 INJECTION, SOLUTION EPIDURAL; INTRATHECAL; INTRAVENOUS at 13:02:00

## 2020-01-07 RX ADMIN — BUPIVACAINE HYDROCHLORIDE 1.6 ML: 7.5 INJECTION, SOLUTION INTRASPINAL at 13:02:00

## 2020-01-07 RX ADMIN — MIDAZOLAM HYDROCHLORIDE 1 MG: 1 INJECTION INTRAMUSCULAR; INTRAVENOUS at 12:57:00

## 2020-01-07 RX ADMIN — GLYCOPYRROLATE 0.2 MG: 0.2 INJECTION, SOLUTION INTRAMUSCULAR; INTRAVENOUS at 12:57:00

## 2020-01-07 RX ADMIN — DEXAMETHASONE SODIUM PHOSPHATE 4 MG: 4 MG/ML VIAL (ML) INJECTION at 12:57:00

## 2020-01-07 RX ADMIN — MIDAZOLAM HYDROCHLORIDE 1 MG: 1 INJECTION INTRAMUSCULAR; INTRAVENOUS at 12:54:00

## 2020-01-07 RX ADMIN — TRANEXAMIC ACID 1000 MG: 10 INJECTION, SOLUTION INTRAVENOUS at 13:19:00

## 2020-01-07 RX ADMIN — ONDANSETRON 4 MG: 2 INJECTION INTRAMUSCULAR; INTRAVENOUS at 12:57:00

## 2020-01-07 RX ADMIN — LIDOCAINE HYDROCHLORIDE 20 MG: 20 INJECTION, SOLUTION EPIDURAL; INFILTRATION; INTRACAUDAL; PERINEURAL at 13:10:00

## 2020-01-07 RX ADMIN — SODIUM CHLORIDE, SODIUM LACTATE, POTASSIUM CHLORIDE, CALCIUM CHLORIDE: 600; 310; 30; 20 INJECTION, SOLUTION INTRAVENOUS at 14:25:00

## 2020-01-07 RX ADMIN — CEFAZOLIN SODIUM/WATER 2 G: 2 G/20 ML SYRINGE (ML) INTRAVENOUS at 13:03:00

## 2020-01-07 NOTE — ANESTHESIA PROCEDURE NOTES
Spinal Block  Performed by: Andrey Holland CRNA  Authorized by: Magda Arriola MD       General Information and Staff    Start Time:  1/7/2020 12:56 PM  End Time:  1/7/2020 1:02 PM  Anesthesiologist:  Magda Arriola MD  CRNA:  Andrey Holland CRNA

## 2020-01-07 NOTE — ANESTHESIA POSTPROCEDURE EVALUATION
Patient: Sid Plater    Procedure Summary     Date:  01/07/20 Room / Location:  [facility] OR  / [facility] OR    Anesthesia Start:  5985 Anesthesia Stop:      Procedure:  KNEE TOTAL REPLACEMENT (Left Knee) Diagnosis:  (osteoarthritis lef knee)    Mere

## 2020-01-07 NOTE — ANESTHESIA PREPROCEDURE EVALUATION
Anesthesia PreOp Note    HPI:     Sima Martinez is a 69year old female who presents for preoperative consultation requested by: Adelfo Malave MD    Date of Surgery: 1/7/2020    Procedure(s):  KNEE TOTAL REPLACEMENT  Indication: osteoarthrit Atherosclerosis         Date Noted: 07/18/2014      Hyperglycemia         Date Noted: 07/18/2014      Low back pain         Date Noted: 07/18/2014      Anxiety state         Date Noted: 08/06/2013      Senile cataract         Date Noted: 08/09/2011 MONTELUKAST SODIUM 10 MG Oral Tab, Take 1 tablet (10 mg total) by mouth once daily. , Disp: 90 tablet, Rfl: 1, 1/6/2020 at 0900  spironolactone 25 MG Oral Tab, Take 1 tablet (25 mg total) by mouth daily. , Disp: 90 tablet, Rfl: 0, 1/6/2020 at 0900  Telmisart No Family h/o Glaucoma   • Macular degeneration Neg         No Family h/o Macular degeneration     Social History    Socioeconomic History      Marital status:        Spouse name: Not on file      Number of children: Not on file      Years of ed Weight Concern: Not Asked        Special Diet: Not Asked        Back Care: Not Asked        Exercise: No        Bike Helmet: Not Asked        Seat Belt: Not Asked        Self-Exams: Not Asked        Grew up on a farm: Not Asked        History of tann (+)  neuromuscular disease, anxiety/panic attacks,        GI/Hepatic/Renal - negative ROS     Endo/Other - negative ROS   Abdominal                Anesthesia Plan:   ASA:  3  Plan:   Spinal and MAC  Informed Consent Plan and Risks Discussed With:  Patient

## 2020-01-07 NOTE — BRIEF OP NOTE
Pre-Operative Diagnosis: osteoarthritis lef knee     Post-Operative Diagnosis: osteoarthritis lef knee      Procedure Performed:   Procedure(s):  left total knee arthroplasty    Surgeon(s) and Role:     * April Simpson MD - Primary    Assistant(s

## 2020-01-07 NOTE — CM/SW NOTE
SW received MDO for discharge planning. PT/OT to eval. Tentative referral made to [facility] per chart review and pre op referrals. SW will open pt tomorrow to complete full assessment and send PT/OT notes once availible.  SW/CM to remain availab

## 2020-01-07 NOTE — PLAN OF CARE
Problem: PAIN - ADULT  Goal: Verbalizes/displays adequate comfort level or patient's stated pain goal  Description  INTERVENTIONS:  - Encourage pt to monitor pain and request assistance  - Assess pain using appropriate pain scale  - Administer analgesics patient's ability to be responsible for managing their own health  - Refer to Case Management Department for coordinating discharge planning if the patient needs post-hospital services based on physician/LIP order or complex needs related to functional sta highest/safest level of mobility/gait  Description  Interventions:  - Assess patient's functional ability and stability  - Promote increasing activity/tolerance for mobility and gait  - Educate and engage patient/family in tolerated activity level and prec

## 2020-01-07 NOTE — PROGRESS NOTES
Per P&T policy, telmisartan 63TZ/QEBU 25 mg po daily has been substituted for losartan 100mg/HCTZ 25mg po ana Silver

## 2020-01-07 NOTE — INTERVAL H&P NOTE
Pre-op Diagnosis: osteoarthritis lef knee    The above referenced H&P was reviewed by Mao Layne MD on 1/7/2020, the patient was examined and no significant changes have occurred in the patient's condition since the H&P was performed.   I discus

## 2020-01-07 NOTE — PROGRESS NOTES
[facility] HOSP - [facility]    Progress Note    Bhumi Concepcion Patient Status:  Surgery Admit - Inpt    1945 MRN [id number]   Location [facility] UNIT Attending Govind Jackson MD   Hosp Day # 0 PCP Bernie Johnson, HOME MEDS. Essential hypertension  CONT HOME MEDS, MONITOR.              Results:     Lab Results   Component Value Date    WBC 7.6 12/12/2019    HGB 11.3 (L) 12/12/2019    HCT 35.6 12/12/2019    .0 12/12/2019    CREATSERUM 0.75 12/12/2019    B

## 2020-01-08 LAB
DEPRECATED RDW RBC AUTO: 47.8 FL (ref 35.1–46.3)
ERYTHROCYTE [DISTWIDTH] IN BLOOD BY AUTOMATED COUNT: 13.3 % (ref 11–15)
HCT VFR BLD AUTO: 33 % (ref 35–48)
HGB BLD-MCNC: 10.3 G/DL (ref 12–16)
MCH RBC QN AUTO: 30.7 PG (ref 26–34)
MCHC RBC AUTO-ENTMCNC: 31.2 G/DL (ref 31–37)
MCV RBC AUTO: 98.5 FL (ref 80–100)
PLATELET # BLD AUTO: 245 10(3)UL (ref 150–450)
RBC # BLD AUTO: 3.35 X10(6)UL (ref 3.8–5.3)
WBC # BLD AUTO: 10.2 X10(3) UL (ref 4–11)

## 2020-01-08 PROCEDURE — 99232 SBSQ HOSP IP/OBS MODERATE 35: CPT | Performed by: HOSPITALIST

## 2020-01-08 RX ORDER — MELATONIN
325
Qty: 20 TABLET | Refills: 0 | Status: SHIPPED | OUTPATIENT
Start: 2020-01-09 | End: 2021-03-18

## 2020-01-08 RX ORDER — HYDROCODONE BITARTRATE AND ACETAMINOPHEN 5; 325 MG/1; MG/1
1 TABLET ORAL EVERY 4 HOURS PRN
Qty: 50 TABLET | Refills: 0 | Status: SHIPPED | OUTPATIENT
Start: 2020-01-08 | End: 2020-05-07

## 2020-01-08 RX ORDER — ASPIRIN 325 MG
325 TABLET ORAL 2 TIMES DAILY
Qty: 60 TABLET | Refills: 0 | Status: SHIPPED | OUTPATIENT
Start: 2020-01-08

## 2020-01-08 RX ORDER — CELECOXIB 200 MG/1
200 CAPSULE ORAL EVERY 12 HOURS SCHEDULED
Qty: 60 CAPSULE | Refills: 0 | Status: SHIPPED | OUTPATIENT
Start: 2020-01-08 | End: 2020-03-13

## 2020-01-08 RX ORDER — PSEUDOEPHEDRINE HCL 30 MG
100 TABLET ORAL 2 TIMES DAILY
Qty: 20 CAPSULE | Refills: 0 | Status: SHIPPED | OUTPATIENT
Start: 2020-01-08 | End: 2021-01-15 | Stop reason: ALTCHOICE

## 2020-01-08 NOTE — PHYSICAL THERAPY NOTE
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 SUBJECTIVE  I feel pretty good walking I do not have any pain right now but I know when the surgical medical wears off that may change.  I think I will need NIRMAL I lives alone and CJ    FUNCTIONAL ABILITY STATUS  Gait Assessment   Gait Assistance: Supervision  Distance (ft): 250  Assistive Device: Rolling walker  Pattern: L Decreased stance time(decreased step length and heel strike)  Stoop/Curb Assistance: Not tested  Comment : Up

## 2020-01-08 NOTE — PHYSICAL THERAPY NOTE
PT PM session: Pt education with bed mobility and therex in bed as per written TKR handouts x 10 reps each. Pt education with gait progression ambulating 250' with RW with a step through gait pattern with SBA.  Pt denies dizziness and reports pain 3/10 with

## 2020-01-08 NOTE — PROGRESS NOTES
[address] [facility] Patient Status:  Inpatient    1945 MRN [id number]   Location [facility] 4W/SW/SE Attending Marnie Chou MD   Hosp Day # 1 PCP Tarik Garcia DO     Subjective:  Post-Operative Day: 1 Status Post l

## 2020-01-08 NOTE — OCCUPATIONAL THERAPY NOTE
OCCUPATIONAL THERAPY EVALUATION - INPATIENT     Room Number: 415/415-A  Evaluation Date: 1/8/2020  Type of Evaluation: Initial  Presenting Problem: (L TKA )    Physician Order: IP Consult to Occupational Therapy  Reason for Therapy: ADL/IADL Dysfunction an state    • Arthritis    • Back problem     Hx herniated discs   • Calculus of kidney    • Deaf     Left ear   • Diverticulosis    • Hearing loss    • High blood pressure    • High cholesterol    • History of shingles     1980's   • Meniere disease    • Aaliyah Hernandez hip surgery too. \"     OCCUPATIONAL THERAPY EXAMINATION      OBJECTIVE  Precautions: Limb alert - left  Fall Risk: Standard fall risk    PAIN ASSESSMENT  Ratin          COGNITION  Overall Cognitive Status:  WFL - within functional limits    VISION  Cur patient questions and concerns addressed    OT Goals  Patients self stated goal is: Go to rehab and recover from surgery      Patient will complete functional transfer with modified independent with RW.    Comment:     Patient will complete toileting with m

## 2020-01-08 NOTE — PROGRESS NOTES
[facility]    Progress Note    Reginaldo Epps Patient Status:  Surgery Admit - Inpt    1945 MRN [id number]   Location [facility] UNIT Attending [place] MD   [address] Day # 1 PCP Andrea Mak PROPHYLAXIS  TOLERATING PT/OT. Mixed hyperlipidemia  CONT HOME MEDS. Essential hypertension  CONT HOME MEDS, MONITOR.              Results:     Lab Results   Component Value Date    WBC 10.2 01/08/2020    HGB 10.3 (L) 01/08/2020    HCT 33.0 (L)

## 2020-01-08 NOTE — PLAN OF CARE
Problem: Patient Centered Care  Goal: Patient preferences are identified and integrated in the patient's plan of care  Description  Interventions:  - What would you like us to know as we care for you?   - Provide timely, complete, and accurate informatio Progressing     Problem: DISCHARGE PLANNING  Goal: Discharge to home or other facility with appropriate resources  Description  INTERVENTIONS:  - Identify barriers to discharge w/pt and caregiver  - Include patient/family/discharge partner in discharge michael ADULT  Goal: Return mobility to safest level of function  Description  INTERVENTIONS:  - Assess patient stability and activity tolerance for standing, transferring and ambulating w/ or w/o assistive devices  - Assist with transfers and ambulation using saf

## 2020-01-08 NOTE — CM/SW NOTE
TRA received MDO for s/p joint. SW to discuss discharge planning with pt. TRA met with pt at bedside to complete initial assessment. SW confirmed pt's address. Pt lives in a 1 level condo w/ elevators alone.  There is/are 0  steps into the home and 0  steps t

## 2020-01-09 LAB
DEPRECATED RDW RBC AUTO: 49.7 FL (ref 35.1–46.3)
ERYTHROCYTE [DISTWIDTH] IN BLOOD BY AUTOMATED COUNT: 13.7 % (ref 11–15)
HCT VFR BLD AUTO: 29.5 % (ref 35–48)
HGB BLD-MCNC: 9.4 G/DL (ref 12–16)
MCH RBC QN AUTO: 31.3 PG (ref 26–34)
MCHC RBC AUTO-ENTMCNC: 31.9 G/DL (ref 31–37)
MCV RBC AUTO: 98.3 FL (ref 80–100)
PLATELET # BLD AUTO: 185 10(3)UL (ref 150–450)
RBC # BLD AUTO: 3 X10(6)UL (ref 3.8–5.3)
WBC # BLD AUTO: 6.4 X10(3) UL (ref 4–11)

## 2020-01-09 PROCEDURE — 99232 SBSQ HOSP IP/OBS MODERATE 35: CPT | Performed by: HOSPITALIST

## 2020-01-09 RX ORDER — FLUTICASONE PROPIONATE 50 MCG
1 SPRAY, SUSPENSION (ML) NASAL DAILY
Status: DISCONTINUED | OUTPATIENT
Start: 2020-01-09 | End: 2020-01-10

## 2020-01-09 NOTE — PROGRESS NOTES
[facility] - [facility]    Progress Note    Luis M Fuentes Patient Status:  Inpatient    1945 MRN [id number]   Location [facility] 4W/SW/SE Attending Kenia Bowling MD   [address] Day # 2 PCP Luis Whitten DO       Subjective:   Kate Waddell (CST):  Sima Lobo MD on 1/07/2020 at 15:40                  [address] Mishel Prasad PA-C  1/9/2020

## 2020-01-09 NOTE — PHYSICAL THERAPY NOTE
PHYSICAL THERAPY KNEE TREATMENT NOTE - INPATIENT     Room Number: 919/471-S             Presenting Problem: L TKR    Problem List  Principal Problem:    Primary osteoarthritis of left knee  Active Problems:    Mixed hyperlipidemia    Essential hypertension Activity promotion;Relaxation;Repositioning(received pain meds prior to session)    BALANCE  Static Sitting: Good  Dynamic Sitting: Fair +  Static Standing: Fair  Dynamic Standing: Fair -        AM-PAC '6-Clicks' INPATIENT SHORT FORM - BASIC MOBILITY  How independent     Goal #1   Current Status SBA   Goal #2 Patient is able to demonstrate transfers Sit to/from Stand at assistance level: modified independent     Goal #2  Current Status SBA/supervision   Goal #3 Patient is able to ambulate 300 feet with assi

## 2020-01-09 NOTE — PLAN OF CARE
Patient alert and oriented. Vitals stable. Room air. SCD to R leg. Voiding. Up with one and a walker. Dressing C/D/I. CPM at night; patient tolerated well. Pain controlled. Plan for patient to discharge to [address] [place] [address]. Will continue to monitor. call for assistance with activity based on assessment  - Modify environment to reduce risk of injury  - Provide assistive devices as appropriate  - Consider OT/PT consult to assist with strengthening/mobility  - Encourage toileting schedule  Outcome: Progr dressing/incision, wound bed, drain sites and surrounding tissue  - Implement wound care per orders  - Initiate isolation precautions as appropriate  - Initiate Pressure Ulcer prevention bundle as indicated  Outcome: Progressing     Problem: Santa Martinez

## 2020-01-09 NOTE — PROGRESS NOTES
[facility] HOSP - [facility]    Progress Note    Carol Cruz Patient Status:  Surgery Admit - Inpt    1945 MRN [id number]   Location [facility] UNIT Attending Pj Gardner MD   [address] Day # 2 PCP Bridget Lee PROPHYLAXIS  TOLERATING PT/OT. Mixed hyperlipidemia  CONT HOME MEDS. Essential hypertension  CONT HOME MEDS, MONITOR.      DISPO: SNF IN AM          Results:     Lab Results   Component Value Date    WBC 6.4 01/09/2020    HGB 9.4 (L) 01/09/2020

## 2020-01-09 NOTE — PLAN OF CARE
Problem: PAIN - ADULT  Goal: Verbalizes/displays adequate comfort level or patient's stated pain goal  Description  INTERVENTIONS:  - Encourage pt to monitor pain and request assistance  - Assess pain using appropriate pain scale  - Administer analgesics sites and surrounding tissue  - Implement wound care per orders  - Initiate isolation precautions as appropriate  - Initiate Pressure Ulcer prevention bundle as indicated  Outcome: Progressing    Left knee incision with silver mepilex cdi.      Problem: MUS

## 2020-01-10 VITALS
HEIGHT: 64 IN | TEMPERATURE: 98 F | OXYGEN SATURATION: 93 % | BODY MASS INDEX: 39.27 KG/M2 | WEIGHT: 230 LBS | DIASTOLIC BLOOD PRESSURE: 36 MMHG | SYSTOLIC BLOOD PRESSURE: 129 MMHG | HEART RATE: 65 BPM | RESPIRATION RATE: 18 BRPM

## 2020-01-10 LAB
DEPRECATED RDW RBC AUTO: 48.6 FL (ref 35.1–46.3)
ERYTHROCYTE [DISTWIDTH] IN BLOOD BY AUTOMATED COUNT: 13.5 % (ref 11–15)
HCT VFR BLD AUTO: 30.2 % (ref 35–48)
HGB BLD-MCNC: 9.9 G/DL (ref 12–16)
MCH RBC QN AUTO: 32 PG (ref 26–34)
MCHC RBC AUTO-ENTMCNC: 32.8 G/DL (ref 31–37)
MCV RBC AUTO: 97.7 FL (ref 80–100)
PLATELET # BLD AUTO: 206 10(3)UL (ref 150–450)
RBC # BLD AUTO: 3.09 X10(6)UL (ref 3.8–5.3)
WBC # BLD AUTO: 6.7 X10(3) UL (ref 4–11)

## 2020-01-10 PROCEDURE — 99239 HOSP IP/OBS DSCHRG MGMT >30: CPT | Performed by: HOSPITALIST

## 2020-01-10 RX ORDER — GABAPENTIN 600 MG/1
600 TABLET ORAL 2 TIMES DAILY
Qty: 60 TABLET | Refills: 0 | Status: SHIPPED | OUTPATIENT
Start: 2020-01-10 | End: 2020-01-27

## 2020-01-10 RX ORDER — FLUTICASONE PROPIONATE 50 MCG
1 SPRAY, SUSPENSION (ML) NASAL DAILY
Qty: 1 BOTTLE | Refills: 0 | Status: SHIPPED | OUTPATIENT
Start: 2020-01-11 | End: 2020-03-13

## 2020-01-10 RX ORDER — HYDROCODONE BITARTRATE AND ACETAMINOPHEN 5; 325 MG/1; MG/1
1 TABLET ORAL EVERY 4 HOURS PRN
Qty: 50 TABLET | Refills: 0 | OUTPATIENT
Start: 2020-01-10

## 2020-01-10 NOTE — CM/SW NOTE
TRA confirmed with RN that pt is medically ready for discharge today. TRA called and spoke with Robbie Davison, hospital liaison for [facility] to arrange a time for discharge. RN is aware of discharge time and location and will inform family.  TRA left  for DCSS at [id number]

## 2020-01-10 NOTE — PLAN OF CARE
Problem: PAIN - ADULT  Goal: Verbalizes/displays adequate comfort level or patient's stated pain goal  Description  INTERVENTIONS:  - Encourage pt to monitor pain and request assistance  - Assess pain using appropriate pain scale  - Administer analgesics dressing/incision, wound bed, drain sites and surrounding tissue  - Implement wound care per orders  - Initiate isolation precautions as appropriate  - Initiate Pressure Ulcer prevention bundle as indicated  Outcome: Adequate for Discharge    Left knee inc

## 2020-01-10 NOTE — PHYSICAL THERAPY NOTE
PHYSICAL THERAPY KNEE TREATMENT NOTE - INPATIENT     Room Number: 441/698-C             Presenting Problem: L TKR    Problem List  Principal Problem:    Primary osteoarthritis of left knee  Active Problems:    Mixed hyperlipidemia    Essential hypertension [address] '6-Clicks' INPATIENT SHORT FORM - BASIC MOBILITY  How much difficulty does the patient currently have. ..  -   Turning over in bed (including adjusting bedclothes, sheets and blankets)?: A Little   -   Sitting down on and maged #2  Current Status Supervision   Goal #3 Patient is able to ambulate 300 feet with assistive device at assistance level: modified independent    Goal #3   Current Status X300' with RW, supervision   Goal #4    Goal #4   Current Status    Goal #5  AROM 0 de

## 2020-01-10 NOTE — DISCHARGE SUMMARY
[facility]    Discharge Summary    Domingo Estrada Patient Status:  Inpatient    1945 MRN [id number]   Location [facility] 4W/SW/SE Attending Tiffany Chirinos MD   Hosp Day # 3 PCP Marquita Fuchs,      Date of Admiss well-nourished. HENT:   Head: Normocephalic and atraumatic. Nose: Nose normal.   Mouth/Throat: Oropharynx is clear and moist.   Eyes: Pupils are equal, round, and reactive to light. Conjunctivae and EOM are normal.   Neck: Normal range of motion.  Neck Prescription details   celecoxib 200 MG Caps  Commonly known as:  CeleBREX      Take 1 capsule (200 mg total) by mouth every 12 (twelve) hours.    Quantity:  60 capsule  Refills:  0     DM-guaiFENesin ER  MG Tb12  Commonly known as:  Addy 598 DM      T Sodium 10 MG Tabs  Commonly known as:  SINGULAIR      Take 1 tablet (10 mg total) by mouth once daily. Quantity:  90 tablet  Refills:  1     PRESERVISION AREDS Tabs      Take by mouth.    Refills:  0     Rosuvastatin Calcium 20 MG Tabs  Commonly known as:

## 2020-01-10 NOTE — PLAN OF CARE
VSS, no acute events overnight. Pt resting in bed, CPM -2 - 50, pt reported having gone up to 40 last night. Pain managed with Norco 5mg x2. Polar care in place. Disease process discussed with pt.  Bed in lowest position, call light and personal possessions indicated by assessment.  - Educate pt/family on patient safety including physical limitations  - Instruct pt to call for assistance with activity based on assessment  - Modify environment to reduce risk of injury  - Provide assistive devices as appropriat factors for pressure ulcer development  - Assess and document skin integrity  - Assess and document dressing/incision, wound bed, drain sites and surrounding tissue  - Implement wound care per orders  - Initiate isolation precautions as appropriate  - Init

## 2020-01-13 ENCOUNTER — EXTERNAL FACILITY (OUTPATIENT)
Dept: INTERNAL MEDICINE CLINIC | Facility: CLINIC | Age: 75
End: 2020-01-13

## 2020-01-13 DIAGNOSIS — I10 ESSENTIAL HYPERTENSION: Chronic | ICD-10-CM

## 2020-01-13 DIAGNOSIS — Z96.652 STATUS POST LEFT KNEE REPLACEMENT: ICD-10-CM

## 2020-01-13 DIAGNOSIS — E78.2 MIXED HYPERLIPIDEMIA: ICD-10-CM

## 2020-01-13 DIAGNOSIS — F41.1 ANXIETY STATE: ICD-10-CM

## 2020-01-13 DIAGNOSIS — G89.29 CHRONIC BILATERAL LOW BACK PAIN WITH RIGHT-SIDED SCIATICA: ICD-10-CM

## 2020-01-13 DIAGNOSIS — M17.12 PRIMARY OSTEOARTHRITIS OF LEFT KNEE: ICD-10-CM

## 2020-01-13 DIAGNOSIS — M54.41 CHRONIC BILATERAL LOW BACK PAIN WITH RIGHT-SIDED SCIATICA: ICD-10-CM

## 2020-01-13 PROCEDURE — 99306 1ST NF CARE HIGH MDM 50: CPT | Performed by: INTERNAL MEDICINE

## 2020-01-20 ENCOUNTER — OFFICE VISIT (OUTPATIENT)
Dept: FAMILY MEDICINE CLINIC | Facility: CLINIC | Age: 75
End: 2020-01-20
Payer: MEDICARE

## 2020-01-20 ENCOUNTER — HOSPITAL ENCOUNTER (OUTPATIENT)
Dept: GENERAL RADIOLOGY | Age: 75
Discharge: HOME | End: 2020-01-20
Attending: FAMILY MEDICINE | Admitting: FAMILY MEDICINE
Payer: MEDICARE

## 2020-01-20 VITALS
DIASTOLIC BLOOD PRESSURE: 72 MMHG | TEMPERATURE: 97 F | WEIGHT: 230 LBS | HEART RATE: 78 BPM | SYSTOLIC BLOOD PRESSURE: 142 MMHG | HEIGHT: 64 IN | BODY MASS INDEX: 39.27 KG/M2

## 2020-01-20 DIAGNOSIS — R09.89 CHEST CONGESTION: ICD-10-CM

## 2020-01-20 DIAGNOSIS — R05.9 COUGH: ICD-10-CM

## 2020-01-20 DIAGNOSIS — Z96.652 S/P TKR (TOTAL KNEE REPLACEMENT), LEFT: ICD-10-CM

## 2020-01-20 DIAGNOSIS — R06.2 WHEEZING: ICD-10-CM

## 2020-01-20 DIAGNOSIS — R05.9 COUGH: Primary | ICD-10-CM

## 2020-01-20 PROCEDURE — 71046 X-RAY EXAM CHEST 2 VIEWS: CPT | Performed by: FAMILY MEDICINE

## 2020-01-20 PROCEDURE — G0463 HOSPITAL OUTPT CLINIC VISIT: HCPCS | Performed by: FAMILY MEDICINE

## 2020-01-20 PROCEDURE — 99214 OFFICE O/P EST MOD 30 MIN: CPT | Performed by: FAMILY MEDICINE

## 2020-01-20 RX ORDER — AZITHROMYCIN 250 MG/1
TABLET, FILM COATED ORAL
Qty: 6 TABLET | Refills: 0 | Status: SHIPPED | OUTPATIENT
Start: 2020-01-20 | End: 2020-01-25

## 2020-01-27 RX ORDER — GABAPENTIN 600 MG/1
600 TABLET ORAL 2 TIMES DAILY
Qty: 180 TABLET | Refills: 0 | Status: SHIPPED | OUTPATIENT
Start: 2020-01-27 | End: 2020-03-13

## 2020-03-13 ENCOUNTER — OFFICE VISIT (OUTPATIENT)
Dept: FAMILY MEDICINE CLINIC | Facility: CLINIC | Age: 75
End: 2020-03-13
Payer: MEDICARE

## 2020-03-13 VITALS
WEIGHT: 238 LBS | SYSTOLIC BLOOD PRESSURE: 135 MMHG | HEIGHT: 64 IN | DIASTOLIC BLOOD PRESSURE: 62 MMHG | BODY MASS INDEX: 40.63 KG/M2 | TEMPERATURE: 97 F | HEART RATE: 67 BPM

## 2020-03-13 DIAGNOSIS — J30.89 OTHER ALLERGIC RHINITIS: ICD-10-CM

## 2020-03-13 DIAGNOSIS — I10 ESSENTIAL HYPERTENSION: ICD-10-CM

## 2020-03-13 DIAGNOSIS — Z00.00 ENCOUNTER FOR ANNUAL HEALTH EXAMINATION: ICD-10-CM

## 2020-03-13 DIAGNOSIS — Z96.641 HISTORY OF RIGHT HIP REPLACEMENT: ICD-10-CM

## 2020-03-13 DIAGNOSIS — Z96.652 S/P TKR (TOTAL KNEE REPLACEMENT), LEFT: ICD-10-CM

## 2020-03-13 DIAGNOSIS — E66.01 MORBID OBESITY DUE TO EXCESS CALORIES (HCC): ICD-10-CM

## 2020-03-13 DIAGNOSIS — I70.90 ATHEROSCLEROSIS: ICD-10-CM

## 2020-03-13 DIAGNOSIS — E78.2 MIXED HYPERLIPIDEMIA: ICD-10-CM

## 2020-03-13 DIAGNOSIS — M51.26 LUMBAR HERNIATED DISC: ICD-10-CM

## 2020-03-13 DIAGNOSIS — Z96.651 STATUS POST RIGHT KNEE REPLACEMENT: ICD-10-CM

## 2020-03-13 DIAGNOSIS — F41.1 ANXIETY STATE: ICD-10-CM

## 2020-03-13 DIAGNOSIS — J30.1 SEASONAL ALLERGIC RHINITIS DUE TO POLLEN: Primary | ICD-10-CM

## 2020-03-13 PROCEDURE — G0463 HOSPITAL OUTPT CLINIC VISIT: HCPCS | Performed by: FAMILY MEDICINE

## 2020-03-13 PROCEDURE — 99214 OFFICE O/P EST MOD 30 MIN: CPT | Performed by: FAMILY MEDICINE

## 2020-03-13 RX ORDER — MELOXICAM 15 MG/1
15 TABLET ORAL
Qty: 90 TABLET | Refills: 2 | Status: SHIPPED | OUTPATIENT
Start: 2020-03-13 | End: 2021-01-08

## 2020-03-13 RX ORDER — FLUTICASONE PROPIONATE 50 MCG
1 SPRAY, SUSPENSION (ML) NASAL DAILY
Qty: 1 BOTTLE | Refills: 2 | Status: SHIPPED | OUTPATIENT
Start: 2020-03-13 | End: 2021-07-11

## 2020-03-13 RX ORDER — SPIRONOLACTONE 25 MG/1
25 TABLET ORAL DAILY
Qty: 90 TABLET | Refills: 2 | Status: SHIPPED | OUTPATIENT
Start: 2020-03-13 | End: 2020-12-29

## 2020-03-13 RX ORDER — TELMISARTAN AND HYDROCHLORTHIAZIDE 80; 25 MG/1; MG/1
1 TABLET ORAL DAILY
Qty: 90 TABLET | Refills: 2 | Status: SHIPPED | OUTPATIENT
Start: 2020-03-13 | End: 2021-04-02

## 2020-03-13 RX ORDER — GABAPENTIN 600 MG/1
600 TABLET ORAL 2 TIMES DAILY
Qty: 180 TABLET | Refills: 2 | Status: SHIPPED | OUTPATIENT
Start: 2020-03-13 | End: 2021-01-16

## 2020-03-13 RX ORDER — MONTELUKAST SODIUM 10 MG/1
10 TABLET ORAL
Qty: 90 TABLET | Refills: 2 | Status: SHIPPED | OUTPATIENT
Start: 2020-03-13 | End: 2021-01-08

## 2020-03-13 RX ORDER — SERTRALINE HYDROCHLORIDE 100 MG/1
TABLET, FILM COATED ORAL
Qty: 90 TABLET | Refills: 2 | Status: SHIPPED | OUTPATIENT
Start: 2020-03-13 | End: 2021-02-15

## 2020-03-13 RX ORDER — ROSUVASTATIN CALCIUM 20 MG/1
20 TABLET, COATED ORAL
Qty: 90 TABLET | Refills: 2 | Status: SHIPPED | OUTPATIENT
Start: 2020-03-13 | End: 2020-12-31

## 2020-03-13 NOTE — PROGRESS NOTES
HPI:   Lesli Willis is a 69year old female who presents for a Medicare Subsequent Annual Wellness visit (Pt already had Initial Annual Wellness). Pt quit smoking in 2002. She works in an office.  She thinks her Buddhist will be canceling masses due Advanced Directive:   She does NOT have a Living Will on file in [address] [place] [address] Rd.    Advance care planning including the explanation and discussion of advance directives standard forms performed Face to Face with patient and Family/surrogate (if present), and forms respiratory tract infection     Status post total right knee replacement using cement     Primary osteoarthritis of right knee     Preop testing     Cystitis     Status post right knee replacement     Primary osteoarthritis of left knee     S/P TKR (total Anxiety state, Arthritis, Back problem, Calculus of kidney, Deaf, Diverticulosis, Hearing loss, High blood pressure, High cholesterol, History of shingles, Meniere disease, Osteoarthritis, Pneumonia due to organism, SCC (squamous cell carcinoma) (2016), Ti is 40.85 kg/m² as calculated from the following:    Height as of this encounter: 5' 4\" (1.626 m). Weight as of this encounter: 238 lb (108 kg).     Medicare Hearing Assessment  (Required for AWV/SWV)    Hearing Screening    Time taken:  3/13/2020  1:51 No thyromegaly present. Cardiovascular: Normal rate, regular rhythm and normal heart sounds. Good radial pulses. Pulmonary/Chest: Effort normal and breath sounds normal. No respiratory distress. Lymphadenopathy: Patient has no cervical adenopathy. Oral Tab; Take 1 tablet (20 mg total) by mouth once daily.  -     ASSAY, THYROID STIM HORMONE; Future    Other allergic rhinitis  Stable  L5-S1 right paracentral mild herniated disc  -     gabapentin 600 MG Oral Tab;  Take 1 tablet (600 mg total) by mouth 2 Internal Lab or Procedure External Lab or Procedure   Diabetes Screening      HbgA1C    At Least  Annually for Diabetics GLYCOHEMOGLOBIN (HgA1c) (L) (%)   Date Value   09/26/2014 5.9     Glycohemoglobin (HgA1c) (%)   Date Value   07/31/2018 5.8    No flows found for this or any previous visit. No flowsheet data found. Fecal Occult Blood   Covered Annually No results found for: FOB, OCCULTSTOOL No flowsheet data found.      Barium Enema-   uncomfortable but covered  Covered but uncomfortable   Glaucoma Scr your 65th birthday    Hepatitis B for Moderate/High Risk       No orders found for this or any previous visit.  Medium/high risk factors:   End-stage renal disease   Hemophiliacs who received Factor VIII or IX concentrates   Clients of institutions for the activity?: Moderate  How would you describe your current health state?: Good  How do you maintain positive mental well-being?: Games      This section provided for quick review of chart, separate sheet to patient  Pavel Trevizo Immunization Activity if applicable)     Influenza  Covered Annually 10/01/2019 Please get every year    Pneumococcal 13 (Prevnar)  Covered Once after 65 06/16/2017 Please get once after your 65th birthday    Pneumococcal 23 (Pneumovax)  Covered Once after presence. I have reviewed the chart and discharge instructions (if applicable) and agree that the record reflects my personal performance and is accurate and complete.   Stephanie Palmer DO, 3/13/2020, 11:49 PM

## 2020-03-13 NOTE — PATIENT INSTRUCTIONS
Eva Gaspar's SCREENING SCHEDULE   Tests on this list are recommended by your physician but may not be covered, or covered at this frequency, by your insurer. Please check with your insurance carrier before scheduling to verify coverage.    PREVENT lifetime   • Anyone with a family history    Colorectal Cancer Screening  Covered up to Age 76     Colonoscopy Screen   Covered every 10 years- more often if abnormal Colonoscopy due on 10/01/2024 Update [facility] if applicable    Flex Sigmoidosco Once after 65 Orders placed or performed in visit on 06/16/17   • PNEUMOCOCCAL VACC, 13 RADHA IM    Please get once after your 65th birthday    Pneumococcal 23 (Pneumovax)  Covered Once after 65 Orders placed or performed in visit on 07/27/18   • Wolfgang Shah

## 2020-04-29 ENCOUNTER — LAB ENCOUNTER (OUTPATIENT)
Dept: LAB | Age: 75
End: 2020-04-29
Attending: FAMILY MEDICINE
Payer: MEDICARE

## 2020-04-29 DIAGNOSIS — I70.90 ATHEROSCLEROSIS: ICD-10-CM

## 2020-04-29 DIAGNOSIS — E78.2 MIXED HYPERLIPIDEMIA: ICD-10-CM

## 2020-04-29 DIAGNOSIS — I10 ESSENTIAL HYPERTENSION: ICD-10-CM

## 2020-04-29 PROCEDURE — 80061 LIPID PANEL: CPT

## 2020-04-29 PROCEDURE — 36415 COLL VENOUS BLD VENIPUNCTURE: CPT

## 2020-04-29 PROCEDURE — 84443 ASSAY THYROID STIM HORMONE: CPT

## 2020-04-29 PROCEDURE — 85025 COMPLETE CBC W/AUTO DIFF WBC: CPT

## 2020-04-29 PROCEDURE — 80053 COMPREHEN METABOLIC PANEL: CPT

## 2020-05-07 RX ORDER — HYDROCODONE BITARTRATE AND ACETAMINOPHEN 5; 325 MG/1; MG/1
1 TABLET ORAL EVERY 4 HOURS PRN
Qty: 50 TABLET | Refills: 0 | OUTPATIENT
Start: 2020-05-07

## 2020-05-07 RX ORDER — HYDROCODONE BITARTRATE AND ACETAMINOPHEN 5; 325 MG/1; MG/1
1 TABLET ORAL EVERY 4 HOURS PRN
Qty: 50 TABLET | Refills: 0 | Status: SHIPPED | OUTPATIENT
Start: 2020-05-07 | End: 2020-07-07

## 2020-05-07 NOTE — TELEPHONE ENCOUNTER
Called Emily Duran from [facility] office as this is their pt and notified her of refill request.

## 2020-07-06 NOTE — TELEPHONE ENCOUNTER
HYDROcodone-acetaminophen 5-325 MG Oral Tab    Patient calling for a refill on the medication   She states she have to call on this one because it is a narcotic     Please advise [phone]

## 2020-07-08 RX ORDER — HYDROCODONE BITARTRATE AND ACETAMINOPHEN 5; 325 MG/1; MG/1
1 TABLET ORAL EVERY 4 HOURS PRN
Qty: 50 TABLET | Refills: 0 | Status: SHIPPED | OUTPATIENT
Start: 2020-07-08 | End: 2020-08-24

## 2020-08-26 RX ORDER — HYDROCODONE BITARTRATE AND ACETAMINOPHEN 5; 325 MG/1; MG/1
1 TABLET ORAL EVERY 4 HOURS PRN
Qty: 50 TABLET | Refills: 0 | OUTPATIENT
Start: 2020-08-26

## 2020-08-26 RX ORDER — HYDROCODONE BITARTRATE AND ACETAMINOPHEN 5; 325 MG/1; MG/1
1 TABLET ORAL EVERY 4 HOURS PRN
Qty: 50 TABLET | Refills: 0 | Status: SHIPPED | OUTPATIENT
Start: 2020-08-26 | End: 2020-10-13

## 2020-09-21 ENCOUNTER — TELEMEDICINE (OUTPATIENT)
Dept: FAMILY MEDICINE CLINIC | Facility: CLINIC | Age: 75
End: 2020-09-21
Payer: MEDICARE

## 2020-09-21 ENCOUNTER — NURSE TRIAGE (OUTPATIENT)
Dept: FAMILY MEDICINE CLINIC | Facility: CLINIC | Age: 75
End: 2020-09-21

## 2020-09-21 DIAGNOSIS — R05.9 COUGH: Primary | ICD-10-CM

## 2020-09-21 PROCEDURE — 99213 OFFICE O/P EST LOW 20 MIN: CPT | Performed by: PHYSICIAN ASSISTANT

## 2020-09-21 RX ORDER — ALBUTEROL SULFATE 90 UG/1
2 AEROSOL, METERED RESPIRATORY (INHALATION) EVERY 4 HOURS PRN
Qty: 1 INHALER | Refills: 0 | Status: SHIPPED | OUTPATIENT
Start: 2020-09-21 | End: 2021-01-15

## 2020-09-21 RX ORDER — AZITHROMYCIN 250 MG/1
TABLET, FILM COATED ORAL
Qty: 6 TABLET | Refills: 0 | Status: SHIPPED | OUTPATIENT
Start: 2020-09-21 | End: 2020-09-26

## 2020-09-21 NOTE — PROGRESS NOTES
Please note that the following visit was completed using two-way, real-time interactive audio and/or video communication.   This has been done in good pierre to provide continuity of care in the best interest of the provider-patient relationship, due to the The patient was made aware of where to find [facility] notice of privacy practices, telehealth consent form and other related consent forms and documents. which are located on the [facility] website.  The patient verbally agreed to telehealth consent form, related c 0   • HYDROcodone-acetaminophen 5-325 MG Oral Tab Take 1 tablet by mouth every 4 (four) hours as needed. 50 tablet 0   • gabapentin 600 MG Oral Tab Take 1 tablet (600 mg total) by mouth 2 (two) times daily.  180 tablet 2   • Fluticasone Propionate 50 MCG/AC hours as needed for Wheezing., Disp: 1 Inhaler, Rfl: 0  •  HYDROcodone-acetaminophen 5-325 MG Oral Tab, Take 1 tablet by mouth every 4 (four) hours as needed. , Disp: 50 tablet, Rfl: 0  •  gabapentin 600 MG Oral Tab, Take 1 tablet (600 mg total) by mouth 2 herniated discs   • Calculus of kidney    • Deaf     Left ear   • Diverticulosis    • Hearing loss    • High blood pressure    • High cholesterol    • History of shingles     1980's   • Meniere disease    • Osteoarthritis    • Pneumonia due to organism THEN 1 tablet (250 mg total) daily for 4 days. • Albuterol Sulfate  (90 Base) MCG/ACT Inhalation Aero Soln 1 Inhaler 0     Sig: Inhale 2 puffs into the lungs every 4 (four) hours as needed for Wheezing.        Imaging & Referrals:  None

## 2020-09-21 NOTE — TELEPHONE ENCOUNTER
Action Requested: Summary for Provider     []  Critical Lab, Recommendations Needed  [] Need Additional Advice  []   FYI    []   Need Orders  [] Need Medications Sent to Pharmacy  []  Other     SUMMARY: Spoke with the patient who reports since last week sh

## 2020-10-14 RX ORDER — HYDROCODONE BITARTRATE AND ACETAMINOPHEN 5; 325 MG/1; MG/1
1 TABLET ORAL EVERY 4 HOURS PRN
Qty: 50 TABLET | Refills: 0 | Status: SHIPPED | OUTPATIENT
Start: 2020-10-14 | End: 2020-12-16

## 2020-11-04 ENCOUNTER — APPOINTMENT (OUTPATIENT)
Dept: CT IMAGING | Facility: HOSPITAL | Age: 75
End: 2020-11-04
Attending: EMERGENCY MEDICINE
Payer: MEDICARE

## 2020-11-04 ENCOUNTER — HOSPITAL ENCOUNTER (EMERGENCY)
Facility: HOSPITAL | Age: 75
Discharge: HOME | End: 2020-11-04
Attending: EMERGENCY MEDICINE
Payer: MEDICARE

## 2020-11-04 VITALS
HEIGHT: 64 IN | RESPIRATION RATE: 22 BRPM | BODY MASS INDEX: 40.63 KG/M2 | WEIGHT: 238 LBS | SYSTOLIC BLOOD PRESSURE: 104 MMHG | OXYGEN SATURATION: 98 % | TEMPERATURE: 98 F | DIASTOLIC BLOOD PRESSURE: 42 MMHG | HEART RATE: 66 BPM

## 2020-11-04 DIAGNOSIS — N20.0 KIDNEY STONE: Primary | ICD-10-CM

## 2020-11-04 PROCEDURE — 81001 URINALYSIS AUTO W/SCOPE: CPT

## 2020-11-04 PROCEDURE — 85025 COMPLETE CBC W/AUTO DIFF WBC: CPT | Performed by: EMERGENCY MEDICINE

## 2020-11-04 PROCEDURE — 99284 EMERGENCY DEPT VISIT MOD MDM: CPT

## 2020-11-04 PROCEDURE — 96374 THER/PROPH/DIAG INJ IV PUSH: CPT

## 2020-11-04 PROCEDURE — 80048 BASIC METABOLIC PNL TOTAL CA: CPT | Performed by: EMERGENCY MEDICINE

## 2020-11-04 PROCEDURE — 74176 CT ABD & PELVIS W/O CONTRAST: CPT | Performed by: EMERGENCY MEDICINE

## 2020-11-04 PROCEDURE — 81001 URINALYSIS AUTO W/SCOPE: CPT | Performed by: EMERGENCY MEDICINE

## 2020-11-04 PROCEDURE — 96361 HYDRATE IV INFUSION ADD-ON: CPT

## 2020-11-04 RX ORDER — KETOROLAC TROMETHAMINE 15 MG/ML
15 INJECTION, SOLUTION INTRAMUSCULAR; INTRAVENOUS ONCE
Status: COMPLETED | OUTPATIENT
Start: 2020-11-04 | End: 2020-11-04

## 2020-11-04 RX ORDER — TAMSULOSIN HYDROCHLORIDE 0.4 MG/1
0.4 CAPSULE ORAL DAILY
Qty: 7 CAPSULE | Refills: 0 | Status: SHIPPED | OUTPATIENT
Start: 2020-11-04 | End: 2020-11-11

## 2020-11-04 RX ORDER — MORPHINE SULFATE 4 MG/ML
4 INJECTION, SOLUTION INTRAMUSCULAR; INTRAVENOUS ONCE
Status: DISCONTINUED | OUTPATIENT
Start: 2020-11-04 | End: 2020-11-04

## 2020-11-04 RX ORDER — HYDROCODONE BITARTRATE AND ACETAMINOPHEN 5; 325 MG/1; MG/1
1 TABLET ORAL EVERY 6 HOURS PRN
Qty: 16 TABLET | Refills: 0 | Status: SHIPPED | OUTPATIENT
Start: 2020-11-04 | End: 2020-11-11

## 2020-11-04 RX ORDER — KETOROLAC TROMETHAMINE 10 MG/1
10 TABLET, FILM COATED ORAL EVERY 6 HOURS PRN
Qty: 30 TABLET | Refills: 0 | Status: SHIPPED | OUTPATIENT
Start: 2020-11-04 | End: 2020-11-11

## 2020-11-04 RX ORDER — ONDANSETRON 4 MG/1
4 TABLET, ORALLY DISINTEGRATING ORAL EVERY 4 HOURS PRN
Qty: 10 TABLET | Refills: 0 | Status: SHIPPED | OUTPATIENT
Start: 2020-11-04 | End: 2020-11-11

## 2020-11-04 NOTE — ED INITIAL ASSESSMENT (HPI)
PATIENT AOX3 TO ED VIA PRIVATE VEHICLE PATIENT CO OF FLANK PAIN X FEW DAYS WORSENING X THIS AM HX OF KIDNEY STONE. PAIN 6/10 +N/V.     TOOK 2 TYLENOL X 30MINS PTA

## 2020-11-04 NOTE — ED PROVIDER NOTES
He does not have a  Patient Seen in: [facility] Emergency Department      History   Patient presents with:  Abdomen/Flank Pain    Stated Complaint: r flank pain      HPI    79-year-old female with past medical history significant for anxiety, kidney • SPINE SURGERY PROCEDURE UNLISTED      for right sciatica issue   • TOTAL HIP REPLACEMENT Right    • TOTAL KNEE REPLACEMENT Right    • TUBAL LIGATION     • YAG CAPSULOTOMY - OD - RIGHT EYE Right 2/12/14    JOEY                    Social History    Tobacc noted. No erythema.    Psychiatric:    ED Course     Labs Reviewed   URINALYSIS WITH CULTURE REFLEX - Abnormal; Notable for the following components:       Result Value    Blood Urine Large (*)     RBC URINE 61 (*)     All other components within normal gibson Zechariah Snyder MD on 11/04/2020 at 1:46 PM     Finalized by (CST): Zechariah Snyder MD on 11/04/2020 at 2:07 PM                  MDM      Patient is pain-free at this time. She does have a 4 mm right UPJ stone. Sign of infection.   Patient is comforta

## 2020-12-21 RX ORDER — HYDROCODONE BITARTRATE AND ACETAMINOPHEN 5; 325 MG/1; MG/1
1 TABLET ORAL EVERY 4 HOURS PRN
Qty: 50 TABLET | Refills: 0 | Status: SHIPPED | OUTPATIENT
Start: 2020-12-21 | End: 2021-02-11

## 2020-12-27 DIAGNOSIS — I10 ESSENTIAL HYPERTENSION: ICD-10-CM

## 2020-12-29 DIAGNOSIS — E78.2 MIXED HYPERLIPIDEMIA: ICD-10-CM

## 2020-12-29 DIAGNOSIS — I70.90 ATHEROSCLEROSIS: ICD-10-CM

## 2020-12-29 RX ORDER — SPIRONOLACTONE 25 MG/1
25 TABLET ORAL DAILY
Qty: 90 TABLET | Refills: 0 | Status: SHIPPED | OUTPATIENT
Start: 2020-12-29 | End: 2021-03-29

## 2020-12-31 RX ORDER — ROSUVASTATIN CALCIUM 20 MG/1
20 TABLET, COATED ORAL
Qty: 90 TABLET | Refills: 0 | Status: SHIPPED | OUTPATIENT
Start: 2020-12-31 | End: 2021-04-02

## 2021-01-08 DIAGNOSIS — M51.26 LUMBAR HERNIATED DISC: ICD-10-CM

## 2021-01-08 DIAGNOSIS — J30.1 SEASONAL ALLERGIC RHINITIS DUE TO POLLEN: ICD-10-CM

## 2021-01-08 RX ORDER — MONTELUKAST SODIUM 10 MG/1
10 TABLET ORAL
Qty: 90 TABLET | Refills: 0 | Status: SHIPPED | OUTPATIENT
Start: 2021-01-08 | End: 2021-04-02

## 2021-01-08 RX ORDER — MELOXICAM 15 MG/1
15 TABLET ORAL
Qty: 90 TABLET | Refills: 0 | Status: SHIPPED | OUTPATIENT
Start: 2021-01-08 | End: 2021-04-02

## 2021-01-15 ENCOUNTER — OFFICE VISIT (OUTPATIENT)
Dept: FAMILY MEDICINE CLINIC | Facility: CLINIC | Age: 76
End: 2021-01-15
Payer: MEDICARE

## 2021-01-15 VITALS
BODY MASS INDEX: 41.32 KG/M2 | WEIGHT: 242 LBS | SYSTOLIC BLOOD PRESSURE: 134 MMHG | DIASTOLIC BLOOD PRESSURE: 77 MMHG | HEART RATE: 76 BPM | HEIGHT: 64 IN

## 2021-01-15 DIAGNOSIS — R35.0 URINARY FREQUENCY: Primary | ICD-10-CM

## 2021-01-15 PROBLEM — N30.90 CYSTITIS: Status: RESOLVED | Noted: 2019-02-27 | Resolved: 2021-01-15

## 2021-01-15 PROBLEM — J06.9 VIRAL UPPER RESPIRATORY TRACT INFECTION: Status: RESOLVED | Noted: 2018-11-08 | Resolved: 2021-01-15

## 2021-01-15 LAB
APPEARANCE: CLEAR
BILIRUBIN: NEGATIVE
GLUCOSE (URINE DIPSTICK): NEGATIVE MG/DL
KETONES (URINE DIPSTICK): NEGATIVE MG/DL
LEUKOCYTES: NEGATIVE
MULTISTIX LOT#: 1044 NUMERIC
NITRITE, URINE: NEGATIVE
OCCULT BLOOD: NEGATIVE
PH, URINE: 5.5 (ref 4.5–8)
PROTEIN (URINE DIPSTICK): NEGATIVE MG/DL
SPECIFIC GRAVITY: 1.02 (ref 1–1.03)
URINE-COLOR: YELLOW
UROBILINOGEN,SEMI-QN: 0.2 MG/DL (ref 0–1.9)

## 2021-01-15 PROCEDURE — 99213 OFFICE O/P EST LOW 20 MIN: CPT | Performed by: NURSE PRACTITIONER

## 2021-01-15 PROCEDURE — 81003 URINALYSIS AUTO W/O SCOPE: CPT | Performed by: NURSE PRACTITIONER

## 2021-01-15 NOTE — PROGRESS NOTES
HPI  Pt presents for c/o urinary frequency. Does not feel as if she is fully emptying her bladder. Denies urinary incontinence. Drinks a lot of diet soda but not much water. Finds that she drinks very little during the day agustín water.      Review of Syst issue   • Total hip replacement Right    • Total knee replacement Right    • Tubal ligation     • Yag capsulotomy - od - right eye Right 2/12/14    RJM       Family History   Problem Relation Age of Onset   • Colon Cancer Father    • Other (hx of SC ( unkn Physically abused: Not on file        Forced sexual activity: Not on file    Other Topics      Concerns:         Service: Not Asked        Blood Transfusions: Not Asked        Caffeine Concern: No          Coffee, 2 cups daily        Occupatio (325 mg total) by mouth 2 (two) times daily.  60 tablet 0   • ferrous sulfate 325 (65 FE) MG Oral Tab EC Take 1 tablet (325 mg total) by mouth daily with breakfast. 20 tablet 0   • Cholecalciferol (VITAMIN D3) 3000 units Oral Tab Take 3,000 Units by mouth d

## 2021-01-16 DIAGNOSIS — M51.26 LUMBAR HERNIATED DISC: ICD-10-CM

## 2021-01-16 RX ORDER — GABAPENTIN 600 MG/1
600 TABLET ORAL 2 TIMES DAILY
Qty: 180 TABLET | Refills: 0 | Status: SHIPPED | OUTPATIENT
Start: 2021-01-16 | End: 2021-05-20

## 2021-02-11 NOTE — TELEPHONE ENCOUNTER
Patient requesting refill hydrocodone for back pain,. Last filled December. [facility] annual visit scheduled 3/15/21. Please advise on refill controlled substance. Pended for signature.

## 2021-02-12 RX ORDER — HYDROCODONE BITARTRATE AND ACETAMINOPHEN 5; 325 MG/1; MG/1
1 TABLET ORAL EVERY 4 HOURS PRN
Qty: 30 TABLET | Refills: 0 | Status: SHIPPED | OUTPATIENT
Start: 2021-02-12 | End: 2021-03-27

## 2021-02-12 NOTE — PROGRESS NOTES
Delivery Note    Gina is a 33 year old  at 38w6d with history significant for  who presented to labor and delivery in active labor. She had SROM.  She progressed well through labor. For pain relief she had nothing.  She progressed to complete. She pushed well for approximately 1 contraction and had a spontaneous vaginal delivery over an intact perineum. There was no nuchal cord.  The infant was suctioned thoroughly after delivery and was passed to the mother.  The cord was clamped and cut after delayed cord clamping. There was spontaneous delivery of an intact placenta with three-vessel cord. Lacerations: none.   The delivery resulted in an infant girl who was found to weigh 2932 grams or 6 pounds and 7.4 ounces and was assigned Apgars of 8 and 9.  Estimated blood loss was 150 cc.  Complications: None  The patient and her child were left stable in the labor and delivery room.      Mother's Information    Lacerations    Episiotomy: None  Perineal laceration: None  Other lacerations?: No  Vaginal delivery est. blood loss (mL): 150  Repair suture: None  Number of repair packets: 0     IO Blood Loss  21 0145 - 21 0426    Vaginal delivery EBL (mL) Hospital Encounter 150    Total  150         Charisse Arzola [[id number]]    [place] Delivery    Time head delivered: 2021 04:04:00  Birth date/time: 2019 04:04:00  Delivery type: Vaginal, Spontaneous     Labor Events     labor?: No   steroids?: None  Antibiotics during labor?: No  Sac identifier: Sac 1  Rupture date/time: 2021 0324  Rupture type: Spontaneous  Fluid color: Clear  Fluid odor: Normal  Augmentation: None  Labor/Delivery complications: None     Anesthesia    Method: Epidural     Operative Delivery    Forceps attempted?: No     Shoulder Dystocia    Shoulder dystocia present?: No     Procedures    Procedures: None     Presentation    Presentation: Vertex  Position: Occiput Anterior     Placenta    Placenta  Patient ID: Leopold Shearer is a 69year old female. HPI  Patient presents with:  Cough: x 2 weeks  Chest Congestion: x 2 weeks    Pt came in with her friend Jose Raul Jenn who was here for an office visit.  She wanted to be seen for sickness so I worked he delivery date/time: 2/12/2021 0407  Placenta removal: Spontaneous  Placenta appearance: Intact  Placenta disposition: discarded     Cord    Vessels: 3 Vessels  Complications: None  Delayed cord clamping?: Yes  Cord clamped date/time: 2/12/2021 0405  Cord blood disposition: Lab  Gases sent?: No  Stem cell collection (by provider): No     Apgars    Living status: Living  Apgars:  1 min.:  5 min.:  10 min.:  15 min.:  20 min.:    Skin color:  0  1       Heart rate:  2  2       Reflex irritability:  2  2       Muscle tone:  2  2       Respiratory effort:  2  2       Total:  8  9       Apgars assigned by: CITLALLI RN     Resuscitation    Method: None     Measurements    Weight: 2932 g  Length: 48.3 cm  Head circumference: 30 cm     Delivery Providers    Delivering clinician:    Provider Role     Tech     Delivery Nurse     Baby Nurse                Review the Delivery Report for details       Esperanza Rios MD       herniated discs   • Calculus of kidney    • Deaf     Left ear   • Diverticulosis    • Hearing loss    • High blood pressure    • High cholesterol    • History of shingles     1980's   • Meniere disease    • Osteoarthritis    • Pneumonia due to organism daily. 90 tablet 1   • spironolactone 25 MG Oral Tab Take 1 tablet (25 mg total) by mouth daily. 90 tablet 0   • Telmisartan-HCTZ 80-25 MG Oral Tab Take 1 tablet by mouth daily.  90 tablet 1   • Cholecalciferol (VITAMIN D3) 3000 units Oral Tab Take 3,000 Un Cardiovascular: Normal rate, regular rhythm and normal heart sounds. Pulmonary/Chest: Effort normal. No respiratory distress. Expiratory rhonchi and wheezing in all lung fields. Good air entry. Lymphadenopathy: Patient has no cervical adenopathy. under the direction and in the presence of Ana Laura Espinosa DO. Electronically Signed: Huma Balderrama, 1/20/2020, 9:55 AM.    I, Ana Laura Espinosa DO,  personally performed the services described in this documentation.  All medical record entries made by the

## 2021-02-15 DIAGNOSIS — F41.1 ANXIETY STATE: ICD-10-CM

## 2021-02-15 RX ORDER — SERTRALINE HYDROCHLORIDE 100 MG/1
TABLET, FILM COATED ORAL
Qty: 90 TABLET | Refills: 0 | Status: SHIPPED | OUTPATIENT
Start: 2021-02-15 | End: 2021-08-27

## 2021-03-08 DIAGNOSIS — Z23 NEED FOR VACCINATION: ICD-10-CM

## 2021-03-10 ENCOUNTER — IMMUNIZATION (OUTPATIENT)
Dept: LAB | Age: 76
End: 2021-03-10
Attending: HOSPITALIST
Payer: MEDICARE

## 2021-03-10 DIAGNOSIS — Z23 NEED FOR VACCINATION: Primary | ICD-10-CM

## 2021-03-10 PROCEDURE — 0001A SARSCOV2 VAC 30MCG/0.3ML IM: CPT

## 2021-03-18 ENCOUNTER — OFFICE VISIT (OUTPATIENT)
Dept: FAMILY MEDICINE CLINIC | Facility: CLINIC | Age: 76
End: 2021-03-18
Payer: MEDICARE

## 2021-03-18 VITALS
HEART RATE: 66 BPM | WEIGHT: 243.19 LBS | TEMPERATURE: 99 F | SYSTOLIC BLOOD PRESSURE: 134 MMHG | HEIGHT: 64 IN | BODY MASS INDEX: 41.52 KG/M2 | DIASTOLIC BLOOD PRESSURE: 66 MMHG

## 2021-03-18 DIAGNOSIS — E66.01 MORBID OBESITY DUE TO EXCESS CALORIES (HCC): ICD-10-CM

## 2021-03-18 DIAGNOSIS — Z78.0 MENOPAUSE: ICD-10-CM

## 2021-03-18 DIAGNOSIS — E78.2 MIXED HYPERLIPIDEMIA: ICD-10-CM

## 2021-03-18 DIAGNOSIS — J30.1 SEASONAL ALLERGIC RHINITIS DUE TO POLLEN: ICD-10-CM

## 2021-03-18 DIAGNOSIS — Z12.31 VISIT FOR SCREENING MAMMOGRAM: ICD-10-CM

## 2021-03-18 DIAGNOSIS — E55.9 VITAMIN D DEFICIENCY: ICD-10-CM

## 2021-03-18 DIAGNOSIS — Z00.00 ENCOUNTER FOR ANNUAL HEALTH EXAMINATION: ICD-10-CM

## 2021-03-18 DIAGNOSIS — I70.90 ATHEROSCLEROSIS: ICD-10-CM

## 2021-03-18 DIAGNOSIS — Z00.00 ADULT GENERAL MEDICAL EXAM: ICD-10-CM

## 2021-03-18 DIAGNOSIS — R09.82 POST-NASAL DRIP: ICD-10-CM

## 2021-03-18 PROCEDURE — G0439 PPPS, SUBSEQ VISIT: HCPCS | Performed by: FAMILY MEDICINE

## 2021-03-18 RX ORDER — FEXOFENADINE HCL 180 MG/1
180 TABLET ORAL DAILY
Qty: 90 TABLET | Refills: 1 | Status: SHIPPED | OUTPATIENT
Start: 2021-03-18 | End: 2021-03-27

## 2021-03-18 NOTE — PATIENT INSTRUCTIONS
Justin Gaspar's SCREENING SCHEDULE   Tests on this list are recommended by your physician but may not be covered, or covered at this frequency, by your insurer. Please check with your insurance carrier before scheduling to verify coverage.    PREVENT Colorectal Cancer Screening  Covered up to Age 76     Colonoscopy Screen   Covered every 10 years- more often if abnormal Colonoscopy due on 10/01/2024 Update Health Maintenance if applicable    Flex Sigmoidoscopy Screen  Covered every 5 years No results visit on 06/16/17   • PNEUMOCOCCAL VACC, 13 RADHA IM    Please get once after your 65th birthday    Pneumococcal 23 (Pneumovax)  Covered Once after 65 Orders placed or performed in visit on 07/27/18   • PNEUMOCOCCAL IMM (PNEUMOVAX)    Please get once after y

## 2021-03-18 NOTE — PROGRESS NOTES
HPI:   Elyse Linton is a 70year old female who presents for a Medicare Subsequent Annual Wellness visit (Pt already had Initial Annual Wellness).     Her last annual assessment has been over 1 year: Annual Physical due on 07/25/2020      Last jazmine  for [employer] on file in [address] [place] [address] Rd.    Advance care planning including the explanation and discussion of advance directives standard forms performed Face to Face with patient and Family/surrogate (if present), and forms available to patient in AVS Encounters:  03/18/21 : 243 lb 3.2 oz  01/15/21 : 242 lb  11/04/20 : 238 lb     Last Cholesterol Labs:   Lab Results   Component Value Date    CHOLEST 171 04/29/2020    HDL 64 (H) 04/29/2020    LDL 96 04/29/2020    TRIG 57 04/29/2020          Last Chemistr Breath.        MEDICAL INFORMATION:   She  has a past medical history of Anxiety state, Arthritis, Back problem, Calculus of kidney, Deaf, Diverticulosis, Hearing loss, High blood pressure, High cholesterol, History of shingles, Meniere disease, Rosiland Timothy (1.626 m)          Medicare Hearing Assessment  (Required for AWV/SWV)    Hearing Screening    Screening Method: Questionnaire  I have a problem hearing over the telephone: Yes I have trouble following the conversations when two or more people are talking distress. Lymphadenopathy: Patient has no cervical adenopathy. Neurological: Patient is alert and oriented to person, place, and time. Skin: Skin is warm. Psychiatry: Normal mood and affect. Lower legs: No edema of the legs bilaterally.     Vitals r mg total) by mouth daily.  For allergies    Vitamin D deficiency  -     VITAMIN D, 25-HYDROXY; Future    Encounter for annual health examination         Diet assessment: fair     PLAN:  The patient indicates understanding of these issues and agrees to the p Pelvic      Pap: Every 3 yrs age 21-68 or Pap+HPV every 5 yrs age 33-67, age 72 and older at high risk There are no preventive care reminders to display for this patient.  Update Health Maintenance if applicable    Chlamydia  Annually if high risk No result Drug Serum Conc  Annually No results found for: DIGOXIN, DIG, VALP No flowsheet data found.                By signing my name below, Lauren De,  attest that this documentation has been prepared under the direction and in the presence of Anant Lipscomb

## 2021-03-27 DIAGNOSIS — I10 ESSENTIAL HYPERTENSION: ICD-10-CM

## 2021-03-27 DIAGNOSIS — R09.82 POST-NASAL DRIP: ICD-10-CM

## 2021-03-27 DIAGNOSIS — J30.1 SEASONAL ALLERGIC RHINITIS DUE TO POLLEN: ICD-10-CM

## 2021-03-29 RX ORDER — SPIRONOLACTONE 25 MG/1
25 TABLET ORAL DAILY
Qty: 90 TABLET | Refills: 0 | Status: SHIPPED | OUTPATIENT
Start: 2021-03-29 | End: 2021-06-15

## 2021-03-29 RX ORDER — FEXOFENADINE HCL 180 MG/1
180 TABLET ORAL DAILY
Qty: 90 TABLET | Refills: 1 | Status: SHIPPED | OUTPATIENT
Start: 2021-03-29 | End: 2021-09-12

## 2021-03-29 RX ORDER — HYDROCODONE BITARTRATE AND ACETAMINOPHEN 5; 325 MG/1; MG/1
1 TABLET ORAL EVERY 4 HOURS PRN
Qty: 30 TABLET | Refills: 0 | Status: SHIPPED | OUTPATIENT
Start: 2021-03-29 | End: 2021-04-24

## 2021-03-31 ENCOUNTER — IMMUNIZATION (OUTPATIENT)
Dept: LAB | Age: 76
End: 2021-03-31
Attending: HOSPITALIST
Payer: MEDICARE

## 2021-03-31 DIAGNOSIS — Z23 NEED FOR VACCINATION: Primary | ICD-10-CM

## 2021-03-31 PROCEDURE — 0002A SARSCOV2 VAC 30MCG/0.3ML IM: CPT

## 2021-04-01 DIAGNOSIS — J30.1 SEASONAL ALLERGIC RHINITIS DUE TO POLLEN: ICD-10-CM

## 2021-04-01 DIAGNOSIS — M51.26 LUMBAR HERNIATED DISC: ICD-10-CM

## 2021-04-01 DIAGNOSIS — I10 ESSENTIAL HYPERTENSION: ICD-10-CM

## 2021-04-01 DIAGNOSIS — I70.90 ATHEROSCLEROSIS: ICD-10-CM

## 2021-04-01 DIAGNOSIS — E78.2 MIXED HYPERLIPIDEMIA: ICD-10-CM

## 2021-04-02 RX ORDER — ROSUVASTATIN CALCIUM 20 MG/1
20 TABLET, COATED ORAL
Qty: 90 TABLET | Refills: 0 | Status: SHIPPED | OUTPATIENT
Start: 2021-04-02 | End: 2021-08-28

## 2021-04-02 RX ORDER — MELOXICAM 15 MG/1
TABLET ORAL
Qty: 90 TABLET | Refills: 0 | Status: SHIPPED | OUTPATIENT
Start: 2021-04-02 | End: 2021-07-11

## 2021-04-02 RX ORDER — TELMISARTAN AND HYDROCHLORTHIAZIDE 80; 25 MG/1; MG/1
TABLET ORAL
Qty: 90 TABLET | Refills: 0 | Status: SHIPPED | OUTPATIENT
Start: 2021-04-02 | End: 2021-07-01

## 2021-04-02 RX ORDER — MONTELUKAST SODIUM 10 MG/1
TABLET ORAL
Qty: 90 TABLET | Refills: 0 | Status: SHIPPED | OUTPATIENT
Start: 2021-04-02 | End: 2021-06-15

## 2021-04-12 ENCOUNTER — HOSPITAL ENCOUNTER (OUTPATIENT)
Dept: MAMMOGRAPHY | Facility: HOSPITAL | Age: 76
Discharge: HOME | End: 2021-04-12
Attending: FAMILY MEDICINE
Payer: MEDICARE

## 2021-04-12 DIAGNOSIS — Z12.31 VISIT FOR SCREENING MAMMOGRAM: ICD-10-CM

## 2021-04-17 ENCOUNTER — HOSPITAL ENCOUNTER (OUTPATIENT)
Dept: MRI IMAGING | Facility: HOSPITAL | Age: 76
Discharge: HOME | End: 2021-04-17
Attending: ORTHOPAEDIC SURGERY
Payer: MEDICARE

## 2021-04-17 ENCOUNTER — HOSPITAL ENCOUNTER (OUTPATIENT)
Dept: BONE DENSITY | Age: 76
Discharge: HOME | End: 2021-04-17
Attending: FAMILY MEDICINE
Payer: MEDICARE

## 2021-04-17 DIAGNOSIS — Z78.0 MENOPAUSE: ICD-10-CM

## 2021-04-17 DIAGNOSIS — M54.16 LUMBAR RADICULOPATHY: ICD-10-CM

## 2021-04-17 PROCEDURE — 72148 MRI LUMBAR SPINE W/O DYE: CPT | Performed by: ORTHOPAEDIC SURGERY

## 2021-04-17 PROCEDURE — 77080 DXA BONE DENSITY AXIAL: CPT | Performed by: FAMILY MEDICINE

## 2021-04-24 ENCOUNTER — LAB ENCOUNTER (OUTPATIENT)
Dept: LAB | Age: 76
End: 2021-04-24
Attending: FAMILY MEDICINE
Payer: MEDICARE

## 2021-04-24 DIAGNOSIS — E55.9 VITAMIN D DEFICIENCY: ICD-10-CM

## 2021-04-24 DIAGNOSIS — I70.90 ATHEROSCLEROSIS: ICD-10-CM

## 2021-04-24 DIAGNOSIS — E78.2 MIXED HYPERLIPIDEMIA: ICD-10-CM

## 2021-04-24 PROCEDURE — 80053 COMPREHEN METABOLIC PANEL: CPT

## 2021-04-24 PROCEDURE — 84443 ASSAY THYROID STIM HORMONE: CPT

## 2021-04-24 PROCEDURE — 80061 LIPID PANEL: CPT

## 2021-04-24 PROCEDURE — 85025 COMPLETE CBC W/AUTO DIFF WBC: CPT

## 2021-04-24 PROCEDURE — 36415 COLL VENOUS BLD VENIPUNCTURE: CPT

## 2021-04-24 PROCEDURE — 82306 VITAMIN D 25 HYDROXY: CPT

## 2021-04-27 RX ORDER — HYDROCODONE BITARTRATE AND ACETAMINOPHEN 5; 325 MG/1; MG/1
1 TABLET ORAL EVERY 4 HOURS PRN
Qty: 30 TABLET | Refills: 0 | Status: SHIPPED | OUTPATIENT
Start: 2021-04-27 | End: 2021-06-15

## 2021-04-27 RX ORDER — HYDROCODONE BITARTRATE AND ACETAMINOPHEN 5; 325 MG/1; MG/1
1 TABLET ORAL EVERY 4 HOURS PRN
Qty: 30 TABLET | Refills: 0 | OUTPATIENT
Start: 2021-04-27

## 2021-04-29 ENCOUNTER — TELEPHONE (OUTPATIENT)
Dept: FAMILY MEDICINE CLINIC | Facility: CLINIC | Age: 76
End: 2021-04-29

## 2021-05-15 ENCOUNTER — HOSPITAL ENCOUNTER (OUTPATIENT)
Dept: MAMMOGRAPHY | Age: 76
Discharge: HOME | End: 2021-05-15
Attending: FAMILY MEDICINE
Payer: MEDICARE

## 2021-05-15 PROCEDURE — 77063 BREAST TOMOSYNTHESIS BI: CPT | Performed by: FAMILY MEDICINE

## 2021-05-15 PROCEDURE — 77067 SCR MAMMO BI INCL CAD: CPT | Performed by: FAMILY MEDICINE

## 2021-05-20 ENCOUNTER — OFFICE VISIT (OUTPATIENT)
Dept: FAMILY MEDICINE CLINIC | Facility: CLINIC | Age: 76
End: 2021-05-20
Payer: MEDICARE

## 2021-05-20 VITALS
DIASTOLIC BLOOD PRESSURE: 77 MMHG | HEIGHT: 64 IN | BODY MASS INDEX: 41.66 KG/M2 | SYSTOLIC BLOOD PRESSURE: 136 MMHG | WEIGHT: 244 LBS | HEART RATE: 76 BPM | TEMPERATURE: 97 F

## 2021-05-20 DIAGNOSIS — E78.2 MIXED HYPERLIPIDEMIA: ICD-10-CM

## 2021-05-20 DIAGNOSIS — M51.9 LUMBAR DISC DISEASE: Primary | ICD-10-CM

## 2021-05-20 DIAGNOSIS — M51.26 LUMBAR HERNIATED DISC: ICD-10-CM

## 2021-05-20 DIAGNOSIS — E66.01 MORBID OBESITY DUE TO EXCESS CALORIES (HCC): ICD-10-CM

## 2021-05-20 DIAGNOSIS — I10 ESSENTIAL HYPERTENSION: ICD-10-CM

## 2021-05-20 DIAGNOSIS — R73.9 HYPERGLYCEMIA: ICD-10-CM

## 2021-05-20 PROCEDURE — 99214 OFFICE O/P EST MOD 30 MIN: CPT | Performed by: FAMILY MEDICINE

## 2021-05-20 RX ORDER — METHYLPREDNISOLONE 4 MG/1
4 TABLET ORAL AS DIRECTED
COMMUNITY
Start: 2021-03-30 | End: 2021-05-20

## 2021-05-20 RX ORDER — GABAPENTIN 600 MG/1
600 TABLET ORAL 3 TIMES DAILY
Qty: 270 TABLET | Refills: 0 | COMMUNITY
Start: 2021-05-20 | End: 2021-06-15

## 2021-05-20 NOTE — PROGRESS NOTES
Patient ID: Fina Arthur is a 70year old female. HPI  Patient presents with: Follow - Up: Lab results    Last seen by me on 3/18/2021. Pt c/o chronic intermittent pain in the upper right abdomen. Pt also c/o back pain.  Pt inquired about taki hydronephrosis secondary to a 0.4 cm calculus at the right ureteropelvic junction.  Additional right renal calculi interpolar lower pole measuring up to 0.3 cm.  Asymmetric right perinephric stranding.       Cholecystectomy.       Colonic diverticulosis. 10/1/2019    Procedure: COLONOSCOPY;  Surgeon: Leo Marcelo MD;  Location: [address] [place] [address] ENDOSCOPY   • KNEE SURGERY     • OOPHORECTOMY Bilateral     per [employer]:  \"laparoscopic bilateral oophorectomy\"   • OTHER SURGICAL HISTORY      Ear surgery   • SP ROSUVASTATIN CALCIUM 20 MG Oral Tab Take 1 tablet (20 mg total) by mouth once daily.  90 tablet 0     Allergies:No Known Allergies     Physical Exam:       Physical Exam  Blood pressure 136/77, pulse 76, temperature 97.4 °F (36.3 °C), height 5' 4\" (1.626 m signing my name below, Debbi Nascimento,  attest that this documentation has been prepared under the direction and in the presence of Danielle Barbosa DO.    Electronically Signed: Samantha Solorio, 5/20/2021, 3:11 PM.    I, Anant Mckeon DO,  personall

## 2021-06-10 ENCOUNTER — OFFICE VISIT (OUTPATIENT)
Dept: SURGERY | Facility: CLINIC | Age: 76
End: 2021-06-10
Payer: MEDICARE

## 2021-06-10 VITALS
BODY MASS INDEX: 43.16 KG/M2 | HEIGHT: 63.4 IN | SYSTOLIC BLOOD PRESSURE: 126 MMHG | WEIGHT: 246.63 LBS | OXYGEN SATURATION: 95 % | HEART RATE: 70 BPM | DIASTOLIC BLOOD PRESSURE: 74 MMHG

## 2021-06-10 DIAGNOSIS — I10 HTN (HYPERTENSION), BENIGN: ICD-10-CM

## 2021-06-10 DIAGNOSIS — E78.5 DYSLIPIDEMIA: ICD-10-CM

## 2021-06-10 DIAGNOSIS — F43.9 STRESS: ICD-10-CM

## 2021-06-10 DIAGNOSIS — E66.01 MORBID OBESITY WITH BMI OF 40.0-44.9, ADULT (HCC): ICD-10-CM

## 2021-06-10 DIAGNOSIS — R63.5 WEIGHT GAIN: Primary | ICD-10-CM

## 2021-06-10 PROCEDURE — 99204 OFFICE O/P NEW MOD 45 MIN: CPT | Performed by: INTERNAL MEDICINE

## 2021-06-15 DIAGNOSIS — I10 ESSENTIAL HYPERTENSION: ICD-10-CM

## 2021-06-15 DIAGNOSIS — M51.26 LUMBAR HERNIATED DISC: ICD-10-CM

## 2021-06-15 DIAGNOSIS — J30.1 SEASONAL ALLERGIC RHINITIS DUE TO POLLEN: ICD-10-CM

## 2021-06-15 RX ORDER — SPIRONOLACTONE 25 MG/1
25 TABLET ORAL DAILY
Qty: 90 TABLET | Refills: 0 | Status: SHIPPED | OUTPATIENT
Start: 2021-06-15 | End: 2021-09-12

## 2021-06-15 RX ORDER — MONTELUKAST SODIUM 10 MG/1
TABLET ORAL
Qty: 90 TABLET | Refills: 0 | Status: SHIPPED | OUTPATIENT
Start: 2021-06-15 | End: 2021-09-12

## 2021-06-15 RX ORDER — HYDROCODONE BITARTRATE AND ACETAMINOPHEN 5; 325 MG/1; MG/1
1 TABLET ORAL EVERY 4 HOURS PRN
Qty: 30 TABLET | Refills: 0 | Status: SHIPPED | OUTPATIENT
Start: 2021-06-15 | End: 2021-07-25

## 2021-06-15 RX ORDER — GABAPENTIN 600 MG/1
TABLET ORAL
Qty: 180 TABLET | Refills: 0 | Status: SHIPPED | OUTPATIENT
Start: 2021-06-15 | End: 2021-09-12

## 2021-07-01 DIAGNOSIS — I10 ESSENTIAL HYPERTENSION: ICD-10-CM

## 2021-07-01 RX ORDER — TELMISARTAN AND HYDROCHLORTHIAZIDE 80; 25 MG/1; MG/1
TABLET ORAL
Qty: 90 TABLET | Refills: 0 | Status: SHIPPED | OUTPATIENT
Start: 2021-07-01 | End: 2021-10-04

## 2021-07-10 DIAGNOSIS — M51.26 LUMBAR HERNIATED DISC: ICD-10-CM

## 2021-07-10 DIAGNOSIS — J30.1 SEASONAL ALLERGIC RHINITIS DUE TO POLLEN: ICD-10-CM

## 2021-07-11 RX ORDER — FLUTICASONE PROPIONATE 50 MCG
SPRAY, SUSPENSION (ML) NASAL
Qty: 16 G | Refills: 0 | Status: SHIPPED | OUTPATIENT
Start: 2021-07-11

## 2021-07-11 RX ORDER — MELOXICAM 15 MG/1
TABLET ORAL
Qty: 90 TABLET | Refills: 0 | Status: SHIPPED | OUTPATIENT
Start: 2021-07-11 | End: 2021-11-06

## 2021-07-16 DIAGNOSIS — M51.26 LUMBAR HERNIATED DISC: ICD-10-CM

## 2021-07-16 RX ORDER — MELOXICAM 15 MG/1
15 TABLET ORAL DAILY
Qty: 90 TABLET | Refills: 0 | OUTPATIENT
Start: 2021-07-16

## 2021-07-25 RX ORDER — HYDROCODONE BITARTRATE AND ACETAMINOPHEN 5; 325 MG/1; MG/1
1 TABLET ORAL EVERY 4 HOURS PRN
Qty: 30 TABLET | Refills: 0 | Status: SHIPPED | OUTPATIENT
Start: 2021-07-25 | End: 2021-09-21

## 2021-08-26 ENCOUNTER — OFFICE VISIT (OUTPATIENT)
Dept: SURGERY | Facility: CLINIC | Age: 76
End: 2021-08-26
Payer: MEDICARE

## 2021-08-26 VITALS
DIASTOLIC BLOOD PRESSURE: 78 MMHG | WEIGHT: 241 LBS | HEIGHT: 63.4 IN | BODY MASS INDEX: 42.17 KG/M2 | SYSTOLIC BLOOD PRESSURE: 122 MMHG

## 2021-08-26 DIAGNOSIS — E66.01 MORBID OBESITY WITH BMI OF 40.0-44.9, ADULT (HCC): ICD-10-CM

## 2021-08-26 DIAGNOSIS — E78.5 DYSLIPIDEMIA: ICD-10-CM

## 2021-08-26 DIAGNOSIS — F43.9 STRESS: ICD-10-CM

## 2021-08-26 DIAGNOSIS — I10 HTN (HYPERTENSION), BENIGN: Primary | ICD-10-CM

## 2021-08-26 PROCEDURE — 99214 OFFICE O/P EST MOD 30 MIN: CPT | Performed by: INTERNAL MEDICINE

## 2021-08-26 RX ORDER — TOPIRAMATE 25 MG/1
25 TABLET ORAL EVERY EVENING
Qty: 30 TABLET | Refills: 2 | Status: SHIPPED | OUTPATIENT
Start: 2021-08-26 | End: 2021-11-22

## 2021-08-26 NOTE — PROGRESS NOTES
[address]  Dept: [phone]       Patient:  Reginaldo Epps  :      1945  MRN:      [id number]    Chief Complaint:  Patient pr TABLET BY MOUTH ONE TIME DAILY  90 tablet 0   • SPIRONOLACTONE 25 MG Oral Tab Take 1 tablet (25 mg total) by mouth daily. 90 tablet 0   • GABAPENTIN 600 MG Oral Tab Take 1 tablet (600 mg total) by mouth 2 (two) times daily.  180 tablet 0   • ROSUVASTATIN CA Special Diet: Not Asked        Back Care: Not Asked        Exercise: No        Bike Helmet: Not Asked        Seat Belt: Not Asked        Self-Exams: Not Asked        Grew up on a farm: Not Asked        History of tanning: Not Asked        Outdoor occupatio REPLACEMENT Right    • TOTAL KNEE REPLACEMENT Right    • TUBAL LIGATION     • YAG CAPSULOTOMY - OD - RIGHT EYE Right 2/12/14    RJVANESSA     Family History:    Family History   Problem Relation Age of Onset   • Colon Cancer Father    • Other (hx of SC ( unknown negative  All other systems were reviewed and are negative    Physical Exam:   General appearance: alert, appears stated age, cooperative and morbidly obese  Head: Normocephalic, without obvious abnormality, atraumatic  Back: symmetric, no curvature.  ROM n minutes per day. 4. Increase fruit and vegetable servings to 5-6 per day.       Doing well    Will start topiramate for cravings  Aware of kidney stone  She still wants to try the medication  Advised to stay hydrated    Diagnoses and all orders for this vi

## 2021-08-27 DIAGNOSIS — F41.1 ANXIETY STATE: ICD-10-CM

## 2021-08-27 NOTE — TELEPHONE ENCOUNTER
Refill passed per [facility] protocol. High level drug interaction with meloxicam, requires provider review.     Requested Prescriptions   Pending Prescriptions Disp Refills    SERTRALINE 100 MG Oral Tab [Pharmacy Med Name: Sertraline Hydrochloride 10

## 2021-08-28 DIAGNOSIS — I70.90 ATHEROSCLEROSIS: ICD-10-CM

## 2021-08-28 DIAGNOSIS — E78.2 MIXED HYPERLIPIDEMIA: ICD-10-CM

## 2021-08-28 RX ORDER — SERTRALINE HYDROCHLORIDE 100 MG/1
TABLET, FILM COATED ORAL
Qty: 90 TABLET | Refills: 1 | Status: SHIPPED | OUTPATIENT
Start: 2021-08-28 | End: 2022-01-07

## 2021-08-28 RX ORDER — ROSUVASTATIN CALCIUM 20 MG/1
20 TABLET, COATED ORAL
Qty: 90 TABLET | Refills: 0 | Status: SHIPPED | OUTPATIENT
Start: 2021-08-28 | End: 2022-01-07

## 2021-09-10 DIAGNOSIS — I10 ESSENTIAL HYPERTENSION: ICD-10-CM

## 2021-09-10 DIAGNOSIS — R09.82 POST-NASAL DRIP: ICD-10-CM

## 2021-09-10 DIAGNOSIS — M51.26 LUMBAR HERNIATED DISC: ICD-10-CM

## 2021-09-10 DIAGNOSIS — J30.1 SEASONAL ALLERGIC RHINITIS DUE TO POLLEN: ICD-10-CM

## 2021-09-12 RX ORDER — SPIRONOLACTONE 25 MG/1
25 TABLET ORAL DAILY
Qty: 90 TABLET | Refills: 0 | Status: SHIPPED | OUTPATIENT
Start: 2021-09-12 | End: 2021-10-30

## 2021-09-12 RX ORDER — MONTELUKAST SODIUM 10 MG/1
TABLET ORAL
Qty: 90 TABLET | Refills: 0 | Status: SHIPPED | OUTPATIENT
Start: 2021-09-12 | End: 2022-02-07

## 2021-09-12 RX ORDER — GABAPENTIN 600 MG/1
TABLET ORAL
Qty: 180 TABLET | Refills: 0 | Status: SHIPPED | OUTPATIENT
Start: 2021-09-12 | End: 2022-02-07

## 2021-09-12 RX ORDER — FEXOFENADINE HCL 180 MG/1
180 TABLET ORAL DAILY
Qty: 90 TABLET | Refills: 0 | Status: SHIPPED | OUTPATIENT
Start: 2021-09-12 | End: 2021-12-28

## 2021-09-21 DIAGNOSIS — J30.1 SEASONAL ALLERGIC RHINITIS DUE TO POLLEN: ICD-10-CM

## 2021-09-21 DIAGNOSIS — R09.82 POST-NASAL DRIP: ICD-10-CM

## 2021-09-21 RX ORDER — FEXOFENADINE HCL 180 MG/1
180 TABLET ORAL DAILY
Qty: 90 TABLET | Refills: 0 | OUTPATIENT
Start: 2021-09-21 | End: 2022-01-19

## 2021-09-21 RX ORDER — HYDROCODONE BITARTRATE AND ACETAMINOPHEN 5; 325 MG/1; MG/1
1 TABLET ORAL EVERY 4 HOURS PRN
Qty: 30 TABLET | Refills: 0 | Status: SHIPPED | OUTPATIENT
Start: 2021-09-21 | End: 2021-10-22

## 2021-09-21 NOTE — TELEPHONE ENCOUNTER
Please review; protocol failed. Requested Prescriptions   Pending Prescriptions Disp Refills    HYDROcodone-acetaminophen 5-325 MG Oral Tab 30 tablet 0     Sig: Take 1 tablet by mouth every 4 (four) hours as needed.         There is no refill protocol in

## 2021-10-04 DIAGNOSIS — I10 ESSENTIAL HYPERTENSION: ICD-10-CM

## 2021-10-04 RX ORDER — TELMISARTAN AND HYDROCHLORTHIAZIDE 80; 25 MG/1; MG/1
1 TABLET ORAL DAILY
Qty: 90 TABLET | Refills: 1 | Status: SHIPPED | OUTPATIENT
Start: 2021-10-04

## 2021-10-04 NOTE — TELEPHONE ENCOUNTER
Refill passed per [facility] protocol.     Red contraindication, please assist.    Requested Prescriptions   Pending Prescriptions Disp Refills    TELMISARTAN-HCTZ 80-25 MG Oral Tab [Pharmacy Med Name: Telmisartan/Hydrochlorothiazide 80-25 Mg Tab Beatris]

## 2021-10-22 DIAGNOSIS — I10 ESSENTIAL HYPERTENSION: ICD-10-CM

## 2021-10-22 RX ORDER — TELMISARTAN AND HYDROCHLORTHIAZIDE 80; 25 MG/1; MG/1
1 TABLET ORAL DAILY
Qty: 90 TABLET | Refills: 1 | OUTPATIENT
Start: 2021-10-22

## 2021-10-25 RX ORDER — HYDROCODONE BITARTRATE AND ACETAMINOPHEN 5; 325 MG/1; MG/1
1 TABLET ORAL EVERY 4 HOURS PRN
Qty: 30 TABLET | Refills: 0 | Status: SHIPPED | OUTPATIENT
Start: 2021-10-25 | End: 2021-12-17

## 2021-10-29 DIAGNOSIS — I10 ESSENTIAL HYPERTENSION: ICD-10-CM

## 2021-10-30 NOTE — TELEPHONE ENCOUNTER
Medication pended due to drug warning. Refill passed per [facility] protocol.   Requested Prescriptions   Pending Prescriptions Disp Refills    SPIRONOLACTONE 25 MG Oral Tab [Pharmacy Med Name: Spironolactone 25 Mg Tab Cadi] 90 tablet 0     Si

## 2021-10-31 RX ORDER — SPIRONOLACTONE 25 MG/1
25 TABLET ORAL DAILY
Qty: 90 TABLET | Refills: 1 | Status: SHIPPED | OUTPATIENT
Start: 2021-10-31 | End: 2021-12-16

## 2021-11-06 DIAGNOSIS — M51.26 LUMBAR HERNIATED DISC: ICD-10-CM

## 2021-11-06 RX ORDER — MELOXICAM 15 MG/1
15 TABLET ORAL DAILY
Qty: 90 TABLET | Refills: 0 | Status: SHIPPED | OUTPATIENT
Start: 2021-11-06 | End: 2022-02-07

## 2021-11-06 NOTE — TELEPHONE ENCOUNTER
Please review; protocol failed/no protocol    Please send, with refills, if appropriate      Requested Prescriptions   Pending Prescriptions Disp Refills    MELOXICAM 15 MG Oral Tab [Pharmacy Med Name: Meloxicam 15 Mg Tab Zydu] 90 tablet 0     Sig: TAKE ON

## 2021-11-16 NOTE — TELEPHONE ENCOUNTER
Appointment Request with Dr. Teresita Hollins.   Message [phone]  From   Mabel Yang To   Osorio Marcum and Delivered   11/11/2021  9:37 AM   Last Read in MyChart   11/11/2021  9:48 AM by Mariya aMjano

## 2021-11-20 DIAGNOSIS — E66.01 MORBID OBESITY WITH BMI OF 40.0-44.9, ADULT (HCC): ICD-10-CM

## 2021-11-22 RX ORDER — TOPIRAMATE 25 MG/1
25 TABLET ORAL EVERY EVENING
Qty: 30 TABLET | Refills: 0 | Status: SHIPPED | OUTPATIENT
Start: 2021-11-22 | End: 2021-12-11

## 2021-11-22 NOTE — TELEPHONE ENCOUNTER
Requesting   Requested Prescriptions     Pending Prescriptions Disp Refills   • TOPIRAMATE 25 MG Oral Tab [Pharmacy Med Name: Topiramate 25 Mg Tab Cipl] 30 tablet 0     Sig: Take 1 tablet (25 mg total) by mouth every evening.      LOV: 8/26/21  Filled: 8/26

## 2021-12-11 DIAGNOSIS — E66.01 MORBID OBESITY WITH BMI OF 40.0-44.9, ADULT (HCC): ICD-10-CM

## 2021-12-11 RX ORDER — HYDROCODONE BITARTRATE AND ACETAMINOPHEN 5; 325 MG/1; MG/1
1 TABLET ORAL EVERY 4 HOURS PRN
Qty: 30 TABLET | Refills: 0 | OUTPATIENT
Start: 2021-12-11

## 2021-12-11 NOTE — TELEPHONE ENCOUNTER
Please review. Protocol Failed or has No Protocol. Requested Prescriptions   Pending Prescriptions Disp Refills    HYDROcodone-acetaminophen 5-325 MG Oral Tab 30 tablet 0     Sig: Take 1 tablet by mouth every 4 (four) hours as needed.         There is no

## 2021-12-11 NOTE — TELEPHONE ENCOUNTER
Patient requesting a return phone call to see why the following medication was denied.     Hydrocodone

## 2021-12-11 NOTE — TELEPHONE ENCOUNTER
KRISTY Bautista 27 minutes ago (10:08 AM)           Hit the wrong button no need see you my next appointment

## 2021-12-12 DIAGNOSIS — I10 ESSENTIAL HYPERTENSION: ICD-10-CM

## 2021-12-13 RX ORDER — TOPIRAMATE 25 MG/1
25 TABLET ORAL EVERY EVENING
Qty: 30 TABLET | Refills: 0 | Status: SHIPPED | OUTPATIENT
Start: 2021-12-13

## 2021-12-16 RX ORDER — SPIRONOLACTONE 25 MG/1
TABLET ORAL
Qty: 90 TABLET | Refills: 0 | Status: SHIPPED | OUTPATIENT
Start: 2021-12-16

## 2021-12-17 RX ORDER — HYDROCODONE BITARTRATE AND ACETAMINOPHEN 5; 325 MG/1; MG/1
1 TABLET ORAL EVERY 4 HOURS PRN
Qty: 30 TABLET | Refills: 0 | Status: SHIPPED | OUTPATIENT
Start: 2021-12-17 | End: 2022-01-16

## 2021-12-17 RX ORDER — HYDROCODONE BITARTRATE AND ACETAMINOPHEN 5; 325 MG/1; MG/1
1 TABLET ORAL EVERY 4 HOURS PRN
Qty: 30 TABLET | Refills: 0 | OUTPATIENT
Start: 2021-12-17

## 2021-12-17 NOTE — TELEPHONE ENCOUNTER
Please review. Protocol Failed or has No Protocol. Pt called regarding refill. See other mychart encounters from 12/11. Pt states that she does need a refill and the message from her was an error last week.     Requested Prescriptions   Pending Prescripti

## 2021-12-28 DIAGNOSIS — J30.1 SEASONAL ALLERGIC RHINITIS DUE TO POLLEN: ICD-10-CM

## 2021-12-28 DIAGNOSIS — R09.82 POST-NASAL DRIP: ICD-10-CM

## 2021-12-28 RX ORDER — FEXOFENADINE HCL 180 MG/1
TABLET ORAL
Qty: 90 TABLET | Refills: 0 | Status: SHIPPED | OUTPATIENT
Start: 2021-12-28

## 2022-01-07 ENCOUNTER — OFFICE VISIT (OUTPATIENT)
Dept: FAMILY MEDICINE CLINIC | Facility: CLINIC | Age: 77
End: 2022-01-07
Payer: MEDICARE

## 2022-01-07 VITALS
DIASTOLIC BLOOD PRESSURE: 66 MMHG | BODY MASS INDEX: 42.35 KG/M2 | HEART RATE: 65 BPM | HEIGHT: 63.4 IN | WEIGHT: 242 LBS | TEMPERATURE: 97 F | SYSTOLIC BLOOD PRESSURE: 128 MMHG

## 2022-01-07 DIAGNOSIS — E78.2 MIXED HYPERLIPIDEMIA: ICD-10-CM

## 2022-01-07 DIAGNOSIS — E66.01 MORBID OBESITY DUE TO EXCESS CALORIES (HCC): ICD-10-CM

## 2022-01-07 DIAGNOSIS — R91.8 PULMONARY NODULES: ICD-10-CM

## 2022-01-07 DIAGNOSIS — F41.1 ANXIETY STATE: ICD-10-CM

## 2022-01-07 DIAGNOSIS — I70.90 ATHEROSCLEROSIS: Primary | ICD-10-CM

## 2022-01-07 PROCEDURE — 99214 OFFICE O/P EST MOD 30 MIN: CPT | Performed by: FAMILY MEDICINE

## 2022-01-07 RX ORDER — ROSUVASTATIN CALCIUM 20 MG/1
20 TABLET, COATED ORAL
Qty: 90 TABLET | Refills: 3 | Status: SHIPPED | OUTPATIENT
Start: 2022-01-07

## 2022-01-07 RX ORDER — SERTRALINE HYDROCHLORIDE 100 MG/1
TABLET, FILM COATED ORAL
Qty: 90 TABLET | Refills: 3 | Status: SHIPPED | OUTPATIENT
Start: 2022-01-07

## 2022-01-07 NOTE — PROGRESS NOTES
Patient ID: Derik Cooper is a 78year old female. HPI  No chief complaint on file. She is here to follow-up on medications. Last seen by me on 5/20/2021. Pt is taking Vit E and zinc and believes these are causing her to have diarrhea.  She 122/78  06/10/21 : 126/74  05/20/21 : 136/77  03/18/21 : 134/66  01/15/21 : 134/77        Review of Systems   Respiratory: Negative for shortness of breath. Cardiovascular: Negative for chest pain. Gastrointestinal: Positive for diarrhea.    Musculoske mg total) by mouth every evening. 30 tablet 0   • Meloxicam 15 MG Oral Tab Take 1 tablet (15 mg total) by mouth daily. 90 tablet 0   • Telmisartan-HCTZ 80-25 MG Oral Tab Take 1 tablet by mouth daily.  90 tablet 1   • GABAPENTIN 600 MG Oral Tab Take 1 tablet Normal mood and affect. Lower legs: No edema of the legs bilaterally. Vitals reviewed. Assessment/Plan:      Diagnoses and all orders for this visit:    Atherosclerosis  -     rosuvastatin 20 MG Oral Tab;  Take 1 tablet (20 mg total) by mouth in my presence. I have reviewed the chart and discharge instructions (if applicable) and agree that the record reflects my personal performance and is accurate and complete.   [address] [place] [address], , 1/7/2022, 3:18 PM

## 2022-01-18 RX ORDER — HYDROCODONE BITARTRATE AND ACETAMINOPHEN 5; 325 MG/1; MG/1
1 TABLET ORAL EVERY 4 HOURS PRN
Qty: 30 TABLET | Refills: 0 | Status: SHIPPED | OUTPATIENT
Start: 2022-01-18

## 2022-01-31 ENCOUNTER — NURSE TRIAGE (OUTPATIENT)
Dept: FAMILY MEDICINE CLINIC | Facility: CLINIC | Age: 77
End: 2022-01-31

## 2022-01-31 RX ORDER — CIPROFLOXACIN 500 MG/1
500 TABLET, FILM COATED ORAL 2 TIMES DAILY
Qty: 6 TABLET | Refills: 0 | Status: SHIPPED | OUTPATIENT
Start: 2022-01-31 | End: 2022-02-03

## 2022-01-31 NOTE — TELEPHONE ENCOUNTER
Cipro was sent to her pharmacy. 1 pill twice daily for 3 days. Only needs to see me if it does not feel better after 3 days.

## 2022-01-31 NOTE — TELEPHONE ENCOUNTER
Action Requested: Summary for Provider     []  Critical Lab, Recommendations Needed  [] Need Additional Advice  []   FYI    []   Need Orders  [x] Need Medications Sent to Pharmacy  []  Other     SUMMARY: Patient is currently in [place] and leaving to the

## 2022-02-07 RX ORDER — GABAPENTIN 600 MG/1
TABLET ORAL
Qty: 180 TABLET | Refills: 0 | Status: SHIPPED | OUTPATIENT
Start: 2022-02-07

## 2022-02-07 RX ORDER — MELOXICAM 15 MG/1
TABLET ORAL
Qty: 90 TABLET | Refills: 0 | Status: SHIPPED | OUTPATIENT
Start: 2022-02-07

## 2022-02-07 RX ORDER — MONTELUKAST SODIUM 10 MG/1
TABLET ORAL
Qty: 90 TABLET | Refills: 0 | Status: SHIPPED | OUTPATIENT
Start: 2022-02-07

## 2022-02-10 ENCOUNTER — OFFICE VISIT (OUTPATIENT)
Dept: FAMILY MEDICINE CLINIC | Facility: CLINIC | Age: 77
End: 2022-02-10
Payer: MEDICARE

## 2022-02-10 VITALS
HEART RATE: 55 BPM | BODY MASS INDEX: 42.35 KG/M2 | WEIGHT: 242 LBS | SYSTOLIC BLOOD PRESSURE: 152 MMHG | DIASTOLIC BLOOD PRESSURE: 81 MMHG | HEIGHT: 63.4 IN

## 2022-02-10 DIAGNOSIS — R30.0 DYSURIA: Primary | ICD-10-CM

## 2022-02-10 LAB
BILIRUBIN: NEGATIVE
GLUCOSE (URINE DIPSTICK): NEGATIVE MG/DL
KETONES (URINE DIPSTICK): NEGATIVE MG/DL
MULTISTIX LOT#: ABNORMAL NUMERIC
NITRITE, URINE: NEGATIVE
OCCULT BLOOD: NEGATIVE
PH, URINE: 6.5 (ref 4.5–8)
PROTEIN (URINE DIPSTICK): NEGATIVE MG/DL
SPECIFIC GRAVITY: 1.01 (ref 1–1.03)
URINE-COLOR: YELLOW
UROBILINOGEN,SEMI-QN: 0.2 MG/DL (ref 0–1.9)

## 2022-02-10 PROCEDURE — 99213 OFFICE O/P EST LOW 20 MIN: CPT | Performed by: NURSE PRACTITIONER

## 2022-02-10 PROCEDURE — 81003 URINALYSIS AUTO W/O SCOPE: CPT | Performed by: NURSE PRACTITIONER

## 2022-02-13 RX ORDER — NITROFURANTOIN 25; 75 MG/1; MG/1
100 CAPSULE ORAL 2 TIMES DAILY
Qty: 14 CAPSULE | Refills: 0 | Status: SHIPPED | OUTPATIENT
Start: 2022-02-13

## 2022-02-16 NOTE — ASSESSMENT & PLAN NOTE
Urine dip shows tr leuks-send out for culture  Supportive care discussed to help alleviate symptoms  Please call if symptoms worsen or are not resolving.

## 2022-02-22 NOTE — PROGRESS NOTES
[facility] MEDICINE PROGRESS NOTE    Patient: Larisa Jolley Encounter Date: 2/22/2022   YOB: 1929 Admission Date: 2/10/2022  7:25 AM   MRN: [id number] Inpatient LOS: 3 day(s)   Room:  105/02 Hospital Day:  Hospital Day: 5       History and Subjective complaints       Interval history and Overnight events:    Patient continues to complain of pain in the left chest.  Is oriented to person, place and time.  Did not work with physical therapy yesterday.  Denies nausea vomiting or abdominal pain.  Has been afebrile.    Hospital Course  Patients interval history reviewed/EHR notes reviewed.   Larisa Jolley is a 89 year old female who presented on 2/10/2022 with complaints of Fall and Rib Pain  89-year-old female with a past medical history of osteopenia, left shoulder pain, right rotator cuff tear, hyperlipidemia, anxiety, depression, gastric ulcer presents to the hospital status post fall.  Patient lives in assisted living facility.  Patient felt a strain sensation over her left side and had a fall followed by pain on the left side.  Denies any loss of consciousness.  Denies any head injury.  Vital signs in the ER revealed the patient to be afebrile hypertensive with normal saturation.  X-ray chest showed acute fracture of the left lower lateral 8th and 9th rib without pneumothorax.  CT of the chest and abdomen pelvis showed left posterior lateral 8-10 fracture with mild displacement with no evidence of hemoperitoneum a hemothorax a splenic laceration.  CT cervical spine no fractures.  CT head no acute injury.  Lab work consistent with mild leukocytosis and hyperkalemia with a potassium of 5.4.  UA unremarkable for infection.  Patient admitted on the hospitalist service for uncontrolled pain secondary to multiple rib fractures.      Reviewed Pertinent Histories: Medical History, Surgical History, Social History, Family History,     ROS: Pertinent systems negative except as  [facility] Consultation Note       Patient:  Sagrario Kwong  :      1945  MRN:      [id number]    Referring Provider: Dr. Azul Clemens       Chief Complaint:  Patient presents with:  Weight Management: Non-surgical weight manageme MG Oral Tab Take 1 tablet by mouth every 4 (four) hours as needed.  30 tablet 0   • MONTELUKAST SODIUM 10 MG Oral Tab TAKE ONE TABLET BY MOUTH ONE TIME DAILY  90 tablet 0   • ROSUVASTATIN CALCIUM 20 MG Oral Tab Take 1 tablet (20 mg total) by mouth once dwight above.  No palpitations, shortness of breath, abdominal pain, changes in bowel habits or dysuria.  No focal acute weakness or numbness.     Physical Examination       Vital 24 Hour Range Most Recent Value   Temperature No data recorded 98.7 °F (37.1 °C)   Pulse No data recorded 84   Respiratory No data recorded 18   Blood Pressure No data recorded (!) 142/73   Pulse Oximetry No data recorded 93 %   Arterial BP No data recorded     O2 No data recorded         Vital Most Recent Value First Value   Weight 78 kg (171 lb 15.3 oz) Weight: 76.6 kg (168 lb 14 oz)   Height 5' 6\" (167.6 cm) Height: 5' 6\" (167.6 cm)   BMI 27.75 N/A     Intake and Output:    No intake or output data in the 24 hours ending 02/22/22 1417    Last Stool Occurrence:       Physical Examination  General  Well developed, Well nourished, minimal distress from chest wall pain.   Head  Normocephalic, Without obvious abnormality, Atraumatic, Non tender.   Neck Supple neck, No lymphadenopathy, No thyromegaly, No jugular venous distension, No carotid bruit.   Eyes  Extraocular movements intact. No Icterus, No scleral injection, No conjunctival pallor.   Nose No deformity, No drainage, No sinus tenderness.   Throat Moist membranes, Uvula midline. No erythema, discharge or thrush   Lungs   Equal air entry bilaterally, No wheezes, No rhonchi, No rales.   Heart  Regular rate and rhythm, No rubs, No gallops, No murmurs.   Abdomen   Soft, Non tender, Non distended, Normoactive bowel sounds, No organomegaly.   Genitourinary No suprapubic tenderness, No flank tenderness, No catheters.   Neurological Alert, awake, Oriented x3, No focal motor weakness, Cranial nerves II-XII intact.   Extremities No cyanosis, No edema, Pulsations palpable and symmetric bilaterally.   Skin No ulcers, No rash, No wounds.        Lab Test Results     The Laboratory values listed below have been reviewed and pertinent findings discussed in the Assessment and Plan.    Laboratory values:   No  Activity      Alcohol use:  Yes        Alcohol/week: 0.0 standard drinks        Comment: social      Drug use: No      Sexual activity: Not on file    Other Topics      Concerns:         Service: Not Asked        Blood Transfusions: Not Asked results found    No results found     No results found    Invalid input(s): CAPTH, ALKPHOS, NH#  Lab Results   Component Value Date    USPG 1.015 02/10/2022    UPROT Negative 02/10/2022    UWBC Negative 02/10/2022    URBC Small (A) 02/10/2022    5UNITR negative 07/30/2013    UPH 8.0 (H) 02/10/2022    UBACTRA None Seen 02/10/2022          Diagnostic Images     Imaging studies have been reviewed and pertinent findings discussed in the Assessment and Plan.    No results found for any visits on 02/10/22 (from the past 48 hour(s)).    Tubes, Devices, Monitoring     Telemetry: On    Hernandez: No     Medications     Reviewed    Home Medications:  No medications prior to admission.        Inpatient Medications:  Scheduled Medications:      Continuous Infusions:        PRN Medications:        Assessment and Plan     - Acute encephalopathy-resolved  Likely secondary to metabolic toxic encephalopathy and polypharmacy, currently improved after medication adjustments including holding hydroxyzine and trazodone  Judicious use of pain medications, use Norco p.r.n.  Workup for other causes of encephalopathy negative-no UTI, normal TSH, B12 and folic acid, CT brain negative  Currently appears back to her baseline     - Mechanical fall  With history of rotator cuff tear and difficulty using walker  Continue fall precautions.    Evaluated with physical therapy, cleared for discharge to assisted living facility with home therapies    - Acute mildly displaced fractures fracture of the left posterior lateral 8 9th and 10th rib  No parenchymal tissue injury, no pneumothorax or hydrothorax on imaging studies.    Saturating well on room air, no respiratory distress.    Evaluated by surgical team, no indication for acute interventions.    Continue Lidoderm patch, incentive spirometry.    Continue pain management and bowel regimen    - Osteopenia  Continue calcium and vitamin-D replacement therapy    - Hyperlipidemia  Continue home dose of  Surgical History:   Procedure Laterality Date   • CATARACT EXTRACTION W/  INTRAOCULAR LENS IMPLANT Right 9/12/11    RJM   • CHOLECYSTECTOMY     • COLONOSCOPY N/A 10/1/2019    Procedure: COLONOSCOPY;  Surgeon: Kala Clements MD;  Location: [address] [place] [address] atorvastatin    - History of gastric ulcers  No signs of active bleeding, within normal serum hemoglobin   Continue Protonix therapy    - Electrolyte abnormalities  Hypokalemia on labs this morning, replete per protocol    - hyperactive urinary bladder  Continue home dose of Myrbetriq    - Anxiety/depression  Continue home dose of Effexor and trazodone       Orders     Consults:  IP CONSULT TO GENERAL SURGERY    Diet:       Therapy Orders:   PT and OT Orders Placed this Encounter   Procedures   • Occupational Therapy   • Occupational Therapy   • Physical Therapy   • Physical Therapy       DVT Prophylaxis:  Heparin 5000 units sub q tid    Quality Indicators:  Smoking status- non smoker    Nutrition status- appropriate  Body mass index is 27.75 kg/m². - Overweight BMI 25-29  Limited English proficiency with :  No    Advanced Directives     Code Status: Prior           Discharge Plan     The patients treatment plans were discussed with patient and RN.     Recommendations for Discharge   SW     PT Assisted living, Home therapy, Other (comment) (return the [facility])   OT Assisted living, Home therapy   SLP       Anticipated discharge destination: Assisted Living Facility [facility]         Barriers to Discharge:  Likely discharge to assisted living facility today          Jonathan España MD  Hospitalist Serviceis  2/22/2022  2:17 PM    (Contact by secure chat or pager )     fatigue  Respiratory: negative  Cardiovascular: negative  Gastrointestinal: positive for constipation  Musculoskeletal:negative  Neurological: negative  Behavioral/Psych: negative  Endocrine: negative   All other systems were reviewed and are negative. good support from her friend Anita Cates        Diagnoses and all orders for this visit:    Weight gain    HTN (hypertension), benign    Stress    Dyslipidemia    Morbid obesity with BMI of 40.0-44.9, adult ([facility]        Mei Hinton MD

## 2022-03-06 RX ORDER — HYDROCODONE BITARTRATE AND ACETAMINOPHEN 5; 325 MG/1; MG/1
1 TABLET ORAL EVERY 4 HOURS PRN
Qty: 30 TABLET | Refills: 0 | Status: SHIPPED | OUTPATIENT
Start: 2022-03-06

## 2022-03-06 NOTE — TELEPHONE ENCOUNTER
Please review; protocol failed/no protocol    Requested Prescriptions   Pending Prescriptions Disp Refills    HYDROcodone-acetaminophen 5-325 MG Oral Tab 30 tablet 0     Sig: Take 1 tablet by mouth every 4 (four) hours as needed.         There is no refill protocol information for this order            Recent Outpatient Visits              3 weeks ago [address], [facility], [address], Jackie Ramos, KRISTY    Office Visit    1 month ago Atherosclerosis    [facility], [facility], [facility], [address], [place], DO    Office Visit    6 months ago HTN (hypertension), benign    Angie Scott MD    Office Visit    8 months ago [address], Kary Davenport MD    Office Visit    9 months ago L1-2 left > right mild-mod diffuse, L2-3 left mod & left mod foraminal, L3-4 mod diffuse, L4-5 right large far lateral & right mod diffuse, L5-S1 right mod-large far lateral bulging discs    [facility], [facility], [facility] [address], [address], [place], DO    Office Visit             Future Appointments         Provider Department Appt Notes    In 4 days Salud Schreiber, [facility], [address], [place] MEDICARE  NON SX F/U

## 2022-03-10 ENCOUNTER — OFFICE VISIT (OUTPATIENT)
Dept: SURGERY | Facility: CLINIC | Age: 77
End: 2022-03-10
Payer: MEDICARE

## 2022-03-10 VITALS
WEIGHT: 239.13 LBS | HEART RATE: 65 BPM | OXYGEN SATURATION: 95 % | DIASTOLIC BLOOD PRESSURE: 62 MMHG | HEIGHT: 63.4 IN | SYSTOLIC BLOOD PRESSURE: 135 MMHG | BODY MASS INDEX: 41.85 KG/M2

## 2022-03-10 DIAGNOSIS — I10 HTN (HYPERTENSION), BENIGN: Primary | ICD-10-CM

## 2022-03-10 DIAGNOSIS — E66.01 MORBID OBESITY WITH BMI OF 40.0-44.9, ADULT (HCC): ICD-10-CM

## 2022-03-10 DIAGNOSIS — F43.9 STRESS: ICD-10-CM

## 2022-03-10 DIAGNOSIS — E78.5 DYSLIPIDEMIA: ICD-10-CM

## 2022-03-10 PROCEDURE — 99214 OFFICE O/P EST MOD 30 MIN: CPT | Performed by: INTERNAL MEDICINE

## 2022-03-10 RX ORDER — DIETHYLPROPION HYDROCHLORIDE 25 MG/1
1 TABLET ORAL
Qty: 30 TABLET | Refills: 1 | Status: SHIPPED | OUTPATIENT
Start: 2022-03-10 | End: 2022-04-09

## 2022-03-10 RX ORDER — TOPIRAMATE 25 MG/1
25 TABLET ORAL EVERY EVENING
Qty: 30 TABLET | Refills: 0 | Status: SHIPPED | OUTPATIENT
Start: 2022-03-10

## 2022-03-20 RX ORDER — SPIRONOLACTONE 25 MG/1
25 TABLET ORAL DAILY
Qty: 90 TABLET | Refills: 1 | Status: SHIPPED | OUTPATIENT
Start: 2022-03-20

## 2022-03-20 NOTE — TELEPHONE ENCOUNTER
Refill passed per [employer], [place] protocol. Unable to refill per protocol due to high drug interaction warning. High    Drug-Drug: spironolactone and Telmisartan-HCTZ  The risk of hyperkalemia may be increased when potassium-sparing diuretics are co-administered with angiotensin II receptor antagonists.     Details             Requested Prescriptions   Pending Prescriptions Disp Refills    SPIRONOLACTONE 25 MG Oral Tab [Pharmacy Med Name: Spironolactone 25 Mg Tab Nort] 90 tablet 0     Sig: TAKE ONE TABLET BY MOUTH ONE TIME DAILY        Hypertensive Medications Protocol Passed - 3/20/2022 11:56 AM        Passed - CMP or BMP in past 12 months        Passed - Appointment in past 6 or next 3 months        Passed - GFR Non- > 50     Lab Results   Component Value Date    GFRNAA 61 (L) 04/24/2021                       Recent Outpatient Visits              1 week ago HTN (hypertension), benign    Emily Shields MD    Office Visit    1 month ago [address], [facility], [address], [facility], APRN    Office Visit    2 months ago Atherosclerosis    [facility], [facility], [facility], [address], [place],     Office Visit    6 months ago HTN (hypertension), benign    Emily Shields MD    Office Visit    9 months ago [facility] gain Rigoberto Leep, Daymon Gaucher, MD    Office Visit            Future Appointments         Provider Department Appt Notes    In 1 month Kinjal Burris MD [facility], [place] MEDICARE  NON SX F/U

## 2022-03-21 NOTE — OPERATIVE REPORT
[facility]    PATIENT'S NAME: Renan Iniguez KORINA   ATTENDING PHYSICIAN: Shruthi Toussaint MD   OPERATING PHYSICIAN: Dony Gloria MD   PATIENT ACCOUNT#:   [id number]    LOCATION:  [address]  MEDICAL RECORD #:   [id number] Urinary Tract Infection, Adult    A urinary tract infection (UTI) is an infection of any part of the urinary tract. The urinary tract includes the kidneys, ureters, bladder, and urethra. These organs make, store, and get rid of urine in the body.  Your health care provider may use other names to describe the infection. An upper UTI affects the ureters and kidneys (pyelonephritis). A lower UTI affects the bladder (cystitis) and urethra (urethritis).  What are the causes?  Most urinary tract infections are caused by bacteria in your genital area, around the entrance to your urinary tract (urethra). These bacteria grow and cause inflammation of your urinary tract.  What increases the risk?  You are more likely to develop this condition if:  You have a urinary catheter that stays in place (indwelling).  You are not able to control when you urinate or have a bowel movement (you have incontinence).  You are female and you:  Use a spermicide or diaphragm for birth control.  Have low estrogen levels.  Are pregnant.  You have certain genes that increase your risk (genetics).  You are sexually active.  You take antibiotic medicines.  You have a condition that causes your flow of urine to slow down, such as:  An enlarged prostate, if you are male.  Blockage in your urethra (stricture).  A kidney stone.  A nerve condition that affects your bladder control (neurogenic bladder).  Not getting enough to drink, or not urinating often.  You have certain medical conditions, such as:  Diabetes.  A weak disease-fighting system (immunesystem).  Sickle cell disease.  Gout.  Spinal cord injury.  What are the signs or symptoms?  Symptoms of this condition include:  Needing to urinate right away (urgently).  Frequent urination or passing small amounts of urine frequently.  Pain or burning with urination.  Blood in the urine.  Urine that smells bad or unusual.  Trouble urinating.  Cloudy urine.  Vaginal discharge, if you are female.  Pain in  made first, and a patella protector was placed over the cut surface of the patella which was then subluxed into the lateral gutter.   The anterior and posterior cruciate ligaments were excised, and the femoral template was placed over the distal femur and s the abdomen or the lower back.  You may also have:  Vomiting or a decreased appetite.  Confusion.  Irritability or tiredness.  A fever.  Diarrhea.  The first symptom in older adults may be confusion. In some cases, they may not have any symptoms until the infection has worsened.  How is this diagnosed?  This condition is diagnosed based on your medical history and a physical exam. You may also have other tests, including:  Urine tests.  Blood tests.  Tests for sexually transmitted infections (STIs).  If you have had more than one UTI, a cystoscopy or imaging studies may be done to determine the cause of the infections.  How is this treated?  Treatment for this condition includes:  Antibiotic medicine.  Over-the-counter medicines to treat discomfort.  Drinking enough water to stay hydrated.  If you have frequent infections or have other conditions such as a kidney stone, you may need to see a health care provider who specializes in the urinary tract (urologist).  In rare cases, urinary tract infections can cause sepsis. Sepsis is a life-threatening condition that occurs when the body responds to an infection. Sepsis is treated in the hospital with IV antibiotics, fluids, and other medicines.  Follow these instructions at home:    Medicines  Take over-the-counter and prescription medicines only as told by your health care provider.  If you were prescribed an antibiotic medicine, take it as told by your health care provider. Do not stop using the antibiotic even if you start to feel better.  General instructions  Make sure you:  Empty your bladder often and completely. Do not hold urine for long periods of time.  Empty your bladder after sex.  Wipe from front to back after a bowel movement if you are female. Use each tissue one time when you wipe.  Drink enough fluid to keep your urine pale yellow.  Keep all follow-up visits as told by your health care provider. This is important.  Contact a health care provider  if:  Your symptoms do not get better after 1-2 days.  Your symptoms go away and then return.  Get help right away if you have:  Severe pain in your back or your lower abdomen.  A fever.  Nausea or vomiting.  Summary  A urinary tract infection (UTI) is an infection of any part of the urinary tract, which includes the kidneys, ureters, bladder, and urethra.  Most urinary tract infections are caused by bacteria in your genital area, around the entrance to your urinary tract (urethra).  Treatment for this condition often includes antibiotic medicines.  If you were prescribed an antibiotic medicine, take it as told by your health care provider. Do not stop using the antibiotic even if you start to feel better.  Keep all follow-up visits as told by your health care provider. This is important.  This information is not intended to replace advice given to you by your health care provider. Make sure you discuss any questions you have with your health care provider.  Document Revised: 12/05/2019 Document Reviewed: 06/27/2019  [employer] Patient Education © 2021 [employer]     preparations were made for the stemmed portion of the tibial component. The trochlear recess was cut for the femur. The trial components were then removed, and the bone was prepared with pulsatile lavage.   All three components were cemented in place jaron

## 2022-03-27 RX ORDER — TELMISARTAN AND HYDROCHLORTHIAZIDE 80; 25 MG/1; MG/1
1 TABLET ORAL DAILY
Qty: 90 TABLET | Refills: 1 | Status: SHIPPED | OUTPATIENT
Start: 2022-03-27

## 2022-03-27 NOTE — TELEPHONE ENCOUNTER
Refill passed per [facility] protocol. Dr Isidro Mayers, this medication passed refill protocol with low GFR 59. Advise on this refill.   Please reply to pool: EM RN TRIAGE    Requested Prescriptions   Pending Prescriptions Disp Refills    TELMISARTAN-HCTZ 80-25 MG Oral Tab [Pharmacy Med Name: Telmisartan/Hydrochlorothiazide 80-25 Mg Tab Beatris] 90 tablet 0     Sig: TAKE ONE TABLET BY MOUTH ONE TIME DAILY        Hypertensive Medications Protocol Passed - 3/27/2022  1:31 AM        Passed - CMP or BMP in past 12 months        Passed - Appointment in past 6 or next 3 months        Passed - GFR Non- > 50     Lab Results   Component Value Date    GFRNAA 61 (L) 04/24/2021                     Recent Outpatient Visits              2 weeks ago HTN (hypertension), benign    Yolie Yang MD    Office Visit    1 month ago [address], [facility], [address], Steff Garza APRN    Office Visit    2 months ago Atherosclerosis    [facility], [facility], [address], [place],     Office Visit    7 months ago HTN (hypertension), benign    Yolie Yang MD    Office Visit    9 months ago [facility]    Jim Pastrana MD    Office Visit          Future Appointments         Provider Department Appt Notes    In 1 month Som Rosenthal MD [facility], [address] [place] [address] Rd,[address], [place] MEDICARE  NON SX F/U

## 2022-04-10 DIAGNOSIS — J30.1 SEASONAL ALLERGIC RHINITIS DUE TO POLLEN: ICD-10-CM

## 2022-04-10 DIAGNOSIS — R09.82 POST-NASAL DRIP: ICD-10-CM

## 2022-04-11 RX ORDER — FEXOFENADINE HCL 180 MG/1
180 TABLET ORAL DAILY
Qty: 90 TABLET | Refills: 1 | Status: SHIPPED | OUTPATIENT
Start: 2022-04-11 | End: 2022-10-04

## 2022-04-11 NOTE — TELEPHONE ENCOUNTER
Refill passed per [facility] protocol.      Requested Prescriptions   Pending Prescriptions Disp Refills    FEXOFENADINE 180 MG Oral Tab [Pharmacy Med Name: Fexofenadine Hydrochloride 180 Mg Tab Nort] 90 tablet 0     Sig: TAKE ONE TABLET BY MOUTH ONE TIME DAILY FOR ALLERGIES        Allergy Medication Protocol Passed - 4/10/2022  9:27 AM        Passed - Appoinment in past 12 or next 3 months                Recent Outpatient Visits              1 month ago HTN (hypertension), rachel Lee MD    Office Visit    2 months ago [address], [facility], [address], [facility], APRN    Office Visit    3 months ago Atherosclerosis    [facility], [facility], [address], [place],     Office Visit    7 months ago HTN (hypertension), rachel Lee MD    Office Visit    10 months ago [facility]    Ab Higuera, Neal Cummings MD    Office Visit             Future Appointments         Provider Department Appt Notes    In 1 month Madhavi Merida MD [address], [address], [place] MEDICARE  NON SX F/U

## 2022-04-21 ENCOUNTER — HOSPITAL ENCOUNTER (OUTPATIENT)
Dept: GENERAL RADIOLOGY | Age: 77
Discharge: HOME | End: 2022-04-21
Attending: NURSE PRACTITIONER
Payer: MEDICARE

## 2022-04-21 ENCOUNTER — OFFICE VISIT (OUTPATIENT)
Dept: FAMILY MEDICINE CLINIC | Facility: CLINIC | Age: 77
End: 2022-04-21
Payer: MEDICARE

## 2022-04-21 ENCOUNTER — TELEPHONE (OUTPATIENT)
Dept: FAMILY MEDICINE CLINIC | Facility: CLINIC | Age: 77
End: 2022-04-21

## 2022-04-21 VITALS
SYSTOLIC BLOOD PRESSURE: 130 MMHG | WEIGHT: 235 LBS | HEIGHT: 63.4 IN | DIASTOLIC BLOOD PRESSURE: 84 MMHG | TEMPERATURE: 99 F | BODY MASS INDEX: 41.12 KG/M2 | HEART RATE: 82 BPM

## 2022-04-21 DIAGNOSIS — I10 HTN (HYPERTENSION), BENIGN: ICD-10-CM

## 2022-04-21 DIAGNOSIS — R05.9 COUGH: ICD-10-CM

## 2022-04-21 DIAGNOSIS — J06.9 VIRAL UPPER RESPIRATORY TRACT INFECTION: ICD-10-CM

## 2022-04-21 DIAGNOSIS — J01.00 ACUTE NON-RECURRENT MAXILLARY SINUSITIS: ICD-10-CM

## 2022-04-21 DIAGNOSIS — J06.9 VIRAL UPPER RESPIRATORY TRACT INFECTION: Primary | ICD-10-CM

## 2022-04-21 LAB — SARS-COV-2 RNA RESP QL NAA+PROBE: NOT DETECTED

## 2022-04-21 PROCEDURE — 99213 OFFICE O/P EST LOW 20 MIN: CPT | Performed by: NURSE PRACTITIONER

## 2022-04-21 PROCEDURE — 71046 X-RAY EXAM CHEST 2 VIEWS: CPT | Performed by: NURSE PRACTITIONER

## 2022-04-21 RX ORDER — AZITHROMYCIN 250 MG/1
TABLET, FILM COATED ORAL
Qty: 6 TABLET | Refills: 0 | Status: SHIPPED | OUTPATIENT
Start: 2022-04-21 | End: 2022-04-26

## 2022-04-21 RX ORDER — GUAIFENESIN AND CODEINE PHOSPHATE 100; 10 MG/5ML; MG/5ML
SOLUTION ORAL
Qty: 180 ML | Refills: 0 | Status: SHIPPED | OUTPATIENT
Start: 2022-04-21

## 2022-04-21 NOTE — TELEPHONE ENCOUNTER
Patient called states she was seen today in office with Franklyn Shone. States she was suppose to send in inhaler and medication for chest congestion/cough. Pt states she is going out of town tomorrow. So need scripts to be sent in tonight.

## 2022-04-23 NOTE — ASSESSMENT & PLAN NOTE
covid test  Chest xray  Supportive care discussed to help alleviate symptoms  Please call if symptoms worsen or are not resolving.

## 2022-04-27 RX ORDER — CODEINE PHOSPHATE AND GUAIFENESIN 10; 100 MG/5ML; MG/5ML
SOLUTION ORAL
Qty: 180 ML | Refills: 0 | OUTPATIENT
Start: 2022-04-27

## 2022-04-28 NOTE — TELEPHONE ENCOUNTER
MyChart message sent. See medication record, was just prescribed on 4/21/22. Encounter changed from refill to condition update .

## 2022-04-29 RX ORDER — CODEINE PHOSPHATE/GUAIFENESIN 10-100MG/5
LIQUID (ML) ORAL
Qty: 118 ML | Refills: 0 | Status: SHIPPED | OUTPATIENT
Start: 2022-04-29 | End: 2022-05-13

## 2022-04-29 NOTE — TELEPHONE ENCOUNTER
1st attempt - [employer] message sent for patient to contact the office to schedule an appointment; see notes below.

## 2022-04-30 DIAGNOSIS — J30.1 SEASONAL ALLERGIC RHINITIS DUE TO POLLEN: ICD-10-CM

## 2022-04-30 RX ORDER — MONTELUKAST SODIUM 10 MG/1
10 TABLET ORAL DAILY
Qty: 90 TABLET | Refills: 1 | Status: SHIPPED | OUTPATIENT
Start: 2022-04-30 | End: 2022-12-05

## 2022-04-30 NOTE — TELEPHONE ENCOUNTER
Refill passed per [address] [facility] protocol.   Requested Prescriptions   Pending Prescriptions Disp Refills    MONTELUKAST 10 MG Oral Tab [Pharmacy Med Name: Montelukast Sodium 10 Mg Tab Auro] 90 tablet 0     Sig: TAKE ONE TABLET BY MOUTH ONE TIME DAILY        Asthma & COPD Medication Protocol Passed - 4/30/2022 10:37 AM        Passed - Appointment in past 6 or next 3 months             Recent Outpatient Visits              1 week ago Viral upper respiratory tract infection    [address] [facility], [facility], Rashaad Fuller, APRN    Office Visit    1 month ago HTN (hypertension), benign    Era Felty, MD    Office Visit    2 months ago [address], [facility] , [address], KRISTY Jimenez    Office Visit    3 months ago Atherosclerosis    [address] [facility], [facility], [address], [address] [facility],     Office Visit    8 months ago HTN (hypertension), benign    [facility], Jozef Leija MD    Office Visit           Future Appointments         Provider Department Appt Notes    In 1 week Jorge Raines MD [facility], [address] [facility],3Rd Floor, [place] MEDICARE  NON SX F/U

## 2022-05-01 NOTE — TELEPHONE ENCOUNTER
Refill passed per [place] [facility] protocol. High drug interaction warning.   Requested Prescriptions   Pending Prescriptions Disp Refills    MELOXICAM 15 MG Oral Tab [Pharmacy Med Name: Meloxicam 15 Mg Tab Zydu] 90 tablet 0     Sig: TAKE ONE TABLET BY MOUTH ONE TIME DAILY        Non-Narcotic Pain Medication Protocol Passed - 4/30/2022 10:39 AM        Passed - Appointment in past 6 or next 3 months              Recent Outpatient Visits              1 week ago Viral upper respiratory tract infection    [facility], [facility], KRISTY Lopez    Office Visit    1 month ago HTN (hypertension), benign    Jared Nicholas MD    Office Visit    2 months ago [address], [facility], [address], Cornelio Daniel, KRISTY    Office Visit    3 months ago Atherosclerosis    [facility], [facility], [facility], [address], [place],     Office Visit    8 months ago HTN (hypertension), benign    [facility], Carmen Lema MD    Office Visit            Future Appointments         Provider Department Appt Notes    In 1 week Manuel Ramirez MD [facility], [address] [facility],3Rd Floor, [place] MEDICARE  NON SX F/U

## 2022-05-03 RX ORDER — MELOXICAM 15 MG/1
15 TABLET ORAL DAILY
Qty: 90 TABLET | Refills: 1 | Status: SHIPPED | OUTPATIENT
Start: 2022-05-03

## 2022-05-12 ENCOUNTER — OFFICE VISIT (OUTPATIENT)
Dept: SURGERY | Facility: CLINIC | Age: 77
End: 2022-05-12
Payer: MEDICARE

## 2022-05-12 VITALS
HEIGHT: 63.4 IN | WEIGHT: 233.56 LBS | BODY MASS INDEX: 40.87 KG/M2 | DIASTOLIC BLOOD PRESSURE: 64 MMHG | HEART RATE: 67 BPM | OXYGEN SATURATION: 96 % | SYSTOLIC BLOOD PRESSURE: 119 MMHG

## 2022-05-12 DIAGNOSIS — E78.5 DYSLIPIDEMIA: ICD-10-CM

## 2022-05-12 DIAGNOSIS — E66.01 MORBID OBESITY WITH BMI OF 40.0-44.9, ADULT (HCC): ICD-10-CM

## 2022-05-12 DIAGNOSIS — Z51.81 ENCOUNTER FOR THERAPEUTIC DRUG MONITORING: ICD-10-CM

## 2022-05-12 DIAGNOSIS — F43.9 STRESS: ICD-10-CM

## 2022-05-12 DIAGNOSIS — I10 HTN (HYPERTENSION), BENIGN: Primary | ICD-10-CM

## 2022-05-12 PROCEDURE — 99214 OFFICE O/P EST MOD 30 MIN: CPT | Performed by: INTERNAL MEDICINE

## 2022-05-12 RX ORDER — TOPIRAMATE 25 MG/1
25 TABLET ORAL EVERY EVENING
Qty: 90 TABLET | Refills: 0 | Status: SHIPPED | OUTPATIENT
Start: 2022-05-12 | End: 2022-08-10

## 2022-05-12 RX ORDER — DIETHYLPROPION HYDROCHLORIDE 25 MG/1
1 TABLET ORAL
Qty: 30 TABLET | Refills: 1 | Status: SHIPPED | OUTPATIENT
Start: 2022-05-12 | End: 2022-06-11

## 2022-05-13 ENCOUNTER — LAB ENCOUNTER (OUTPATIENT)
Dept: LAB | Age: 77
End: 2022-05-13
Attending: FAMILY MEDICINE
Payer: MEDICARE

## 2022-05-13 ENCOUNTER — OFFICE VISIT (OUTPATIENT)
Dept: FAMILY MEDICINE CLINIC | Facility: CLINIC | Age: 77
End: 2022-05-13
Payer: MEDICARE

## 2022-05-13 VITALS
WEIGHT: 233 LBS | BODY MASS INDEX: 40.77 KG/M2 | SYSTOLIC BLOOD PRESSURE: 160 MMHG | DIASTOLIC BLOOD PRESSURE: 70 MMHG | TEMPERATURE: 97 F | HEIGHT: 63.4 IN | HEART RATE: 69 BPM

## 2022-05-13 DIAGNOSIS — I10 WHITE COAT SYNDROME WITH DIAGNOSIS OF HYPERTENSION: ICD-10-CM

## 2022-05-13 DIAGNOSIS — R39.15 URINARY URGENCY: ICD-10-CM

## 2022-05-13 DIAGNOSIS — Z12.31 VISIT FOR SCREENING MAMMOGRAM: ICD-10-CM

## 2022-05-13 DIAGNOSIS — Z87.440 HISTORY OF UTI: ICD-10-CM

## 2022-05-13 DIAGNOSIS — M48.061 SPINAL STENOSIS OF LUMBAR REGION, UNSPECIFIED WHETHER NEUROGENIC CLAUDICATION PRESENT: Primary | ICD-10-CM

## 2022-05-13 LAB
BILIRUB UR QL: NEGATIVE
COLOR UR: YELLOW
GLUCOSE UR-MCNC: NEGATIVE MG/DL
HGB UR QL STRIP.AUTO: NEGATIVE
KETONES UR-MCNC: NEGATIVE MG/DL
NITRITE UR QL STRIP.AUTO: NEGATIVE
PH UR: 6 [PH] (ref 5–8)
PROT UR-MCNC: NEGATIVE MG/DL
SP GR UR STRIP: 1.02 (ref 1–1.03)
UROBILINOGEN UR STRIP-ACNC: <2
VIT C UR-MCNC: NEGATIVE MG/DL

## 2022-05-13 PROCEDURE — 99214 OFFICE O/P EST MOD 30 MIN: CPT | Performed by: FAMILY MEDICINE

## 2022-05-13 PROCEDURE — 87086 URINE CULTURE/COLONY COUNT: CPT

## 2022-05-13 PROCEDURE — 81001 URINALYSIS AUTO W/SCOPE: CPT

## 2022-05-13 RX ORDER — HYDROCODONE BITARTRATE AND ACETAMINOPHEN 5; 325 MG/1; MG/1
1 TABLET ORAL EVERY 4 HOURS PRN
Qty: 30 TABLET | Refills: 0 | Status: SHIPPED | OUTPATIENT
Start: 2022-05-13

## 2022-05-14 RX ORDER — GABAPENTIN 600 MG/1
600 TABLET ORAL 2 TIMES DAILY
Qty: 180 TABLET | Refills: 1 | Status: SHIPPED | OUTPATIENT
Start: 2022-05-14

## 2022-05-14 NOTE — TELEPHONE ENCOUNTER
Refill passed per [facility] protocol    Requested Prescriptions   Pending Prescriptions Disp Refills    GABAPENTIN 600 MG Oral Tab [Pharmacy Med Name: Gabapentin 600 Mg Tab Nort] 180 tablet 0     Sig: TAKE ONE TABLET BY MOUTH TWICE DAILY        Neurology Medications Passed - 5/14/2022 10:43 AM        Passed - Appointment in the past 6 or next 3 months              Future Appointments         Provider Department Appt Notes    In 2 months Bandar Phillips MD [address], [address], [place] MEDICARE  NON SX F/U            Recent Outpatient Visits              Yesterday Spinal stenosis of lumbar region, unspecified whether neurogenic claudication present    [facility], [address], [place],     Office Visit    2 days ago HTN (hypertension), benign    Arthur Toussaint MD    Office Visit    3 weeks ago Viral upper respiratory tract infection    [facility], [facility], [address], Sherill Carlos, APRN    Office Visit    2 months ago HTN (hypertension), rachel Toussaint MD    Office Visit    3 months ago [address], [facility], [address], Sherill Carlos, APRN    Office Visit

## 2022-06-13 DIAGNOSIS — I10 ESSENTIAL HYPERTENSION: ICD-10-CM

## 2022-06-13 DIAGNOSIS — M48.061 SPINAL STENOSIS OF LUMBAR REGION, UNSPECIFIED WHETHER NEUROGENIC CLAUDICATION PRESENT: ICD-10-CM

## 2022-06-13 RX ORDER — HYDROCODONE BITARTRATE AND ACETAMINOPHEN 5; 325 MG/1; MG/1
1 TABLET ORAL EVERY 4 HOURS PRN
Qty: 30 TABLET | Refills: 0 | Status: SHIPPED | OUTPATIENT
Start: 2022-06-13

## 2022-06-13 NOTE — TELEPHONE ENCOUNTER
Please review refill protocol failed/ no protocol  Requested Prescriptions   Pending Prescriptions Disp Refills    HYDROcodone-acetaminophen 5-325 MG Oral Tab 30 tablet 0     Sig: Take 1 tablet by mouth every 4 (four) hours as needed.         There is no refill protocol information for this order

## 2022-06-14 RX ORDER — SPIRONOLACTONE 25 MG/1
25 TABLET ORAL DAILY
Qty: 90 TABLET | Refills: 1 | Status: SHIPPED | OUTPATIENT
Start: 2022-06-14

## 2022-06-29 ENCOUNTER — HOSPITAL ENCOUNTER (OUTPATIENT)
Dept: MAMMOGRAPHY | Age: 77
Discharge: HOME | End: 2022-06-29
Attending: FAMILY MEDICINE
Payer: MEDICARE

## 2022-06-29 DIAGNOSIS — Z12.31 VISIT FOR SCREENING MAMMOGRAM: ICD-10-CM

## 2022-06-29 PROCEDURE — 77063 BREAST TOMOSYNTHESIS BI: CPT | Performed by: FAMILY MEDICINE

## 2022-06-29 PROCEDURE — 77067 SCR MAMMO BI INCL CAD: CPT | Performed by: FAMILY MEDICINE

## 2022-07-14 ENCOUNTER — OFFICE VISIT (OUTPATIENT)
Dept: SURGERY | Facility: CLINIC | Age: 77
End: 2022-07-14
Payer: MEDICARE

## 2022-07-14 VITALS
OXYGEN SATURATION: 94 % | BODY MASS INDEX: 41.44 KG/M2 | WEIGHT: 233.88 LBS | HEART RATE: 48 BPM | HEIGHT: 63 IN | SYSTOLIC BLOOD PRESSURE: 123 MMHG | DIASTOLIC BLOOD PRESSURE: 53 MMHG

## 2022-07-14 DIAGNOSIS — E78.5 DYSLIPIDEMIA: ICD-10-CM

## 2022-07-14 DIAGNOSIS — E66.01 MORBID OBESITY WITH BMI OF 40.0-44.9, ADULT (HCC): ICD-10-CM

## 2022-07-14 DIAGNOSIS — I10 HTN (HYPERTENSION), BENIGN: Primary | ICD-10-CM

## 2022-07-14 DIAGNOSIS — F43.9 STRESS: ICD-10-CM

## 2022-07-14 DIAGNOSIS — Z51.81 ENCOUNTER FOR THERAPEUTIC DRUG MONITORING: ICD-10-CM

## 2022-07-14 PROCEDURE — 99214 OFFICE O/P EST MOD 30 MIN: CPT | Performed by: INTERNAL MEDICINE

## 2022-07-14 RX ORDER — DIETHYLPROPION HYDROCHLORIDE 25 MG/1
1 TABLET ORAL
Qty: 30 TABLET | Refills: 2 | Status: SHIPPED | OUTPATIENT
Start: 2022-07-14 | End: 2022-08-13

## 2022-07-26 DIAGNOSIS — M48.061 SPINAL STENOSIS OF LUMBAR REGION, UNSPECIFIED WHETHER NEUROGENIC CLAUDICATION PRESENT: ICD-10-CM

## 2022-07-26 DIAGNOSIS — E66.01 MORBID OBESITY WITH BMI OF 40.0-44.9, ADULT (HCC): ICD-10-CM

## 2022-07-27 DIAGNOSIS — M48.061 SPINAL STENOSIS OF LUMBAR REGION, UNSPECIFIED WHETHER NEUROGENIC CLAUDICATION PRESENT: ICD-10-CM

## 2022-07-27 RX ORDER — HYDROCODONE BITARTRATE AND ACETAMINOPHEN 5; 325 MG/1; MG/1
1 TABLET ORAL EVERY 4 HOURS PRN
Qty: 30 TABLET | Refills: 0 | Status: SHIPPED | OUTPATIENT
Start: 2022-07-27

## 2022-07-27 RX ORDER — DIETHYLPROPION HYDROCHLORIDE 25 MG/1
1 TABLET ORAL
Qty: 30 TABLET | Refills: 2 | OUTPATIENT
Start: 2022-07-27 | End: 2022-08-26

## 2022-07-27 RX ORDER — HYDROCODONE BITARTRATE AND ACETAMINOPHEN 5; 325 MG/1; MG/1
1 TABLET ORAL EVERY 4 HOURS PRN
Qty: 30 TABLET | Refills: 0 | Status: CANCELLED | OUTPATIENT
Start: 2022-07-27

## 2022-09-02 DIAGNOSIS — M48.061 SPINAL STENOSIS OF LUMBAR REGION, UNSPECIFIED WHETHER NEUROGENIC CLAUDICATION PRESENT: ICD-10-CM

## 2022-09-02 NOTE — TELEPHONE ENCOUNTER
Please review. Protocol failed or has no protocol. Requested Prescriptions   Pending Prescriptions Disp Refills    HYDROcodone-acetaminophen 5-325 MG Oral Tab 30 tablet 0     Sig: Take 1 tablet by mouth every 4 (four) hours as needed.         There is no refill protocol information for this order           Recent Outpatient Visits              1 month ago HTN (hypertension), benign    [facility],2Nd Floor, Konrad Oviedo MD    Office Visit    3 months ago Spinal stenosis of lumbar region, unspecified whether neurogenic claudication present    [facility], [address], [place], DO    Office Visit    3 months ago HTN (hypertension), benign    Gloria Carr MD    Office Visit    4 months ago Viral upper respiratory tract infection    [facility], [address], Osman Aldana, APRN    Office Visit    5 months ago HTN (hypertension), benign    [address] [facility],2Nd Floor, Konrad Oviedo MD    Office Visit            Future Appointments         Provider Department Appt Notes    In 1 month Tino Teixeira MD [address] [place] [address], [facility]

## 2022-09-04 RX ORDER — HYDROCODONE BITARTRATE AND ACETAMINOPHEN 5; 325 MG/1; MG/1
1 TABLET ORAL EVERY 4 HOURS PRN
Qty: 30 TABLET | Refills: 0 | Status: SHIPPED | OUTPATIENT
Start: 2022-09-04

## 2022-10-04 DIAGNOSIS — R09.82 POST-NASAL DRIP: ICD-10-CM

## 2022-10-04 DIAGNOSIS — J30.1 SEASONAL ALLERGIC RHINITIS DUE TO POLLEN: ICD-10-CM

## 2022-10-04 RX ORDER — FEXOFENADINE HCL 180 MG/1
180 TABLET ORAL DAILY
Qty: 90 TABLET | Refills: 1 | Status: SHIPPED | OUTPATIENT
Start: 2022-10-04 | End: 2023-04-03

## 2022-10-04 NOTE — TELEPHONE ENCOUNTER
Refill passed per [employer] [facility] protocol.      Requested Prescriptions   Pending Prescriptions Disp Refills    FEXOFENADINE 180 MG Oral Tab [Pharmacy Med Name: Fexofenadine Hydrochloride 180 Mg Tab Nort] 90 tablet 0     Sig: TAKE ONE TABLET BY MOUTH ONE TIME DAILY        Allergy Medication Protocol Passed - 10/4/2022  1:30 AM        Passed - In person appointment or virtual visit in the past 12 mos or appointment in next 3 mos       Recent Outpatient Visits              2 months ago HTN (hypertension), benign    [address] [place], [place] [address]Ivy MD    Office Visit    4 months ago Spinal stenosis of lumbar region, unspecified whether neurogenic claudication present    [place] [employer] [facility], [address], [place],     Office Visit    4 months ago HTN (hypertension), benign    Kale Abbasi MD    Office Visit    5 months ago Viral upper respiratory tract infection    [place] [facility], [facility] [address], [address], [place] Amaury, APRN    Office Visit    6 months ago HTN (hypertension), benign    [address] [place], [place] [address]Ivy MD    Office Visit     Future Appointments         Provider Department Appt Notes    In 3 weeks Swathi Sethi MD [address]                      [unfilled]      [unfilled]

## 2022-10-10 ENCOUNTER — TELEPHONE (OUTPATIENT)
Dept: FAMILY MEDICINE CLINIC | Facility: CLINIC | Age: 77
End: 2022-10-10

## 2022-10-10 NOTE — TELEPHONE ENCOUNTER
Patient dropped off a handicap placard form for the Provider to fill out. Please mail form when it is completed.

## 2022-10-13 NOTE — TELEPHONE ENCOUNTER
Contacted patient and informed of message below, she verbalized understanding.  Placard form mailed out to  per patient's request.

## 2022-10-16 DIAGNOSIS — M48.061 SPINAL STENOSIS OF LUMBAR REGION, UNSPECIFIED WHETHER NEUROGENIC CLAUDICATION PRESENT: ICD-10-CM

## 2022-10-17 RX ORDER — HYDROCODONE BITARTRATE AND ACETAMINOPHEN 5; 325 MG/1; MG/1
1 TABLET ORAL EVERY 4 HOURS PRN
Qty: 30 TABLET | Refills: 0 | Status: SHIPPED | OUTPATIENT
Start: 2022-10-17

## 2022-10-17 NOTE — TELEPHONE ENCOUNTER
Please review. Protocol failed / No protocol. Requested Prescriptions   Pending Prescriptions Disp Refills    HYDROcodone-acetaminophen 5-325 MG Oral Tab 30 tablet 0     Sig: Take 1 tablet by mouth every 4 (four) hours as needed.         There is no refill protocol information for this order          Future Appointments         Provider Department Appt Notes    In 1 week AdriannaHayden Kapoors, [address], [facility]            Recent Outpatient Visits              3 months ago HTN (hypertension), benign    [address] [facility],2Nd Floor, Karen Garibay MD    Office Visit    5 months ago Spinal stenosis of lumbar region, unspecified whether neurogenic claudication present    [facility] [facility], [facility], [address], [place], DO    Office Visit    5 months ago HTN (hypertension), benign    Lorena Thompson MD    Office Visit    5 months ago Viral upper respiratory tract infection    [facility] [facility], [facility], [address], KRISTY    Office Visit    7 months ago HTN (hypertension), benign    [address] [facility],2Nd Floor, Karen Garibay MD    Office Visit

## 2022-10-27 ENCOUNTER — OFFICE VISIT (OUTPATIENT)
Dept: SURGERY | Facility: CLINIC | Age: 77
End: 2022-10-27
Payer: COMMERCIAL

## 2022-10-27 VITALS
WEIGHT: 234 LBS | DIASTOLIC BLOOD PRESSURE: 59 MMHG | SYSTOLIC BLOOD PRESSURE: 135 MMHG | OXYGEN SATURATION: 99 % | HEART RATE: 60 BPM | HEIGHT: 63 IN | BODY MASS INDEX: 41.46 KG/M2

## 2022-10-27 DIAGNOSIS — F43.9 STRESS: ICD-10-CM

## 2022-10-27 DIAGNOSIS — E66.01 MORBID OBESITY WITH BMI OF 40.0-44.9, ADULT (HCC): ICD-10-CM

## 2022-10-27 DIAGNOSIS — I10 HTN (HYPERTENSION), BENIGN: Primary | ICD-10-CM

## 2022-10-27 DIAGNOSIS — E78.5 DYSLIPIDEMIA: ICD-10-CM

## 2022-10-27 DIAGNOSIS — Z51.81 ENCOUNTER FOR THERAPEUTIC DRUG MONITORING: ICD-10-CM

## 2022-10-27 PROCEDURE — 99214 OFFICE O/P EST MOD 30 MIN: CPT | Performed by: INTERNAL MEDICINE

## 2022-10-27 RX ORDER — DIETHYLPROPION HYDROCHLORIDE 25 MG/1
1 TABLET ORAL
Qty: 30 TABLET | Refills: 2 | Status: SHIPPED | OUTPATIENT
Start: 2022-10-27 | End: 2022-11-26

## 2022-10-27 RX ORDER — TOPIRAMATE 25 MG/1
25 TABLET ORAL EVERY EVENING
Qty: 90 TABLET | Refills: 1 | Status: SHIPPED | OUTPATIENT
Start: 2022-10-27 | End: 2023-01-25

## 2022-11-06 DIAGNOSIS — M51.26 LUMBAR HERNIATED DISC: ICD-10-CM

## 2022-11-07 RX ORDER — GABAPENTIN 600 MG/1
600 TABLET ORAL 2 TIMES DAILY
Qty: 180 TABLET | Refills: 1 | Status: SHIPPED | OUTPATIENT
Start: 2022-11-07

## 2022-11-07 NOTE — TELEPHONE ENCOUNTER
Refill passed per [employer] [facility] protocol. .  Requested Prescriptions   Pending Prescriptions Disp Refills    GABAPENTIN 600 MG Oral Tab [Pharmacy Med Name: Gabapentin 600 Mg Tab Nort] 180 tablet 0     Sig: Take 1 tablet (600 mg total) by mouth 2 (two) times daily.        Neurology Medications Passed - 11/6/2022  1:32 AM        Passed - In person appointment or virtual visit in the past 6 mos or appointment in next 3 mos     Recent Outpatient Visits              1 week ago HTN (hypertension), rachel Nick MD    Office Visit    3 months ago HTN (hypertension), benign    [address] [facility],2Nd Floor, Akilah Hickman MD    Office Visit    5 months ago Spinal stenosis of lumbar region, unspecified whether neurogenic claudication present    [facility], [address], [address], [place], DO    Office Visit    5 months ago HTN (hypertension), rachel Nick MD    Office Visit    6 months ago Viral upper respiratory tract infection    [facility] [facility], [address], KRISTY Johnson    Office Visit          Future Appointments         Provider Department Appt Notes    In 2 months Sg Vasquez MD [address], [address], [place] MEDICARE  NON [address] F/U                    Recent Outpatient Visits              1 week ago HTN (hypertension), benign    [address] [facility],2Nd Floor, Akilah Hickman MD    Office Visit    3 months ago HTN (hypertension), rachel Nick MD    Office Visit    5 months ago Spinal stenosis of lumbar region, unspecified whether neurogenic claudication present    [facility], [address], [address], [place], DO    Office Visit    5 months ago HTN (hypertension), benign    [address], [address], Alistair Sousa MD    Office Visit    6 months ago Viral upper respiratory tract infection    [address] Rashaad John APRN    Office Visit            Future Appointments         Provider Department Appt Notes    In 2 months Allegra Gaxiola MD [address], [address], [place] MEDICARE  NON SX F/U

## 2022-11-16 ENCOUNTER — NURSE TRIAGE (OUTPATIENT)
Dept: FAMILY MEDICINE CLINIC | Facility: CLINIC | Age: 77
End: 2022-11-16

## 2022-11-17 ENCOUNTER — OFFICE VISIT (OUTPATIENT)
Dept: FAMILY MEDICINE CLINIC | Facility: CLINIC | Age: 77
End: 2022-11-17
Payer: MEDICARE

## 2022-11-17 ENCOUNTER — HOSPITAL ENCOUNTER (OUTPATIENT)
Dept: CT IMAGING | Age: 77
Discharge: HOME | End: 2022-11-17
Attending: PHYSICIAN ASSISTANT
Payer: MEDICARE

## 2022-11-17 VITALS
SYSTOLIC BLOOD PRESSURE: 138 MMHG | HEART RATE: 64 BPM | HEIGHT: 63 IN | DIASTOLIC BLOOD PRESSURE: 78 MMHG | BODY MASS INDEX: 41.29 KG/M2 | WEIGHT: 233 LBS

## 2022-11-17 DIAGNOSIS — R10.32 LLQ PAIN: ICD-10-CM

## 2022-11-17 DIAGNOSIS — R10.32 LLQ PAIN: Primary | ICD-10-CM

## 2022-11-17 LAB
APPEARANCE: CLEAR
BILIRUBIN: NEGATIVE
CREAT BLD-MCNC: 1.1 MG/DL
GFR SERPLBLD BASED ON 1.73 SQ M-ARVRAT: 52 ML/MIN/1.73M2 (ref 60–?)
GLUCOSE (URINE DIPSTICK): NEGATIVE MG/DL
KETONES (URINE DIPSTICK): NEGATIVE MG/DL
LEUKOCYTES: NEGATIVE
MULTISTIX LOT#: NORMAL NUMERIC
NITRITE, URINE: NEGATIVE
OCCULT BLOOD: NEGATIVE
PH, URINE: 6 (ref 4.5–8)
PROTEIN (URINE DIPSTICK): NEGATIVE MG/DL
SPECIFIC GRAVITY: 1.02 (ref 1–1.03)
URINE-COLOR: YELLOW
UROBILINOGEN,SEMI-QN: 0.2 MG/DL (ref 0–1.9)

## 2022-11-17 PROCEDURE — 81003 URINALYSIS AUTO W/O SCOPE: CPT | Performed by: PHYSICIAN ASSISTANT

## 2022-11-17 PROCEDURE — 1126F AMNT PAIN NOTED NONE PRSNT: CPT | Performed by: PHYSICIAN ASSISTANT

## 2022-11-17 PROCEDURE — 99213 OFFICE O/P EST LOW 20 MIN: CPT | Performed by: PHYSICIAN ASSISTANT

## 2022-11-17 PROCEDURE — 82565 ASSAY OF CREATININE: CPT

## 2022-11-17 PROCEDURE — 74177 CT ABD & PELVIS W/CONTRAST: CPT | Performed by: PHYSICIAN ASSISTANT

## 2022-11-17 RX ORDER — METRONIDAZOLE 500 MG/1
500 TABLET ORAL 3 TIMES DAILY
Qty: 21 TABLET | Refills: 0 | Status: SHIPPED | OUTPATIENT
Start: 2022-11-17 | End: 2022-11-24

## 2022-11-17 RX ORDER — CIPROFLOXACIN 500 MG/1
500 TABLET, FILM COATED ORAL 2 TIMES DAILY
Qty: 14 TABLET | Refills: 0 | Status: SHIPPED | OUTPATIENT
Start: 2022-11-17 | End: 2022-11-24

## 2022-12-13 DIAGNOSIS — M48.061 SPINAL STENOSIS OF LUMBAR REGION, UNSPECIFIED WHETHER NEUROGENIC CLAUDICATION PRESENT: ICD-10-CM

## 2022-12-15 RX ORDER — HYDROCODONE BITARTRATE AND ACETAMINOPHEN 5; 325 MG/1; MG/1
1 TABLET ORAL EVERY 4 HOURS PRN
Qty: 30 TABLET | Refills: 0 | Status: SHIPPED | OUTPATIENT
Start: 2022-12-15 | End: 2022-12-17

## 2022-12-15 RX ORDER — HYDROCODONE BITARTRATE AND ACETAMINOPHEN 5; 325 MG/1; MG/1
1 TABLET ORAL EVERY 4 HOURS PRN
Qty: 30 TABLET | Refills: 0 | OUTPATIENT
Start: 2022-12-15

## 2022-12-17 DIAGNOSIS — M51.26 LUMBAR HERNIATED DISC: ICD-10-CM

## 2022-12-17 DIAGNOSIS — M48.061 SPINAL STENOSIS OF LUMBAR REGION, UNSPECIFIED WHETHER NEUROGENIC CLAUDICATION PRESENT: ICD-10-CM

## 2022-12-17 NOTE — TELEPHONE ENCOUNTER
Refill passed per [email or web address] [facility] protocol. Please see pended medication for your review. Although Refill passed per [employer] [facility], [facility] protocol, it is an NSAID, requiring doctor review prior to approval per current Refill Protocol    Requested Prescriptions   Pending Prescriptions Disp Refills    Meloxicam 15 MG Oral Tab 90 tablet 1     Sig: Take 1 tablet (15 mg total) by mouth daily.        Non-Narcotic Pain Medication Protocol Passed - 12/17/2022  7:33 AM        Passed - In person appointment or virtual visit in the past 6 mos or appointment in next 3 mos     Recent Outpatient Visits              1 month ago LLQ pain    [facility]    Office Visit    1 month ago HTN (hypertension), benign    [address] [place] [address],2Nd Floor, Carol Smith MD    Office Visit    5 months ago HTN (hypertension), benign    [address] [place] [address],[address] [place] [address], Carol Smith MD    Office Visit    7 months ago Spinal stenosis of lumbar region, unspecified whether neurogenic claudication present    [place] [employer] [facility], [facility], [address], [address], [place], DO    Office Visit    7 months ago HTN (hypertension), rachel Salazar MD    Office Visit          Future Appointments         Provider Department Appt Notes    In 1 week Carolin Salas, [address] [facility], [address], [address] px  [place] [place] informed\"    In 1 month Marilyn Landry MD [employer], [address], [place] MEDICARE  NON SX F/U                  Recent Outpatient Visits              1 month ago LLQ pain    [address] [facility], [facility]    Office Visit    1 month ago HTN (hypertension), rachel Salazar MD    Office Visit    5 months ago HTN (hypertension), benign    [employer], [address], Birdie Nova MD    Office Visit    7 months ago Spinal stenosis of lumbar region, unspecified whether neurogenic claudication present    [facility], [facility], [address], [address], [place], DO    Office Visit    7 months ago HTN (hypertension), benign    Jessica Linda MD    Office Visit          Future Appointments         Provider Department Appt Notes    In 1 week Nathan Calhoun, [address], [address], [place] medicare px  \"policy informed\"    In 1 month Poncho Rosenthal MD [facility], [address] [place] [address] Rd,3Rd Floor, [place] MEDICARE  NON SX F/U

## 2022-12-18 RX ORDER — MELOXICAM 15 MG/1
15 TABLET ORAL DAILY
Qty: 90 TABLET | Refills: 1 | Status: SHIPPED | OUTPATIENT
Start: 2022-12-18

## 2022-12-19 RX ORDER — HYDROCODONE BITARTRATE AND ACETAMINOPHEN 5; 325 MG/1; MG/1
1 TABLET ORAL EVERY 4 HOURS PRN
Qty: 30 TABLET | Refills: 0 | Status: SHIPPED | OUTPATIENT
Start: 2022-12-19

## 2022-12-24 ENCOUNTER — OFFICE VISIT (OUTPATIENT)
Dept: FAMILY MEDICINE CLINIC | Facility: CLINIC | Age: 77
End: 2022-12-24
Payer: MEDICARE

## 2022-12-24 VITALS
DIASTOLIC BLOOD PRESSURE: 68 MMHG | SYSTOLIC BLOOD PRESSURE: 148 MMHG | BODY MASS INDEX: 41.11 KG/M2 | WEIGHT: 232 LBS | HEART RATE: 74 BPM | HEIGHT: 63 IN

## 2022-12-24 DIAGNOSIS — Z00.00 ADULT GENERAL MEDICAL EXAM: Primary | ICD-10-CM

## 2022-12-24 DIAGNOSIS — E66.01 MORBID OBESITY WITH BMI OF 40.0-44.9, ADULT (HCC): ICD-10-CM

## 2022-12-24 DIAGNOSIS — H25.12 AGE-RELATED NUCLEAR CATARACT OF LEFT EYE: ICD-10-CM

## 2022-12-24 DIAGNOSIS — E55.9 VITAMIN D DEFICIENCY: ICD-10-CM

## 2022-12-24 DIAGNOSIS — E78.2 MIXED HYPERLIPIDEMIA: ICD-10-CM

## 2022-12-24 DIAGNOSIS — I10 ESSENTIAL HYPERTENSION: ICD-10-CM

## 2022-12-24 DIAGNOSIS — I10 HTN (HYPERTENSION), BENIGN: ICD-10-CM

## 2022-12-24 DIAGNOSIS — Z00.00 ENCOUNTER FOR ANNUAL HEALTH EXAMINATION: ICD-10-CM

## 2022-12-24 DIAGNOSIS — J30.89 OTHER ALLERGIC RHINITIS: ICD-10-CM

## 2022-12-24 DIAGNOSIS — R91.8 PULMONARY NODULES: ICD-10-CM

## 2022-12-24 DIAGNOSIS — I70.90 ATHEROSCLEROSIS: ICD-10-CM

## 2022-12-24 DIAGNOSIS — M25.50 ARTHRALGIA, UNSPECIFIED JOINT: ICD-10-CM

## 2022-12-24 DIAGNOSIS — F41.1 ANXIETY STATE: ICD-10-CM

## 2022-12-24 PROCEDURE — G0439 PPPS, SUBSEQ VISIT: HCPCS | Performed by: FAMILY MEDICINE

## 2022-12-24 PROCEDURE — 1125F AMNT PAIN NOTED PAIN PRSNT: CPT | Performed by: FAMILY MEDICINE

## 2022-12-24 RX ORDER — SERTRALINE HYDROCHLORIDE 100 MG/1
TABLET, FILM COATED ORAL
Qty: 90 TABLET | Refills: 3 | Status: SHIPPED | OUTPATIENT
Start: 2022-12-24

## 2022-12-24 RX ORDER — TELMISARTAN AND HYDROCHLORTHIAZIDE 80; 25 MG/1; MG/1
1 TABLET ORAL DAILY
Qty: 90 TABLET | Refills: 1 | Status: SHIPPED | OUTPATIENT
Start: 2022-12-24

## 2022-12-24 RX ORDER — ROSUVASTATIN CALCIUM 20 MG/1
20 TABLET, COATED ORAL
Qty: 90 TABLET | Refills: 3 | Status: SHIPPED | OUTPATIENT
Start: 2022-12-24

## 2022-12-24 RX ORDER — SPIRONOLACTONE 25 MG/1
25 TABLET ORAL DAILY
Qty: 90 TABLET | Refills: 1 | Status: SHIPPED | OUTPATIENT
Start: 2022-12-24

## 2023-01-07 ENCOUNTER — LAB ENCOUNTER (OUTPATIENT)
Dept: LAB | Age: 78
End: 2023-01-07
Attending: FAMILY MEDICINE
Payer: MEDICARE

## 2023-01-07 DIAGNOSIS — E78.2 MIXED HYPERLIPIDEMIA: ICD-10-CM

## 2023-01-07 DIAGNOSIS — I10 HTN (HYPERTENSION), BENIGN: ICD-10-CM

## 2023-01-07 DIAGNOSIS — I70.90 ATHEROSCLEROSIS: ICD-10-CM

## 2023-01-07 DIAGNOSIS — E55.9 VITAMIN D DEFICIENCY: ICD-10-CM

## 2023-01-07 LAB
ALBUMIN SERPL-MCNC: 3.7 G/DL (ref 3.4–5)
ALBUMIN/GLOB SERPL: 1.1 {RATIO} (ref 1–2)
ALP LIVER SERPL-CCNC: 99 U/L
ALT SERPL-CCNC: 30 U/L
ANION GAP SERPL CALC-SCNC: 4 MMOL/L (ref 0–18)
AST SERPL-CCNC: 21 U/L (ref 15–37)
BASOPHILS # BLD AUTO: 0.11 X10(3) UL (ref 0–0.2)
BASOPHILS NFR BLD AUTO: 1.4 %
BILIRUB SERPL-MCNC: 0.5 MG/DL (ref 0.1–2)
BUN BLD-MCNC: 31 MG/DL (ref 7–18)
BUN/CREAT SERPL: 29.5 (ref 10–20)
CALCIUM BLD-MCNC: 9 MG/DL (ref 8.5–10.1)
CHLORIDE SERPL-SCNC: 108 MMOL/L (ref 98–112)
CHOLEST SERPL-MCNC: 134 MG/DL (ref ?–200)
CO2 SERPL-SCNC: 29 MMOL/L (ref 21–32)
CREAT BLD-MCNC: 1.05 MG/DL
DEPRECATED RDW RBC AUTO: 50.8 FL (ref 35.1–46.3)
EOSINOPHIL # BLD AUTO: 0.33 X10(3) UL (ref 0–0.7)
EOSINOPHIL NFR BLD AUTO: 4.3 %
ERYTHROCYTE [DISTWIDTH] IN BLOOD BY AUTOMATED COUNT: 13.8 % (ref 11–15)
FASTING PATIENT LIPID ANSWER: YES
FASTING STATUS PATIENT QL REPORTED: YES
GFR SERPLBLD BASED ON 1.73 SQ M-ARVRAT: 55 ML/MIN/1.73M2 (ref 60–?)
GLOBULIN PLAS-MCNC: 3.4 G/DL (ref 2.8–4.4)
GLUCOSE BLD-MCNC: 105 MG/DL (ref 70–99)
HCT VFR BLD AUTO: 34.7 %
HDLC SERPL-MCNC: 54 MG/DL (ref 40–59)
HGB BLD-MCNC: 10.8 G/DL
IMM GRANULOCYTES # BLD AUTO: 0.03 X10(3) UL (ref 0–1)
IMM GRANULOCYTES NFR BLD: 0.4 %
LDLC SERPL CALC-MCNC: 71 MG/DL (ref ?–100)
LYMPHOCYTES # BLD AUTO: 2.23 X10(3) UL (ref 1–4)
LYMPHOCYTES NFR BLD AUTO: 28.7 %
MCH RBC QN AUTO: 31.2 PG (ref 26–34)
MCHC RBC AUTO-ENTMCNC: 31.1 G/DL (ref 31–37)
MCV RBC AUTO: 100.3 FL
MONOCYTES # BLD AUTO: 0.6 X10(3) UL (ref 0.1–1)
MONOCYTES NFR BLD AUTO: 7.7 %
NEUTROPHILS # BLD AUTO: 4.46 X10 (3) UL (ref 1.5–7.7)
NEUTROPHILS # BLD AUTO: 4.46 X10(3) UL (ref 1.5–7.7)
NEUTROPHILS NFR BLD AUTO: 57.5 %
NONHDLC SERPL-MCNC: 80 MG/DL (ref ?–130)
OSMOLALITY SERPL CALC.SUM OF ELEC: 299 MOSM/KG (ref 275–295)
PLATELET # BLD AUTO: 338 10(3)UL (ref 150–450)
POTASSIUM SERPL-SCNC: 3.9 MMOL/L (ref 3.5–5.1)
PROT SERPL-MCNC: 7.1 G/DL (ref 6.4–8.2)
RBC # BLD AUTO: 3.46 X10(6)UL
SODIUM SERPL-SCNC: 141 MMOL/L (ref 136–145)
TRIGL SERPL-MCNC: 38 MG/DL (ref 30–149)
TSI SER-ACNC: 1.74 MIU/ML (ref 0.36–3.74)
VIT D+METAB SERPL-MCNC: 71.8 NG/ML (ref 30–100)
VLDLC SERPL CALC-MCNC: 6 MG/DL (ref 0–30)
WBC # BLD AUTO: 7.8 X10(3) UL (ref 4–11)

## 2023-01-07 PROCEDURE — 80053 COMPREHEN METABOLIC PANEL: CPT

## 2023-01-07 PROCEDURE — 85025 COMPLETE CBC W/AUTO DIFF WBC: CPT

## 2023-01-07 PROCEDURE — 84443 ASSAY THYROID STIM HORMONE: CPT

## 2023-01-07 PROCEDURE — 82306 VITAMIN D 25 HYDROXY: CPT

## 2023-01-07 PROCEDURE — 80061 LIPID PANEL: CPT

## 2023-01-07 PROCEDURE — 36415 COLL VENOUS BLD VENIPUNCTURE: CPT

## 2023-02-02 ENCOUNTER — OFFICE VISIT (OUTPATIENT)
Dept: SURGERY | Facility: CLINIC | Age: 78
End: 2023-02-02
Payer: MEDICARE

## 2023-02-02 VITALS
HEIGHT: 63 IN | WEIGHT: 232.63 LBS | OXYGEN SATURATION: 98 % | DIASTOLIC BLOOD PRESSURE: 57 MMHG | HEART RATE: 57 BPM | SYSTOLIC BLOOD PRESSURE: 140 MMHG | BODY MASS INDEX: 41.22 KG/M2

## 2023-02-02 DIAGNOSIS — F43.9 STRESS: ICD-10-CM

## 2023-02-02 DIAGNOSIS — E78.5 DYSLIPIDEMIA: ICD-10-CM

## 2023-02-02 DIAGNOSIS — I10 HTN (HYPERTENSION), BENIGN: Primary | ICD-10-CM

## 2023-02-02 DIAGNOSIS — Z51.81 ENCOUNTER FOR THERAPEUTIC DRUG MONITORING: ICD-10-CM

## 2023-02-02 DIAGNOSIS — E66.01 MORBID OBESITY WITH BMI OF 40.0-44.9, ADULT (HCC): ICD-10-CM

## 2023-02-02 PROCEDURE — 99214 OFFICE O/P EST MOD 30 MIN: CPT | Performed by: INTERNAL MEDICINE

## 2023-02-02 RX ORDER — TOPIRAMATE 25 MG/1
25 TABLET ORAL 2 TIMES DAILY
Qty: 180 TABLET | Refills: 1 | Status: SHIPPED | OUTPATIENT
Start: 2023-02-02 | End: 2023-05-03

## 2023-02-02 RX ORDER — DIETHYLPROPION HYDROCHLORIDE 75 MG/1
1 TABLET ORAL DAILY
Qty: 30 TABLET | Refills: 2 | Status: SHIPPED | OUTPATIENT
Start: 2023-02-02

## 2023-02-10 ENCOUNTER — HOSPITAL ENCOUNTER (OUTPATIENT)
Dept: GENERAL RADIOLOGY | Facility: HOSPITAL | Age: 78
Discharge: HOME | End: 2023-02-10
Attending: INTERNAL MEDICINE
Payer: MEDICARE

## 2023-02-10 ENCOUNTER — OFFICE VISIT (OUTPATIENT)
Dept: RHEUMATOLOGY | Facility: CLINIC | Age: 78
End: 2023-02-10

## 2023-02-10 VITALS
HEART RATE: 77 BPM | SYSTOLIC BLOOD PRESSURE: 118 MMHG | HEIGHT: 63 IN | BODY MASS INDEX: 41.11 KG/M2 | WEIGHT: 232 LBS | RESPIRATION RATE: 16 BRPM | DIASTOLIC BLOOD PRESSURE: 48 MMHG

## 2023-02-10 DIAGNOSIS — M84.48XS SACRAL INSUFFICIENCY FRACTURE, SEQUELA: ICD-10-CM

## 2023-02-10 DIAGNOSIS — M48.061 SPINAL STENOSIS OF LUMBAR REGION, UNSPECIFIED WHETHER NEUROGENIC CLAUDICATION PRESENT: ICD-10-CM

## 2023-02-10 DIAGNOSIS — M48.061 SPINAL STENOSIS OF LUMBAR REGION, UNSPECIFIED WHETHER NEUROGENIC CLAUDICATION PRESENT: Primary | ICD-10-CM

## 2023-02-10 PROCEDURE — 72202 X-RAY EXAM SI JOINTS 3/> VWS: CPT | Performed by: INTERNAL MEDICINE

## 2023-02-10 PROCEDURE — 72100 X-RAY EXAM L-S SPINE 2/3 VWS: CPT | Performed by: INTERNAL MEDICINE

## 2023-02-10 PROCEDURE — 1125F AMNT PAIN NOTED PAIN PRSNT: CPT | Performed by: INTERNAL MEDICINE

## 2023-02-10 PROCEDURE — 99204 OFFICE O/P NEW MOD 45 MIN: CPT | Performed by: INTERNAL MEDICINE

## 2023-02-10 NOTE — PATIENT INSTRUCTIONS
1. Check xray of si joint and lumbar xray   2. Cont. meloxicam 15mg a day   3. Cont gabapentin 600mg In morning -- also ok to take extra 600mg at night. 4. Physical therapy for lower back   5. Return to clinic in 2 months.    6. Consider switching sertraline to duloxetine - will discuss with dr. Kareem Fernandes

## 2023-02-19 DIAGNOSIS — M48.061 SPINAL STENOSIS OF LUMBAR REGION, UNSPECIFIED WHETHER NEUROGENIC CLAUDICATION PRESENT: ICD-10-CM

## 2023-02-20 RX ORDER — HYDROCODONE BITARTRATE AND ACETAMINOPHEN 5; 325 MG/1; MG/1
1 TABLET ORAL EVERY 4 HOURS PRN
Qty: 30 TABLET | Refills: 0 | Status: SHIPPED | OUTPATIENT
Start: 2023-02-20

## 2023-02-21 ENCOUNTER — TELEPHONE (OUTPATIENT)
Dept: RHEUMATOLOGY | Facility: CLINIC | Age: 78
End: 2023-02-21

## 2023-02-21 NOTE — TELEPHONE ENCOUNTER
Received [facility]I therapy medicare initial evaluation/plan of care that needs physician signature. Placed in Dr. Austin Mederos folder in [place].

## 2023-03-23 ENCOUNTER — MED REC SCAN ONLY (OUTPATIENT)
Dept: RHEUMATOLOGY | Facility: CLINIC | Age: 78
End: 2023-03-23

## 2023-04-02 DIAGNOSIS — R09.82 POST-NASAL DRIP: ICD-10-CM

## 2023-04-02 DIAGNOSIS — J30.1 SEASONAL ALLERGIC RHINITIS DUE TO POLLEN: ICD-10-CM

## 2023-04-03 RX ORDER — FEXOFENADINE HCL 180 MG/1
180 TABLET ORAL DAILY
Qty: 90 TABLET | Refills: 3 | Status: SHIPPED | OUTPATIENT
Start: 2023-04-03

## 2023-04-03 NOTE — TELEPHONE ENCOUNTER
Refill passed per [place] [employer] [place] protocol. Requested Prescriptions   Pending Prescriptions Disp Refills    FEXOFENADINE 180 MG Oral Tab [Pharmacy Med Name: Fexofenadine Hydrochloride 180 Mg Tab Nort] 90 tablet 0     Sig: Take 1 tablet (180 mg total) by mouth daily.        Allergy Medication Protocol Passed - 4/2/2023  1:31 AM        Passed - In person appointment or virtual visit in the past 12 mos or appointment in next 3 mos     Recent Outpatient Visits              1 month ago Spinal stenosis of lumbar region, unspecified whether neurogenic claudication present    [facility], [address] [facility] Luc Orozco MD    Office Visit    2 months ago HTN (hypertension), benign    Yaw Mcclure MD    Office Visit    3 months ago Adult general medical exam    [address] [facility], [address], [facility]    Office Visit    4 months ago LLQ pain    [address], [place], [employer]    Office Visit    5 months ago HTN (hypertension), benign    [address] [facility], [address] MD    Office Visit          Future Appointments         Provider Department Appt Notes    In 2 months Betty Kolb MD [address], [address], [place] MEDICARE  NON SX F/U                     Recent Outpatient Visits              1 month ago Spinal stenosis of lumbar region, unspecified whether neurogenic claudication present    [address] [facility], Rossi Amato MD    Office Visit    2 months ago HTN (hypertension), benign    [address] [facility], [address] MD    Office Visit    3 months ago Adult general medical exam    [address] [facility], Rashaad Duran, DO    Office Visit    4 months ago LLQ pain    [address], [address] [place] [address], Haylee Perez PA-C    Office Visit    5 months ago HTN (hypertension), benign    Carmen Izaguirre MD    Office Visit            Future Appointments         Provider Department Appt Notes    In 2 months Jorge Raines MD [facility], [address] [place] [address] Rd,3Rd Floor, [place] MEDICARE  NON SX F/U

## 2023-04-07 ENCOUNTER — MED REC SCAN ONLY (OUTPATIENT)
Dept: FAMILY MEDICINE CLINIC | Facility: CLINIC | Age: 78
End: 2023-04-07

## 2023-04-22 DIAGNOSIS — M48.061 SPINAL STENOSIS OF LUMBAR REGION, UNSPECIFIED WHETHER NEUROGENIC CLAUDICATION PRESENT: ICD-10-CM

## 2023-04-23 NOTE — TELEPHONE ENCOUNTER
Please review; protocol failed. Requested Prescriptions   Pending Prescriptions Disp Refills    HYDROcodone-acetaminophen 5-325 MG Oral Tab 30 tablet 0     Sig: Take 1 tablet by mouth every 4 (four) hours as needed.        There is no refill protocol information for this order          Future Appointments         Provider Department Appt Notes    In 1 month Norah Bullock MD [address], [address], [place] MEDICARE  NON SX F/U            Recent Outpatient Visits              2 months ago Spinal stenosis of lumbar region, unspecified whether neurogenic claudication present    [address], [address], [place] Odilia Oliveira MD    Office Visit    2 months ago HTN (hypertension), benign    Cyndi Webb MD    Office Visit    4 months ago Adult general medical exam    [address] [facility], [address], [place],     Office Visit    5 months ago LLQ pain    [facility], [address], [place], [employer]    Office Visit    5 months ago HTN (hypertension), benign    [address] [facility], [facility]    Office Visit

## 2023-04-24 RX ORDER — HYDROCODONE BITARTRATE AND ACETAMINOPHEN 5; 325 MG/1; MG/1
1 TABLET ORAL EVERY 4 HOURS PRN
Qty: 30 TABLET | Refills: 0 | Status: SHIPPED | OUTPATIENT
Start: 2023-04-24

## 2023-05-16 ENCOUNTER — OFFICE VISIT (OUTPATIENT)
Dept: FAMILY MEDICINE CLINIC | Facility: CLINIC | Age: 78
End: 2023-05-16

## 2023-05-16 VITALS
DIASTOLIC BLOOD PRESSURE: 75 MMHG | HEART RATE: 88 BPM | WEIGHT: 230 LBS | HEIGHT: 63 IN | BODY MASS INDEX: 40.75 KG/M2 | SYSTOLIC BLOOD PRESSURE: 131 MMHG

## 2023-05-16 DIAGNOSIS — R30.0 DYSURIA: Primary | ICD-10-CM

## 2023-05-16 LAB
BILIRUBIN: NEGATIVE
GLUCOSE (URINE DIPSTICK): NEGATIVE MG/DL
KETONES (URINE DIPSTICK): NEGATIVE MG/DL
MULTISTIX LOT#: ABNORMAL NUMERIC
NITRITE, URINE: NEGATIVE
OCCULT BLOOD: NEGATIVE
PH, URINE: 7 (ref 4.5–8)
PROTEIN (URINE DIPSTICK): NEGATIVE MG/DL
SPECIFIC GRAVITY: 1.02 (ref 1–1.03)
URINE-COLOR: YELLOW
UROBILINOGEN,SEMI-QN: 0.2 MG/DL (ref 0–1.9)

## 2023-05-16 PROCEDURE — 99213 OFFICE O/P EST LOW 20 MIN: CPT | Performed by: NURSE PRACTITIONER

## 2023-05-16 PROCEDURE — 81003 URINALYSIS AUTO W/O SCOPE: CPT | Performed by: NURSE PRACTITIONER

## 2023-05-16 RX ORDER — NITROFURANTOIN 25; 75 MG/1; MG/1
100 CAPSULE ORAL 2 TIMES DAILY
Qty: 14 CAPSULE | Refills: 0 | Status: SHIPPED | OUTPATIENT
Start: 2023-05-16

## 2023-05-16 NOTE — ASSESSMENT & PLAN NOTE
Urine dip and culture  Will presumptively start macrobid 100 mg I po twice a day   Supportive care discussed to help alleviate symptoms  Please call if symptoms worsen or are not resolving.

## 2023-05-28 DIAGNOSIS — M48.061 SPINAL STENOSIS OF LUMBAR REGION, UNSPECIFIED WHETHER NEUROGENIC CLAUDICATION PRESENT: ICD-10-CM

## 2023-05-28 DIAGNOSIS — R30.0 DYSURIA: ICD-10-CM

## 2023-05-30 RX ORDER — NITROFURANTOIN 25; 75 MG/1; MG/1
100 CAPSULE ORAL 2 TIMES DAILY
Qty: 14 CAPSULE | Refills: 0 | OUTPATIENT
Start: 2023-05-30

## 2023-05-30 RX ORDER — HYDROCODONE BITARTRATE AND ACETAMINOPHEN 5; 325 MG/1; MG/1
1 TABLET ORAL EVERY 4 HOURS PRN
Qty: 30 TABLET | Refills: 0 | Status: SHIPPED | OUTPATIENT
Start: 2023-05-30

## 2023-05-30 NOTE — TELEPHONE ENCOUNTER
Please Review. Protocol Failed or has no protocol. Requested Prescriptions   Pending Prescriptions Disp Refills    HYDROcodone-acetaminophen 5-325 MG Oral Tab 30 tablet 0     Sig: Take 1 tablet by mouth every 4 (four) hours as needed.        There is no refill protocol information for this order        Recent Outpatient Visits              2 weeks ago [address] [facility], [place] Michele Dozier, KRISTY    Office Visit    3 months ago Spinal stenosis of lumbar region, unspecified whether neurogenic claudication present    Cora Steele, [address] [facility] [address], [place] Luc Orozco MD    Office Visit    3 months ago HTN (hypertension), benign    Yaw Mcclure MD    Office Visit    5 months ago Adult general medical exam    [address] [facility], [address], [place], DO    Office Visit    6 months ago LLQ pain    Cora Steele, [address], [place] [address] [place], [employer]    Office Visit          Future Appointments         Provider Department Appt Notes    In 1 week Betty Kolb MD [facility], [address], [place] MEDICARE  NON SX F/U

## 2023-06-06 NOTE — TELEPHONE ENCOUNTER
Pt would like a refill on hydrocodone medication. Please, call pt when the script is ready for .        Current Outpatient Prescriptions:  HYDROcodone-acetaminophen (NORCO) 5-325 MG Oral Tab take 1 - 2 tablet by ORAL route 3 times every day as needed for pain Disp: 60 tablet Rfl: 0 [FreeTextEntry1] : PATRICK is a 14 year old male referred for cardiac consultation due to chest pain this morning. He was doing his first 'one on one' football training session for the season, After 15 minutes of vigorous running and drills, he felt tired, shortness of breath, midline chest pain which was worse with inspiration and palpation. He sat down and one minute later, had emesis. He then felt shortness of breath because it hurt to inspire. He was trying to take deep breaths, then got dizzy which was worse when he stood up. The  checked his HR which was normal. He did not eat that morning, because the children often have emesis during these sessions. He drank only one cup of water. \par He was brought to an urgicenter. His mother recalls that his O2 sat was initially 94%, and then 97%. HR 66 and /79. No wheezing. told that his ECG was normal and to f/u with cardio. \par - he has been doing push- ups/ hanging on basketball hoop , playing football with friends. He is less active during colder months- this was his first vigorous work out since before the winter\par - usual fluid intake is 24 oz water/ noncaffeinated drinks. Yesterday,he was on a field trip, had ice tea and one other drink. mother suspects he is dehydrated. \par - last 2 weeks, seasonal allergies, itchy eyes, headache, nasal nitish - feels more tired than usual   \par \par - mother - history of episode of tachycardia >250 bpm in the setting of hyperthyroidism at 36 yo. It had resolved  spontaneously. Recently, resting HR was 130 bpm, beta blocker started. type 2 diabetes mellitus. nl cholesterol\par - MGF- CVA and MI at 40 yo, hypercholesterolemia and severe hypertension

## 2023-06-08 ENCOUNTER — OFFICE VISIT (OUTPATIENT)
Dept: SURGERY | Facility: CLINIC | Age: 78
End: 2023-06-08
Payer: MEDICARE

## 2023-06-08 VITALS
HEIGHT: 63 IN | BODY MASS INDEX: 41.25 KG/M2 | OXYGEN SATURATION: 96 % | HEART RATE: 58 BPM | SYSTOLIC BLOOD PRESSURE: 123 MMHG | WEIGHT: 232.81 LBS | DIASTOLIC BLOOD PRESSURE: 58 MMHG

## 2023-06-08 DIAGNOSIS — F43.9 STRESS: ICD-10-CM

## 2023-06-08 DIAGNOSIS — E66.01 MORBID OBESITY WITH BMI OF 40.0-44.9, ADULT (HCC): ICD-10-CM

## 2023-06-08 DIAGNOSIS — Z51.81 ENCOUNTER FOR THERAPEUTIC DRUG MONITORING: ICD-10-CM

## 2023-06-08 DIAGNOSIS — E78.5 DYSLIPIDEMIA: ICD-10-CM

## 2023-06-08 DIAGNOSIS — I10 HTN (HYPERTENSION), BENIGN: Primary | ICD-10-CM

## 2023-06-08 PROCEDURE — 99214 OFFICE O/P EST MOD 30 MIN: CPT | Performed by: INTERNAL MEDICINE

## 2023-06-19 DIAGNOSIS — I10 ESSENTIAL HYPERTENSION: ICD-10-CM

## 2023-06-19 NOTE — TELEPHONE ENCOUNTER
Refill passed per [employer] [employer] protocol  Received warning:  High  Drug-Drug: spironolactone and Telmisartan-HCTZ    Medication pended for your review and approval.     Requested Prescriptions   Pending Prescriptions Disp Refills    SPIRONOLACTONE 25 MG Oral Tab [Pharmacy Med Name: Spironolactone 25 Mg Tab Amne] 90 tablet 0     Sig: Take 1 tablet (25 mg total) by mouth daily.        Hypertensive Medications Protocol Passed - 6/19/2023  1:30 AM        Passed - In person appointment in the past 12 or next 3 months     Recent Outpatient Visits              1 week ago HTN (hypertension), benign    [address], [address], Akilah Hickman MD    Office Visit    1 month ago [address], KRISTY Johnson    Office Visit    4 months ago Spinal stenosis of lumbar region, unspecified whether neurogenic claudication present    [address], [address], Jesus Villa MD    Office Visit    4 months ago HTN (hypertension), benign    [address] [facility], Akilah Hickman MD    Office Visit    5 months ago Adult general medical exam    [address] [facility], [address], [place], DO    Office Visit          Future Appointments         Provider Department Appt Notes    In 4 months Sg Vasquez MD [address], [address], [place]                Passed - Last BP reading less than 140/90     BP Readings from Last 1 Encounters:  06/08/23 : 123/58              Passed - CMP or BMP in past 6 months     Recent Results (from the past 4392 hour(s))   COMP METABOLIC PANEL (14)    Collection Time: 01/07/23  8:31 AM   Result Value Ref Range    Glucose 105 (H) 70 - 99 mg/dL    Sodium 141 136 - 145 mmol/L    Potassium 3.9 3.5 - 5.1 mmol/L    Chloride 108 98 - 112 mmol/L    CO2 29.0 21.0 - 32.0 mmol/L    Anion Gap 4 0 - 18 mmol/L    BUN 31 (H) 7 - 18 mg/dL Creatinine 1.05 (H) 0.55 - 1.02 mg/dL    BUN/CREA Ratio 29.5 (H) 10.0 - 20.0    Calcium, Total 9.0 8.5 - 10.1 mg/dL    Calculated Osmolality 299 (H) 275 - 295 mOsm/kg    eGFR-Cr 55 (L) >=60 mL/min/1.73m2    ALT 30 13 - 56 U/L    AST 21 15 - 37 U/L    Alkaline Phosphatase 99 55 - 142 U/L    Bilirubin, Total 0.5 0.1 - 2.0 mg/dL    Total Protein 7.1 6.4 - 8.2 g/dL    Albumin 3.7 3.4 - 5.0 g/dL    Globulin  3.4 2.8 - 4.4 g/dL    A/G Ratio 1.1 1.0 - 2.0    Patient Fasting for CMP? Yes      *Note: Due to a large number of results and/or encounters for the requested time period, some results have not been displayed. A complete set of results can be found in Results Review.                Passed - In person appointment or virtual visit in the past 6 months     Recent Outpatient Visits              1 week ago HTN (hypertension), benign    Julien Churchill MD    Office Visit    1 month ago [address], KRISTY Dumont    Office Visit    4 months ago Spinal stenosis of lumbar region, unspecified whether neurogenic claudication present    Luke Thurman MD    Office Visit    4 months ago HTN (hypertension), benign    [place], John Bray MD    Office Visit    5 months ago Adult general medical exam    [place], Rashaad Zurita,     Office Visit          Future Appointments         Provider Department Appt Notes    In 4 months Elam Young MD [employer], [address], [place]                Passed - [facility] or [facility] > 50     GFR Evaluation  EGFRCR: 54 , resulted on 2023

## 2023-06-21 RX ORDER — SPIRONOLACTONE 25 MG/1
25 TABLET ORAL DAILY
Qty: 90 TABLET | Refills: 3 | Status: SHIPPED | OUTPATIENT
Start: 2023-06-21

## 2023-06-24 DIAGNOSIS — M51.26 LUMBAR HERNIATED DISC: ICD-10-CM

## 2023-06-25 NOTE — TELEPHONE ENCOUNTER
Refill passed per [address] [place] protocol. High drug interaction warning with sertraline. Requested Prescriptions   Pending Prescriptions Disp Refills    MELOXICAM 15 MG Oral Tab [Pharmacy Med Name: Meloxicam 15 Mg Tab Zydu] 90 tablet 0     Sig: Take 1 tablet (15 mg total) by mouth daily.        Non-Narcotic Pain Medication Protocol Passed - 6/24/2023  9:42 AM        Passed - In person appointment or virtual visit in the past 6 mos or appointment in next 3 mos     Recent Outpatient Visits              2 weeks ago HTN (hypertension), [facility], [address], Carol Smith MD    Office Visit    1 month ago [address], KRISTY Hicks    Office Visit    4 months ago Spinal stenosis of lumbar region, unspecified whether neurogenic claudication present    [facility], [address], Gay Perez MD    Office Visit    4 months ago HTN (hypertension), rachel Acosta MD    Office Visit    6 months ago Adult general medical exam    [address] [facility], [address], [place],     Office Visit          Future Appointments         Provider Department Appt Notes    In 4 months Marilyn Landry MD [address] [facility],[address], [address], [place]                     Recent Outpatient Visits              2 weeks ago HTN (hypertension), benign    Checo Acosta MD    Office Visit    1 month ago [address], KRISTY Hicks    Office Visit    4 months ago Spinal stenosis of lumbar region, unspecified whether neurogenic claudication present    [address] [facility], Gay Perez MD    Office Visit    4 months ago HTN (hypertension), [facility] Leanne Cook MD    Office Visit    6 months ago Adult general medical exam    [address] [facility], [address], [place],     Office Visit            Future Appointments         Provider Department Appt Notes    In 4 months MD Marry Xiong, [address] [facility],[address], [place]

## 2023-06-26 RX ORDER — MELOXICAM 15 MG/1
15 TABLET ORAL DAILY
Qty: 90 TABLET | Refills: 1 | Status: SHIPPED | OUTPATIENT
Start: 2023-06-26

## 2023-07-15 ENCOUNTER — HOSPITAL ENCOUNTER (OUTPATIENT)
Age: 78
Discharge: HOME | End: 2023-07-15
Payer: MEDICARE

## 2023-07-15 VITALS
SYSTOLIC BLOOD PRESSURE: 141 MMHG | WEIGHT: 230 LBS | HEIGHT: 65 IN | OXYGEN SATURATION: 96 % | DIASTOLIC BLOOD PRESSURE: 56 MMHG | HEART RATE: 73 BPM | TEMPERATURE: 98 F | RESPIRATION RATE: 20 BRPM | BODY MASS INDEX: 38.32 KG/M2

## 2023-07-15 DIAGNOSIS — Z11.52 ENCOUNTER FOR SCREENING FOR COVID-19: ICD-10-CM

## 2023-07-15 DIAGNOSIS — R09.81 NASAL CONGESTION: ICD-10-CM

## 2023-07-15 DIAGNOSIS — Z20.822 LAB TEST NEGATIVE FOR COVID-19 VIRUS: ICD-10-CM

## 2023-07-15 DIAGNOSIS — J02.8 SORE THROAT (VIRAL): Primary | ICD-10-CM

## 2023-07-15 DIAGNOSIS — B97.89 SORE THROAT (VIRAL): Primary | ICD-10-CM

## 2023-07-15 LAB
S PYO AG THROAT QL: NEGATIVE
SARS-COV-2 RNA RESP QL NAA+PROBE: NOT DETECTED

## 2023-07-15 NOTE — DISCHARGE INSTRUCTIONS
Recommend over-the-counter Flonase and 24-hour Claritin to help with nasal congestion. Take Tylenol as needed for any pain or discomfort. Gargle with salt water 2-3 times per day and spit it out. Drink plenty of fluids warm tea with honey. If you develop fever chest pain shortness of breath productive cough nausea vomiting diarrhea or any new or worsening symptoms go to the nearest emergency department otherwise follow-up with primary care provider in 2 to 3 days.

## 2023-07-15 NOTE — ED INITIAL ASSESSMENT (HPI)
Pt reports sore throat, nasal congestion beginning Wednesday. Denies fevers. Recently travelled to [facility].

## 2023-07-26 ENCOUNTER — TELEPHONE (OUTPATIENT)
Dept: FAMILY MEDICINE CLINIC | Facility: CLINIC | Age: 78
End: 2023-07-26

## 2023-07-26 DIAGNOSIS — Z12.31 SCREENING MAMMOGRAM, ENCOUNTER FOR: Primary | ICD-10-CM

## 2023-07-28 ENCOUNTER — NURSE TRIAGE (OUTPATIENT)
Dept: FAMILY MEDICINE CLINIC | Facility: CLINIC | Age: 78
End: 2023-07-28

## 2023-07-28 DIAGNOSIS — I10 ESSENTIAL HYPERTENSION: ICD-10-CM

## 2023-07-28 NOTE — TELEPHONE ENCOUNTER
Per patient, she was seen on 7/15/23 at  due to her nasal congestion , and was advised to take over the counter medication. Reportedly she's been taking over-the counter medications, BUT until now  still  with congestion ,can't sleep at night,  cannot get out the phlegm  and feels like it stuck in the middle of her throat, no fever, no chest pain, no sob. Home care provider,requesting antibiotics, appointment scheduled, advised urgent care or immediate care  for worsening symptoms .         Future Appointments   Date Time Provider Barrett Caceres   7/29/2023  9:20 AM Aster Nevarez DO [facility] [place]   10/26/2023  4:15 PM Betty Kolb MD [address]       Reason for Disposition   Continuous (nonstop) coughing interferes with work or school and no improvement using cough treatment per Care Advice    Protocols used: Cough-A-OH

## 2023-07-29 ENCOUNTER — HOSPITAL ENCOUNTER (OUTPATIENT)
Dept: GENERAL RADIOLOGY | Age: 78
Discharge: HOME | End: 2023-07-29
Attending: FAMILY MEDICINE
Payer: MEDICARE

## 2023-07-29 ENCOUNTER — OFFICE VISIT (OUTPATIENT)
Dept: FAMILY MEDICINE CLINIC | Facility: CLINIC | Age: 78
End: 2023-07-29

## 2023-07-29 VITALS
HEART RATE: 75 BPM | DIASTOLIC BLOOD PRESSURE: 81 MMHG | BODY MASS INDEX: 38.82 KG/M2 | SYSTOLIC BLOOD PRESSURE: 134 MMHG | WEIGHT: 233 LBS | HEIGHT: 65 IN

## 2023-07-29 DIAGNOSIS — R05.9 COUGH, UNSPECIFIED TYPE: ICD-10-CM

## 2023-07-29 DIAGNOSIS — R19.7 DIARRHEA, UNSPECIFIED TYPE: ICD-10-CM

## 2023-07-29 DIAGNOSIS — R05.9 COUGH, UNSPECIFIED TYPE: Primary | ICD-10-CM

## 2023-07-29 LAB
CONTROL LINE PRESENT WITH A CLEAR BACKGROUND (YES/NO): YES YES/NO
KIT LOT #: 6812 NUMERIC
STREP GRP A CUL-SCR: POSITIVE

## 2023-07-29 PROCEDURE — 87880 STREP A ASSAY W/OPTIC: CPT | Performed by: FAMILY MEDICINE

## 2023-07-29 PROCEDURE — 1126F AMNT PAIN NOTED NONE PRSNT: CPT | Performed by: FAMILY MEDICINE

## 2023-07-29 PROCEDURE — 71046 X-RAY EXAM CHEST 2 VIEWS: CPT | Performed by: FAMILY MEDICINE

## 2023-07-29 PROCEDURE — 99214 OFFICE O/P EST MOD 30 MIN: CPT | Performed by: FAMILY MEDICINE

## 2023-07-29 RX ORDER — TOPIRAMATE 25 MG/1
25 TABLET ORAL 2 TIMES DAILY
COMMUNITY
Start: 2023-07-18

## 2023-07-29 RX ORDER — PENICILLIN V POTASSIUM 500 MG/1
500 TABLET ORAL 3 TIMES DAILY
Qty: 30 TABLET | Refills: 0 | Status: SHIPPED | OUTPATIENT
Start: 2023-07-29

## 2023-07-29 RX ORDER — CODEINE PHOSPHATE AND GUAIFENESIN 10; 100 MG/5ML; MG/5ML
5 SOLUTION ORAL EVERY 6 HOURS PRN
Qty: 236 ML | Refills: 0 | Status: SHIPPED | OUTPATIENT
Start: 2023-07-29

## 2023-07-29 RX ORDER — BENZONATATE 100 MG/1
100 CAPSULE ORAL 3 TIMES DAILY PRN
Qty: 45 CAPSULE | Refills: 0 | Status: SHIPPED | OUTPATIENT
Start: 2023-07-29

## 2023-07-29 NOTE — PROGRESS NOTES
Blood pressure 134/81, pulse 75, height 5' 5\" (1.651 m), weight 233 lb (105.7 kg), not currently breastfeeding. 2-week history of nocturnal cough with clear to white phlegm. No nasal congestion no fever no chills. No dyspnea. Some relief with NyQuil voice has become raspy. No ear pain no sore throat no sneezing. No watery or itchy eyes no bad breath or headache no tooth pain. She does not smoke or have asthma. She does have diarrhea which she has had for the past month. No foreign travel no antibiotics no pets. Objective throat clear nonerythematous    Lungs with mild scattered expiratory rhonchi    Otherwise clear to auscultation patient without chest retractions nasal flaring speaking in uninterrupted sentences    Assessment #1 2-week history of cough #2 diarrhea #3 hypertension    Plan #1 Cheratussin AC MEDICATION MAY CAUSE DROWSINESS. DO NOT DRIVE OR OPERATE HEAVY MACHINERY.   Benzonatate #2 stool studies ordered and Imodium as needed brat diet with avoidance of milk products #3 stop NyQuil

## 2023-07-29 NOTE — PATIENT INSTRUCTIONS
IMODIUM     AVOID MILK PRODUCTS        JESUS DIET:  BANANA   RICE  APPLE SAUCE          TEA     TOAST

## 2023-07-29 NOTE — TELEPHONE ENCOUNTER
Please review. Protocol failed / No protocol. Requested Prescriptions   Pending Prescriptions Disp Refills    TELMISARTAN-HCTZ 80-25 MG Oral Tab [Pharmacy Med Name: Telmisartan/Hydrochlorothiazide 80-25 Mg Tab Beatris] 90 tablet 0     Sig: Take 1 tablet by mouth daily. Hypertensive Medications Protocol Failed - 7/28/2023  4:49 PM        Failed - Last BP reading less than 140/90     BP Readings from Last 1 Encounters:  07/29/23 : 134/81              Failed - CMP or BMP in past 6 months     No results found for this or any previous visit (from the past 4392 hour(s)).             Passed - In person appointment in the past 12 or next 3 months     Recent Outpatient Visits              Today Cough, unspecified type    [facility], [address], [place] DO    Office Visit    1 month ago HTN (hypertension), benign    Tessy Gaston MD    Office Visit    2 months ago [address] [facility], [facility], APRN    Office Visit    5 months ago Spinal stenosis of lumbar region, unspecified whether neurogenic claudication present    Oni Neely MD    Office Visit    5 months ago HTN (hypertension), benign    Brayden Neely MD    Office Visit          Future Appointments         Provider Department Appt Notes    In 2 months Jayla Kaplan MD [address], [address] [facility] - In person appointment or virtual visit in the past 6 months     Recent Outpatient Visits              Today Cough, unspecified type    [facility], [address], [place]    Office Visit    1 month ago HTN (hypertension), benign    Brayden Neely MD    Office Visit    2 months ago Dysuria    [facility], [address], Rashaad Levin, APRN    Office Visit    5 months ago Spinal stenosis of lumbar region, unspecified whether neurogenic claudication present    [facility], [address], [place] Ayesha Palomino MD    Office Visit    5 months ago HTN (hypertension), benign    Ayo Peña MD    Office Visit          Future Appointments         Provider Department Appt Notes    In 2 months Ana Reilly MD [address], [address], [address] or GFRNAA > 50     GFR Evaluation  EGFRCR: 54 , resulted on 1/7/2023               Future Appointments         Provider Department Appt Notes    In 2 months Ana Reilly MD [address], [address], [place]             Recent Outpatient Visits              Today Cough, unspecified type    [address], [place] Madhavi Fine DO    Office Visit    1 month ago HTN (hypertension), benign    Ayo Peña MD    Office Visit    2 months ago [address] [place] [address], Rashaad Levin, APRN    Office Visit    5 months ago Spinal stenosis of lumbar region, unspecified whether neurogenic claudication present    [address] Sharita Presley MD    Office Visit    5 months ago HTN (hypertension), benign    [address] Diallo Presley MD    Office Visit

## 2023-07-30 LAB — SARS-COV-2 RNA RESP QL NAA+PROBE: NOT DETECTED

## 2023-07-31 ENCOUNTER — LAB ENCOUNTER (OUTPATIENT)
Dept: LAB | Age: 78
End: 2023-07-31
Attending: FAMILY MEDICINE
Payer: MEDICARE

## 2023-07-31 DIAGNOSIS — R19.7 DIARRHEA, UNSPECIFIED TYPE: ICD-10-CM

## 2023-07-31 LAB
ANION GAP SERPL CALC-SCNC: 4 MMOL/L (ref 0–18)
BUN BLD-MCNC: 29 MG/DL (ref 7–18)
BUN/CREAT SERPL: 24.4 (ref 10–20)
CALCIUM BLD-MCNC: 9.1 MG/DL (ref 8.5–10.1)
CHLORIDE SERPL-SCNC: 111 MMOL/L (ref 98–112)
CO2 SERPL-SCNC: 27 MMOL/L (ref 21–32)
CREAT BLD-MCNC: 1.19 MG/DL
EGFRCR SERPLBLD CKD-EPI 2021: 48 ML/MIN/1.73M2 (ref 60–?)
FASTING STATUS PATIENT QL REPORTED: NO
GLUCOSE BLD-MCNC: 85 MG/DL (ref 70–99)
OSMOLALITY SERPL CALC.SUM OF ELEC: 299 MOSM/KG (ref 275–295)
POTASSIUM SERPL-SCNC: 4.2 MMOL/L (ref 3.5–5.1)
SODIUM SERPL-SCNC: 142 MMOL/L (ref 136–145)

## 2023-07-31 PROCEDURE — 36415 COLL VENOUS BLD VENIPUNCTURE: CPT

## 2023-07-31 PROCEDURE — 80048 BASIC METABOLIC PNL TOTAL CA: CPT

## 2023-07-31 RX ORDER — TELMISARTAN AND HYDROCHLORTHIAZIDE 80; 25 MG/1; MG/1
1 TABLET ORAL DAILY
Qty: 90 TABLET | Refills: 0 | Status: SHIPPED | OUTPATIENT
Start: 2023-07-31

## 2023-08-02 ENCOUNTER — LAB ENCOUNTER (OUTPATIENT)
Dept: LAB | Age: 78
End: 2023-08-02
Attending: FAMILY MEDICINE
Payer: MEDICARE

## 2023-08-02 DIAGNOSIS — R19.7 DIARRHEA, UNSPECIFIED TYPE: ICD-10-CM

## 2023-08-02 LAB
CRYPTOSP AG STL QL IA: NEGATIVE
G LAMBLIA AG STL QL IA: NEGATIVE

## 2023-08-02 PROCEDURE — 87046 STOOL CULTR AEROBIC BACT EA: CPT

## 2023-08-02 PROCEDURE — 87329 GIARDIA AG IA: CPT

## 2023-08-02 PROCEDURE — 87272 CRYPTOSPORIDIUM AG IF: CPT

## 2023-08-02 PROCEDURE — 87493 C DIFF AMPLIFIED PROBE: CPT

## 2023-08-02 PROCEDURE — 87427 SHIGA-LIKE TOXIN AG IA: CPT

## 2023-08-02 PROCEDURE — 87045 FECES CULTURE AEROBIC BACT: CPT

## 2023-08-03 ENCOUNTER — TELEPHONE (OUTPATIENT)
Dept: FAMILY MEDICINE CLINIC | Facility: CLINIC | Age: 78
End: 2023-08-03

## 2023-08-03 LAB — C DIFF TOX B STL QL: NEGATIVE

## 2023-08-03 NOTE — TELEPHONE ENCOUNTER
Jenny Chávez from Dr. Jyoti Walker lab faxed an order for cheek swab. Calling to confirm if fax was received and if can be faxed back? Asking to be completed asap so pt can be contacted.      Fax# [phone]     When calling back please give pt record, Patient record # : [id number]

## 2023-08-07 NOTE — TELEPHONE ENCOUNTER
I did review the forms that she brought for genetic testing. I have seen these type of requests before and usually they are not going to be covered through insurance. I will be happy to send you to a  as they would put in the appropriate diagnoses if any to see if any of these tests would be covered.

## 2023-08-08 NOTE — TELEPHONE ENCOUNTER
Per Sandor Ibanez with Dr. Sherry Melo office, patient's insurance will cover the testing with no out of pocket cost for the patient.

## 2023-08-10 NOTE — TELEPHONE ENCOUNTER
Adryan: I spoke with patient verified name and . Patient would like to thank you for your time in reviewing these forms/requests and states that if you do not find it necessary to forget about the request andd she has no idea how these people got a hold of her.

## 2023-08-11 NOTE — TELEPHONE ENCOUNTER
Please see message below    John Pauli: I spoke with patient verified name and . Patient would like to thank you for your time in reviewing these forms/requests and states that if you do not find it necessary to forget about the request andd she has no idea how these people got a hold of her.

## 2023-08-11 NOTE — TELEPHONE ENCOUNTER
[facility] [phone] is calling for status of the request. Please, use reference number [id number].

## 2023-08-15 NOTE — TELEPHONE ENCOUNTER
Deanna Tran calling for update, please call at [phone],[facility].

## 2023-08-20 DIAGNOSIS — M48.061 SPINAL STENOSIS OF LUMBAR REGION, UNSPECIFIED WHETHER NEUROGENIC CLAUDICATION PRESENT: ICD-10-CM

## 2023-08-22 RX ORDER — HYDROCODONE BITARTRATE AND ACETAMINOPHEN 5; 325 MG/1; MG/1
1 TABLET ORAL EVERY 4 HOURS PRN
Qty: 30 TABLET | Refills: 0 | Status: SHIPPED | OUTPATIENT
Start: 2023-08-22

## 2023-08-22 NOTE — TELEPHONE ENCOUNTER
Please review. Protocol failed or has no protocol. Requested Prescriptions   Pending Prescriptions Disp Refills    HYDROcodone-acetaminophen 5-325 MG Oral Tab 30 tablet 0     Sig: Take 1 tablet by mouth every 4 (four) hours as needed.        There is no refill protocol information for this order          Recent Outpatient Visits              3 weeks ago Cough, unspecified type    [facility], [address], [place], Wendy Gilliam,     Office Visit    2 months ago HTN (hypertension), benign    Tarik Manriquez MD    Office Visit    3 months ago [address] [facility], [place] KRISTY Adkins    Office Visit    6 months ago Spinal stenosis of lumbar region, unspecified whether neurogenic claudication present    Der[facility] President, Fernando Mobley MD    Office Visit    6 months ago HTN (hypertension), benign    Tarik Manriquez MD    Office Visit            Future Appointments         Provider Department Appt Notes    In 1 week ANIVAL BARRETT RM1; SAMUELO SUNNY [address] [facility]     In 2 months Karina Isaac MD [address] [facility],[address], [address] [facility]

## 2023-09-01 ENCOUNTER — HOSPITAL ENCOUNTER (OUTPATIENT)
Dept: MAMMOGRAPHY | Age: 78
Discharge: HOME | End: 2023-09-01
Attending: FAMILY MEDICINE
Payer: MEDICARE

## 2023-09-01 DIAGNOSIS — Z12.31 SCREENING MAMMOGRAM, ENCOUNTER FOR: ICD-10-CM

## 2023-09-01 PROCEDURE — 77063 BREAST TOMOSYNTHESIS BI: CPT | Performed by: FAMILY MEDICINE

## 2023-09-01 PROCEDURE — 77067 SCR MAMMO BI INCL CAD: CPT | Performed by: FAMILY MEDICINE

## 2023-10-08 DIAGNOSIS — M48.061 SPINAL STENOSIS OF LUMBAR REGION, UNSPECIFIED WHETHER NEUROGENIC CLAUDICATION PRESENT: ICD-10-CM

## 2023-10-09 RX ORDER — HYDROCODONE BITARTRATE AND ACETAMINOPHEN 5; 325 MG/1; MG/1
1 TABLET ORAL EVERY 4 HOURS PRN
Qty: 30 TABLET | Refills: 0 | Status: SHIPPED | OUTPATIENT
Start: 2023-10-09

## 2023-10-09 NOTE — TELEPHONE ENCOUNTER
Dr. Palma Lack - patient confirms that [facility] has 5-325mg tablets. Please advise    Patient called office. Date of birth and full name both confirmed. She spoke with [facility] just now, in [place], [place] , They have this dose.

## 2023-10-18 DIAGNOSIS — I10 ESSENTIAL HYPERTENSION: ICD-10-CM

## 2023-10-19 RX ORDER — TOPIRAMATE 25 MG/1
25 TABLET ORAL 2 TIMES DAILY
Qty: 180 TABLET | Refills: 0 | Status: SHIPPED | OUTPATIENT
Start: 2023-10-19

## 2023-10-19 RX ORDER — TELMISARTAN AND HYDROCHLORTHIAZIDE 80; 25 MG/1; MG/1
1 TABLET ORAL DAILY
Qty: 90 TABLET | Refills: 3 | Status: SHIPPED | OUTPATIENT
Start: 2023-10-19

## 2023-10-19 NOTE — TELEPHONE ENCOUNTER
Please review. Protocol failed/ No protocol      Requested Prescriptions   Pending Prescriptions Disp Refills    TELMISARTAN-HCTZ 80-25 MG Oral Tab [Pharmacy Med Name: Telmisartan/Hydrochlorothiazide 80-25 Mg Tab Beatris] 90 tablet 0     Sig: Take 1 tablet by mouth daily.        Hypertensive Medications Protocol Failed - 10/18/2023  6:10 PM        Failed - EGFRCR or GFRNAA > 50     GFR Evaluation  EGFRCR: 48 , resulted on 7/31/2023          Passed - In person appointment in the past 12 or next 3 months     Recent Outpatient Visits              2 months ago Cough, unspecified type    [facility], [address], [place], Michell Balbuena DO    Office Visit    4 months ago HTN (hypertension), benign    [facility], [address], Gretchen Garcia MD    Office Visit    5 months ago [address] [facility], [facility]    Office Visit    8 months ago Spinal stenosis of lumbar region, unspecified whether neurogenic claudication present    [facility], [address], Augustin Matthews MD    Office Visit    8 months ago HTN (hypertension), benign    [facility], [address], Gretchen Garcia MD    Office Visit                      Passed - Last BP reading less than 140/90     BP Readings from Last 1 Encounters:  07/29/23 : 134/81              Passed - CMP or BMP in past 6 months     Recent Results (from the past 4392 hour(s))   Basic Metabolic Panel (8)    Collection Time: 07/31/23  5:05 PM   Result Value Ref Range    Glucose 85 70 - 99 mg/dL    Sodium 142 136 - 145 mmol/L    Potassium 4.2 3.5 - 5.1 mmol/L    Chloride 111 98 - 112 mmol/L    CO2 27.0 21.0 - 32.0 mmol/L    Anion Gap 4 0 - 18 mmol/L    BUN 29 (H) 7 - 18 mg/dL    Creatinine 1.19 (H) 0.55 - 1.02 mg/dL    BUN/CREA Ratio 24.4 (H) 10.0 - 20.0    Calcium, Total 9.1 8.5 - 10.1 mg/dL    Calculated Osmolality 299 (H) 275 - 295 mOsm/kg eGFR-Cr 48 (L) >=60 mL/min/1.73m2    Patient Fasting for BMP? No      *Note: Due to a large number of results and/or encounters for the requested time period, some results have not been displayed. A complete set of results can be found in Results Review.                Passed - In person appointment or virtual visit in the past 6 months     Recent Outpatient Visits              2 months ago Cough, unspecified type    [facility], [address], [place], [facility], DO    Office Visit    4 months ago HTN (hypertension), benign    [facility], [address], Akilah Hickman MD    Office Visit    5 months ago [address], [facility] Gladys, APRN    Office Visit    8 months ago Spinal stenosis of lumbar region, unspecified whether neurogenic claudication present    [facility], [address], Jesus Villa MD    Office Visit    8 months ago HTN (hypertension), rachel Urrutia MD    Office Visit                                Recent Outpatient Visits              2 months ago Cough, unspecified type    [facility], [place], Priscille [place]ridge, DO    Office Visit    4 months ago HTN (hypertension), rachel Urrutia MD    Office Visit    5 months ago [address], [facility], APR[facility]    Office Visit    8 months ago Spinal stenosis of lumbar region, unspecified whether neurogenic claudication present    Jesus Crawley MD    Office Visit    8 months ago HTN (hypertension), Akilah Omer MD    Office Visit

## 2023-10-20 RX ORDER — TOPIRAMATE 25 MG/1
25 TABLET ORAL 2 TIMES DAILY
Qty: 180 TABLET | Refills: 0 | OUTPATIENT
Start: 2023-10-20

## 2023-11-17 DIAGNOSIS — J30.1 SEASONAL ALLERGIC RHINITIS DUE TO POLLEN: ICD-10-CM

## 2023-11-18 RX ORDER — MONTELUKAST SODIUM 10 MG/1
10 TABLET ORAL DAILY
Qty: 90 TABLET | Refills: 0 | Status: SHIPPED | OUTPATIENT
Start: 2023-11-18 | End: 2024-02-19

## 2023-11-18 NOTE — TELEPHONE ENCOUNTER
Refill passed per [facility] protocol.     Pt due for OV, 90 day supply given.  Please contact pt to schedule OV to prevent delays in future refills.  Thank you.    Requested Prescriptions   Pending Prescriptions Disp Refills    MONTELUKAST 10 MG Oral Tab [Pharmacy Med Name: Montelukast Sodium 10 Mg Tab Auro] 90 tablet 0     Sig: TAKE ONE TABLET BY MOUTH ONE TIME DAILY       Asthma & COPD Medication Protocol Failed - 11/17/2023  1:30 AM        Failed - In person appointment or virtual visit in the past 6 mos or appointment in next 3 mos     Recent Outpatient Visits              3 months ago Cough, unspecified type    [facility], [address], [place], DO    Office Visit    5 months ago HTN (hypertension), [facility], Ellis Florez MD    Office Visit    6 months ago Dysuria    [facility], Gerda Alvarez, APRCOURTNEY    Office Visit    9 months ago Spinal stenosis of lumbar region, unspecified whether neurogenic claudication present    [facility] Ramin Christy MD    Office Visit    9 months ago HTN (hypertension), [facility] Ellis Florez MD    Office Visit                            Recent Outpatient Visits              3 months ago Cough, unspecified type    [facility], [address], [place], DO    Office Visit    5 months ago [facility]hypertension), [facility]    Office Visit    6 months ago Dysuria    [facility], Gerda Alvarez, KRISTY    Office Visit    9 months ago Spinal stenosis of lumbar region, unspecified whether neurogenic claudication present    [facility], Ramin Christy MD    Office Visit     9 months ago HTN (hypertension), benign    [facility], [facility] Ellis Garcia MD    Office Visit

## 2023-11-20 DIAGNOSIS — M48.061 SPINAL STENOSIS OF LUMBAR REGION, UNSPECIFIED WHETHER NEUROGENIC CLAUDICATION PRESENT: ICD-10-CM

## 2023-11-21 NOTE — TELEPHONE ENCOUNTER
Please review; protocol failed. Requested Prescriptions   Pending Prescriptions Disp Refills    HYDROcodone-acetaminophen 5-325 MG Oral Tab 30 tablet 0     Sig: Take 1 tablet by mouth every 4 (four) hours as needed.        There is no refill protocol information for this order        Recent Outpatient Visits              3 months ago Cough, unspecified type    [facility] [address], DO    Office Visit    5 months ago HTN (hypertension), benign    Kelby Covarrubias MD    Office Visit    6 months ago [address] [facility], APRN    Office Visit    9 months ago Spinal stenosis of lumbar region, unspecified whether neurogenic claudication present    Denita Sauceda MD    Office Visit    9 months ago HTN (hypertension), benign    Claude Sauceda MD    Office Visit

## 2023-11-25 DIAGNOSIS — M48.061 SPINAL STENOSIS OF LUMBAR REGION, UNSPECIFIED WHETHER NEUROGENIC CLAUDICATION PRESENT: ICD-10-CM

## 2023-11-26 NOTE — TELEPHONE ENCOUNTER
Failed Protocol/No Protocol. Requested Prescriptions   Pending Prescriptions Disp Refills    HYDROcodone-acetaminophen 5-325 MG Oral Tab 30 tablet 0     Sig: Take 1 tablet by mouth every 4 (four) hours as needed.        There is no refill protocol information for this order            Recent Outpatient Visits              4 months ago Cough, unspecified type    [facility] [address], DO    Office Visit    5 months ago HTN (hypertension), benign    Adriane Woodall MD    Office Visit    6 months ago [address] [facility] Alayna Steele, APRN    Office Visit    9 months ago Spinal stenosis of lumbar region, unspecified whether neurogenic claudication present    [address] [facility], Benitez Blood MD    Office Visit    9 months ago HTN (hypertension), benign    [address] [facility], Jim Marin MD    Office Visit

## 2023-11-27 RX ORDER — HYDROCODONE BITARTRATE AND ACETAMINOPHEN 5; 325 MG/1; MG/1
1 TABLET ORAL EVERY 4 HOURS PRN
Qty: 30 TABLET | Refills: 0 | Status: SHIPPED | OUTPATIENT
Start: 2023-11-27

## 2023-11-30 ENCOUNTER — MED REC SCAN ONLY (OUTPATIENT)
Dept: FAMILY MEDICINE CLINIC | Facility: CLINIC | Age: 78
End: 2023-11-30

## 2023-12-05 DIAGNOSIS — F41.1 ANXIETY STATE: ICD-10-CM

## 2023-12-05 DIAGNOSIS — I70.90 ATHEROSCLEROSIS: ICD-10-CM

## 2023-12-05 DIAGNOSIS — E78.2 MIXED HYPERLIPIDEMIA: ICD-10-CM

## 2023-12-06 RX ORDER — ROSUVASTATIN CALCIUM 20 MG/1
20 TABLET, COATED ORAL
Qty: 90 TABLET | Refills: 3 | Status: SHIPPED | OUTPATIENT
Start: 2023-12-06

## 2023-12-06 NOTE — TELEPHONE ENCOUNTER
Routed to Dr Niraj Pisano for advise on sertraline refill, thanks. There is a high alert notification that I am not permitted to override. Selective Serotonin Reuptake Inhibitors / NSAIDs     Significance: Major     Warning: Toxic effects may be increased with concurrent administration of Meloxicam and sertraline. The risk of upper gastrointestinal bleeding may be increased. Patients taking both drugs concurrently should be educated about the signs and symptoms of GI bleeding. Refill passed per [facility] protocol   Requested Prescriptions   Pending Prescriptions Disp Refills    ROSUVASTATIN 20 MG Oral Tab [Pharmacy Med Name: Rosuvastatin Calcium 20 Mg Tab Nort] 90 tablet 0     Sig: Take 1 tablet (20 mg total) by mouth once daily.        Cholesterol Medication Protocol Passed - 12/5/2023  1:31 AM        Passed - ALT in past 12 months        Passed - LDL in past 12 months        Passed - Last ALT < 80     Lab Results   Component Value Date    ALT 30 01/07/2023             Passed - Last LDL < 130     Lab Results   Component Value Date    LDL 71 01/07/2023             Passed - In person appointment or virtual visit in the past 12 mos or appointment in next 3 mos     Recent Outpatient Visits              4 months ago Cough, unspecified type    [facility] [address], DO    Office Visit    6 months ago HTN (hypertension), benign    Sera Kenney MD    Office Visit    6 months ago [address] [facility] KRISTY Holman    Office Visit    9 months ago Spinal stenosis of lumbar region, unspecified whether neurogenic claudication present    Miles Giron MD    Office Visit    10 months ago HTN (hypertension), benign    Neal Giron MD    Office Visit SERTRALINE 100 MG Oral Tab [Pharmacy Med Name: Sertraline Hydrochloride 100 Mg Tab Nort] 90 tablet 0     Sig: TAKE ONE TABLET BY MOUTH ONE TIME DAILY       Psychiatric Non-Scheduled (Anti-Anxiety) Failed - 12/5/2023  1:31 AM        Failed - In person appointment or virtual visit in the past 6 mos or appointment in next 3 mos     Recent Outpatient Visits              4 months ago Cough, unspecified type    [facility], [place] Annabella Picker, [address],     Office Visit    6 months ago HTN (hypertension), benign    Isatu Orlando MD    Office Visit    6 months ago [address] [facility], APRN    Office Visit    9 months ago Spinal stenosis of lumbar region, unspecified whether neurogenic claudication present    Steven Massey MD    Office Visit    10 months ago HTN (hypertension), benign    Kartik Massey MD    Office Visit

## 2023-12-07 RX ORDER — SERTRALINE HYDROCHLORIDE 100 MG/1
TABLET, FILM COATED ORAL
Qty: 90 TABLET | Refills: 0 | Status: SHIPPED | OUTPATIENT
Start: 2023-12-07

## 2023-12-23 ENCOUNTER — HOSPITAL ENCOUNTER (OUTPATIENT)
Dept: GENERAL RADIOLOGY | Age: 78
Discharge: HOME | End: 2023-12-23
Attending: FAMILY MEDICINE
Payer: MEDICARE

## 2023-12-23 ENCOUNTER — LAB ENCOUNTER (OUTPATIENT)
Dept: LAB | Age: 78
End: 2023-12-23
Attending: FAMILY MEDICINE
Payer: MEDICARE

## 2023-12-23 ENCOUNTER — OFFICE VISIT (OUTPATIENT)
Dept: FAMILY MEDICINE CLINIC | Facility: CLINIC | Age: 78
End: 2023-12-23

## 2023-12-23 VITALS
TEMPERATURE: 97 F | SYSTOLIC BLOOD PRESSURE: 122 MMHG | HEIGHT: 65 IN | DIASTOLIC BLOOD PRESSURE: 69 MMHG | HEART RATE: 73 BPM | BODY MASS INDEX: 38.95 KG/M2 | WEIGHT: 233.81 LBS

## 2023-12-23 DIAGNOSIS — E66.01 SEVERE OBESITY (BMI 35.0-35.9 WITH COMORBIDITY)  (HCC): ICD-10-CM

## 2023-12-23 DIAGNOSIS — M79.675 PAIN IN LEFT TOE(S): ICD-10-CM

## 2023-12-23 DIAGNOSIS — E78.2 MIXED HYPERLIPIDEMIA: ICD-10-CM

## 2023-12-23 DIAGNOSIS — R45.7 EMOTIONAL STRESS: ICD-10-CM

## 2023-12-23 DIAGNOSIS — M87.878 OTHER OSTEONECROSIS, LEFT TOE(S) (HCC): ICD-10-CM

## 2023-12-23 DIAGNOSIS — M79.672 LEFT FOOT PAIN: ICD-10-CM

## 2023-12-23 DIAGNOSIS — K57.30 DIVERTICULOSIS OF COLON: ICD-10-CM

## 2023-12-23 DIAGNOSIS — M20.42 HAMMERTOE OF LEFT FOOT: ICD-10-CM

## 2023-12-23 DIAGNOSIS — R19.7 DIARRHEA, UNSPECIFIED TYPE: ICD-10-CM

## 2023-12-23 DIAGNOSIS — I10 ESSENTIAL HYPERTENSION: ICD-10-CM

## 2023-12-23 DIAGNOSIS — R19.7 DIARRHEA, UNSPECIFIED TYPE: Primary | ICD-10-CM

## 2023-12-23 LAB
ALBUMIN SERPL-MCNC: 4.4 G/DL (ref 3.2–4.8)
ALBUMIN/GLOB SERPL: 1.6 {RATIO} (ref 1–2)
ALP LIVER SERPL-CCNC: 88 U/L
ALT SERPL-CCNC: 20 U/L
ANION GAP SERPL CALC-SCNC: 4 MMOL/L (ref 0–18)
AST SERPL-CCNC: 22 U/L (ref ?–34)
BASOPHILS # BLD AUTO: 0.11 X10(3) UL (ref 0–0.2)
BASOPHILS NFR BLD AUTO: 1.6 %
BILIRUB SERPL-MCNC: 0.5 MG/DL (ref 0.2–1.1)
BUN BLD-MCNC: 25 MG/DL (ref 9–23)
BUN/CREAT SERPL: 24 (ref 10–20)
CALCIUM BLD-MCNC: 9.7 MG/DL (ref 8.7–10.4)
CHLORIDE SERPL-SCNC: 108 MMOL/L (ref 98–112)
CHOLEST SERPL-MCNC: 170 MG/DL (ref ?–200)
CO2 SERPL-SCNC: 28 MMOL/L (ref 21–32)
CREAT BLD-MCNC: 1.04 MG/DL
DEPRECATED RDW RBC AUTO: 47.8 FL (ref 35.1–46.3)
EGFRCR SERPLBLD CKD-EPI 2021: 56 ML/MIN/1.73M2 (ref 60–?)
EOSINOPHIL # BLD AUTO: 0.25 X10(3) UL (ref 0–0.7)
EOSINOPHIL NFR BLD AUTO: 3.5 %
ERYTHROCYTE [DISTWIDTH] IN BLOOD BY AUTOMATED COUNT: 13.2 % (ref 11–15)
FASTING PATIENT LIPID ANSWER: YES
FASTING STATUS PATIENT QL REPORTED: YES
GLOBULIN PLAS-MCNC: 2.8 G/DL (ref 2.8–4.4)
GLUCOSE BLD-MCNC: 99 MG/DL (ref 70–99)
HCT VFR BLD AUTO: 35.3 %
HDLC SERPL-MCNC: 56 MG/DL (ref 40–59)
HGB BLD-MCNC: 11.6 G/DL
IMM GRANULOCYTES # BLD AUTO: 0.02 X10(3) UL (ref 0–1)
IMM GRANULOCYTES NFR BLD: 0.3 %
LDLC SERPL CALC-MCNC: 106 MG/DL (ref ?–100)
LYMPHOCYTES # BLD AUTO: 1.83 X10(3) UL (ref 1–4)
LYMPHOCYTES NFR BLD AUTO: 26 %
MCH RBC QN AUTO: 31.9 PG (ref 26–34)
MCHC RBC AUTO-ENTMCNC: 32.9 G/DL (ref 31–37)
MCV RBC AUTO: 97 FL
MONOCYTES # BLD AUTO: 0.6 X10(3) UL (ref 0.1–1)
MONOCYTES NFR BLD AUTO: 8.5 %
NEUTROPHILS # BLD AUTO: 4.24 X10 (3) UL (ref 1.5–7.7)
NEUTROPHILS # BLD AUTO: 4.24 X10(3) UL (ref 1.5–7.7)
NEUTROPHILS NFR BLD AUTO: 60.1 %
NONHDLC SERPL-MCNC: 114 MG/DL (ref ?–130)
OSMOLALITY SERPL CALC.SUM OF ELEC: 294 MOSM/KG (ref 275–295)
PLATELET # BLD AUTO: 260 10(3)UL (ref 150–450)
POTASSIUM SERPL-SCNC: 4.5 MMOL/L (ref 3.5–5.1)
PROT SERPL-MCNC: 7.2 G/DL (ref 5.7–8.2)
RBC # BLD AUTO: 3.64 X10(6)UL
SODIUM SERPL-SCNC: 140 MMOL/L (ref 136–145)
TRIGL SERPL-MCNC: 40 MG/DL (ref 30–149)
TSI SER-ACNC: 1.66 MIU/ML (ref 0.55–4.78)
VLDLC SERPL CALC-MCNC: 7 MG/DL (ref 0–30)
WBC # BLD AUTO: 7.1 X10(3) UL (ref 4–11)

## 2023-12-23 PROCEDURE — 99215 OFFICE O/P EST HI 40 MIN: CPT | Performed by: FAMILY MEDICINE

## 2023-12-23 PROCEDURE — 36415 COLL VENOUS BLD VENIPUNCTURE: CPT

## 2023-12-23 PROCEDURE — 85025 COMPLETE CBC W/AUTO DIFF WBC: CPT

## 2023-12-23 PROCEDURE — 1126F AMNT PAIN NOTED NONE PRSNT: CPT | Performed by: FAMILY MEDICINE

## 2023-12-23 PROCEDURE — 84443 ASSAY THYROID STIM HORMONE: CPT

## 2023-12-23 PROCEDURE — 73630 X-RAY EXAM OF FOOT: CPT | Performed by: FAMILY MEDICINE

## 2023-12-23 PROCEDURE — 80053 COMPREHEN METABOLIC PANEL: CPT

## 2023-12-23 PROCEDURE — 80061 LIPID PANEL: CPT

## 2024-01-10 DIAGNOSIS — F41.1 ANXIETY STATE: ICD-10-CM

## 2024-01-10 DIAGNOSIS — M48.061 SPINAL STENOSIS OF LUMBAR REGION, UNSPECIFIED WHETHER NEUROGENIC CLAUDICATION PRESENT: ICD-10-CM

## 2024-01-11 RX ORDER — SERTRALINE HYDROCHLORIDE 100 MG/1
TABLET, FILM COATED ORAL
Qty: 90 TABLET | Refills: 0 | OUTPATIENT
Start: 2024-01-11

## 2024-01-11 NOTE — TELEPHONE ENCOUNTER
Please review.Protocol failed/ No protocol      Requested Prescriptions   Pending Prescriptions Disp Refills    HYDROcodone-acetaminophen 5-325 MG Oral Tab 30 tablet 0     Sig: Take 1 tablet by mouth every 4 (four) hours as needed.       There is no refill protocol information for this order       sertraline 100 MG Oral Tab 90 tablet 0     Sig: TAKE ONE TABLET BY MOUTH ONE TIME DAILY       Psychiatric Non-Scheduled (Anti-Anxiety) Passed - 1/10/2024  1:05 PM        Passed - In person appointment or virtual visit in the past 6 mos or appointment in next 3 mos     Recent Outpatient Visits              2 weeks ago [facility], unspecified type    [facility]     [facility]    5 months ago Cough, unspecified type    [facility], [place] Cespedes, David B, DO    Office Visit    7 months ago HTN (hypertension), benign    [facility], Ellis Florez MD    Office Visit    8 months ago Dysuria    [facility], RashaadGerda Toth APRN    Office Visit    11 months ago Spinal stenosis of lumbar region, unspecified whether neurogenic claudication present    [facility], Ramin Christy MD    Office Visit                                Recent Outpatient Visits              2 weeks ago Diarrhea, unspecified type    [facility], Anant Ford,     Office Visit    5 months ago Cough, unspecified type    [facility], [place] Cespedes, David B,     Office Visit    7 months [facility]    [facility]    8 months ago Dysuria    [facility]    Office Visit    11 months ago Spinal stenosis of lumbar  region, unspecified whether neurogenic claudication present    [facility], [facility] [place] Ramin Dominique MD    Office Visit

## 2024-01-12 RX ORDER — HYDROCODONE BITARTRATE AND ACETAMINOPHEN 5; 325 MG/1; MG/1
1 TABLET ORAL EVERY 4 HOURS PRN
Qty: 30 TABLET | Refills: 0 | Status: SHIPPED | OUTPATIENT
Start: 2024-01-12 | End: 2024-03-16

## 2024-01-23 ENCOUNTER — TELEPHONE (OUTPATIENT)
Facility: CLINIC | Age: 79
End: 2024-01-23

## 2024-01-23 DIAGNOSIS — M51.26 LUMBAR HERNIATED DISC: ICD-10-CM

## 2024-01-23 DIAGNOSIS — Z80.0 FAMILY HISTORY OF COLON CANCER: Primary | ICD-10-CM

## 2024-01-23 DIAGNOSIS — Z86.010 HX OF COLONIC POLYPS: ICD-10-CM

## 2024-01-23 NOTE — TELEPHONE ENCOUNTER
Patient called wanting to know status of Rx request. I made her aware we will process the Rx request; she has not seen GI yet. Patient verbalized understanding. No further questions or concerns at this time.    Routing for protocol

## 2024-01-23 NOTE — TELEPHONE ENCOUNTER
Refill passed per [facility] protocol --> however interaction warning pops up  Rx Pended, authorize if appropriate      Requested Prescriptions   Pending Prescriptions Disp Refills    Meloxicam 15 MG Oral Tab [Pharmacy Med Name: Meloxicam 15 Mg Tab Zydu] 90 tablet 1     Sig: Take 1 tablet (15 mg total) by mouth daily.       Non-Narcotic Pain Medication Protocol Passed - 1/23/2024 12:33 PM        Passed - In person appointment or virtual visit in the past 6 mos or appointment in next 3 mos     Recent Outpatient Visits              1 month ago Diarrhea, unspecified type    [facility], Anant Ford,     Office Visit    5 months ago Cough, unspecified type    [facility], [place] Cespedes, David B, DO    Office Visit    7 months ago [facility] (hypertension), benign    [facility]    Office Visit    8 months ago Dysuria    [facility], Gerda Alvarez APRN    Office Visit    11 months ago Spinal stenosis of lumbar region, unspecified whether neurogenic claudication present    [facility]    [facility]

## 2024-01-24 NOTE — TELEPHONE ENCOUNTER
Please review pended refill request as unable to refill due to high/very high drug interaction warning copied here:    High  Drug-Drug: sertraline and MeloxicamToxic effects may be increased with concurrent administration of NSAIDs and Selective Serotonin Reuptake Inhibitors. The risk of upper gastrointestinal bleeding may be increased. Patients taking both drugs concurrently should be educated about the signs and symptoms of GI bleeding.

## 2024-01-25 RX ORDER — MELOXICAM 15 MG/1
15 TABLET ORAL DAILY
Qty: 90 TABLET | Refills: 1 | Status: SHIPPED | OUTPATIENT
Start: 2024-01-25

## 2024-01-25 NOTE — TELEPHONE ENCOUNTER
John Puri MD   Physician  Gastroenterology     Operative Report  Signed     Date of Service: 10/1/2019  1:37 PM  Case Time: Procedures: Surgeons:   10/1/2019  1:52 PM COLONOSCOPY    John Puri MD               Signed         COLONOSCOPY PROCEDURE REPORT     DATE OF PROCEDURE:  10/1/2019      PCP: Anant Mckeon DO     PREOPERATIVE DIAGNOSIS:    1.  Personal history of adenomatous colon polyps.  2.  Family history of colon cancer.     POSTOPERATIVE DIAGNOSIS:  See impression.     SURGEON:  John Puri M.D.     SEDATION:    MAC anesthesia provided by the Anesthesia Service.  MAC anesthesia requested due to BMI 38, anticipated intolerance of colonoscopy examination under safe doses conscious sedation medications     Body mass index is 38.27 kg/m².      COLONOSCOPY PROCEDURE:   After the nature and risks of colonoscopy examination under MAC anesthesia were discussed with the patient and all questions answered, informed consent was obtained.  The patient was sedated as above.       Digital rectal exam was performed which showed no masses.  The Olympus pediatric video colonoscope was placed in the patient's rectum and advanced under direct visualization through the entire length of the colon up to the cecum and terminal ileum.  Unable to retroflex in the ascending colon.  The cecum was confirmed by landmarks including appendiceal orifice, cecal trifold, ileocecal valve.  Retroflexion was performed in the rectum.     The quality of the prep was good.  Constant and fairly severe spasm, peristalsis significantly limited exam of the ascending and descending, sigmoid colon today.     COLONOSCOPY FINDINGS:    Moderate severity diverticulosis descending and sigmoid colon.  Medium sized internal hemorrhoids.     RECOMMENDATIONS:  High fiber diet.  Repeat colonoscopy examination in 5 years               Electronically signed by John Puri MD at 10/1/2019  2:36 PM

## 2024-02-01 RX ORDER — SODIUM, POTASSIUM,MAG SULFATES 17.5-3.13G
SOLUTION, RECONSTITUTED, ORAL ORAL
Qty: 1 EACH | Refills: 0 | Status: SHIPPED | OUTPATIENT
Start: 2024-02-01

## 2024-02-01 NOTE — TELEPHONE ENCOUNTER
Thanks all.  Recent OV w/Dr Mckeon 12/23/2023..  New concern for intermittent diarrhea.  Watery stools.  Medications reviewed.    My previous colonoscopy exam report 10/1/2019 reviewed.  No colon polyps.  Repeat in 5 years.      GI schedulers -    Please schedule colonoscopy exam at [facility] ([facility])    BMI Readings from Last 1 Encounters:   12/23/23 38.91 kg/m²     MAC anesthesia     BP Readings from Last 5 Encounters:   12/23/23 122/69   07/29/23 134/81   07/15/23 141/56   06/08/23 123/58   05/16/23 131/75       Suprep small volume bowel prep if covered by insurance, otherwise   Golytely (PEG) 4L bowel prep  Rx sent in to [facility]      DX = Personal history of adenomatous colon polyps, Family history of colon cancer.    Medication instructions:    None

## 2024-02-01 NOTE — TELEPHONE ENCOUNTER
Last Procedure, Date, MD:  10/01/2019, Colonoscopy, Dr Puri  Last Diagnosis:  diverticulosis, internal hemorrhoids, family hx of colon cancer, personal hx of polyps  Recalled (mth/yrs): 5 years  Sedation Used Previously:  MAC  Last Prep Used (if known):  Golytely  Quality Of Prep (if known): good  Anticoagulants: no  Diabetic Med's (PO/Injectables): no  Weight loss Med's:no  Iron/Herbal/Multivitamin Supplements (RX/OTC): yes  Marijuana/Vaping/CBD: no  Height & Weight: 5'5\"/233  BMI: 38.91  Hx of Cardiac/CVA Issues/(MI/Stroke): no  Devices Pacemaker/Defibrillator/Stents: no  Respiratory Issues/Oxygen Use/MOISES/COPD: no  Issues w/ Anesthesia: no    Symptoms (Y/N): no  Symptoms Details: N/A    Special Comments/Notes: LOV w/Dr Mckeon was 12/23/2023. Medications allergies and pharmacy confirmed with the pt    Please advise on orders and prep.     Thank you!

## 2024-02-17 DIAGNOSIS — J30.1 SEASONAL ALLERGIC RHINITIS DUE TO POLLEN: ICD-10-CM

## 2024-02-19 RX ORDER — MONTELUKAST SODIUM 10 MG/1
10 TABLET ORAL DAILY
Qty: 90 TABLET | Refills: 0 | Status: SHIPPED | OUTPATIENT
Start: 2024-02-19

## 2024-02-19 NOTE — TELEPHONE ENCOUNTER
Please review; protocol failed/ has no protocol    Requested Prescriptions   Pending Prescriptions Disp Refills    MONTELUKAST 10 MG Oral Tab [Pharmacy Med Name: Montelukast Sodium 10 Mg Tab Auro] 90 tablet 0     Sig: Take 1 tablet (10 mg total) by mouth daily.       Asthma & COPD Medication Protocol Failed - 2/17/2024  1:55 PM        Failed - Asthma Action Score greater than or equal to 20        Failed - AAP/ACT given in last 12 months     No data recorded  No data recorded  No data recorded  No data recorded          Passed - Appointment in past 6 or next 3 months      Recent Outpatient Visits              1 month ago [facility], unspecified type    [facility]    Office Visit    6 months ago Cough, unspecified type    [facility], [place] Cespedes, David B, DO    Office Visit    8 months ago [facility] (hypertension), benign    [facility]    [facility]    9 months ago Dysuria    [facility] [place]    Office Visit    1 year ago Spinal stenosis of lumbar region, unspecified whether neurogenic claudication present    [facility]    Office Visit          Future Appointments         Provider Department Appt Notes    In 5 months HAYLIE ALCALA [facility], [place] Colon w/mac @[facility]                  Recent Outpatient Visits              1 month ago Diarrhea, unspecified type    [facility] Anant Ford,     Office Visit    6 months ago Cough, unspecified type    [facility], [place] Cespedes, David B, DO    Office Visit    8 months ago HTN (hypertension), benign    [facility]    Office Visit     9 months ago Dysuria    [facility], Gerda Alvarez APRN    Office Visit    1 year ago Spinal stenosis of lumbar region, unspecified whether neurogenic claudication present    [facility], Ramin Christy MD    Office Visit          Future Appointments         Provider Department Appt Notes    In 5 months FREDRICK, PROCEDURE [facility], [place] Colon w/mac @[facility]

## 2024-03-09 DIAGNOSIS — F41.1 ANXIETY STATE: ICD-10-CM

## 2024-03-11 RX ORDER — SERTRALINE HYDROCHLORIDE 100 MG/1
TABLET, FILM COATED ORAL
Qty: 90 TABLET | Refills: 3 | Status: SHIPPED | OUTPATIENT
Start: 2024-03-11

## 2024-03-11 NOTE — TELEPHONE ENCOUNTER
Refill passed per protocol.    Please review pended refill request as unable to refill due to high/very high drug interaction warning copied here:      High  Drug-Drug: sertraline and MeloxicamToxic effects may be increased with concurrent administration of NSAIDs and Selective Serotonin Reuptake Inhibitors. The risk of upper gastrointestinal bleeding may be increased. Patients taking both drugs concurrently should be educated about the signs and symptoms of GI bleeding.      Requested Prescriptions   Pending Prescriptions Disp Refills    SERTRALINE 100 MG Oral Tab [Pharmacy Med Name: Sertraline Hydrochloride 100 Mg Tab Nort] 90 tablet 0     Sig: TAKE ONE TABLET BY MOUTH ONE TIME DAILY       Psychiatric Non-Scheduled (Anti-Anxiety) Passed - 3/9/2024 11:41 AM        Passed - In person appointment or virtual visit in the past 6 mos or appointment in next 3 mos     Recent Outpatient Visits              2 months ago Diarrhea, unspecified type    [facility], Anant Ford,     Office Visit    7 months ago Cough, unspecified type    [facility], [place] Cespedes, David B,     Office Visit    9 months ago HTN (hypertension), benign    [facility]    Office Visit    10 months ago Dysuria    [facility], Gerda Alvarez APRN    Office Visit    1 year ago Spinal stenosis of lumbar region, unspecified whether neurogenic claudication present    [facility]    Office Visit          Future Appointments         Provider Department Appt Notes    In 4 months [facility]RICK, [facility]               Passed - Depression Screening completed within the past 12 months             Future Appointments         Provider Department Appt Notes     In 4 months FREDRICK, PROCEDURE [facility], [place] Colon w/mac @[facility]          Recent Outpatient Visits              2 months ago Diarrhea, unspecified type    [facility], Anant Ford, DO    Office Visit    7 months ago Cough, unspecified type    [facility], [place] Cespedes, David B, DO    Office Visit    9 months ago HTN (hypertension), benign    [facility], Ellis Florez MD    Office Visit    10 months ago Dysuria    [facility], Gerda Alvarez APRN    Office Visit    1 year ago Spinal stenosis of lumbar region, unspecified whether neurogenic claudication present    [facility], Ramin Christy MD    Office Visit

## 2024-03-16 DIAGNOSIS — M48.061 SPINAL STENOSIS OF LUMBAR REGION, UNSPECIFIED WHETHER NEUROGENIC CLAUDICATION PRESENT: ICD-10-CM

## 2024-03-19 NOTE — TELEPHONE ENCOUNTER
Please review. Protocol failed or has no protocol.    Requested Prescriptions   Pending Prescriptions Disp Refills    HYDROcodone-acetaminophen 5-325 MG Oral Tab 30 tablet 0     Sig: Take 1 tablet by mouth every 4 (four) hours as needed.       Controlled Substance Medication Failed - 3/16/2024 10:50 AM        Failed - This medication is a controlled substance - forward to provider to refill             Recent Outpatient Visits              2 months ago Diarrhea, unspecified type    [facility], Anant Ford, DO    Office Visit    7 months ago Cough, unspecified type    [facility], [place] Cespedes, David B, DO    Office Visit    9 months ago HTN (hypertension), benign    [facility], Ellis Florez MD    Office Visit    10 months ago Dysuria    [facility], Gerda Alvarez APRN    Office Visit    1 year ago Spinal stenosis of lumbar region, unspecified whether neurogenic claudication present    [facility]    [facility]            Future Appointments         Provider Department Appt Notes    In 4 months FREDRICK, PROCEDURE [facility], [place] Colon w/mac @[facility]

## 2024-03-21 PROBLEM — J06.9 VIRAL UPPER RESPIRATORY TRACT INFECTION: Status: RESOLVED | Noted: 2018-11-08 | Resolved: 2024-03-21

## 2024-03-21 RX ORDER — HYDROCODONE BITARTRATE AND ACETAMINOPHEN 5; 325 MG/1; MG/1
1 TABLET ORAL EVERY 4 HOURS PRN
Qty: 30 TABLET | Refills: 0 | Status: SHIPPED | OUTPATIENT
Start: 2024-03-21

## 2024-04-16 ENCOUNTER — TELEPHONE (OUTPATIENT)
Facility: CLINIC | Age: 79
End: 2024-04-16

## 2024-04-16 NOTE — TELEPHONE ENCOUNTER
Ma Team,     Please follow up on message below, pt also needs prep instructions sent sent via "[employer] per 1/23/24 TE.     Thank you.

## 2024-05-17 DIAGNOSIS — I10 ESSENTIAL HYPERTENSION: ICD-10-CM

## 2024-05-19 NOTE — TELEPHONE ENCOUNTER
Refill passed per [facility] protocol.    Requested Prescriptions   Pending Prescriptions Disp Refills    SPIRONOLACTONE 25 MG Oral Tab [Pharmacy Med Name: Spironolactone 25 Mg Tab Nort] 90 tablet 0     Sig: TAKE ONE TABLET BY MOUTH ONE TIME DAILY       Hypertension Medications Protocol Passed - 5/17/2024  1:31 AM        Passed - CMP or BMP in past 12 months        Passed - Last BP reading less than 140/90     BP Readings from Last 1 Encounters:   12/23/23 122/69               Passed - In person appointment or virtual visit in the past 12 mos or appointment in next 3 mos     Recent Outpatient Visits              4 months ago Diarrhea, unspecified type    [facility], Anant Ford, DO    Office Visit    9 months ago Cough, unspecified type    [facility], [place] Cespedes, David B, DO    Office Visit    11 months ago HTN (hypertension), benign    [facility] [place] Ellis Garcia MD    Office Visit    1 year ago Dysuria    [facility], RashaadGerda Toth APRN    Office Visit    1 year ago Spinal stenosis of lumbar region, unspecified whether neurogenic claudication present    [facility]    Office Visit          Future Appointments         Provider Department Appt Notes    In 2 months HAYLIE ALCALA [facility] Colon w/mac @[facility]                    Passed - EGFRCR or GFRNAA > 50     GFR Evaluation  EGFRCR: 56 , resulted on 12/23/2023               Future Appointments         Provider Department Appt Notes    In 2 months [facility]            Recent Outpatient Visits              4 months ago Diarrhea, unspecified type    [facility], Anant Ford,  DO    Office Visit    9 months ago Cough, unspecified type    [facility], [place] Cespedes, David B, DO    Office Visit    11 months ago HTN (hypertension), benign    [facility], Ellis Florez MD    Office Visit    1 year ago Dysuria    [facility], Gerda Alvarez APRN    Office Visit    1 year ago Spinal stenosis of lumbar region, unspecified whether neurogenic claudication present    [facility], Ramin Christy MD    Office Visit             Closure 2 Information: This tab is for additional flaps and grafts, including complex repair and grafts and complex repair and flaps. You can also specify a different location for the additional defect, if the location is the same you do not need to select a new one. We will insert the automated text for the repair you select below just as we do for solitary flaps and grafts. Please note that at this time if you select a location with a different insurance zone you will need to override the ICD10 and CPT if appropriate.

## 2024-05-20 NOTE — TELEPHONE ENCOUNTER
Refill Per Protocol   Please review pended refill request as unable to refill due to high/very high interaction warning copied here:    High  Drug-Drug: spironolactone and Telmisartan-HCTZThe risk of hyperkalemia may be increased when potassium-sparing diuretics are co-administered with angiotensin II receptor antagonists.  Details    Last written by Anant Mckeon DO on 06/21/2023  Recent Visits  Date Type Provider Dept   12/23/23 Office Visit Anant Mckeon DO [facility]         Requested Prescriptions   Pending Prescriptions Disp Refills    SPIRONOLACTONE 25 MG Oral Tab [Pharmacy Med Name: Spironolactone 25 Mg Tab Nort] 90 tablet 0     Sig: TAKE ONE TABLET BY MOUTH ONE TIME DAILY       Hypertension Medications Protocol Passed - 5/17/2024  1:31 AM        Passed - CMP or BMP in past 12 months        Passed - Last BP reading less than 140/90     BP Readings from Last 1 Encounters:   12/23/23 122/69               Passed - In person appointment or virtual visit in the past 12 mos or appointment in next 3 mos     Recent Outpatient Visits              4 months ago Diarrhea, unspecified type    [facility] Anant Mckeon DO    Office Visit    9 months ago Cough, unspecified type    [facility], [place] Cespedes, David B, DO    Office Visit    11 months ago HTN (hypertension), benign    [facility]    Office Visit    1 year ago Dysuria    [facility]    Office Visit    1 year ago Spinal stenosis of lumbar region, unspecified whether neurogenic claudication present    [facility]    [facility]          Future Appointments         Provider Department Appt Notes    In 2 months HAYLIE ALCALA [facility], [place] Colon w/mac  @[facility]                    Passed - EGFRCR or GFRNAA > 50     GFR Evaluation  EGFRCR: 56 , resulted on 12/23/2023                 Future Appointments         Provider Department Appt Notes    In 2 months HAYLIE ALCAL[facility] [place] Colon w/mac @[facility]          Recent Outpatient Visits              4 months ago Diarrhea, unspecified type    [facility], Anant Ford, DO    Office Visit    9 months ago Cough, unspecified type    [facility], [place] Cespedes, David B, DO    Office Visit    11 months ago HTN (hypertension), benign    [facility] Florez MD    Office Visit    1 year ago Dysuria    [facility], Gerda Alvarez APRN    Office Visit    1 year ago Spinal stenosis of lumbar region, unspecified whether neurogenic claudication present    [facility], R[facility] Christy MD    Office Visit

## 2024-05-21 DIAGNOSIS — J30.1 SEASONAL ALLERGIC RHINITIS DUE TO POLLEN: ICD-10-CM

## 2024-05-21 RX ORDER — SPIRONOLACTONE 25 MG/1
25 TABLET ORAL DAILY
Qty: 90 TABLET | Refills: 3 | Status: SHIPPED | OUTPATIENT
Start: 2024-05-21

## 2024-05-23 ENCOUNTER — OFFICE VISIT (OUTPATIENT)
Dept: FAMILY MEDICINE CLINIC | Facility: CLINIC | Age: 79
End: 2024-05-23

## 2024-05-23 VITALS
HEIGHT: 65 IN | HEART RATE: 81 BPM | WEIGHT: 234.63 LBS | BODY MASS INDEX: 39.09 KG/M2 | DIASTOLIC BLOOD PRESSURE: 71 MMHG | SYSTOLIC BLOOD PRESSURE: 136 MMHG

## 2024-05-23 DIAGNOSIS — K57.92 DIVERTICULITIS: Primary | ICD-10-CM

## 2024-05-23 DIAGNOSIS — H92.02 LEFT EAR PAIN: ICD-10-CM

## 2024-05-23 PROCEDURE — 99213 OFFICE O/P EST LOW 20 MIN: CPT | Performed by: FAMILY MEDICINE

## 2024-05-23 RX ORDER — CIPROFLOXACIN 250 MG/1
250 TABLET, FILM COATED ORAL 2 TIMES DAILY
Qty: 20 TABLET | Refills: 0 | Status: SHIPPED | OUTPATIENT
Start: 2024-05-23 | End: 2024-06-02

## 2024-05-23 RX ORDER — METRONIDAZOLE 250 MG/1
250 TABLET ORAL 3 TIMES DAILY
Qty: 30 TABLET | Refills: 0 | Status: SHIPPED | OUTPATIENT
Start: 2024-05-23 | End: 2024-06-02

## 2024-05-23 NOTE — TELEPHONE ENCOUNTER
Please review; protocol failed/No Protocol    Requested Prescriptions   Pending Prescriptions Disp Refills    MONTELUKAST 10 MG Oral Tab [Pharmacy Med Name: Montelukast Sodium 10 Mg Tab Auro] 90 tablet 0     Sig: TAKE ONE TABLET BY MOUTH ONE TIME DAILY       Asthma & COPD Medication Protocol Failed - 5/21/2024  9:44 AM        Failed - Asthma Action Score greater than or equal to 20        Failed - AAP/ACT given in last 12 months     No data recorded  No data recorded  No data recorded  No data recorded          Passed - Appointment in past 6 or next 3 months      Recent Outpatient Visits              Today Diverticulitis    [facility], Vj Thomason MD    Office Visit    5 months ago Diarrhea, unspecified type    [facility], Anant Ford,     Office Visit    9 months ago Cough, unspecified type    [facility], [place] Ces[place], David B, DO    Office Visit    11 months ago HTN (hypertension), benign    [facility], Ellis Florez MD    Office Visit    1 year ago Dysuria    [facility], Gerda Alvarez, KRISTY    Office Visit          Future Appointments         Provider Department Appt Notes    In 2 months FREDRICK PROCEDURE [facility], [place] [facility] w/mac @[facility]                       Future Appointments         Provider Department Appt Notes    In 2 months FREDRICK PROCEDURE [facility], [place] Colon w/mac @[facility]          Recent Outpatient Visits              Today Diverticulitis    [facility], Vj Thomason MD    Office Visit    5 months ago Diarrhea, unspecified type    [facility], Anant Ford, DO    Office Visit    9 months ago Cough, unspecified type    [facility], [place] Cespedes, David B, DO    Office Visit    11 months ago HTN (hypertension), benign    [facility], [facility], Ellis Florez MD    Office Visit    1 year ago Dysuria    [facility], [facility], Gerda Alvarez APRN    Office Visit

## 2024-05-23 NOTE — PROGRESS NOTES
5/23/2024  3:14 PM    Mari Gaspar is a 78 year old female.    Chief complaint(s):   Chief Complaint   Patient presents with    Abdominal Pain     LLQ pain x1 week     Ear Pain     Left ear pain x 1 week      HPI:     Mari Gaspar primary complaint is regarding as above.       Patient is a 78-year-old female with long history of diverticulosis/ diverticulitis who presented complaining of left lower quadrant pain for the past week.  It is associated at times with diarrhea but no hematochezia.  She learned that eating seeds is now safe to eat in people with diverticulosis so she started eating peanuts, popcorn, fruits with seeds and nausea has left lower quadrant pain.  Denies any fever and she is scheduled to undergo a colonoscopy this July.      In addition she is complaining of left ear pain for the past 3 days.  The pain lasts only seconds but is sharp and not associated with any discharge.  She has a long history of hearing loss for which she uses hearing aids.  At present time and in the past 48 hours or has been no more ear pain.        HISTORY:  Past Medical History:    Anxiety state    Arthritis    Back problem    Hx herniated discs    Calculus of kidney    Deaf    Left ear    Diverticulosis    Hearing loss    High blood pressure    High cholesterol    History of shingles    1980's    Meniere disease    Morbid obesity with BMI of 40.0-44.9, adult (HCC)    Osteoarthritis    Pneumonia due to organism    as an infant    SCC (squamous cell carcinoma)    right jawline    Tinnitus    Unspecified essential hypertension    Visual impairment    reading glasses      Past Surgical History:   Procedure Laterality Date    Cataract extraction w/  intraocular lens implant Right 9/12/11    RJM    Cholecystectomy      Colonoscopy N/A 10/1/2019    Procedure: COLONOSCOPY;  Surgeon: John Puri MD;  Location: [facility] ENDOSCOPY    Knee surgery      Oophorectomy Bilateral     per [employer]:  \"laparoscopic  bilateral oophorectomy\"    Other surgical history      Ear surgery    Spine surgery procedure unlisted      for right sciatica issue    Total hip replacement Right     Total knee replacement Right     Tubal ligation      Yag capsulotomy - od - right eye Right 14    RJM      Family History   Problem Relation Age of Onset    Cancer Self     Diabetes Mother     Hypertension Mother     Colon Cancer Father     Other (hx of SC ( unknown type)) Father     Diabetes Paternal Grandmother     Glaucoma Neg         No Family h/o Glaucoma    Macular degeneration Neg         No Family h/o Macular degeneration    Breast Cancer Neg     Ovarian Cancer Neg       Social History:   Social History     Socioeconomic History    Marital status:    Tobacco Use    Smoking status: Former     Current packs/day: 0.00     Types: Cigarettes     Start date: 1966     Quit date: 2002     Years since quittin.4    Smokeless tobacco: Never   Vaping Use    Vaping status: Never Used   Substance and Sexual Activity    Alcohol use: Yes     Alcohol/week: 0.0 standard drinks of alcohol     Comment: social    Drug use: No   Other Topics Concern    Caffeine Concern No     Comment: Coffee, 2 cups daily    Exercise No    Pt has a pacemaker No    Pt has a defibrillator No    Reaction to local anesthetic No   Social History Narrative    The patient does not use an assistive device..      The patient does not live in a home with stairs.        Immunizations:   Immunization History   Administered Date(s) Administered    Covid-19 Vaccine Pfizer 30 mcg/0.3 ml 03/10/2021, 2021, 2021    Covid-19 Vaccine Pfizer Mark-Sucrose 30 mcg/0.3 ml 2022    FLU VAC High Dose 65 YRS & Older PRSV Free (35222) 2021, 10/10/2022    Fluvirin, 3 Years & >, Im 2012, 2013    Fluzone Vaccine Medicare () 10/01/2016, 2017    HIGH DOSE FLU 65 YRS AND OLDER PRSV FREE SINGLE D (16049) FLU CLINIC 10/09/2014, 10/02/2016,  09/21/2017, 10/17/2018, 09/07/2019, 09/13/2021, 10/10/2022    Influenza 10/09/2014, 09/17/2015, 08/12/2017, 10/01/2019    Influenza Vaccine, Preserv Free 09/01/2009, 10/15/2011, 11/12/2013    Pneumococcal (Prevnar 13) 06/16/2017, 10/15/2017    Pneumovax 23 11/14/2012, 12/12/2013, 07/27/2018       Medications (Active prior to today's visit):  Current Outpatient Medications   Medication Sig Dispense Refill    ciprofloxacin 250 MG Oral Tab Take 1 tablet (250 mg total) by mouth 2 (two) times daily for 10 days. 20 tablet 0    metRONIDAZOLE 250 MG Oral Tab Take 1 tablet (250 mg total) by mouth 3 (three) times daily for 10 days. 30 tablet 0    SPIRONOLACTONE 25 MG Oral Tab TAKE ONE TABLET BY MOUTH ONE TIME DAILY 90 tablet 3    sertraline 100 MG Oral Tab TAKE ONE TABLET BY MOUTH ONE TIME DAILY 90 tablet 3    montelukast 10 MG Oral Tab Take 1 tablet (10 mg total) by mouth daily. 90 tablet 0    Meloxicam 15 MG Oral Tab Take 1 tablet (15 mg total) by mouth daily. 90 tablet 1    rosuvastatin 20 MG Oral Tab Take 1 tablet (20 mg total) by mouth once daily. 90 tablet 3    HYDROcodone-acetaminophen 5-325 MG Oral Tab Take 1 tablet by mouth every 4 (four) hours as needed. 30 tablet 0    Telmisartan-HCTZ 80-25 MG Oral Tab Take 1 tablet by mouth daily. 90 tablet 3    topiramate 25 MG Oral Tab Take 1 tablet (25 mg total) by mouth 2 (two) times daily. 180 tablet 0    gabapentin 600 MG Oral Tab Take 1 tablet (600 mg total) by mouth 2 (two) times daily. 180 tablet 3    FLUTICASONE PROPIONATE 50 MCG/ACT Nasal Suspension inhale 1 spray in each nare daily 16 g 0    aspirin 325 MG Oral Tab Take 1 tablet (325 mg total) by mouth 2 (two) times daily. 60 tablet 0    Cholecalciferol (VITAMIN D3) 3000 units Oral Tab Take 3,000 Units by mouth daily.        Multiple Vitamins-Minerals (PRESERVISION AREDS) Oral Tab Take by mouth.      Albuterol Sulfate  (90 Base) MCG/ACT Inhalation Aero Soln Inhale 2 puffs into the lungs every 6 (six) hours as  needed for Wheezing or Shortness of Breath. 1 Inhaler 1    Na Sulfate-K Sulfate-Mg Sulf (SUPREP BOWEL PREP KIT) 17.5-3.13-1.6 GM/177ML Oral Solution Take as directed (Patient not taking: Reported on 5/23/2024) 1 each 0    fexofenadine 180 MG Oral Tab Take 1 tablet (180 mg total) by mouth daily. (Patient not taking: Reported on 5/23/2024) 90 tablet 3       Allergies:  No Known Allergies      ROS:   Review of Systems   Constitutional:  Negative for appetite change and fever.   HENT:  Positive for ear pain (left), hearing loss and tinnitus.    Eyes:  Negative for visual disturbance.   Respiratory:  Negative for shortness of breath.    Cardiovascular:  Negative for chest pain.   Gastrointestinal:  Positive for abdominal pain (LLQ) and diarrhea. Negative for blood in stool, constipation, nausea and vomiting.   Musculoskeletal:  Negative for back pain.   Skin:  Negative for rash.   Neurological:  Negative for dizziness and headaches.       PHYSICAL EXAM:   VS: /71 (BP Location: Right arm, Patient Position: Sitting, Cuff Size: large)   Pulse 81   Ht 5' 5\" (1.651 m)   Wt 234 lb 9.6 oz (106.4 kg)   BMI 39.04 kg/m²     Physical Exam  Vitals reviewed.   Constitutional:       General: She is not in acute distress.     Appearance: Normal appearance.   HENT:      Head: Normocephalic.      Right Ear: Tympanic membrane and ear canal normal.      Left Ear: Tympanic membrane and ear canal normal.   Eyes:      Conjunctiva/sclera: Conjunctivae normal.   Cardiovascular:      Rate and Rhythm: Normal rate.   Pulmonary:      Effort: Pulmonary effort is normal.   Abdominal:      General: Bowel sounds are normal.      Palpations: Abdomen is soft. There is no hepatomegaly or mass.      Tenderness: There is abdominal tenderness in the left lower quadrant.   Musculoskeletal:      Cervical back: Neck supple.   Skin:     Findings: No rash.   Psychiatric:         Mood and Affect: Mood normal.         LABORATORY RESULTS:     EKG /  Spirometry : -     Radiology: No results found.     ASSESSMENT/PLAN:   Assessment   Encounter Diagnoses   Name Primary?    Diverticulitis Yes    Left ear pain        1. Diverticulitis    MEDICATIONS:     Requested Prescriptions     Signed Prescriptions Disp Refills    ciprofloxacin 250 MG Oral Tab 20 tablet 0     Sig: Take 1 tablet (250 mg total) by mouth 2 (two) times daily for 10 days.    metRONIDAZOLE 250 MG Oral Tab 30 tablet 0     Sig: Take 1 tablet (250 mg total) by mouth 3 (three) times daily for 10 days.       REFERRALS: [facility] with GI     RECOMMENDATIONS given include: Patient was reassured of  her medical condition and all questions and concerns were answered. Patient was informed to please, call our office with any new or further questions or concerns that may come up in the near future. Notify Dr tSearns or the [facility] if there is a deterioration or worsening of the medical condition. Also, inform the doctor with any new symptoms or medications' side effects.  No seeds diet.     FOLLOW-UP: Schedule a follow-up visit in  prn.       2. Left ear pain    Normal exam, patient reassured.           Orders This Visit:  No orders of the defined types were placed in this encounter.      Meds This Visit:  Requested Prescriptions     Signed Prescriptions Disp Refills    ciprofloxacin 250 MG Oral Tab 20 tablet 0     Sig: Take 1 tablet (250 mg total) by mouth 2 (two) times daily for 10 days.    metRONIDAZOLE 250 MG Oral Tab 30 tablet 0     Sig: Take 1 tablet (250 mg total) by mouth 3 (three) times daily for 10 days.       Imaging & Referrals:  None         PARIS STEARNS MD

## 2024-05-24 RX ORDER — MONTELUKAST SODIUM 10 MG/1
10 TABLET ORAL DAILY
Qty: 90 TABLET | Refills: 1 | Status: SHIPPED | OUTPATIENT
Start: 2024-05-24

## 2024-06-07 DIAGNOSIS — M48.061 SPINAL STENOSIS OF LUMBAR REGION, UNSPECIFIED WHETHER NEUROGENIC CLAUDICATION PRESENT: ICD-10-CM

## 2024-06-07 NOTE — TELEPHONE ENCOUNTER
Patient is requesting medication refills    HYDROcodone    [facility] in [facility] confirmed preferred

## 2024-06-11 NOTE — TELEPHONE ENCOUNTER
Please review; protocol failed/ has no protocol      Recent fills: 03/21/2024, 01/12/2024  Last Rx written: 03/21/2024  Last Office Visit : 05//23/2024    Requested Prescriptions   Pending Prescriptions Disp Refills    HYDROcodone-acetaminophen 5-325 MG Oral Tab 30 tablet 0     Sig: Take 1 tablet by mouth every 4 (four) hours as needed.       Controlled Substance Medication Failed - 6/7/2024  8:24 AM        Failed - This medication is a controlled substance - forward to provider to refill           Recent Outpatient Visits              2 weeks ago Diverticulitis    [facility], Vj Thomason MD    Office Visit    5 months ago Diarrhea, unspecified type    [facility], Anant Ford DO    Office Visit    10 months ago Cough, unspecified type    [facility], [place] Ces[place]es, David B,     Office Visit    1 year [facility]    [facility]    1 year ago Dysuria    [facility], Gerda Alvarez APRN    Office Visit          Future Appointments         Provider Department Appt Notes    In 1 month FREDRICK, PROCEDURE [facility], [place] Colon w/mac @[facility]

## 2024-06-12 ENCOUNTER — TELEPHONE (OUTPATIENT)
Dept: FAMILY MEDICINE CLINIC | Facility: CLINIC | Age: 79
End: 2024-06-12

## 2024-06-12 RX ORDER — HYDROCODONE BITARTRATE AND ACETAMINOPHEN 5; 325 MG/1; MG/1
1 TABLET ORAL EVERY 4 HOURS PRN
Qty: 30 TABLET | Refills: 0 | Status: SHIPPED | OUTPATIENT
Start: 2024-06-12

## 2024-07-11 DIAGNOSIS — M48.061 SPINAL STENOSIS OF LUMBAR REGION, UNSPECIFIED WHETHER NEUROGENIC CLAUDICATION PRESENT: ICD-10-CM

## 2024-07-13 ENCOUNTER — LAB ENCOUNTER (OUTPATIENT)
Dept: LAB | Age: 79
End: 2024-07-13
Attending: FAMILY MEDICINE
Payer: MEDICARE

## 2024-07-13 ENCOUNTER — EKG ENCOUNTER (OUTPATIENT)
Dept: LAB | Age: 79
End: 2024-07-13
Attending: FAMILY MEDICINE
Payer: MEDICARE

## 2024-07-13 ENCOUNTER — OFFICE VISIT (OUTPATIENT)
Dept: FAMILY MEDICINE CLINIC | Facility: CLINIC | Age: 79
End: 2024-07-13

## 2024-07-13 ENCOUNTER — TELEPHONE (OUTPATIENT)
Dept: FAMILY MEDICINE CLINIC | Facility: CLINIC | Age: 79
End: 2024-07-13

## 2024-07-13 VITALS
HEIGHT: 65 IN | HEART RATE: 62 BPM | DIASTOLIC BLOOD PRESSURE: 72 MMHG | WEIGHT: 240.63 LBS | BODY MASS INDEX: 40.09 KG/M2 | SYSTOLIC BLOOD PRESSURE: 146 MMHG | TEMPERATURE: 97 F

## 2024-07-13 DIAGNOSIS — M51.26 LUMBAR HERNIATED DISC: ICD-10-CM

## 2024-07-13 DIAGNOSIS — I70.90 ATHEROSCLEROSIS: ICD-10-CM

## 2024-07-13 DIAGNOSIS — E66.01 MORBID OBESITY WITH BMI OF 40.0-44.9, ADULT (HCC): ICD-10-CM

## 2024-07-13 DIAGNOSIS — G89.29 CHRONIC RIGHT-SIDED LOW BACK PAIN WITHOUT SCIATICA: ICD-10-CM

## 2024-07-13 DIAGNOSIS — F50.81 BINGE EATING DISORDER: ICD-10-CM

## 2024-07-13 DIAGNOSIS — J30.1 SEASONAL ALLERGIC RHINITIS DUE TO POLLEN: ICD-10-CM

## 2024-07-13 DIAGNOSIS — R05.9 COUGH: ICD-10-CM

## 2024-07-13 DIAGNOSIS — I10 ESSENTIAL HYPERTENSION: ICD-10-CM

## 2024-07-13 DIAGNOSIS — E55.9 VITAMIN D DEFICIENCY: ICD-10-CM

## 2024-07-13 DIAGNOSIS — Z00.00 ADULT GENERAL MEDICAL EXAM: Primary | ICD-10-CM

## 2024-07-13 DIAGNOSIS — Z00.00 ENCOUNTER FOR ANNUAL HEALTH EXAMINATION: ICD-10-CM

## 2024-07-13 DIAGNOSIS — E78.2 MIXED HYPERLIPIDEMIA: ICD-10-CM

## 2024-07-13 DIAGNOSIS — M54.50 CHRONIC RIGHT-SIDED LOW BACK PAIN WITHOUT SCIATICA: ICD-10-CM

## 2024-07-13 DIAGNOSIS — Z12.83 SKIN CANCER SCREENING: ICD-10-CM

## 2024-07-13 DIAGNOSIS — F41.1 ANXIETY STATE: ICD-10-CM

## 2024-07-13 LAB
ALBUMIN SERPL-MCNC: 4.8 G/DL (ref 3.2–4.8)
ALBUMIN/GLOB SERPL: 1.7 {RATIO} (ref 1–2)
ALP LIVER SERPL-CCNC: 88 U/L
ALT SERPL-CCNC: 17 U/L
ANION GAP SERPL CALC-SCNC: 6 MMOL/L (ref 0–18)
AST SERPL-CCNC: 24 U/L (ref ?–34)
ATRIAL RATE: 57 BPM
BASOPHILS # BLD AUTO: 0.1 X10(3) UL (ref 0–0.2)
BASOPHILS NFR BLD AUTO: 1.4 %
BILIRUB SERPL-MCNC: 0.8 MG/DL (ref 0.2–1.1)
BUN BLD-MCNC: 27 MG/DL (ref 9–23)
BUN/CREAT SERPL: 23.1 (ref 10–20)
CALCIUM BLD-MCNC: 9.8 MG/DL (ref 8.7–10.4)
CHLORIDE SERPL-SCNC: 108 MMOL/L (ref 98–112)
CHOLEST SERPL-MCNC: 160 MG/DL (ref ?–200)
CO2 SERPL-SCNC: 29 MMOL/L (ref 21–32)
CREAT BLD-MCNC: 1.17 MG/DL
DEPRECATED RDW RBC AUTO: 48.2 FL (ref 35.1–46.3)
EGFRCR SERPLBLD CKD-EPI 2021: 48 ML/MIN/1.73M2 (ref 60–?)
EOSINOPHIL # BLD AUTO: 0.25 X10(3) UL (ref 0–0.7)
EOSINOPHIL NFR BLD AUTO: 3.4 %
ERYTHROCYTE [DISTWIDTH] IN BLOOD BY AUTOMATED COUNT: 13.2 % (ref 11–15)
FASTING PATIENT LIPID ANSWER: YES
FASTING STATUS PATIENT QL REPORTED: YES
GLOBULIN PLAS-MCNC: 2.9 G/DL (ref 2–3.5)
GLUCOSE BLD-MCNC: 105 MG/DL (ref 70–99)
HCT VFR BLD AUTO: 33.9 %
HDLC SERPL-MCNC: 56 MG/DL (ref 40–59)
HGB BLD-MCNC: 11.3 G/DL
IMM GRANULOCYTES # BLD AUTO: 0.03 X10(3) UL (ref 0–1)
IMM GRANULOCYTES NFR BLD: 0.4 %
LDLC SERPL CALC-MCNC: 92 MG/DL (ref ?–100)
LYMPHOCYTES # BLD AUTO: 1.26 X10(3) UL (ref 1–4)
LYMPHOCYTES NFR BLD AUTO: 17.1 %
MCH RBC QN AUTO: 33 PG (ref 26–34)
MCHC RBC AUTO-ENTMCNC: 33.3 G/DL (ref 31–37)
MCV RBC AUTO: 99.1 FL
MONOCYTES # BLD AUTO: 0.62 X10(3) UL (ref 0.1–1)
MONOCYTES NFR BLD AUTO: 8.4 %
NEUTROPHILS # BLD AUTO: 5.12 X10 (3) UL (ref 1.5–7.7)
NEUTROPHILS # BLD AUTO: 5.12 X10(3) UL (ref 1.5–7.7)
NEUTROPHILS NFR BLD AUTO: 69.3 %
NONHDLC SERPL-MCNC: 104 MG/DL (ref ?–130)
OSMOLALITY SERPL CALC.SUM OF ELEC: 301 MOSM/KG (ref 275–295)
P AXIS: 37 DEGREES
P-R INTERVAL: 168 MS
PLATELET # BLD AUTO: 237 10(3)UL (ref 150–450)
POTASSIUM SERPL-SCNC: 4.9 MMOL/L (ref 3.5–5.1)
PROT SERPL-MCNC: 7.7 G/DL (ref 5.7–8.2)
Q-T INTERVAL: 410 MS
QRS DURATION: 76 MS
QTC CALCULATION (BEZET): 399 MS
R AXIS: 9 DEGREES
RBC # BLD AUTO: 3.42 X10(6)UL
SODIUM SERPL-SCNC: 143 MMOL/L (ref 136–145)
T AXIS: 43 DEGREES
TRIGL SERPL-MCNC: 61 MG/DL (ref 30–149)
TSI SER-ACNC: 2.5 MIU/ML (ref 0.55–4.78)
VENTRICULAR RATE: 57 BPM
VIT D+METAB SERPL-MCNC: 50.9 NG/ML (ref 30–100)
VLDLC SERPL CALC-MCNC: 10 MG/DL (ref 0–30)
WBC # BLD AUTO: 7.4 X10(3) UL (ref 4–11)

## 2024-07-13 PROCEDURE — 84443 ASSAY THYROID STIM HORMONE: CPT

## 2024-07-13 PROCEDURE — 93010 ELECTROCARDIOGRAM REPORT: CPT | Performed by: INTERNAL MEDICINE

## 2024-07-13 PROCEDURE — 85025 COMPLETE CBC W/AUTO DIFF WBC: CPT

## 2024-07-13 PROCEDURE — 80061 LIPID PANEL: CPT

## 2024-07-13 PROCEDURE — 36415 COLL VENOUS BLD VENIPUNCTURE: CPT

## 2024-07-13 PROCEDURE — 80053 COMPREHEN METABOLIC PANEL: CPT

## 2024-07-13 PROCEDURE — 82306 VITAMIN D 25 HYDROXY: CPT

## 2024-07-13 PROCEDURE — 93005 ELECTROCARDIOGRAM TRACING: CPT

## 2024-07-13 RX ORDER — ALBUTEROL SULFATE 90 UG/1
2 AEROSOL, METERED RESPIRATORY (INHALATION) EVERY 6 HOURS PRN
Qty: 1 EACH | Refills: 1 | Status: SHIPPED | OUTPATIENT
Start: 2024-07-13

## 2024-07-13 RX ORDER — ASPIRIN 325 MG
325 TABLET ORAL DAILY
Qty: 90 TABLET | Refills: 0 | COMMUNITY
Start: 2024-07-13

## 2024-07-13 RX ORDER — GABAPENTIN 600 MG/1
600 TABLET ORAL 2 TIMES DAILY
Qty: 180 TABLET | Refills: 3 | Status: SHIPPED | OUTPATIENT
Start: 2024-07-13

## 2024-07-13 RX ORDER — FLUTICASONE PROPIONATE 50 MCG
2 SPRAY, SUSPENSION (ML) NASAL DAILY
Qty: 3 EACH | Refills: 2 | Status: SHIPPED | OUTPATIENT
Start: 2024-07-13

## 2024-07-13 RX ORDER — PHENTERMINE HYDROCHLORIDE 15 MG/1
15 CAPSULE ORAL EVERY MORNING
Qty: 90 CAPSULE | Refills: 0 | Status: SHIPPED | OUTPATIENT
Start: 2024-07-13

## 2024-07-13 NOTE — TELEPHONE ENCOUNTER
I was paged after the visit stating that patient's feels I did not send in her weight loss medication.  Spoke to the pharmacist and they will relay to the patient that we need to first await the EKG and make sure it is normal and then I can decide on a weight loss medication for her.

## 2024-07-13 NOTE — PROGRESS NOTES
Subjective:   Mari Gaspar is a 78 year old female who presents for a Medicare Subsequent Annual Wellness visit (Pt already had Initial Annual Wellness) and has some other issues she would like to discuss.      Already has appointment to see gastroenterology at the end of this month.  I recently refilled her pain medication for her spine pain.  She states her back pain is bad enough that she would really like to see a pain doctor again.  She is not interested in back surgery but states she has had epidural steroid injections in the past but that was quite sometime ago.      She takes allergy medication consisting of Flonase and montelukast if needed.  No longer feels she needs to take the Allegra.  She is on sertraline for mood and that also telmisartan and spironolactone for blood pressure.  She sees Dr. Garcia for weight loss and has not been on the Topamax for weight loss but remembers it causing her to feel dizzy so she has not been on it.  She is willing to try medication like phentermine.  She does not want to be on a weekly injectable at this time.  She states her weight fluctuates up and down    She does chair yoga 3 times a week.  She eats breakfast but skips lunch and then eats dinner around 4 PM and then does admit to snacking until bedtime which she definitely needs to stop.  She states she really needs something as an appetite suppressant as she just is unable to control her self.    She states recently she noticed that alcohol will cause somewhat of a throbbing feeling around the scalp but it is not a headache.  Once it happened with wine and another time was spiked a lemonade but she states she just avoids alcohol and no longer feels that issue.    She would like to see a female dermatologist.  She states she needs skin cancer screening.    She does take over-the-counter vitamin D.    History/Other:   Fall Risk Assessment:   She has been screened for Falls and is low risk.      Cognitive  Assessment:   Abnormal  What day of the week is this?: Correct  What month is it?: Correct  What year is it?: Correct  Recall \"Ball\": Correct  Recall \"Flag\": Correct  Recall \"Tree\": Incorrect      Functional Ability/Status:   Mari Gaspar has some abnormal functions as listed below:  She has Hearing problems based on screening of functional status.She has Vision problems based on screening of functional status.       Depression Screening (PHQ):  PHQ-2 SCORE: 0  , done 7/13/2024   Last [place] Suicide Screening on 7/13/2024 was No Risk.          Advanced Directives:   She does NOT have a Living Will. [Do you have a living will?: No]  She does NOT have a Power of  for Health Care. [Do you have a healthcare power of ?: No]  Discussed Advance Care Planning with patient (and family/surrogate if present). Standard forms made available to patient in After Visit Summary.      Patient Active Problem List   Diagnosis    Mixed hyperlipidemia    Pulmonary nodules    Atherosclerosis    Hyperglycemia    Low back pain    Senile cataract    Anxiety state    Age-related nuclear cataract of left eye    right L5-S1 radiculopathy    L1-2 left > right mild-mod diffuse, L2-3 left mod & left mod foraminal, L3-4 mod diffuse, L4-5 right large far lateral & right mod diffuse, L5-S1 right mod-large far lateral bulging discs    L5-S1 right paracentral mild herniated disc    L5-S1 right mod, L4-5 right mod, L3-4 left mod foraminal stenosis    Primary osteoarthritis of both hips: right mod and left mild OA    Chronic bilateral low back pain with right-sided sciatica    L3-4 mod central, L4-5 right mod lateral recess stenosis    HTN (hypertension), benign    Status post total right knee replacement using cement    Primary osteoarthritis of right knee    Encounter for therapeutic drug monitoring    Status post right knee replacement    Primary osteoarthritis of left knee    S/P TKR (total knee replacement), left    Urinary  frequency    Morbid obesity with BMI of 40.0-44.9, adult (HCC)    Weight gain    Stress    Dyslipidemia    Dysuria    Cough     Allergies:  She has No Known Allergies.    Current Medications:  Outpatient Medications Marked as Taking for the 7/13/24 encounter (Office Visit) with Anant Mckeon, DO   Medication Sig    HYDROcodone-acetaminophen 5-325 MG Oral Tab Take 1 tablet by mouth every 4 (four) hours as needed.    montelukast 10 MG Oral Tab Take 1 tablet (10 mg total) by mouth daily.    SPIRONOLACTONE 25 MG Oral Tab TAKE ONE TABLET BY MOUTH ONE TIME DAILY    sertraline 100 MG Oral Tab TAKE ONE TABLET BY MOUTH ONE TIME DAILY    Na Sulfate-K Sulfate-Mg Sulf (SUPREP BOWEL PREP KIT) 17.5-3.13-1.6 GM/177ML Oral Solution Take as directed    Meloxicam 15 MG Oral Tab Take 1 tablet (15 mg total) by mouth daily.    rosuvastatin 20 MG Oral Tab Take 1 tablet (20 mg total) by mouth once daily.    Telmisartan-HCTZ 80-25 MG Oral Tab Take 1 tablet by mouth daily.    gabapentin 600 MG Oral Tab Take 1 tablet (600 mg total) by mouth 2 (two) times daily.    FLUTICASONE PROPIONATE 50 MCG/ACT Nasal Suspension inhale 1 spray in each nare daily    Cholecalciferol (VITAMIN D3) 3000 units Oral Tab Take 3,000 Units by mouth daily.      Multiple Vitamins-Minerals (PRESERVISION AREDS) Oral Tab Take by mouth.    Albuterol Sulfate  (90 Base) MCG/ACT Inhalation Aero Soln Inhale 2 puffs into the lungs every 6 (six) hours as needed for Wheezing or Shortness of Breath.       Medical History:  She  has a past medical history of Anxiety state, Arthritis, Back problem, Calculus of kidney, Deaf, Diverticulosis, Hearing loss, High blood pressure, High cholesterol, History of shingles, Meniere disease, Morbid obesity with BMI of 40.0-44.9, adult (HCC), Osteoarthritis, Pneumonia due to organism, SCC (squamous cell carcinoma) (2016), Tinnitus, Unspecified essential hypertension, and Visual impairment.  Surgical History:  She  has a past surgical  history that includes cholecystectomy; tubal ligation; knee surgery; other surgical history; oophorectomy (Bilateral); Cataract extraction w/  intraocular lens implant (Right, 11); Yag Capsulotomy - OD - Right Eye (Right, 14); spine surgery procedure unlisted; colonoscopy (N/A, 10/1/2019); total hip replacement (Right); and total knee replacement (Right).   Family History:  Her family history includes Cancer in her self; Colon Cancer in her father; Diabetes in her mother and paternal grandmother; Hypertension in her mother; hx of SC ( unknown type) in her father.  Social History:  She  reports that she quit smoking about 22 years ago. Her smoking use included cigarettes. She started smoking about 58 years ago. She has never used smokeless tobacco. She reports current alcohol use. She reports that she does not use drugs.    Tobacco:  She smoked tobacco in the past but quit greater than 12 months ago.  Social History     Tobacco Use   Smoking Status Former    Current packs/day: 0.00    Types: Cigarettes    Start date: 1966    Quit date: 2002    Years since quittin.5   Smokeless Tobacco Never        CAGE Alcohol Screen:   Score was 0      Patient Care Team:  Anant Mckeon DO as PCP - General (Family Medicine)  Roman Reyes MD (Physical Medicine)  Ellis Garcia MD as Consulting Physician (BARIATRICS)    Review of Systems  See HPI.  She denies any chest pain or shortness of breath.    Objective:   Physical Exam  Physical Exam   Constitutional: She is oriented to person, place, and time. She appears well-developed and well-nourished. No distress.  HEENT:   Head: Normocephalic.   Right Ear: Tympanic membrane and ear canal normal.  Wears hearing aids bilaterally.  Left Ear: Tympanic membrane and ear canal normal.   Mouth/Throat: Oropharynx is clear and moist and mucous membranes are normal.   Eyes: Conjunctivae and EOM are normal. Pupils are equal, round, and reactive to light.   Neck: Normal  range of motion. Neck supple. No thyromegaly present.   Cardiovascular: Normal rate, regular rhythm and no murmur heard.  Pulmonary/Chest: Effort normal and breath sounds normal. No respiratory distress.   Abdominal: Soft. Bowel sounds are normal. There is no hepatosplenomegaly. There is no tenderness.   Lymphadenopathy:     She has no  cervical adenopathy.   Neurological: She has normal reflexes. No cranial nerve deficit.   Skin: Skin is warm and dry. No rash noted.  Positive solar lentigos.  Lower legs: No edema of the legs bilaterally  Psychiatric: She has a normal mood and affect     Vitals reviewed.      /81   Pulse 62   Temp 97 °F (36.1 °C)   Ht 5' 5\" (1.651 m)   Wt 240 lb 9.6 oz (109.1 kg)   BMI 40.04 kg/m²  Estimated body mass index is 40.04 kg/m² as calculated from the following:    Height as of this encounter: 5' 5\" (1.651 m).    Weight as of this encounter: 240 lb 9.6 oz (109.1 kg).    Medicare Hearing Assessment:   Hearing Screening    Screening Method: Questionnaire  I have a problem hearing over the telephone: Yes I have trouble following the conversations when two or more people are talking at the same time: Yes   I have trouble understanding things on the TV: Yes I have to strain to understand conversations: Yes   I have to worry about missing the telephone ring or doorbell: Yes I have trouble hearing conversations in a noisy background such as a crowded room or restaurant: Yes   I get confused about where sounds come from: Yes I misunderstand some words in a sentence and need to ask people to repeat themselves: Yes   I especially have trouble understanding the speech of women and children: Yes I have trouble understanding the speaker in a large room such as at a meeting or place of [place]: Yes   Many people I talk to seem to mumble (or don't speak clearly): Yes People get annoyed because I misunderstand what they say: Yes   I misunderstand what others are saying and make inappropriate  responses: Yes I avoid social activities because I cannot hear well and fear I will reply improperly: Yes   Family members and friends have told me they think I may have hearing loss: Yes             Visual Acuity:   Right Eye Visual Acuity: Corrected Right Eye Chart Acuity: 20/50   Left Eye Visual Acuity: Corrected Left Eye Chart Acuity: 20/50   Both Eyes Visual Acuity: Corrected Both Eyes Chart Acuity: 20/50   Able To Tolerate Visual Acuity: Yes        Assessment & Plan:   Mari Gaspar is a 78 year old female who presents for a Medicare Assessment.   Diagnoses and all orders for this visit:    Adult general medical exam  Medicare exam done  Encounter for annual health examination    Cough  -     albuterol 108 (90 Base) MCG/ACT Inhalation Aero Soln; Inhale 2 puffs into the lungs every 6 (six) hours as needed for Wheezing or Shortness of Breath.  She wanted a refill of the butyryl that she may use if she has a coughing fit due to allergies.  L5-S1 right paracentral mild herniated disc  -     gabapentin 600 MG Oral Tab; Take 1 tablet (600 mg total) by mouth 2 (two) times daily.  History of low back pain which has been chronic.  She has done epidural steroid injections and has been on gabapentin but would like to see the pain specialist again as she is not interested in surgery.  Seasonal allergic rhinitis due to pollen  -     fluticasone propionate 50 MCG/ACT Nasal Suspension; 2 sprays by Nasal route daily.  Continue the Flonase and the montelukast  Essential hypertension  -     CBC With Differential With Platelet; Future  -     Comp Metabolic Panel (14); Future  Blood pressure a little bit high and will continue medications but we may need to put her on a weight loss medication but we will await the EKG.  Morbid obesity with BMI of 40.0-44.9, adult (HCC)  EKG ordered.  Really needs to watch the snacking  Mixed hyperlipidemia  -     EKG 12 Lead; Future  -     Lipid Panel; Future  -     Assay, Thyroid Stim  Hormone; Future  Compliant with her cholesterol medication  Anxiety state  Compliant with Lexapro and will continue  Atherosclerosis  -     EKG 12 Lead; Future  -     aspirin 325 MG Oral Tab; Take 1 tablet (325 mg total) by mouth daily.    Binge eating disorder  Await EKG to see possible weight loss medication  Skin cancer screening  -     DERM - INTERNAL    Chronic right-sided low back pain without sciatica  -     PHYSIATRY - INTERNAL    Vitamin D deficiency  -     Vitamin D [E]; Future  Continue over-the-counter vitamin D for now .      Referrals (if applicable)  Orders Placed This Encounter   Procedures    DERM - INTERNAL     SKIN DOCTOR    Call [phone].     Referral Priority:   Routine     Referral Type:   OFFICE VISIT     Referred to Provider:   Laina Dee MD     Requested Specialty:   DERMATOLOGY     Number of Visits Requested:   3    PHYSIATRY - INTERNAL     Call [phone].     Referral Priority:   Routine     Referral Type:   OFFICE VISIT     Referred to Provider:   Behar, Alex, MD     Requested Specialty:   PHYSIATRY     Number of Visits Requested:   3         Follow up if symptoms persist.  Take medicine (if given) as prescribed.  Approach to treatment discussed and patient/family member understands and agrees to plan.     No follow-ups on file.    The patient indicates understanding of these issues and agrees to the plan.  Reinforced healthy diet, lifestyle, and exercise.      No follow-ups on file.     Anant Mckeon DO, 7/13/2024     Supplementary Documentation:   General Health:  In the past six months, have you lost more than 10 pounds without trying?: 2 - No  Has your appetite been poor?: No  Type of Diet: Balanced  How does the patient maintain a good energy level?: Other  How would you describe your daily physical activity?: Moderate  How would you describe your current health state?: Good  How do you maintain positive mental well-being?: Social Interaction;Games;Visiting  Friends;Visiting Family  On a scale of 0 to 10, with 0 being no pain and 10 being severe pain, what is your pain level?: 5 - (Moderate)  In the past six months, have you experienced urine leakage?: 0-No  At any time do you feel concerned for the safety/well-being of yourself and/or your children, in your home or elsewhere?: No  Have you had any immunizations at another office such as Influenza, Hepatitis B, Tetanus, or Pneumococcal?: Yes    Health Maintenance   Topic Date Due    COVID-19 Vaccine (5 - 2023-24 season) 09/01/2023    Annual Physical  12/24/2023    Annual Depression Screening  01/01/2024    Fall Risk Screening (Annual)  01/01/2024    Colorectal Cancer Screening  10/01/2024    Zoster Vaccines (1 of 2) 12/23/2024 (Originally 11/8/1997)    Influenza Vaccine (1) 10/01/2024    Mammogram  09/01/2025    DEXA Scan  Completed    Pneumococcal Vaccine: 65+ Years  Completed

## 2024-07-15 NOTE — TELEPHONE ENCOUNTER
Please review. Protocol Failed; No Protocol      Recent fills: 3/21/2024, 6/12/2024  Last Rx written: 6/12/2024  Last office visit: 7/13/2024      Future Appointments  Date Type Provider Dept   09/16/24 Appointment Anant Mckeon DO [facility]   Showing future appointments within next 150 days with a meds authorizing provider and meeting all other requirements        Requested Prescriptions   Pending Prescriptions Disp Refills    HYDROcodone-acetaminophen 5-325 MG Oral Tab 30 tablet 0     Sig: Take 1 tablet by mouth every 4 (four) hours as needed.       Controlled Substance Medication Failed - 7/11/2024  6:24 PM        Failed - This medication is a controlled substance - forward to provider to refill               Future Appointments         Provider Department Appt Notes    In 2 weeks [facility]    In 2 months Anant Mckeon DO [facility] 2 mos f/u meds see comm    In 2 months [facility] Dee [facility]          Recent Outpatient Visits              [facility]    [facility]    1 month ago Diverticulitis    [facility] Vj Mclaughlin MD    Office Visit    6 months ago Diarrhea, unspecified type    [facility] Anant Ford DO    Office Visit    11 months ago Cough, unspecified type    [facility] [place] Cespedes, David B, DO    Office Visit    1 year ago HTN (hypertension), benign    [facility] Ellis Garcia MD    Office Visit

## 2024-07-18 RX ORDER — HYDROCODONE BITARTRATE AND ACETAMINOPHEN 5; 325 MG/1; MG/1
1 TABLET ORAL EVERY 4 HOURS PRN
Qty: 30 TABLET | Refills: 0 | Status: SHIPPED | OUTPATIENT
Start: 2024-07-18

## 2024-07-24 ENCOUNTER — HOSPITAL ENCOUNTER (OUTPATIENT)
Age: 79
Discharge: HOME | End: 2024-07-24
Attending: PHYSICIAN ASSISTANT
Payer: MEDICARE

## 2024-07-24 ENCOUNTER — NURSE TRIAGE (OUTPATIENT)
Dept: FAMILY MEDICINE CLINIC | Facility: CLINIC | Age: 79
End: 2024-07-24

## 2024-07-24 ENCOUNTER — APPOINTMENT (OUTPATIENT)
Dept: CT IMAGING | Facility: HOSPITAL | Age: 79
End: 2024-07-24
Attending: PHYSICIAN ASSISTANT
Payer: MEDICARE

## 2024-07-24 VITALS
RESPIRATION RATE: 18 BRPM | SYSTOLIC BLOOD PRESSURE: 159 MMHG | TEMPERATURE: 98 F | DIASTOLIC BLOOD PRESSURE: 75 MMHG | HEART RATE: 89 BPM | OXYGEN SATURATION: 95 %

## 2024-07-24 DIAGNOSIS — K57.92 ACUTE DIVERTICULITIS: Primary | ICD-10-CM

## 2024-07-24 LAB
#MXD IC: 0.8 X10ˆ3/UL (ref 0.1–1)
BILIRUB UR QL STRIP: NEGATIVE
BUN BLD-MCNC: 28 MG/DL (ref 7–18)
CHLORIDE BLD-SCNC: 103 MMOL/L (ref 98–112)
CO2 BLD-SCNC: 30 MMOL/L (ref 21–32)
COLOR UR: YELLOW
CREAT BLD-MCNC: 1.2 MG/DL
EGFRCR SERPLBLD CKD-EPI 2021: 47 ML/MIN/1.73M2 (ref 60–?)
GLUCOSE BLD-MCNC: 105 MG/DL (ref 70–99)
GLUCOSE UR STRIP-MCNC: NEGATIVE MG/DL
HCT VFR BLD AUTO: 34.2 %
HCT VFR BLD CALC: 36 %
HGB BLD-MCNC: 11.4 G/DL
HGB UR QL STRIP: NEGATIVE
ISTAT IONIZED CALCIUM FOR CHEM 8: 1.26 MMOL/L (ref 1.12–1.32)
KETONES UR STRIP-MCNC: NEGATIVE MG/DL
LEUKOCYTE ESTERASE UR QL STRIP: NEGATIVE
LYMPHOCYTES # BLD AUTO: 1.9 X10ˆ3/UL (ref 1–4)
LYMPHOCYTES NFR BLD AUTO: 19.8 %
MCH RBC QN AUTO: 31.7 PG (ref 26–34)
MCHC RBC AUTO-ENTMCNC: 33.3 G/DL (ref 31–37)
MCV RBC AUTO: 95 FL (ref 80–100)
MIXED CELL %: 8.2 %
NEUTROPHILS # BLD AUTO: 7.1 X10ˆ3/UL (ref 1.5–7.7)
NEUTROPHILS NFR BLD AUTO: 72 %
NITRITE UR QL STRIP: NEGATIVE
PH UR STRIP: 6.5 [PH]
PLATELET # BLD AUTO: 250 X10ˆ3/UL (ref 150–450)
POTASSIUM BLD-SCNC: 4.8 MMOL/L (ref 3.6–5.1)
PROT UR STRIP-MCNC: NEGATIVE MG/DL
RBC # BLD AUTO: 3.6 X10ˆ6/UL
SODIUM BLD-SCNC: 139 MMOL/L (ref 136–145)
SP GR UR STRIP: 1.01
UROBILINOGEN UR STRIP-ACNC: <2 MG/DL
WBC # BLD AUTO: 9.8 X10ˆ3/UL (ref 4–11)

## 2024-07-24 PROCEDURE — 99214 OFFICE O/P EST MOD 30 MIN: CPT | Performed by: PHYSICIAN ASSISTANT

## 2024-07-24 PROCEDURE — 81002 URINALYSIS NONAUTO W/O SCOPE: CPT | Performed by: PHYSICIAN ASSISTANT

## 2024-07-24 PROCEDURE — 85025 COMPLETE CBC W/AUTO DIFF WBC: CPT | Performed by: PHYSICIAN ASSISTANT

## 2024-07-24 PROCEDURE — 80047 BASIC METABLC PNL IONIZED CA: CPT | Performed by: PHYSICIAN ASSISTANT

## 2024-07-24 PROCEDURE — 74177 CT ABD & PELVIS W/CONTRAST: CPT | Performed by: PHYSICIAN ASSISTANT

## 2024-07-24 RX ORDER — CIPROFLOXACIN 500 MG/1
500 TABLET, FILM COATED ORAL 2 TIMES DAILY
Qty: 14 TABLET | Refills: 0 | Status: SHIPPED | OUTPATIENT
Start: 2024-07-24 | End: 2024-07-31

## 2024-07-24 RX ORDER — METRONIDAZOLE 500 MG/1
500 TABLET ORAL 3 TIMES DAILY
Qty: 21 TABLET | Refills: 0 | Status: SHIPPED | OUTPATIENT
Start: 2024-07-24 | End: 2024-07-31

## 2024-07-24 NOTE — ED QUICK NOTES
:   Patient left the IC in stable condition via PV to [facility] for cat scan. Instructed patient to go straight to facility and avoid drinking/eating. She verbalized understanding of instructions given.      Raya GUZMAN RN, 07/24/24, 11:52 AM

## 2024-07-24 NOTE — ED PROVIDER NOTES
Chief Complaint   Patient presents with    Abdominal Pain       HPI:     Mari Gaspar is a 78 year old female who presents patient of left lower quadrant pain developing at 1 AM last night.  Notes pain at onset 8 out of 10, improving over the last few hours without analgesic support, 3 out of 10.  Notes history of diverticulitis without recent imaging pending outpatient colonoscopy next week.  Notes was treated with Cipro Flagyl back in May with resolution of similar symptoms.  Denies associated headache dizziness neck pain chest pain shortness of breath dysuria diarrhea hematochezia upper or lower extremity weakness or numbness.  No history of kidney stones .      PFSH    PFSH asessment screens reviewed and agree.  Nurses notes reviewed I agree with documentation.    Family History   Problem Relation Age of Onset    Cancer Self     Diabetes Mother     Hypertension Mother     Colon Cancer Father     Other (hx of SC ( unknown type)) Father     Diabetes Paternal Grandmother     Glaucoma Neg         No Family h/o Glaucoma    Macular degeneration Neg         No Family h/o Macular degeneration    Breast Cancer Neg     Ovarian Cancer Neg      Family history reviewed with patient/caregiver and is not pertinent to presenting problem.  Social History     Socioeconomic History    Marital status:      Spouse name: Not on file    Number of children: Not on file    Years of education: Not on file    Highest education level: Not on file   Occupational History    Not on file   Tobacco Use    Smoking status: Former     Current packs/day: 0.00     Types: Cigarettes     Start date: 1966     Quit date: 2002     Years since quittin.5    Smokeless tobacco: Never   Vaping Use    Vaping status: Never Used   Substance and Sexual Activity    Alcohol use: Yes     Alcohol/week: 0.0 standard drinks of alcohol     Comment: social    Drug use: No    Sexual activity: Not on file   Other Topics Concern     Service  Not Asked    Blood Transfusions Not Asked    Caffeine Concern No     Comment: Coffee, 2 cups daily    Occupational Exposure Not Asked    Hobby Hazards Not Asked    Sleep Concern Not Asked    Stress Concern Not Asked    Weight Concern Not Asked    Special Diet Not Asked    Back Care Not Asked    Exercise No    Bike Helmet Not Asked    Seat Belt Not Asked    Self-Exams Not Asked    Grew up on a farm Not Asked    History of tanning Not Asked    Outdoor occupation Not Asked    Pt has a pacemaker No    Pt has a defibrillator No    Breast feeding Not Asked    Reaction to local anesthetic No   Social History Narrative    The patient does not use an assistive device..      The patient does not live in a home with stairs.     Social Determinants of Health     Financial Resource Strain: Not on file   Food Insecurity: Not on file   Transportation Needs: Not on file   Physical Activity: Not on file   Stress: Not on file   Social Connections: Not on file   Housing Stability: Not on file         ROS:   Positive for stated complaint: Left abdominal pain.  All other systems reviewed and negative except as noted above.  Constitutional and Vital Signs Reviewed.      Physical Exam:     Findings:    /75   Pulse 89   Temp 97.7 °F (36.5 °C) (Temporal)   Resp 18   SpO2 95%   GENERAL: well developed, well nourished, well hydrated, no distress  SKIN: good skin turgor, no obvious rashes  EXTREMITIES: no cyanosis or edema. TIM without difficulty  GI: Left lower quadrant tenderness.  No peritoneal changes, negative McBurney.  No CVA or adnexal tenderness, no peritoneal changes.  Normal bowel sounds  HEAD: normocephalic, atraumatic  EYES: sclera non icteric bilateral, conjunctiva clear  NOSE: nasal turbinates: pink, normal mucosa  LUNGS: clear to auscultation bilaterally; no rales, rhonchi, or wheezes  NEURO: No focal deficits  PSYCH: Alert and oriented x3.  Answering questions appropriately.  Mood appropriate.    MDM/Assessment/Plan:    Orders for this encounter:    Orders Placed This Encounter    CT ABDOMEN+PELVIS(CONTRAST ONLY)(CPT=74177)     Order Specific Question:   If clinically indicated, CT Protocol includes Oral Contrast. Can Oral Contrast be administered?     Answer:   Yes     Order Specific Question:   What is the Relevant Clinical Indication / Reason for Exam?     Answer:   Diverticulosis     Order Specific Question:   Release to patient     Answer:   Immediate    POCT CBC     Order Specific Question:   Release to patient     Answer:   Immediate    POCT Urinalysis Dipstick    POCT ISTAT chem8 cartridge    iStat (Chem 8)    POCT Urine Dip    Insert Peripheral IV    sodium chloride 0.9 % IV bolus 500 mL    iopamidol 76% (ISOVUE-370) injection for power injector    ciprofloxacin 500 MG Oral Tab     Sig: Take 1 tablet (500 mg total) by mouth 2 (two) times daily for 7 days.     Dispense:  14 tablet     Refill:  0    metRONIDAZOLE 500 MG Oral Tab     Sig: Take 1 tablet (500 mg total) by mouth 3 (three) times daily for 7 days.     Dispense:  21 tablet     Refill:  0       Labs performed this visit:  Recent Results (from the past 10 hour(s))   POCT Urinalysis Dipstick    Collection Time: 07/24/24 11:08 AM   Result Value Ref Range    Urine Color Yellow Yellow    Urine Clarity Cloudy (A) Clear    Specific Gravity, Urine 1.015 1.005 - 1.030    PH, Urine 6.5 5.0 - 8.0    Protein urine Negative Negative mg/dL    Glucose, Urine Negative Negative mg/dL    Ketone, Urine Negative Negative mg/dL    Bilirubin, Urine Negative Negative    Blood, Urine Negative Negative    Nitrite Urine Negative Negative    Urobilinogen urine <2.0 <2.0 mg/dL    Leukocyte esterase urine Negative Negative   POCT CBC    Collection Time: 07/24/24 11:18 AM   Result Value Ref Range    WBC IC 9.8 4.0 - 11.0 x10ˆ3/uL    RBC IC 3.60 (L) 3.80 - 5.30 X10ˆ6/uL    HGB IC 11.4 (L) 12.0 - 16.0 g/dL    HCT IC 34.2 (L) 35.0 - 48.0 %    MCV IC 95.0 80.0 - 100.0 fL    MCH IC 31.7 26.0 - 34.0  pg    MCHC IC 33.3 31.0 - 37.0 g/dL    PLT .0 150.0 - 450.0 X10ˆ3/uL    # Neutrophil 7.1 1.5 - 7.7 X10ˆ3/uL    # Lymphocyte 1.9 1.0 - 4.0 X10ˆ3/uL    # Mixed Cells 0.8 0.1 - 1.0 X10ˆ3/uL    Neutrophil % 72.0 %    Lymphocyte % 19.8 %    Mixed Cell % 8.2 %   POCT ISTAT chem8 cartridge    Collection Time: 07/24/24 11:23 AM   Result Value Ref Range    ISTAT Sodium 139 136 - 145 mmol/L    ISTAT BUN 28 (H) 7 - 18 mg/dL    ISTAT Potassium 4.8 3.6 - 5.1 mmol/L    ISTAT Chloride 103 98 - 112 mmol/L    ISTAT Ionized Calcium 1.26 1.12 - 1.32 mmol/L    ISTAT Hematocrit 36 34 - 50 %    ISTAT Glucose 105 (H) 70 - 99 mg/dL    ISTAT TCO2 30 21 - 32 mmol/L    ISTAT Creatinine 1.20 (H) 0.55 - 1.02 mg/dL    eGFR-Cr 47 (L) >=60 mL/min/1.73m2       MDM:  Urine dip unremarkable.  Metabolic profile without leukocytosis, chemistries show similar renal insufficiency from previous draw 11 days ago.  Patient given 1 500 cc bolus prior to CT at hospital with upcoming contrast infusion.    Dr Nava notified.     CT shows uncomplicated diverticulitis, patient agrees to take Cipro Flagyl by previous prescription and addressed her gastroenterologist on findings as well as plan with upcoming colonoscopy.  Instructed on outpatient supportive measures as well as indications to go to the ER for breakthrough changes.      Diagnosis:    ICD-10-CM    1. Acute diverticulitis  K57.92           All results reviewed and discussed with patient.  See AVS for detailed discharge instructions for your condition today.    Follow Up with:  Anant Mckeon DO  [address]  [address]  [facility] [id number] [phone]    Schedule an appointment as soon as possible for a visit in 1 week      John Puri MD  [address]  [facility] [place] [id number] [phone]    Call   FOLLOW UP ON FINDINGS/COLONOSCOPY SCHEDULED.

## 2024-07-24 NOTE — ED INITIAL ASSESSMENT (HPI)
Pt reports waking up at 0100 with sharp LLQ abdominal pain.   Reports hx of diverticulitis in May.

## 2024-07-24 NOTE — TELEPHONE ENCOUNTER
Action Requested: Summary for Provider     []  Critical Lab, Recommendations Needed  [] Need Additional Advice  []   FYI    []   Need Orders  [] Need Medications Sent to Pharmacy  []  Other     SUMMARY: Disposition per protocol  is to be seen in ED/ICC.  Patient preferred office visit, no appointments available so she agrees to immediate care.      Reason for call:Abdominal pain  Onset: Early this morning     Sharp Left  Abdominal pain woke her up from sleep- 5/10. No  diarrhea or constipation recently. Had  similar pain diagnosed as diverticulitis 5/23/24  treated with antibiotics. Has screening colonoscopy  scheduled with Dr Puri 7/30/24.  Reviewed care advice including go to immediate care bow, push clear fluids, call back for increased pain, fever, vomiting, blood in stool. Call back to schedule follow up as needed, and call GI to let them know about abdominal pain. Patient verbalizes understanding and agrees to plan of care.    Reason for Disposition   MILD TO MODERATE constant pain lasting > 2 hours    Protocols used: Abdominal Pain - Female-A-[place]

## 2024-07-25 ENCOUNTER — TELEPHONE (OUTPATIENT)
Facility: CLINIC | Age: 79
End: 2024-07-25

## 2024-07-25 DIAGNOSIS — Z80.0 FAMILY HISTORY OF MALIGNANT NEOPLASM OF GASTROINTESTINAL TRACT: ICD-10-CM

## 2024-07-25 DIAGNOSIS — Z86.010 PERSONAL HISTORY OF COLONIC POLYPS: Primary | ICD-10-CM

## 2024-07-25 NOTE — TELEPHONE ENCOUNTER
Dr Puri,    FY: colonoscopy scheduled for 07/30/2024 cancelled due to diverticulitis flare    I spoke to the pt and let her know we will be will be cancelling her colonoscopy    Pt concerned she may have to wait another 4 months to have her procedure done    I told the pt I will have the schedulers reach back out to her to reschedule in 4-6 weeks     I told her there are probably no openings in 4-6 weeks but hopefully we can get her in on a cancellation or add on at some point.    Pt agrees to the plan    I spoke to Sara in Endo and she is aware we are cancelling the pt    Surgical Change Request sent to Endo and appt cancelled in EPIC    Routed back to the GI Schedulers

## 2024-07-25 NOTE — TELEPHONE ENCOUNTER
Received call from Sara ROQUE RN    Patient was in urgent care yesterday d/t diverticulitis flare  She had imaging done which confirmed the diagnosis. Patient was discharged home on 7 day course of Cipro Flagyl    Patient is scheduled for a colonoscopy on 7/30/2024    MYA asking if Dr. Puri recommends proceeding.  Please advise    Thank you!

## 2024-07-26 NOTE — TELEPHONE ENCOUNTER
Thanks all.    Recent CT scan 7/24/2024 reviewed; \"1. Mild acute diverticulitis of the mid descending colon.  No free peritoneal air or drainable diverticular abscess. \"    Thank you for canceling the colonoscopy.  Unfortunately this is not urgent.  We can try to get her scheduled again within the next 6 to 12 weeks.    We may have moved up the colonoscopy exam due to new symptoms.  However her next follow-up colonoscopy examination was not due until October 2024.    - cb

## 2024-07-26 NOTE — TELEPHONE ENCOUNTER
Noted.  Patient is scheduled on 9/10/24 which is in the 6-12 week timeframe from now.    Your Appointments      Tuesday September 10, 2024  Gastroenterology Procedure with John Puri MD  [facility] (--) [address]  [facility] [address]  [phone]     Tuesday September 10, 2024 11:10 AM  Procedure Mac with HAYLIE PURI  [facility] (--) [address]  [facility] [address]  [phone]

## 2024-07-30 DIAGNOSIS — M51.26 LUMBAR HERNIATED DISC: ICD-10-CM

## 2024-07-30 DIAGNOSIS — J30.1 SEASONAL ALLERGIC RHINITIS DUE TO POLLEN: ICD-10-CM

## 2024-08-02 RX ORDER — MONTELUKAST SODIUM 10 MG/1
10 TABLET ORAL DAILY
Qty: 90 TABLET | Refills: 0 | OUTPATIENT
Start: 2024-08-02

## 2024-08-02 RX ORDER — MELOXICAM 15 MG/1
15 TABLET ORAL DAILY
Qty: 90 TABLET | Refills: 1 | Status: SHIPPED | OUTPATIENT
Start: 2024-08-02

## 2024-08-02 NOTE — TELEPHONE ENCOUNTER
Refill passed per [facility] protocol.    Please review pended refill request as unable to refill due to high/very high drug interaction warning copied here:     High  Drug-Drug: sertraline and MeloxicamToxic effects may be increased with concurrent administration of NSAIDs and Selective Serotonin Reuptake Inhibitors. The risk of upper gastrointestinal bleeding may be increased. Patients taking both drugs concurrently should be educated about the signs and symptoms of GI bleeding.        MELOXICAM 15 MG Oral Tab [Pharmacy Med Name: Meloxicam 15 Mg Tab Zydu] 90 tablet 0     Sig: TAKE ONE TABLET BY MOUTH ONE TIME DAILY       Non-Narcotic Pain Medication Protocol Passed - 7/30/2024  3:23 PM        Passed - In person appointment or virtual visit in the past 6 mos or appointment in next 3 mos     Recent Outpatient Visits              [facility]     [facility]    2 months ago Diverticulitis    [facility] Vj Mclaughlin MD    Office Visit    7 months ago Diarrhea, unspecified type    [facility] Anant Mckeon,     Office Visit    1 year ago Cough, unspecified type    [facility] [place] Cespedes, David B, DO    Office Visit    1 year ago HTN (hypertension), benign    [facility] Ellis Garcia MD    Office Visit          Future Appointments         Provider Department Appt Notes    In 2 weeks Behar, Alex, MD [facility] rfd- Dr Mcekon- back pain- medicare/bcbs    In 1 month FREDRICK, PROCEDURE [facility]Mac [facility] per Nehal pt is scheduled at 9;17am on the Endo schedule    In 1 month Anant Mckeon DO [facility] 2 mos f/u meds see  comm    In 1 month [facility]    In 2 months [facility]  ( seen over 5 yrs)                       Future Appointments         Provider Department Appt Notes    In 2 weeks Behar, Alex, MD [facility] rfd- Dr Mckeon- back pain- medicare/bcbs    In 1 month FREDRICK, PROCEDURE [facility] Colon w/Mac @[facility] per Nehal pt is scheduled at 9;17am on the Endo schedule    In 1 month Anant Mckeon DO [facility] 2 mos f/u meds see comm    In 1 month [facility]    In 2 months [facility] Body Check  ( seen over 5 yrs)          Recent Outpatient Visits              2 weeks ago Adult general medical exam    [facility] Anant Ford,     Office Visit    2 months ago Diverticulitis    [facility] Vj Thomason MD    Office Visit    7 months ago [facility], [facility]     [facility]    1 year ago Cough, unspecified type    [facility] [place] Ces[facility] David B, DO    Office Visit    1 year ago [facility]ension[facility]    Office Visit

## 2024-08-22 ENCOUNTER — TELEPHONE (OUTPATIENT)
Dept: PHYSICAL MEDICINE AND REHAB | Facility: CLINIC | Age: 79
End: 2024-08-22

## 2024-08-22 ENCOUNTER — OFFICE VISIT (OUTPATIENT)
Dept: PHYSICAL MEDICINE AND REHAB | Facility: CLINIC | Age: 79
End: 2024-08-22
Payer: MEDICARE

## 2024-08-22 VITALS — WEIGHT: 240 LBS | BODY MASS INDEX: 40 KG/M2

## 2024-08-22 DIAGNOSIS — M48.061 LUMBAR FORAMINAL STENOSIS: ICD-10-CM

## 2024-08-22 DIAGNOSIS — M47.816 FACET SYNDROME, LUMBAR: Primary | ICD-10-CM

## 2024-08-22 DIAGNOSIS — M79.10 MYALGIA: ICD-10-CM

## 2024-08-22 DIAGNOSIS — M51.37 DDD (DEGENERATIVE DISC DISEASE), LUMBOSACRAL: ICD-10-CM

## 2024-08-22 DIAGNOSIS — M48.062 SPINAL STENOSIS OF LUMBAR REGION WITH NEUROGENIC CLAUDICATION: ICD-10-CM

## 2024-08-22 DIAGNOSIS — M51.36 BULGE OF LUMBAR DISC WITHOUT MYELOPATHY: ICD-10-CM

## 2024-08-22 DIAGNOSIS — M54.59 MECHANICAL LOW BACK PAIN: ICD-10-CM

## 2024-08-22 DIAGNOSIS — M47.816 LUMBAR SPONDYLOSIS: ICD-10-CM

## 2024-08-22 PROCEDURE — 99204 OFFICE O/P NEW MOD 45 MIN: CPT | Performed by: PHYSICAL MEDICINE & REHABILITATION

## 2024-08-22 NOTE — TELEPHONE ENCOUNTER
Estimated body mass index is 39.94 kg/m² as calculated from the following:    Height as of 7/13/24: 65\".    Weight as of an earlier encounter on 8/22/24: 240 lb.  Restrictions w/ procedure.    Patient has been scheduled for Bilateral L4-5 and L5-S1 medial branch blocks on 8/28/24 at the [facility] with Dr. Behar.   Anesthesia type:  Local  Please note: The [facility] will call the business day prior to discuss the exact time/arrival and additional instructions for your appointment.  Patient was advised that if he/she does receive the covid vaccine it needs to be at least 2 weeks before or after the injection.  Medications and allergies reviewed.  Educated to hold NSAIDS (Aleve, Ibuprofen, Motrin, Advil) and anti-inflammatories (Meloxicam, Naproxen, Diclofenac, Celebrex) and for cervical injections must hold Multi-Vitamins, Vitamin E, Fish Oil/Omega-3.  If patient is receiving MAC/IVCS, weight loss oral/injectable medications will need to be held for 7 days prior to injection.  Patient informed to fast 8 hours prior to procedure and 10-12 hours prior to procedure with IVCS/MAC if patient is on a weight loss medication.   If on blood thinner, clearance has been received and approved to hold this medication by provider.   Patient informed of [facility]'s  policy:  he/she will need a  to and from procedure and must be on site for their entirety of their visit, if their ride is unable to the procedure will be cancelled.   [facility] is located in the [facility] 1st floor [address], [place] [address].   may park in the yellow/purple parking lot.  Patient verbalized understanding and agrees with plan.  Scheduled in Epic: Yes  Scheduled in Surgical Case: Yes  Follow up appointment made: NOV: Visit date not found

## 2024-08-22 NOTE — H&P
[facility]  Clinic H&P    Requesting Physician: Anant Mckeon DO    Chief Complaint (Reason for Visit):    Chief Complaint   Patient presents with    New Patient     New right handed lower back pain 2017 that has progressively worsen through the years.No injuries. Previous injections in lower back . Completed PT no relief. Currently doing chair yoga to help relief pain. No T/N.Currently taking gabapentin /hydrocodone/meloxicam helps relief some pain. Pain 0/10.       History of Present Illness:  The patient is a 78 year old RIGHT handed female with significant past medical history of HTN and HLD who presents with bilateral low back pain without radicular symptoms. Her pain is a 8-9/10 while standing and walking and improves by sitting. Her symptoms have been going on for over 10 years. She has a history of a laminectomy over 15 years ago. She last tried physical therapy back over 15 years ago. She last had an injection about 10 years ago.  She denies paresthesia, weakness. She just retired from office work.     PAST MEDICAL HISTORY:   Past Medical History:    Anxiety state    Arthritis    Back problem    Hx herniated discs    Calculus of kidney    Deaf    Left ear    Diverticulosis    Hearing loss    High blood pressure    High cholesterol    History of shingles    1980's    Meniere disease    Morbid obesity with BMI of 40.0-44.9, adult (HCC)    Osteoarthritis    Pneumonia due to organism    as an infant    SCC (squamous cell carcinoma)    right jawline    Tinnitus    Unspecified essential hypertension    Visual impairment    reading glasses       PAST SURGICAL HISTORY:   Past Surgical History:   Procedure Laterality Date    Cataract extraction w/  intraocular lens implant Right 9/12/11    RJM    Cholecystectomy      Colonoscopy N/A 10/1/2019    Procedure: COLONOSCOPY;  Surgeon: John Puri MD;  Location: [facility] ENDOSCOPY    Knee surgery      Oophorectomy  Bilateral     per [employer]:  \"laparoscopic bilateral oophorectomy\"    Other surgical history      Ear surgery    Spine surgery procedure unlisted      for right sciatica issue    Total hip replacement Right     Total knee replacement Right     Tubal ligation      Yag capsulotomy - od - right eye Right 14    RJM        FAMILY HISTORY:   Family History   Problem Relation Age of Onset    Cancer Self     Diabetes Mother     Hypertension Mother     Colon Cancer Father     Other (hx of SC ( unknown type)) Father     Diabetes Paternal Grandmother     Glaucoma Neg         No Family h/o Glaucoma    Macular degeneration Neg         No Family h/o Macular degeneration    Breast Cancer Neg     Ovarian Cancer Neg        SOCIAL HISTORY:   Social History     Occupational History    Not on file   Tobacco Use    Smoking status: Former     Current packs/day: 0.00     Types: Cigarettes     Start date: 1966     Quit date: 2002     Years since quittin.6    Smokeless tobacco: Never   Vaping Use    Vaping status: Never Used   Substance and Sexual Activity    Alcohol use: Yes     Alcohol/week: 0.0 standard drinks of alcohol     Comment: social    Drug use: No    Sexual activity: Not on file       CURRENT MEDICATIONS:   Current Outpatient Medications   Medication Sig Dispense Refill    Meloxicam 15 MG Oral Tab Take 1 tablet (15 mg total) by mouth daily. 90 tablet 1    HYDROcodone-acetaminophen 5-325 MG Oral Tab Take 1 tablet by mouth every 4 (four) hours as needed. 30 tablet 0    albuterol 108 (90 Base) MCG/ACT Inhalation Aero Soln Inhale 2 puffs into the lungs every 6 (six) hours as needed for Wheezing or Shortness of Breath. 1 each 1    gabapentin 600 MG Oral Tab Take 1 tablet (600 mg total) by mouth 2 (two) times daily. 180 tablet 3    fluticasone propionate 50 MCG/ACT Nasal Suspension 2 sprays by Nasal route daily. 3 each 2    aspirin 325 MG Oral Tab Take 1 tablet (325 mg total) by mouth daily. 90 tablet 0    Phentermine  HCl 15 MG Oral Cap Take 1 capsule (15 mg total) by mouth every morning. For weight loss 90 capsule 0    montelukast 10 MG Oral Tab Take 1 tablet (10 mg total) by mouth daily. 90 tablet 1    SPIRONOLACTONE 25 MG Oral Tab TAKE ONE TABLET BY MOUTH ONE TIME DAILY 90 tablet 3    sertraline 100 MG Oral Tab TAKE ONE TABLET BY MOUTH ONE TIME DAILY 90 tablet 3    Na Sulfate-K Sulfate-Mg Sulf (SUPREP BOWEL PREP KIT) 17.5-3.13-1.6 GM/177ML Oral Solution Take as directed 1 each 0    rosuvastatin 20 MG Oral Tab Take 1 tablet (20 mg total) by mouth once daily. 90 tablet 3    Telmisartan-HCTZ 80-25 MG Oral Tab Take 1 tablet by mouth daily. 90 tablet 3    Cholecalciferol (VITAMIN D3) 3000 units Oral Tab Take 3,000 Units by mouth daily.        Multiple Vitamins-Minerals (PRESERVISION AREDS) Oral Tab Take by mouth.          ALLERGIES:   No Known Allergies      REVIEW OF SYSTEMS:   Review of Systems   Constitutional: Negative.    HENT: Negative.    Eyes: Negative.    Respiratory: Negative.    Cardiovascular: Negative.    Gastrointestinal: Negative.    Genitourinary: Negative.    Musculoskeletal: As per HPI   Skin: Negative.    Neurological: As per HPI  Endo/Heme/Allergies: Negative.    Psychiatric/Behavioral: Negative.      All other systems reviewed and are negative. Pertinent positives and negatives noted in the HPI.        PHYSICAL EXAM:   Wt 240 lb (108.9 kg)   BMI 39.94 kg/m²     Body mass index is 39.94 kg/m².      General: No immediate distress  Head: Normocephalic/ Atraumatic  Eyes: Extra-occular movements intact.   Ears: No auricular hematoma or deformities  Mouth: No lesions or ulcerations  Heart: peripheral pulses intact. Normal capillary refill.   Lungs: Non-labored respirations  Abdomen: No abdominal guarding  Extremities: No lower extremity edema bilaterally   Skin: No lesions noted.   Cognition: alert & oriented x 3, attentive, able to follow 2 step commands, comprehention intact, spontaneous speech  intact  Motor:    Musculoskeletal:    LUMBAR SPINE:  Inspection: no erythema, swelling, or obvious deformity.  Their iliac crest and shoulder heights are symmetrical.  Postural exam reveals no scoliosis or kyphosis.  Palpation: Palpation midline lumbar spine as well as bilateral lower lumbar facets and paraspinals, bilateral glutes and piriformis  ROM: Full flexion.  Full extension.  Pain during extension  Motor: 5/5 in all myotomes of the BILATERAL lower extremities except 4+ out of 5 during left great toe extension (L5)  Sensation: Intact to light touch in all dermatomes of the lower extremities   Reflexes: 1/4 at L4 and S1  Facet Loading: Positive bilaterally  TRINA: Negative  Straight leg raise: negative for radicular pain symptoms  Slump test: negative for pain symptoms or radicular pain symptoms      Gait:  Normal    Data  Admission on 07/24/2024, Discharged on 07/24/2024   Component Date Value Ref Range Status    WBC IC 07/24/2024 9.8  4.0 - 11.0 x10ˆ3/uL Final    RBC IC 07/24/2024 3.60 (L)  3.80 - 5.30 X10ˆ6/uL Final    HGB IC 07/24/2024 11.4 (L)  12.0 - 16.0 g/dL Final    HCT IC 07/24/2024 34.2 (L)  35.0 - 48.0 % Final    MCV IC 07/24/2024 95.0  80.0 - 100.0 fL Final    MCH IC 07/24/2024 31.7  26.0 - 34.0 pg Final    MCHC IC 07/24/2024 33.3  31.0 - 37.0 g/dL Final    PLT IC 07/24/2024 250.0  150.0 - 450.0 X10ˆ3/uL Final    # Neutrophil 07/24/2024 7.1  1.5 - 7.7 X10ˆ3/uL Final    # Lymphocyte 07/24/2024 1.9  1.0 - 4.0 X10ˆ3/uL Final    # Mixed Cells 07/24/2024 0.8  0.1 - 1.0 X10ˆ3/uL Final    Neutrophil % 07/24/2024 72.0  % Final    Lymphocyte % 07/24/2024 19.8  % Final    Mixed Cell % 07/24/2024 8.2  % Final    Urine Color 07/24/2024 Yellow  Yellow Final    Urine Clarity 07/24/2024 Cloudy (A)  Clear Final    Specific Gravity, Urine 07/24/2024 1.015  1.005 - 1.030 Final    PH, Urine 07/24/2024 6.5  5.0 - 8.0 Final    Protein urine 07/24/2024 Negative  Negative mg/dL Final    Glucose, Urine 07/24/2024  Negative  Negative mg/dL Final    Ketone, Urine 07/24/2024 Negative  Negative mg/dL Final    Bilirubin, Urine 07/24/2024 Negative  Negative Final    Blood, Urine 07/24/2024 Negative  Negative Final    Nitrite Urine 07/24/2024 Negative  Negative Final    Urobilinogen urine 07/24/2024 <2.0  <2.0 mg/dL Final    Leukocyte esterase urine 07/24/2024 Negative  Negative Final    ISTAT Sodium 07/24/2024 139  136 - 145 mmol/L Final    ISTAT BUN 07/24/2024 28 (H)  7 - 18 mg/dL Final    ISTAT Potassium 07/24/2024 4.8  3.6 - 5.1 mmol/L Final    ISTAT Chloride 07/24/2024 103  98 - 112 mmol/L Final    ISTAT Ionized Calcium 07/24/2024 1.26  1.12 - 1.32 mmol/L Final    ISTAT Hematocrit 07/24/2024 36  34 - 50 % Final    ISTAT Glucose 07/24/2024 105 (H)  70 - 99 mg/dL Final    ISTAT TCO2 07/24/2024 30  21 - 32 mmol/L Final    ISTAT Creatinine 07/24/2024 1.20 (H)  0.55 - 1.02 mg/dL Final    eGFR-Cr 07/24/2024 47 (L)  >=60 mL/min/1.73m2 Final   [facility] Lab Encounter on 07/13/2024   Component Date Value Ref Range Status    Glucose 07/13/2024 105 (H)  70 - 99 mg/dL Final    Sodium 07/13/2024 143  136 - 145 mmol/L Final    Potassium 07/13/2024 4.9  3.5 - 5.1 mmol/L Final    Chloride 07/13/2024 108  98 - 112 mmol/L Final    CO2 07/13/2024 29.0  21.0 - 32.0 mmol/L Final    Anion Gap 07/13/2024 6  0 - 18 mmol/L Final    BUN 07/13/2024 27 (H)  9 - 23 mg/dL Final    Creatinine 07/13/2024 1.17 (H)  0.55 - 1.02 mg/dL Final    BUN/CREA Ratio 07/13/2024 23.1 (H)  10.0 - 20.0 Final    Calcium, Total 07/13/2024 9.8  8.7 - 10.4 mg/dL Final    Calculated Osmolality 07/13/2024 301 (H)  275 - 295 mOsm/kg Final    eGFR-Cr 07/13/2024 48 (L)  >=60 mL/min/1.73m2 Final    ALT 07/13/2024 17  10 - 49 U/L Final    AST 07/13/2024 24  <=34 U/L Final    Alkaline Phosphatase 07/13/2024 88  55 - 142 U/L Final    Bilirubin, Total 07/13/2024 0.8  0.2 - 1.1 mg/dL Final    Total Protein 07/13/2024 7.7  5.7 - 8.2 g/dL Final    Albumin 07/13/2024 4.8  3.2 - 4.8 g/dL Final     Globulin  07/13/2024 2.9  2.0 - 3.5 g/dL Final    A/G Ratio 07/13/2024 1.7  1.0 - 2.0 Final    Patient Fasting for CMP? 07/13/2024 Yes   Final    Cholesterol, Total 07/13/2024 160  <200 mg/dL Final    HDL Cholesterol 07/13/2024 56  40 - 59 mg/dL Final    Triglycerides 07/13/2024 61  30 - 149 mg/dL Final    LDL Cholesterol 07/13/2024 92  <100 mg/dL Final    VLDL 07/13/2024 10  0 - 30 mg/dL Final    Non HDL Chol 07/13/2024 104  <130 mg/dL Final    Patient Fasting for Lipid? 07/13/2024 Yes   Final    TSH 07/13/2024 2.505  0.550 - 4.780 mIU/mL Final    WBC 07/13/2024 7.4  4.0 - 11.0 x10(3) uL Final    RBC 07/13/2024 3.42 (L)  3.80 - 5.30 x10(6)uL Final    HGB 07/13/2024 11.3 (L)  12.0 - 16.0 g/dL Final    HCT 07/13/2024 33.9 (L)  35.0 - 48.0 % Final    MCV 07/13/2024 99.1  80.0 - 100.0 fL Final    MCH 07/13/2024 33.0  26.0 - 34.0 pg Final    MCHC 07/13/2024 33.3  31.0 - 37.0 g/dL Final    RDW-SD 07/13/2024 48.2 (H)  35.1 - 46.3 fL Final    RDW 07/13/2024 13.2  11.0 - 15.0 % Final    PLT 07/13/2024 237.0  150.0 - 450.0 10(3)uL Final    Neutrophil Absolute Prelim 07/13/2024 5.12  1.50 - 7.70 x10 (3) uL Final    Neutrophil Absolute 07/13/2024 5.12  1.50 - 7.70 x10(3) uL Final    Lymphocyte Absolute 07/13/2024 1.26  1.00 - 4.00 x10(3) uL Final    Monocyte Absolute 07/13/2024 0.62  0.10 - 1.00 x10(3) uL Final    Eosinophil Absolute 07/13/2024 0.25  0.00 - 0.70 x10(3) uL Final    Basophil Absolute 07/13/2024 0.10  0.00 - 0.20 x10(3) uL Final    Immature Granulocyte Absolute 07/13/2024 0.03  0.00 - 1.00 x10(3) uL Final    Neutrophil % 07/13/2024 69.3  % Final    Lymphocyte % 07/13/2024 17.1  % Final    Monocyte % 07/13/2024 8.4  % Final    Eosinophil % 07/13/2024 3.4  % Final    Basophil % 07/13/2024 1.4  % Final    Immature Granulocyte % 07/13/2024 0.4  % Final    Vitamin D, 25OH, Total 07/13/2024 50.9  30.0 - 100.0 ng/mL Final    Ventricular rate 07/13/2024 57  BPM Final    Atrial rate 07/13/2024 57  BPM Final    P-R  Interval 07/13/2024 168  ms Final    QRS Duration 07/13/2024 76  ms Final    Q-T Interval 07/13/2024 410  ms Final    QTC Calculation (Bezet) 07/13/2024 399  ms Final    P Axis 07/13/2024 37  degrees Final    R Axis 07/13/2024 9  degrees Final    T Axis 07/13/2024 43  degrees Final   ]      Radiology Imaging:  I reviewed with the patient her X-ray and MRI of the lumbar spine   PROCEDURE: XR LUMBAR SPINE (MIN 2 VIEWS) (CPT=72100)     COMPARISON: [facility], XR LUMBAR SPINE (MIN 4 VIEWS) (CPT=72110), 5/16/2017, 5:04 PM.  [facility], X LUMBAR SPINE AP LAT OBLS, 1/27/2015, 3:28 PM.  [facility], X LUMBAR SPINE AP LAT, 11/23/2007,  6:07 AM.     INDICATIONS: Chronic bilateral low back pain.     TECHNIQUE: Lumbar spine radiographs (2-3 views)       Counting Reference: Lumbosacral junction.     For the purposes of this exam, it will be assumed that there are 5 lumbar-type vertebral bodies. L4-L5 is at the level of the iliac crest.     FINDINGS:     ALIGNMENT: There has been straightening of the lumbar lordosis which could be secondary to positioning versus muscle spasm. There is S-shaped scoliosis of the thoracolumbar spine.  VERTEBRAL BODIES:   There are hypertrophic changes of the lower lumbar facet joints. Small anterior endplate osteophytes seen within the lumbar vertebral bodies. There is no acute fracture or dislocation.  DISC SPACES: Severe disc space narrowing seen from L2 through S1.. Mild disc space narrowing throughout the remainder of the lumbar spine.  SACROILIAC JOINTS: Degenerative changes are seen within the sacroiliac joints.  OTHER: Atherosclerotic calcification of the abdominal aorta. There are right upper quadrant surgical clips.              Impression  CONCLUSION:     Severe degenerative disc and facet disease within the mid and lower lumbar spine.     Scoliosis.       Dictated by (CST): Devon Maravilla MD on 2/10/2023 at 2:51 PM      Finalized by  (CST): Devon Maravilla MD on 2/10/2023 at 2:59 PM            PROCEDURE: MRI SPINE LUMBAR (CPT=72148)     COMPARISON: [facility], SUNNY SCREENING BILAT (CPT=77067), 11/16/2017, 2:04 [facility], CT ABDOMEN + PELVIS KIDNEYSTONE 2D RNDR (NO IV NO ORAL) (CPT=741, 11/04/2020, 1:33 PM.  [facility],  MRI SPINE LUMBAR (CPT=72148), 8/28/2017, 2:49 PM.     INDICATIONS: M54.16 Lumbar radiculopathy     TECHNIQUE: A variety of imaging planes and parameters were utilized for visualization of suspected pathology.     Counting Reference: Lumbosacral junction.     For the purposes of this exam, it will be assumed that there are 5 lumbar-type vertebral bodies. L4-L5 is at the level of the iliac crest.     FINDINGS:  PARASPINAL AREA: Normal with no visible mass.    BONES: There are hypertrophic changes of the lower lumbar facet joints. Small anterior endplate osteophytes seen within the lumbar vertebral bodies.  There is a sacral insufficiency fracture seen involving left sacral ala with extensive marrow edema seen   on the STIR sequences.  To a lesser extent, there is mild marrow edema seen within the right sacral ala without a definite fracture line.  1.1 cm hypointense lesion is seen within the right iliac bone which appears unchanged since 8/28/2017 and is  suggestive of red marrow hyperplasia.  Osseous hemangiomas are seen within the T10 and L1 vertebral bodies.  CORD/CAUDA EQUINA: Normal caliber, contour, and signal intensity.    OTHER: There is S-shaped scoliosis of the thoracolumbar spine..     LUMBAR DISC LEVELS:  L1-L2: Disk desiccation with bulging disk, facet, and ligamentous hypertrophy results in mild central canal narrowing and mild bilateral neural foraminal narrowing.  L2-L3: Disk desiccation with bulging disk, facet, and ligamentous hypertrophy results in mild central canal narrowing and mild bilateral neural foraminal narrowing.  L3-L4: Disk desiccation  with bulging disk, facet, and ligamentous hypertrophy results in moderate central canal narrowing and moderate left and severe right neural foraminal narrowing.  L4-L5: Disk desiccation with bulging disk, facet, and ligamentous hypertrophy results in mild central canal narrowing and mild left and moderate right neural foraminal narrowing.  L5-S1: Disk desiccation with bulging disk, facet, and ligamentous hypertrophy results in mild central canal narrowing and mild bilateral neural foraminal narrowing.               Impression   CONCLUSION:     Sacral insufficiency fracture of the left sacral ala, new since the prior exams..     Stress changes within the right sacral ala without fracture at this point.  This is also new.     Degenerative disc and facet disease throughout the lumbar spine, most prominent at L3-L4, where there is moderate narrowing of the canal and severe narrowing of the right neural foramen.  Overall appearance is not significantly changed since 8/28/2017.     S shaped scoliosis of thoracolumbar spine, unchanged.                 Dictated by (CST): Devon Maravilla MD on 4/17/2021 at 11:36 AM      Finalized by (CST): Devon Maravilla MD on 4/17/2021 at 11:50 AM           ASSESSMENT AND PLAN:  Mari is a 78-year-old female presents with bilateral low back pain without radicular symptoms which is aggravated more by standing and walking and improved by sitting as well as walking with a shopping cart.  Her symptoms sound like stenosis without neurogenic claudication although there is also facet arthropathy.  I am recommending physical therapy as well as new x-rays of the lumbar spine.  She should use Tylenol and continue meloxicam.  She should use Norco only for breakthrough pain.  I have recommended bilateral L4-L5 and L5-S1 medial branch blocks as a diagnostic procedure.  If positive, we will proceed with radiofrequency ablation at those levels.       RTC in 2 days after procedure     Discharge Instructions  were provided as documented in AVS summary.  The patient was in agreement with the assessment and plan.  All questions were answered.  There were no barriers to learning.         1. Facet syndrome, lumbar    2. Myalgia    3. Mechanical low back pain    4. Lumbar spondylosis    5. Spinal stenosis of lumbar region with neurogenic claudication    6. Lumbar foraminal stenosis    7. Bulge of lumbar disc without myelopathy    8. DDD (degenerative disc disease), lumbosacral        Alex B. Behar MD, [facility]  Physical Medicine and Rehabilitation/Sports Medicine  [facility]

## 2024-08-22 NOTE — PATIENT INSTRUCTIONS
1) My office will call you to schedule the BILATERAL L4-L5 and L5-S1 MBB under local anesthesia once the procedure is approved by your insurance carrier.    2) Please begin physical therapy as soon as possible.   3) Please get X-rays of the Lumbar spine today on your way out.   4) Tylenol 500-1000 mg every 6-8 hours as needed for pain.  No more than 3000 mg daily.  5) Continue Meloxicam 15 mg daily  6) Limit Norco for breakthrough pain only  7) We will touch base after the medial branch blocks. If the pain subsides, then we will either perform the ablation to those nerves or we can consider the facet joint injections

## 2024-08-22 NOTE — TELEPHONE ENCOUNTER
Per CMS Guidelines as of 8/11/24 -no authorization is required for BILATERAL L4-L5 and L5-S1 MBB under local anesthesia   CPT codes: 90701-87, 02497-95   Dx: M48.061, M79.10       Status: Authorization is not required based on medical necessity however is not a guarantee of payment and may be subject to review once claim is submitted-Covered Benefit.  Facet Joint Interventions are considered medically reasonable and necessary for the diagnosis and treatment of chronic pain in patients who meet ALL of the following criteria:  Moderate to severe chronic neck or low back pain, predominantly axial, that causes functional deficit measured on pain or disability scale6; AND*  Pain that has been present for a minimum of 3 months with documented failure to respond to noninvasive conservative care management (as tolerated)4,7, AND  Absence of untreated radiculopathy or neurogenic claudication (except for radiculopathy caused by facet joint synovial cyst)4,8 AND  There is no non-facet pathology per clinical assessment or radiology studies that could explain the source of the patient’s pain, including but not limited to fracture, tumor, infection, or significant deformity. 6  *Pain assessment must be performed and documented at baseline, after each diagnostic procedure using the same pain scale for each assessment. A disability scale must also be obtained at baseline to be used for functional assessment (if patient qualifies for treatment).    A. Diagnostic Facet Joint Injection Procedures (IA or MBB):  The primary indication of a diagnostic facet joint procedure is to diagnose whether the patient has facet syndrome.1,4,7-9 Intraarticular (IA) facet block(s) are considered medically reasonable and necessary as a diagnostic test only if MBB cannot be performed due to specific documented anatomic restrictions or there is an indication to proceed with therapeutic intraarticular injections. These restrictions must be clearly  documented in the medical record and made available upon request.    Diagnostic procedures should be performed with the intent that if successful, RFA procedure would be considered the primary treatment goal at the diagnosed level(s).6    A second diagnostic facet procedure is considered medically reasonable and necessary to confirm validity of the initial diagnostic facet procedure when administered at the same level. The second diagnostic procedure may only be performed a minimum of 2 weeks after the initial diagnostic procedure.8 Clinical circumstances that necessitate an exception to the 2 week duration may be considered on an individual basis and must be clearly documented in the medical record.    For the first diagnostic facet joint injection to be considered medically reasonable and necessary, the patient must meet the criteria outlined under indications for facet joint interventions.  A second confirmatory diagnostic facet joint injection is considered medically reasonable and necessary in patients who meet ALL the following criteria:  The patient meets the criteria for the first diagnostic injection; AND  After the first diagnostic facet joint injection, there must be a consistent positive response of at least 80% relief of primary (index) pain (with the duration of relief being consistent with the agent used).6  Frequency limitation: For each covered spinal region no more than 4 diagnostic joint sessions will be considered medically reasonable and necessary per rolling 12 months, in recognition that the pain generator cannot always be identified with the initial and confirmatory diagnostic procedure.    B. Therapeutic Facet Joint Injection Procedures (IA or MBB):  Therapeutic facet joint injections are considered medically reasonable and necessary for patients who meet ALL the following criteria:  The patient has had 2 medically reasonable and necessary diagnostic facet joint procedures with each one  providing a consistent minimum of 80% relief of primary (index) pain (with the duration of relief being consistent with the agent used); AND  Subsequent therapeutic facet joint procedures at the same anatomic site results in at least consistent 50% pain relief for at least 3 months from the prior therapeutic procedure or at least 50% consistent improvement in the ability to perform previously painful movements and activities of daily living (ADLs) as compared to baseline measurement using the same scale 6; AND  Documentation of why the patient is not a candidate for radiofrequency ablation (such as established spinal pseudarthrosis, implanted electrical device).5, 10-12  Frequency limitation: For each covered spinal region no more than 4 therapeutic facet joint injection sessions will be reimbursed per rolling 12 months.

## 2024-08-23 ENCOUNTER — HOSPITAL ENCOUNTER (OUTPATIENT)
Dept: GENERAL RADIOLOGY | Age: 79
Discharge: HOME | End: 2024-08-23
Attending: PHYSICAL MEDICINE & REHABILITATION
Payer: MEDICARE

## 2024-08-23 DIAGNOSIS — M79.10 MYALGIA: ICD-10-CM

## 2024-08-23 DIAGNOSIS — M51.36 BULGE OF LUMBAR DISC WITHOUT MYELOPATHY: ICD-10-CM

## 2024-08-23 DIAGNOSIS — M47.816 FACET SYNDROME, LUMBAR: ICD-10-CM

## 2024-08-23 DIAGNOSIS — M48.062 SPINAL STENOSIS OF LUMBAR REGION WITH NEUROGENIC CLAUDICATION: ICD-10-CM

## 2024-08-23 DIAGNOSIS — M54.59 MECHANICAL LOW BACK PAIN: ICD-10-CM

## 2024-08-23 DIAGNOSIS — M51.37 DDD (DEGENERATIVE DISC DISEASE), LUMBOSACRAL: ICD-10-CM

## 2024-08-23 DIAGNOSIS — M47.816 LUMBAR SPONDYLOSIS: ICD-10-CM

## 2024-08-23 DIAGNOSIS — M48.061 LUMBAR FORAMINAL STENOSIS: ICD-10-CM

## 2024-08-23 PROCEDURE — 72110 X-RAY EXAM L-2 SPINE 4/>VWS: CPT | Performed by: PHYSICAL MEDICINE & REHABILITATION

## 2024-08-24 DIAGNOSIS — J30.1 SEASONAL ALLERGIC RHINITIS DUE TO POLLEN: ICD-10-CM

## 2024-08-26 RX ORDER — MONTELUKAST SODIUM 10 MG/1
10 TABLET ORAL DAILY
Qty: 90 TABLET | Refills: 3 | Status: SHIPPED | OUTPATIENT
Start: 2024-08-26

## 2024-08-26 NOTE — TELEPHONE ENCOUNTER
Please review. Protocol Failed; No Protocol    Requested Prescriptions   Pending Prescriptions Disp Refills    MONTELUKAST 10 MG Oral Tab [Pharmacy Med Name: Montelukast Sodium 10 Mg Tab Auro] 90 tablet 0     Sig: TAKE ONE TABLET BY MOUTH ONE TIME DAILY       Asthma & COPD Medication Protocol Failed - 8/24/2024 12:52 PM        Failed - Asthma Action Score greater than or equal to 20        Failed - AAP/ACT given in last 12 months     No data recorded  No data recorded  No data recorded  No data recorded          Passed - Appointment in past 6 or next 3 months      Recent Outpatient Visits              4 days ago Facet syndrome, lumbar    [facility] Behar, Alex, MD    Office Visit    1 month ago Adult general medical exam    [facility], RashaadAnant Muñoz,     Office Visit    3 months ago Diverticulitis    [facility] Vj Mclaughlin MD    Office Visit    8 months ago [facility]arrhea, unspecified [facility]    1 year ago Cough, unspecified type    [facility], [place] Cespedes, David B,     Office Visit          Future Appointments         Provider Department Appt Notes    In 2 days Behar, Alex, MD [facility] EOSC: Bilateral L4-5 and L5-S1 medial branch blocks- local    In 2 weeks FREDRICK, PROC[facility] per Nehal pt is scheduled at 9;17am on the Endo schedule    In 3 weeks Anant Mckeon DO [facility] 2 mos f/u meds see comm    In 1 month Laina Dee MD [facility] [place] check spots    In 2 months [facility]- [facility]  ( seen over 5  yrs)                           Future Appointments         Provider Department Appt Notes    In 2 days Behar, Alex, MD [facility] EOSC: Bilateral L4-5 and L5-S1 medial branch blocks- local    In 2 weeks [facility] per Nehal pt is scheduled at 9;17am on the Endo schedule    In 3 weeks [facility]u [facility]s see comm    In 1 month [facility]    In 2 months [facility] Body [facility]  ( seen over 5 yrs)          Recent Outpatient Visits              4 days ago Facet syndrome, lumbar    [facility] Behar, Alex, MD    Office Visit    1 month ago Adult general medical exam    [facility], Anant Ford DO    Office Visit    3 months ago Diverticulitis    [facility] Vj Thomason MD    Office Visit    8 months ago Diarrhea, unspecified type    [facility]    Office Visit    1 year ago Cough, unspecified type    [facility], [place] Cespedes, David B, DO    Office Visit

## 2024-09-04 ENCOUNTER — MED REC SCAN ONLY (OUTPATIENT)
Dept: PHYSICAL MEDICINE AND REHAB | Facility: CLINIC | Age: 79
End: 2024-09-04

## 2024-09-04 ENCOUNTER — TELEPHONE (OUTPATIENT)
Dept: PHYSICAL MEDICINE AND REHAB | Facility: CLINIC | Age: 79
End: 2024-09-04

## 2024-09-04 NOTE — TELEPHONE ENCOUNTER
Patient came in to inform Dr.Behar that the las 2 injections did not work, she remains with the same level of pain with no improvement. She wants to proceed with another injection. Please call to advice once injection is approved.

## 2024-09-05 NOTE — TELEPHONE ENCOUNTER
Patient had Bilateral L4-5 and L5-S1 medial branch blocks  on 8/28/24 40 % relief only the first 24 hours, then pain came back the same the next day.     Patient would like to know next steps.     Routing to Dr Behar for his recommendations.

## 2024-09-10 ENCOUNTER — HOSPITAL ENCOUNTER (OUTPATIENT)
Facility: HOSPITAL | Age: 79
Setting detail: HOSPITAL OUTPATIENT SURGERY
Discharge: HOME | End: 2024-09-10
Attending: INTERNAL MEDICINE | Admitting: INTERNAL MEDICINE
Payer: MEDICARE

## 2024-09-10 ENCOUNTER — ANESTHESIA (OUTPATIENT)
Dept: ENDOSCOPY | Facility: HOSPITAL | Age: 79
End: 2024-09-10
Payer: MEDICARE

## 2024-09-10 ENCOUNTER — ANESTHESIA EVENT (OUTPATIENT)
Dept: ENDOSCOPY | Facility: HOSPITAL | Age: 79
End: 2024-09-10
Payer: MEDICARE

## 2024-09-10 VITALS
OXYGEN SATURATION: 98 % | DIASTOLIC BLOOD PRESSURE: 77 MMHG | WEIGHT: 239 LBS | SYSTOLIC BLOOD PRESSURE: 106 MMHG | HEIGHT: 64 IN | HEART RATE: 55 BPM | RESPIRATION RATE: 21 BRPM | BODY MASS INDEX: 40.8 KG/M2 | TEMPERATURE: 97 F

## 2024-09-10 DIAGNOSIS — K64.9 HEMORRHOIDS, UNSPECIFIED HEMORRHOID TYPE: ICD-10-CM

## 2024-09-10 DIAGNOSIS — K63.5 POLYP OF SIGMOID COLON, UNSPECIFIED TYPE: ICD-10-CM

## 2024-09-10 DIAGNOSIS — Z86.010 HX OF COLONIC POLYPS: ICD-10-CM

## 2024-09-10 DIAGNOSIS — Z80.0 FAMILY HISTORY OF COLON CANCER: ICD-10-CM

## 2024-09-10 DIAGNOSIS — K57.90 DIVERTICULOSIS: ICD-10-CM

## 2024-09-10 DIAGNOSIS — K63.5 POLYP OF COLON, UNSPECIFIED PART OF COLON, UNSPECIFIED TYPE: Primary | ICD-10-CM

## 2024-09-10 PROCEDURE — 45385 COLONOSCOPY W/LESION REMOVAL: CPT | Performed by: INTERNAL MEDICINE

## 2024-09-10 PROCEDURE — 0DBN8ZX EXCISION OF SIGMOID COLON, VIA NATURAL OR ARTIFICIAL OPENING ENDOSCOPIC, DIAGNOSTIC: ICD-10-PCS | Performed by: INTERNAL MEDICINE

## 2024-09-10 PROCEDURE — 0DBE8ZX EXCISION OF LARGE INTESTINE, VIA NATURAL OR ARTIFICIAL OPENING ENDOSCOPIC, DIAGNOSTIC: ICD-10-PCS | Performed by: INTERNAL MEDICINE

## 2024-09-10 PROCEDURE — 45380 COLONOSCOPY AND BIOPSY: CPT | Performed by: INTERNAL MEDICINE

## 2024-09-10 PROCEDURE — 0DBK8ZX EXCISION OF ASCENDING COLON, VIA NATURAL OR ARTIFICIAL OPENING ENDOSCOPIC, DIAGNOSTIC: ICD-10-PCS | Performed by: INTERNAL MEDICINE

## 2024-09-10 RX ORDER — SODIUM CHLORIDE, SODIUM LACTATE, POTASSIUM CHLORIDE, CALCIUM CHLORIDE 600; 310; 30; 20 MG/100ML; MG/100ML; MG/100ML; MG/100ML
INJECTION, SOLUTION INTRAVENOUS CONTINUOUS
Status: DISCONTINUED | OUTPATIENT
Start: 2024-09-10 | End: 2024-09-10

## 2024-09-10 RX ORDER — ONDANSETRON 2 MG/ML
4 INJECTION INTRAMUSCULAR; INTRAVENOUS ONCE AS NEEDED
Status: DISCONTINUED | OUTPATIENT
Start: 2024-09-10 | End: 2024-09-10

## 2024-09-10 RX ORDER — LIDOCAINE HYDROCHLORIDE 10 MG/ML
INJECTION, SOLUTION EPIDURAL; INFILTRATION; INTRACAUDAL; PERINEURAL AS NEEDED
Status: DISCONTINUED | OUTPATIENT
Start: 2024-09-10 | End: 2024-09-10 | Stop reason: SURG

## 2024-09-10 RX ORDER — NALOXONE HYDROCHLORIDE 0.4 MG/ML
0.08 INJECTION, SOLUTION INTRAMUSCULAR; INTRAVENOUS; SUBCUTANEOUS ONCE AS NEEDED
Status: DISCONTINUED | OUTPATIENT
Start: 2024-09-10 | End: 2024-09-10

## 2024-09-10 RX ADMIN — LIDOCAINE HYDROCHLORIDE 50 MG: 10 INJECTION, SOLUTION EPIDURAL; INFILTRATION; INTRACAUDAL; PERINEURAL at 09:33:00

## 2024-09-10 RX ADMIN — SODIUM CHLORIDE, SODIUM LACTATE, POTASSIUM CHLORIDE, CALCIUM CHLORIDE: 600; 310; 30; 20 INJECTION, SOLUTION INTRAVENOUS at 09:30:00

## 2024-09-10 RX ADMIN — SODIUM CHLORIDE, SODIUM LACTATE, POTASSIUM CHLORIDE, CALCIUM CHLORIDE: 600; 310; 30; 20 INJECTION, SOLUTION INTRAVENOUS at 10:10:00

## 2024-09-10 NOTE — ANESTHESIA POSTPROCEDURE EVALUATION
Patient: Mari Gaspar    Procedure Summary       Date: 09/10/24 Room / Location: [facility] ENDOSCOPY 05 / [facility] ENDOSCOPY    Anesthesia Start: 0930 Anesthesia Stop:     Procedure: COLONOSCOPY Diagnosis:       Family history of colon cancer      Hx of colonic polyps      (colon polyps ,diverticulosis, hemorrhoids,)    Surgeons: John Puri MD Anesthesiologist: Kathrine Sargent CRNA    Anesthesia Type: MAC, general ASA Status: 3            Anesthesia Type: MAC, general    Vitals Value Taken Time   BP 96/37 09/10/24 1016   Temp  09/10/24 1017   Pulse 61 09/10/24 1017   Resp 27 09/10/24 1017   SpO2 94 % 09/10/24 1016   Vitals shown include unfiled device data.    [facility] AN Post Evaluation:   Patient Evaluated in Patient location: [place] [address].  Patient Participation: complete - patient participated  Level of Consciousness: awake and alert  Pain Score: 0  Pain Management: adequate  Airway Patency:patent  Dental exam unchanged from preop  Yes    Nausea/Vomiting: none  Cardiovascular Status: stable  Respiratory Status: room air  Postoperative Hydration stable      Kathrine Sargent CRNA  9/10/2024 10:17 AM

## 2024-09-10 NOTE — OPERATIVE REPORT
[facility]    COLONOSCOPY PROCEDURE REPORT     DATE OF PROCEDURE:  9/10/2024     PCP: Anant Mckeon DO     PREOPERATIVE DIAGNOSIS:  Personal history adenomatous colon polyps, family history colon cancer     POSTOPERATIVE DIAGNOSIS:  See impression.     SURGEON:  John Puri M.D.     SEDATION:    MAC anesthesia provided by the Anesthesia Service.  MAC anesthesia requested due to anticipated intolerance of colonoscopy examination under safe doses conscious sedation medications     COLONOSCOPY PROCEDURE:   After the nature and risks of colonoscopy examination under MAC anesthesia were discussed with the patient and all questions answered, informed consent was obtained.  The patient was sedated as above.      Digital rectal exam was performed which showed no masses.  The Olympus pediatric video colonoscope was placed in the patient's rectum and advanced under direct visualization through the entire length of the colon up to the cecum.  The cecum was confirmed by landmarks including appendiceal orifice, cecal trifold, ileocecal valve.  Unable to perform retroflexion in the rectum due to narrow caliber, spasm.    The quality of the prep was good with some gelatinous residue remaining most of which was washed and suctioned out.  Washing stimulated a fair amount of colonic peristalsis which also limited the exam.    Estimated blood loss: none/insignificant       COLONOSCOPY FINDINGS:    Subtle sessile 4 mm colon polyp removed from the ascending colon by excisional biopsy using the cold biopsy forceps.  Sessile 6 mm colon polyp removed from the mid sigmoid colon by cold snare polypectomy technique, suctioned out.  Medium severity diverticulosis of the descending and sigmoid colon.  No inflammatory process seen in this patient with history of intermittent chronic watery diarrhea; random colonic mucosal biopsies obtained.  Small internal hemorrhoids.    RECOMMENDATIONS:  High fiber diet.  Follow-up  above colon polyp pathology and random colonic mucosal biopsy results.  Consideration for repeat colonoscopy examination in 7 years as per polyp pathology results and clinical condition at that time.  No aspirin or NSAID medications for next 5 days to prevent bleeding

## 2024-09-10 NOTE — DISCHARGE INSTRUCTIONS
.Home Care Instructions for Colonoscopy with Sedation    Diet:  - Resume your regular diet as tolerated unless otherwise instructed.  - Start with light meals to minimize bloating.  - Do not drink alcohol today.    Medication:  - If you have questions about resuming your normal medications, please contact your Primary Care Physician.    Activities:  - Take it easy today. Do not return to work today.  - Do not drive today.  - Do not operate any machinery today (including kitchen equipment).    Colonoscopy:  - You may notice some rectal \"spotting\" (a little blood on the toilet tissue) for a day or two after the exam. This is normal.  - If you experience any rectal bleeding (not spotting), persistent tenderness or sharp severe abdominal pains, oral temperature over 100 degrees Fahrenheit, light-headedness or dizziness, or any other problems, contact your doctor.      **If unable to reach your doctor, please go to the [facility] Emergency Room**    - Your referring physician will receive a full report of your examination.  - If you do not hear from your doctor's office within two weeks of your biopsy, please call them for your results.    You may be able to see your laboratory results in [employer] between 4 and 7 business days.  In some cases, your physician may not have viewed the results before they are released to [employer].  If you have questions regarding your results contact the physician who ordered the test/exam by phone or via [employer] by choosing \"Ask a Medical Question.\"  .  .  Notes from Dr Puri:  Mild \"diverticulosis\" (pockets) of the wall of the colon. You should be provided a handout regarding diet and the possible risk of diverticulitis/infection  2 small benign colon polyps were found and removed today - to be sent to the lab to be examined - a letter will be sent with results.  You may see small quantities of blood with your next 1-2 bowel movements today.    If you check your results on  [facility], the \"pathology\" report on the colon polyp(s) should be released within the next 24-48 hours.  In general, a \"hyperplastic\" colon polyp is completely benign, vs an \"adenoma\" or \"sessile serrated adenoma\" which is considered a benign but precancerous colon polyp.  That will be explained in a letter/email from me as well.  No aspirin, Excedrin, Advil/Motrin/ibuprofen, Aleve/naproxen for next 5 days (to prevent bleeding.)  Internal hemorrhoids - very common  If you are raw and irritated back there after all of that diarrhea and wiping, three good options to soothe and heal the irritation include Aquaphor ointment, \"Desitin\" or \"A & D\" diaper ointment, or good old-fashioned Vaseline.  All of these soothe and create a protective barrier so that your skin can heal.  .  .  .

## 2024-09-10 NOTE — ANESTHESIA PREPROCEDURE EVALUATION
Anesthesia PreOp Note    HPI:     Mari Gaspar is a 78 year old female who presents for preoperative consultation requested by: John Puri MD    Date of Surgery: 9/10/2024    Procedure(s):  COLONOSCOPY  Indication: Family history of colon cancer /Hx of colonic polyps    Relevant Problems   No relevant active problems       NPO:  Last Liquid Consumption Date: 09/10/24  Last Liquid Consumption Time: 0610  Last Solid Consumption Date: 09/08/24  Last Solid Consumption Time: 0900  Last Liquid Consumption Date: 09/10/24          History Review:  Patient Active Problem List    Diagnosis Date Noted    Cough 04/21/2022    Dysuria 02/10/2022    Weight gain 06/10/2021    Stress 06/10/2021    Dyslipidemia 06/10/2021    Morbid obesity with BMI of 40.0-44.9, adult (HCC) 03/18/2021    Urinary frequency 01/15/2021    S/P TKR (total knee replacement), left 01/20/2020    Primary osteoarthritis of left knee 01/07/2020    Status post right knee replacement 02/27/2019    Encounter for therapeutic drug monitoring     Status post total right knee replacement using cement 02/04/2019    Primary osteoarthritis of right knee 02/04/2019    HTN (hypertension), benign 09/17/2018    L1-2 left > right mild-mod diffuse, L2-3 left mod & left mod foraminal, L3-4 mod diffuse, L4-5 right large far lateral & right mod diffuse, L5-S1 right mod-large far lateral bulging discs 04/23/2018    L5-S1 right paracentral mild herniated disc 04/23/2018    L5-S1 right mod, L4-5 right mod, L3-4 left mod foraminal stenosis 04/23/2018    Primary osteoarthritis of both hips: right mod and left mild OA 04/23/2018    Chronic bilateral low back pain with right-sided sciatica 04/23/2018    L3-4 mod central, L4-5 right mod lateral recess stenosis 04/23/2018    right L5-S1 radiculopathy 07/10/2017    Age-related nuclear cataract of left eye 03/19/2015    Mixed hyperlipidemia 07/18/2014    Pulmonary nodules 07/18/2014    Atherosclerosis 07/18/2014     Hyperglycemia 07/18/2014    Low back pain 07/18/2014    Anxiety state 08/06/2013    Senile cataract 08/09/2011       Past Medical History:    Anxiety state    Arthritis    Back problem    Hx herniated discs    Calculus of kidney    Deaf    Left ear    Diverticulosis    Hearing loss    High blood pressure    High cholesterol    History of shingles    1980's    Meniere disease    Morbid obesity with BMI of 40.0-44.9, adult (HCC)    Osteoarthritis    Pneumonia due to organism    as an infant    SCC (squamous cell carcinoma)    right jawline    Tinnitus    Unspecified essential hypertension    Visual impairment    reading glasses       Past Surgical History:   Procedure Laterality Date    Cataract extraction w/  intraocular lens implant Right 9/12/11    [facility]    Cholecystectomy      Colonoscopy N/A 10/1/2019    Procedure: COLONOSCOPY;  Surgeon: John Puri MD;  Location: [facility] ENDOSCOPY    Knee surgery      Oophorectomy Bilateral     per [employer]:  \"laparoscopic bilateral oophorectomy\"    Other surgical history      Ear surgery    Spine surgery procedure unlisted      for right sciatica issue    Total hip replacement Right     Total knee replacement Right     Tubal ligation      Yag capsulotomy - od - right eye Right 2/12/14    [facility]       Medications Prior to Admission   Medication Sig Dispense Refill Last Dose    montelukast 10 MG Oral Tab Take 1 tablet (10 mg total) by mouth daily. 90 tablet 3     Meloxicam 15 MG Oral Tab Take 1 tablet (15 mg total) by mouth daily. 90 tablet 1     HYDROcodone-acetaminophen 5-325 MG Oral Tab Take 1 tablet by mouth every 4 (four) hours as needed. 30 tablet 0     albuterol 108 (90 Base) MCG/ACT Inhalation Aero Soln Inhale 2 puffs into the lungs every 6 (six) hours as needed for Wheezing or Shortness of Breath. 1 each 1     gabapentin 600 MG Oral Tab Take 1 tablet (600 mg total) by mouth 2 (two) times daily. 180 tablet 3     fluticasone propionate 50 MCG/ACT Nasal Suspension 2  sprays by Nasal route daily. 3 each 2     aspirin 325 MG Oral Tab Take 1 tablet (325 mg total) by mouth daily. 90 tablet 0 2024    Phentermine HCl 15 MG Oral Cap Take 1 capsule (15 mg total) by mouth every morning. For weight loss 90 capsule 0 9/3/2024    sertraline 100 MG Oral Tab TAKE ONE TABLET BY MOUTH ONE TIME DAILY 90 tablet 3     rosuvastatin 20 MG Oral Tab Take 1 tablet (20 mg total) by mouth once daily. 90 tablet 3     Telmisartan-HCTZ 80-25 MG Oral Tab Take 1 tablet by mouth daily. 90 tablet 3     Cholecalciferol (VITAMIN D3) 3000 units Oral Tab Take 3,000 Units by mouth daily.         [] ciprofloxacin 500 MG Oral Tab Take 1 tablet (500 mg total) by mouth 2 (two) times daily for 7 days. 14 tablet 0     [] metRONIDAZOLE 500 MG Oral Tab Take 1 tablet (500 mg total) by mouth 3 (three) times daily for 7 days. 21 tablet 0     SPIRONOLACTONE 25 MG Oral Tab TAKE ONE TABLET BY MOUTH ONE TIME DAILY 90 tablet 3     Na Sulfate-K Sulfate-Mg Sulf (SUPREP BOWEL PREP KIT) 17.5-3.13-1.6 GM/177ML Oral Solution Take as directed 1 each 0     Multiple Vitamins-Minerals (PRESERVISION AREDS) Oral Tab Take by mouth.        Current Facility-Administered Medications Ordered in Epic   Medication Dose Route Frequency Provider Last Rate Last Admin    lactated ringers infusion   Intravenous Continuous John Puri MD         No current [facility]-ordered outpatient medications on file.       No Known Allergies    Family History   Problem Relation Age of Onset    Cancer Self     Diabetes Mother     Hypertension Mother     Colon Cancer Father     Other (hx of SC ( unknown type)) Father     Diabetes Paternal Grandmother     Glaucoma Neg         No Family h/o Glaucoma    Macular degeneration Neg         No Family h/o Macular degeneration    Breast Cancer Neg     Ovarian Cancer Neg      Social History     Socioeconomic History    Marital status:    Tobacco Use    Smoking status: Former     Current packs/day:  0.00     Types: Cigarettes     Start date: 1966     Quit date: 2002     Years since quittin.7    Smokeless tobacco: Never   Vaping Use    Vaping status: Never Used   Substance and Sexual Activity    Alcohol use: Yes     Alcohol/week: 0.0 standard drinks of alcohol     Comment: social    Drug use: No   Other Topics Concern    Caffeine Concern No     Comment: Coffee, 2 cups daily    Exercise No    Pt has a pacemaker No    Pt has a defibrillator No    Reaction to local anesthetic No       Available pre-op labs reviewed.  Lab Results   Component Value Date    WBC 7.4 2024    RBC 3.42 (L) 2024    HGB 11.3 (L) 2024    HCT 33.9 (L) 2024    MCV 95.0 2024    MCV 99.1 2024    MCH 33.0 2024    MCHC 33.3 2024    MCHC 33.3 2024    RDW 13.2 2024    .0 2024     Lab Results   Component Value Date     2024    K 4.9 2024     2024    CO2 29.0 2024    BUN 27 (H) 2024    CREATSERUM 1.17 (H) 2024     (H) 2024    CA 9.8 2024          Vital Signs:  Body mass index is 41.02 kg/m².   height is 1.626 m (5' 4\") and weight is 108.4 kg (239 lb).   Vitals:    24 1510   Weight: 108.4 kg (239 lb)   Height: 1.626 m (5' 4\")        Anesthesia Evaluation     Patient summary reviewed and Nursing notes reviewed    Airway   Mallampati: II  TM distance: >3 FB  Neck ROM: full  Dental - Dentition appears grossly intact     Pulmonary - normal exam     ROS comment: Chronic cough  Cardiovascular - normal exam  Exercise tolerance: good  (+) hypertension well controlled    Neuro/Psych    (+)  neuromuscular disease, anxiety/panic attacks,        GI/Hepatic/Renal    (+) bowel prep    Endo/Other    Abdominal   (+) obese                 Anesthesia Plan:   ASA:  3  Plan:   MAC and general  Informed Consent Plan and Risks Discussed With:  Patient and sibling  Discussed plan with:  CRNA and surgeon      I have  informed Mari Gaspar and/or legal guardian or family member of the nature of the anesthetic plan, benefits, risks including possible dental damage if relevant, major complications, and any alternative forms of anesthetic management.   All of the patient's questions were answered to the best of my ability. The patient desires the anesthetic management as planned.  Kathrine Sargent, CRNA  9/10/2024 8:46 AM  Present on Admission:  **None**

## 2024-09-10 NOTE — H&P
History & Physical Examination    Patient Name: Mari Gaspar  MRN: [id number]  [facility]: [id number]  YOB: 1945    Diagnosis: Personal history adenomatous colon polyps, family history colon cancer    PRESENT ILLNESS: 78-year-old woman with BMI 41, history hypertension dyslipidemia who presents for colonoscopy examination.  Previous exam reassuring 2019.      BMI Readings from Last 1 Encounters:   09/10/24 41.02 kg/m²         Medications Prior to Admission   Medication Sig Dispense Refill Last Dose    montelukast 10 MG Oral Tab Take 1 tablet (10 mg total) by mouth daily. 90 tablet 3 2024 at 0900    Meloxicam 15 MG Oral Tab Take 1 tablet (15 mg total) by mouth daily. 90 tablet 1 2024 at 0900    HYDROcodone-acetaminophen 5-325 MG Oral Tab Take 1 tablet by mouth every 4 (four) hours as needed. 30 tablet 0 over a week    albuterol 108 (90 Base) MCG/ACT Inhalation Aero Soln Inhale 2 puffs into the lungs every 6 (six) hours as needed for Wheezing or Shortness of Breath. 1 each 1 couple months since use    gabapentin 600 MG Oral Tab Take 1 tablet (600 mg total) by mouth 2 (two) times daily. 180 tablet 3 2024 at 0900    fluticasone propionate 50 MCG/ACT Nasal Suspension 2 sprays by Nasal route daily. 3 each 2 prn    aspirin 325 MG Oral Tab Take 1 tablet (325 mg total) by mouth daily. 90 tablet 0 2024 at 0900    Phentermine HCl 15 MG Oral Cap Take 1 capsule (15 mg total) by mouth every morning. For weight loss 90 capsule 0 9/3/2024    sertraline 100 MG Oral Tab TAKE ONE TABLET BY MOUTH ONE TIME DAILY 90 tablet 3 9/3/2024 at 0900    rosuvastatin 20 MG Oral Tab Take 1 tablet (20 mg total) by mouth once daily. 90 tablet 3 2024 at 0900    Telmisartan-HCTZ 80-25 MG Oral Tab Take 1 tablet by mouth daily. 90 tablet 3 2024 at 0900    Cholecalciferol (VITAMIN D3) 3000 units Oral Tab Take 3,000 Units by mouth daily.     2024 at 0900    [] ciprofloxacin 500 MG Oral Tab Take 1  tablet (500 mg total) by mouth 2 (two) times daily for 7 days. 14 tablet 0     [] metRONIDAZOLE 500 MG Oral Tab Take 1 tablet (500 mg total) by mouth 3 (three) times daily for 7 days. 21 tablet 0     SPIRONOLACTONE 25 MG Oral Tab TAKE ONE TABLET BY MOUTH ONE TIME DAILY 90 tablet 3 2024 at 0900    Na Sulfate-K Sulfate-Mg Sulf (SUPREP BOWEL PREP KIT) 17.5-3.13-1.6 GM/177ML Oral Solution Take as directed 1 each 0     Multiple Vitamins-Minerals (PRESERVISION AREDS) Oral Tab Take by mouth.   9/3/2024     Current Facility-Administered Medications   Medication Dose Route Frequency    lactated ringers infusion   Intravenous Continuous       Allergies: No Known Allergies    Past Medical History:    Anxiety state    Arthritis    Back problem    Hx herniated discs    Calculus of kidney    Deaf    Left ear    Diverticulosis    Hearing loss    High blood pressure    High cholesterol    History of shingles        Meniere disease    Morbid obesity with BMI of 40.0-44.9, adult (HCC)    Osteoarthritis    Pneumonia due to organism    as an infant    SCC (squamous cell carcinoma)    right jawline    Tinnitus    Unspecified essential hypertension    Visual impairment    reading glasses     Past Surgical History:   Procedure Laterality Date    Cataract extraction w/  intraocular lens implant Right 11    [facility]    Cholecystectomy      Colonoscopy N/A 10/1/2019    Procedure: COLONOSCOPY;  Surgeon: John Puri MD;  Location: [facility] ENDOSCOPY    Knee surgery      Oophorectomy Bilateral     per [employer]:  \"laparoscopic bilateral oophorectomy\"    Other surgical history      Ear surgery    Spine surgery procedure unlisted      for right sciatica issue    Total hip replacement Right     Total knee replacement Right     Tubal ligation      Yag capsulotomy - od - right eye Right 14    [facility]     Family History   Problem Relation Age of Onset    Cancer Self     Diabetes Mother     Hypertension Mother     Colon Cancer  Father     Other (hx of SC ( unknown type)) Father     Diabetes Paternal Grandmother     Glaucoma Neg         No Family h/o Glaucoma    Macular degeneration Neg         No Family h/o Macular degeneration    Breast Cancer Neg     Ovarian Cancer Neg      Social History     Tobacco Use    Smoking status: Former     Current packs/day: 0.00     Types: Cigarettes     Start date: 1966     Quit date: 2002     Years since quittin.7    Smokeless tobacco: Never   Substance Use Topics    Alcohol use: Yes     Alcohol/week: 0.0 standard drinks of alcohol     Comment: social       SYSTEM Check if Review is Normal Check if Physical Exam is Normal If not normal, please explain:   HEENT [ ] [X ]    NECK & BACK [ ] [ ]    HEART [ X ] [ X ]    LUNGS [ X ] [ X ]    ABDOMEN [ X ] [ X ]    UROGENITAL [ ] [ ]    EXTREMITIES [ ] [ ]    OTHER        See Anesthesia documentation    [ x ] I have discussed the risks and benefits and alternatives with the patient/family.  They understand and agree to proceed with plan of care.  [ x ] I have reviewed the History and Physical done within the last 30 days.  Any changes noted above.    John Puri MD  9/10/2024  9:11 AM

## 2024-09-16 ENCOUNTER — OFFICE VISIT (OUTPATIENT)
Dept: FAMILY MEDICINE CLINIC | Facility: CLINIC | Age: 79
End: 2024-09-16

## 2024-09-16 ENCOUNTER — TELEPHONE (OUTPATIENT)
Dept: NEUROLOGY | Facility: CLINIC | Age: 79
End: 2024-09-16

## 2024-09-16 VITALS
DIASTOLIC BLOOD PRESSURE: 70 MMHG | HEIGHT: 64 IN | BODY MASS INDEX: 40.12 KG/M2 | HEART RATE: 73 BPM | WEIGHT: 235 LBS | TEMPERATURE: 97 F | SYSTOLIC BLOOD PRESSURE: 140 MMHG

## 2024-09-16 DIAGNOSIS — M54.50 CHRONIC RIGHT-SIDED LOW BACK PAIN WITHOUT SCIATICA: ICD-10-CM

## 2024-09-16 DIAGNOSIS — G89.29 CHRONIC RIGHT-SIDED LOW BACK PAIN WITHOUT SCIATICA: ICD-10-CM

## 2024-09-16 DIAGNOSIS — E66.01 MORBID OBESITY WITH BMI OF 40.0-44.9, ADULT (HCC): ICD-10-CM

## 2024-09-16 DIAGNOSIS — F50.81 BINGE EATING DISORDER: ICD-10-CM

## 2024-09-16 DIAGNOSIS — N28.9 RENAL INSUFFICIENCY: Primary | ICD-10-CM

## 2024-09-16 DIAGNOSIS — I10 ESSENTIAL HYPERTENSION: ICD-10-CM

## 2024-09-16 PROCEDURE — 99214 OFFICE O/P EST MOD 30 MIN: CPT | Performed by: FAMILY MEDICINE

## 2024-09-16 PROCEDURE — G2211 COMPLEX E/M VISIT ADD ON: HCPCS | Performed by: FAMILY MEDICINE

## 2024-09-16 RX ORDER — PHENTERMINE HYDROCHLORIDE 30 MG/1
30 CAPSULE ORAL EVERY MORNING
Qty: 90 CAPSULE | Refills: 1 | Status: SHIPPED | OUTPATIENT
Start: 2024-09-16

## 2024-09-16 NOTE — TELEPHONE ENCOUNTER
Pt came into the [facility] office asking to speak with clinical team about getting another injection. Please advise

## 2024-09-16 NOTE — PROGRESS NOTES
Patient ID: Mari Gaspar is a 78 year old female.    HPI  Chief Complaint   Patient presents with    Medication Follow-Up     Phentermine      She states early on the phentermine 15 mg was curbing her appetite but it is not helping as much now.  She has lost 5 pounds since starting on the medication but she was hoping to go to the next dose.  She is not having any chest pain or shortness of breath or palpitations due to the medicine..    She states that the epidural shots did not help for her back pain.  She continues to have it.  She is not having the sciatica though.  She we will see Dr. Behar again to discuss other options.  She is currently doing rehab.  She does yoga on her own as well.  She just had a colonoscopy and her blood pressure was much better than it was here.      Wt Readings from Last 6 Encounters:   09/16/24 235 lb (106.6 kg)   09/10/24 239 lb (108.4 kg)   08/23/24 240 lb (108.9 kg)   08/22/24 240 lb (108.9 kg)   07/13/24 240 lb 9.6 oz (109.1 kg)   05/23/24 234 lb 9.6 oz (106.4 kg)       BMI Readings from Last 6 Encounters:   09/16/24 40.34 kg/m²   09/10/24 41.02 kg/m²   08/23/24 39.94 kg/m²   08/22/24 39.94 kg/m²   07/13/24 40.04 kg/m²   05/23/24 39.04 kg/m²       BP Readings from Last 6 Encounters:   09/16/24 147/72   09/10/24 106/77   08/28/24 138/58   07/24/24 159/75   07/13/24 146/72   05/23/24 136/71         Review of Systems  See HPI      Medical History:      Past Medical History:    Anxiety state    Arthritis    Back problem    Hx herniated discs    Calculus of kidney    Deaf    Left ear    Diverticulosis    Hearing loss    High blood pressure    High cholesterol    History of shingles    1980's    Meniere disease    Morbid obesity with BMI of 40.0-44.9, adult (HCC)    Osteoarthritis    Pneumonia due to organism    as an infant    SCC (squamous cell carcinoma)    right jawline    Tinnitus    Unspecified essential hypertension    Visual impairment    reading glasses       Past  Surgical History:   Procedure Laterality Date    Cataract extraction w/  intraocular lens implant Right 9/12/11    RJM    Cholecystectomy      Colonoscopy N/A 10/1/2019    Procedure: COLONOSCOPY;  Surgeon: John Puri MD;  Location: [facility] ENDOSCOPY    Colonoscopy N/A 9/10/2024    Procedure: COLONOSCOPY;  Surgeon: John Puri MD;  Location: [facility] ENDOSCOPY    Knee surgery      Oophorectomy Bilateral     per [employer]:  \"laparoscopic bilateral oophorectomy\"    Other surgical history      Ear surgery    Spine surgery procedure unlisted      for right sciatica issue    Total hip replacement Right     Total knee replacement Right     Tubal ligation      Yag capsulotomy - od - right eye Right 2/12/14    RJM          Current Outpatient Medications   Medication Sig Dispense Refill    montelukast 10 MG Oral Tab Take 1 tablet (10 mg total) by mouth daily. 90 tablet 3    Meloxicam 15 MG Oral Tab Take 1 tablet (15 mg total) by mouth daily. 90 tablet 1    HYDROcodone-acetaminophen 5-325 MG Oral Tab Take 1 tablet by mouth every 4 (four) hours as needed. 30 tablet 0    albuterol 108 (90 Base) MCG/ACT Inhalation Aero Soln Inhale 2 puffs into the lungs every 6 (six) hours as needed for Wheezing or Shortness of Breath. 1 each 1    gabapentin 600 MG Oral Tab Take 1 tablet (600 mg total) by mouth 2 (two) times daily. 180 tablet 3    fluticasone propionate 50 MCG/ACT Nasal Suspension 2 sprays by Nasal route daily. 3 each 2    aspirin 325 MG Oral Tab Take 1 tablet (325 mg total) by mouth daily. 90 tablet 0    Phentermine HCl 15 MG Oral Cap Take 1 capsule (15 mg total) by mouth every morning. For weight loss 90 capsule 0    SPIRONOLACTONE 25 MG Oral Tab TAKE ONE TABLET BY MOUTH ONE TIME DAILY 90 tablet 3    sertraline 100 MG Oral Tab TAKE ONE TABLET BY MOUTH ONE TIME DAILY 90 tablet 3    Na Sulfate-K Sulfate-Mg Sulf (SUPREP BOWEL PREP KIT) 17.5-3.13-1.6 GM/177ML Oral Solution Take as directed 1 each 0    rosuvastatin  20 MG Oral Tab Take 1 tablet (20 mg total) by mouth once daily. 90 tablet 3    Telmisartan-HCTZ 80-25 MG Oral Tab Take 1 tablet by mouth daily. 90 tablet 3    Cholecalciferol (VITAMIN D3) 3000 units Oral Tab Take 3,000 Units by mouth daily.        Multiple Vitamins-Minerals (PRESERVISION AREDS) Oral Tab Take by mouth.       Allergies:No Known Allergies     Physical Exam:       Physical Exam  Blood pressure 147/72, pulse 73, temperature 97.1 °F (36.2 °C), temperature source Temporal, height 5' 4\" (1.626 m), weight 235 lb (106.6 kg), not currently breastfeeding.  Physical Exam   Constitutional: Patient is oriented to person, place, and time. Patient appears well-developed and well-nourished. No distress.   HENT:   Head: Normocephalic and atraumatic.   Neck: Normal range of motion. Neck supple. No thyromegaly present.   Lymphadenopathy:     Has  no cervical adenopathy.   Cardiovascular: Normal rate, regular rhythm and no murmur heard.  Pulmonary/Chest: Effort normal and breath sounds normal. No respiratory distress.   Neurological: Patient is alert and oriented to person, place, and time.   Skin: Skin is warm and dry.   Psychiatric: Patient has a normal mood and affect.   Nursing note and vitals reviewed.  Vitals:    09/16/24 0900 09/16/24 0912   BP: 147/72 140/70   Pulse: 73    Temp: 97.1 °F (36.2 °C)    TempSrc: Temporal    Weight: 235 lb (106.6 kg)    Height: 5' 4\" (1.626 m)               Assessment/Plan:      Diagnoses and all orders for this visit:    Renal insufficiency  -     NEPHROLOGY - INTERNAL  I looked through some of the past labs and see that she does have some renal insufficiency.  I would like her to see nephrology about this.  She understands that many times this can be due to chronic hypertension.  Morbid obesity with BMI of 40.0-44.9, adult (HCC)  -     Phentermine HCl 30 MG Oral Cap; Take 1 capsule (30 mg total) by mouth every morning. For weight loss  She tolerated the 15 mg dose and we will bump  it up to 30 mg.  Binge eating disorder  -     Phentermine HCl 30 MG Oral Cap; Take 1 capsule (30 mg total) by mouth every morning. For weight loss    Chronic right-sided low back pain without sciatica  No more sciatica but continues to have the right low back pain.  She will see Dr. Behar  Essential hypertension  -     NEPHROLOGY - INTERNAL  Continue blood pressure medications as is.  Because she recently had a normal blood pressure I do not want to adjust her medications.  I know nephrology will be evaluating her soon anyway.  She agrees.      Referrals (if applicable)  Orders Placed This Encounter   Procedures    NEPHROLOGY - INTERNAL     Referral Priority:   Routine     Referral Type:   OFFICE VISIT     Referred to Provider:   Tony Askew MD     Requested Specialty:   NEPHROLOGY     Number of Visits Requested:   3         Follow up if symptoms persist.  Take medicine (if given) as prescribed.  Approach to treatment discussed and patient/family member understands and agrees to plan.     Return in about 2 months (around 11/16/2024) for Weight followup.        Anant Mckeon,   9/16/2024

## 2024-09-18 ENCOUNTER — MED REC SCAN ONLY (OUTPATIENT)
Dept: PHYSICAL MEDICINE AND REHAB | Facility: CLINIC | Age: 79
End: 2024-09-18

## 2024-09-20 NOTE — TELEPHONE ENCOUNTER
Condition update after Procedure.    - Patient had Bilateral L4-5 and L5-S1 medial branch blocks on 08/28/2024 with Dr. Behar.  - 0% relief; verbally confirmed with patient several times this was within the first 12 hours after the procedure, pt reiterated that no relief was felt.  - It should be noted that in TE 09/04/2024 RN documented: \"Patient had Bilateral L4-5 and L5-S1 medial branch blocks  on 8/28/24 40 % relief only the first 24 hours, then pain came back the same the next day.\"        - Current pain level:  8-10    - Pain location: Center lower back, slightly radiates to R/L lower back.  - Pain description:  see quotations ; \"It's a pain like you can't walk no more, it just takes your breath away.\"  - Denies N/T. Denies weaknesses.  - Relieving factors: sitting, laying  - Aggravating factors: walking, standing  - Current pain treatment:  Hydrocodone-Acetaminophen only for severe PRN, provides no relief  - Current prescription pain medications/dosing: Pt reports she is compliant with outpatient rehab    - LOV:  08/22/2024 (08/28/2024 [facility]) with Dr. Behar  - NOV: Visit date not found   - Plan from LOV: Per Dr. Behar --   1) My office will call you to schedule the BILATERAL L4-L5 and L5-S1 MBB under local anesthesia once the procedure is approved by your insurance carrier.    2) Please begin physical therapy as soon as possible.   3) Please get X-rays of the Lumbar spine today on your way out.   4) Tylenol 500-1000 mg every 6-8 hours as needed for pain.  No more than 3000 mg daily.  5) Continue Meloxicam 15 mg daily  6) Limit Norco for breakthrough pain only  7) We will touch base after the medial branch blocks. If the pain subsides, then we will either perform the ablation to those nerves or we can consider the facet joint injections

## 2024-09-23 ENCOUNTER — OFFICE VISIT (OUTPATIENT)
Dept: NEPHROLOGY | Facility: CLINIC | Age: 79
End: 2024-09-23
Payer: MEDICARE

## 2024-09-23 ENCOUNTER — TELEPHONE (OUTPATIENT)
Facility: CLINIC | Age: 79
End: 2024-09-23

## 2024-09-23 VITALS
SYSTOLIC BLOOD PRESSURE: 147 MMHG | BODY MASS INDEX: 40.46 KG/M2 | DIASTOLIC BLOOD PRESSURE: 71 MMHG | HEART RATE: 71 BPM | HEIGHT: 64 IN | WEIGHT: 237 LBS

## 2024-09-23 DIAGNOSIS — N18.31 STAGE 3A CHRONIC KIDNEY DISEASE (HCC): Primary | ICD-10-CM

## 2024-09-23 DIAGNOSIS — E78.2 MIXED HYPERLIPIDEMIA: ICD-10-CM

## 2024-09-23 DIAGNOSIS — N20.0 NEPHROLITHIASIS: ICD-10-CM

## 2024-09-23 DIAGNOSIS — I10 PRIMARY HYPERTENSION: ICD-10-CM

## 2024-09-23 PROCEDURE — 99204 OFFICE O/P NEW MOD 45 MIN: CPT | Performed by: INTERNAL MEDICINE

## 2024-09-23 NOTE — TELEPHONE ENCOUNTER
Recall colonoscopy in 7 years per Dr Puri.    Colon done 9/10/2024    Health maintenance updated and message sent to patient outreach to repeat colonoscopy in 7 years    Results seen by patient via mychart  Seen by patient Mari Gaspar on 9/23/2024  8:04 AM

## 2024-09-23 NOTE — TELEPHONE ENCOUNTER
----- Message from John Puri sent at 9/22/2024 10:56 AM CDT -----  GI RNs - please recall for colonoscopy exam in 7yrs

## 2024-09-23 NOTE — TELEPHONE ENCOUNTER
Per Dr. Behar - need video visit with patient to discuss further. Patient has been scheduled appropriately.

## 2024-09-27 ENCOUNTER — OFFICE VISIT (OUTPATIENT)
Dept: DERMATOLOGY CLINIC | Facility: CLINIC | Age: 79
End: 2024-09-27

## 2024-09-27 ENCOUNTER — TELEPHONE (OUTPATIENT)
Dept: PHYSICAL MEDICINE AND REHAB | Facility: CLINIC | Age: 79
End: 2024-09-27

## 2024-09-27 ENCOUNTER — TELEMEDICINE (OUTPATIENT)
Dept: PHYSICAL MEDICINE AND REHAB | Facility: CLINIC | Age: 79
End: 2024-09-27
Payer: MEDICARE

## 2024-09-27 DIAGNOSIS — M51.36 BULGE OF LUMBAR DISC WITHOUT MYELOPATHY: ICD-10-CM

## 2024-09-27 DIAGNOSIS — M79.10 MYALGIA: ICD-10-CM

## 2024-09-27 DIAGNOSIS — D22.9 MULTIPLE NEVI: ICD-10-CM

## 2024-09-27 DIAGNOSIS — Z08 ENCOUNTER FOR FOLLOW-UP SURVEILLANCE OF SKIN CANCER: ICD-10-CM

## 2024-09-27 DIAGNOSIS — L81.4 LENTIGO: ICD-10-CM

## 2024-09-27 DIAGNOSIS — Z85.828 ENCOUNTER FOR FOLLOW-UP SURVEILLANCE OF SKIN CANCER: ICD-10-CM

## 2024-09-27 DIAGNOSIS — M47.816 FACET SYNDROME, LUMBAR: Primary | ICD-10-CM

## 2024-09-27 DIAGNOSIS — M48.062 SPINAL STENOSIS OF LUMBAR REGION WITH NEUROGENIC CLAUDICATION: ICD-10-CM

## 2024-09-27 DIAGNOSIS — M47.816 LUMBAR SPONDYLOSIS: ICD-10-CM

## 2024-09-27 DIAGNOSIS — D23.9 BENIGN NEOPLASM OF SKIN, UNSPECIFIED LOCATION: ICD-10-CM

## 2024-09-27 DIAGNOSIS — M48.061 LUMBAR FORAMINAL STENOSIS: ICD-10-CM

## 2024-09-27 DIAGNOSIS — L82.1 SK (SEBORRHEIC KERATOSIS): Primary | ICD-10-CM

## 2024-09-27 DIAGNOSIS — M54.59 MECHANICAL LOW BACK PAIN: ICD-10-CM

## 2024-09-27 DIAGNOSIS — M51.379 DDD (DEGENERATIVE DISC DISEASE), LUMBOSACRAL: ICD-10-CM

## 2024-09-27 PROCEDURE — 99203 OFFICE O/P NEW LOW 30 MIN: CPT | Performed by: DERMATOLOGY

## 2024-09-27 PROCEDURE — 99214 OFFICE O/P EST MOD 30 MIN: CPT | Performed by: PHYSICAL MEDICINE & REHABILITATION

## 2024-09-27 NOTE — PROGRESS NOTES
[facility]  Video Visit Progress Note  CHIEF COMPLAINT:    Chief Complaint   Patient presents with    Low Back Pain    Injection    Imaging       History of Present Illness:  The patient is a 78 year old RIGHT handed female with significant past medical history of HTN and HLD who presents with bilateral low back pain without radicular symptoms.  She is status post bilateral L4-L5 and L5-S1 medial branch blocks on 8/28/2024.  She improved by 100% for the first 12 hours but her pain returned.  Her pain is aggravated by standing and walking and improved by sitting.  She does have a history of a lumbar laminectomy about 15 years ago and tried physical therapy about 15 years ago.  Currently, she has been in physical therapy at [facility] and has noticed mild improvement.    PAST MEDICAL HISTORY:  Past Medical History:    Anxiety state    Arthritis    Back problem    Hx herniated discs    Calculus of kidney    Deaf    Left ear    Diverticulosis    Hearing loss    High blood pressure    High cholesterol    History of shingles    1980's    Meniere disease    Morbid obesity with BMI of 40.0-44.9, adult (HCC)    Osteoarthritis    Pneumonia due to organism    as an infant    SCC (squamous cell carcinoma)    right jawline    Tinnitus    Unspecified essential hypertension    Visual impairment    reading glasses       SURGICAL HISTORY:  Past Surgical History:   Procedure Laterality Date    Cataract extraction w/  intraocular lens implant Right 9/12/11    RJM    Cholecystectomy      Colonoscopy N/A 10/1/2019    Procedure: COLONOSCOPY;  Surgeon: John Puri MD;  Location: [facility] ENDOSCOPY    Colonoscopy N/A 9/10/2024    Procedure: COLONOSCOPY;  Surgeon: John Puri MD;  Location: [facility] ENDOSCOPY    Knee surgery      Oophorectomy Bilateral     per [employer]:  \"laparoscopic bilateral oophorectomy\"    Other surgical history      Ear surgery    Spine surgery procedure  unlisted      for right sciatica issue    Total hip replacement Right     Total knee replacement Right     Tubal ligation      Yag capsulotomy - od - right eye Right 14    RJM       SOCIAL HISTORY:   Social History     Occupational History    Not on file   Tobacco Use    Smoking status: Former     Current packs/day: 0.00     Types: Cigarettes     Start date: 1966     Quit date: 2002     Years since quittin.7    Smokeless tobacco: Never   Vaping Use    Vaping status: Never Used   Substance and Sexual Activity    Alcohol use: Yes     Alcohol/week: 0.0 standard drinks of alcohol     Comment: social    Drug use: No    Sexual activity: Not on file       FAMILY HISTORY:   Family History   Problem Relation Age of Onset    Cancer Self     Diabetes Mother     Hypertension Mother     Colon Cancer Father     Other (hx of SC ( unknown type)) Father     Diabetes Paternal Grandmother     Glaucoma Neg         No Family h/o Glaucoma    Macular degeneration Neg         No Family h/o Macular degeneration    Breast Cancer Neg     Ovarian Cancer Neg        CURRENT MEDICATIONS:   Current Outpatient Medications   Medication Sig Dispense Refill    Phentermine HCl 30 MG Oral Cap Take 1 capsule (30 mg total) by mouth every morning. For weight loss 90 capsule 1    montelukast 10 MG Oral Tab Take 1 tablet (10 mg total) by mouth daily. 90 tablet 3    HYDROcodone-acetaminophen 5-325 MG Oral Tab Take 1 tablet by mouth every 4 (four) hours as needed. 30 tablet 0    gabapentin 600 MG Oral Tab Take 1 tablet (600 mg total) by mouth 2 (two) times daily. 180 tablet 3    fluticasone propionate 50 MCG/ACT Nasal Suspension 2 sprays by Nasal route daily. 3 each 2    aspirin 325 MG Oral Tab Take 1 tablet (325 mg total) by mouth daily. 90 tablet 0    SPIRONOLACTONE 25 MG Oral Tab TAKE ONE TABLET BY MOUTH ONE TIME DAILY 90 tablet 3    sertraline 100 MG Oral Tab TAKE ONE TABLET BY MOUTH ONE TIME DAILY 90 tablet 3    rosuvastatin 20 MG Oral  Tab Take 1 tablet (20 mg total) by mouth once daily. 90 tablet 3    Telmisartan-HCTZ 80-25 MG Oral Tab Take 1 tablet by mouth daily. 90 tablet 3    Cholecalciferol (VITAMIN D3) 3000 units Oral Tab Take 3,000 Units by mouth daily.        Multiple Vitamins-Minerals (PRESERVISION AREDS) Oral Tab Take by mouth.         ALLERGIES:   No Known Allergies    REVIEW OF SYSTEMS:   Review of Systems   Constitutional: Negative.    HENT: Negative.    Eyes: Negative.    Respiratory: Negative.    Cardiovascular: Negative.    Gastrointestinal: Negative.    Genitourinary: Negative.    Musculoskeletal: As per HPI  Skin: Negative.    Neurological: As per HPI  Endo/Heme/Allergies: Negative.    Psychiatric/Behavioral: Negative.      All other systems reviewed and are negative. Pertinent positives and negatives noted in the HPI.        PHYSICAL EXAM:     There is no height or weight on file to calculate BMI.    General: No immediate distress  Head: Normocephalic/ Atraumatic  Eyes: Extra-occular movements intact  Ears/Nose/Throat:  External appearance identifies normal appearance without obvious deformity  Cardiovascular: No cyanosis, clubbing or edema  Respiratory: Non-labored respirations  Skin: No lesions noted   Neurological: alert & oriented x 3, attentive, able to follow commands, comprehention intact, spontaneous speech intact  Psychiatric: Mood and affect appropriate        Data  Admission on 09/10/2024, Discharged on 09/10/2024   Component Date Value Ref Range Status    Case Report 09/10/2024    Final                    Value:Surgical Pathology                                Case: [id number]                                  Authorizing Provider:  John Puri,  Collected:           09/10/2024 09:47 AM                                 MD                                                                           Ordering Location:     [facility]          Received:            09/10/2024 11:35 AM                                  [facility]                                                         Pathologist:           Yaakov Laboy MD                                                             Specimens:   A) - Colon biopsy, random colon biopsies                                                            B) - Colon ascending, polyp x1                                                                      C) - Sigmoid colon, polyp x1                                                               Final Diagnosis: 09/10/2024    Final                    Value:This result contains rich text formatting which cannot be displayed here.    Clinical Information 09/10/2024    Final                    Value:This result contains rich text formatting which cannot be displayed here.    Gross Description 09/10/2024    Final                    Value:This result contains rich text formatting which cannot be displayed here.    Interpretation 09/10/2024 Benign    Final   Admission on 07/24/2024, Discharged on 07/24/2024   Component Date Value Ref Range Status    WBC IC 07/24/2024 9.8  4.0 - 11.0 x10ˆ3/uL Final    RBC IC 07/24/2024 3.60 (L)  3.80 - 5.30 X10ˆ6/uL Final    HGB IC 07/24/2024 11.4 (L)  12.0 - 16.0 g/dL Final    HCT IC 07/24/2024 34.2 (L)  35.0 - 48.0 % Final    MCV IC 07/24/2024 95.0  80.0 - 100.0 fL Final    MCH IC 07/24/2024 31.7  26.0 - 34.0 pg Final    MCHC IC 07/24/2024 33.3  31.0 - 37.0 g/dL Final    PLT IC 07/24/2024 250.0  150.0 - 450.0 X10ˆ3/uL Final    # Neutrophil 07/24/2024 7.1  1.5 - 7.7 X10ˆ3/uL Final    # Lymphocyte 07/24/2024 1.9  1.0 - 4.0 X10ˆ3/uL Final    # Mixed Cells 07/24/2024 0.8  0.1 - 1.0 X10ˆ3/uL Final    Neutrophil % 07/24/2024 72.0  % Final    Lymphocyte % 07/24/2024 19.8  % Final    Mixed Cell % 07/24/2024 8.2  % Final    Urine Color 07/24/2024 Yellow  Yellow Final    Urine Clarity 07/24/2024 Cloudy (A)  Clear Final    Specific Gravity, Urine 07/24/2024 1.015  1.005 - 1.030 Final    PH, Urine 07/24/2024  6.5  5.0 - 8.0 Final    Protein urine 07/24/2024 Negative  Negative mg/dL Final    Glucose, Urine 07/24/2024 Negative  Negative mg/dL Final    Ketone, Urine 07/24/2024 Negative  Negative mg/dL Final    Bilirubin, Urine 07/24/2024 Negative  Negative Final    Blood, Urine 07/24/2024 Negative  Negative Final    Nitrite Urine 07/24/2024 Negative  Negative Final    Urobilinogen urine 07/24/2024 <2.0  <2.0 mg/dL Final    Leukocyte esterase urine 07/24/2024 Negative  Negative Final    ISTAT Sodium 07/24/2024 139  136 - 145 mmol/L Final    ISTAT BUN 07/24/2024 28 (H)  7 - 18 mg/dL Final    ISTAT Potassium 07/24/2024 4.8  3.6 - 5.1 mmol/L Final    ISTAT Chloride 07/24/2024 103  98 - 112 mmol/L Final    ISTAT Ionized Calcium 07/24/2024 1.26  1.12 - 1.32 mmol/L Final    ISTAT Hematocrit 07/24/2024 36  34 - 50 % Final    ISTAT Glucose 07/24/2024 105 (H)  70 - 99 mg/dL Final    ISTAT TCO2 07/24/2024 30  21 - 32 mmol/L Final    ISTAT Creatinine 07/24/2024 1.20 (H)  0.55 - 1.02 mg/dL Final    eGFR-Cr 07/24/2024 47 (L)  >=60 mL/min/1.73m2 Final   [facility] Lab Encounter on 07/13/2024   Component Date Value Ref Range Status    Glucose 07/13/2024 105 (H)  70 - 99 mg/dL Final    Sodium 07/13/2024 143  136 - 145 mmol/L Final    Potassium 07/13/2024 4.9  3.5 - 5.1 mmol/L Final    Chloride 07/13/2024 108  98 - 112 mmol/L Final    CO2 07/13/2024 29.0  21.0 - 32.0 mmol/L Final    Anion Gap 07/13/2024 6  0 - 18 mmol/L Final    BUN 07/13/2024 27 (H)  9 - 23 mg/dL Final    Creatinine 07/13/2024 1.17 (H)  0.55 - 1.02 mg/dL Final    BUN/CREA Ratio 07/13/2024 23.1 (H)  10.0 - 20.0 Final    Calcium, Total 07/13/2024 9.8  8.7 - 10.4 mg/dL Final    Calculated Osmolality 07/13/2024 301 (H)  275 - 295 mOsm/kg Final    eGFR-Cr 07/13/2024 48 (L)  >=60 mL/min/1.73m2 Final    ALT 07/13/2024 17  10 - 49 U/L Final    AST 07/13/2024 24  <=34 U/L Final    Alkaline Phosphatase 07/13/2024 88  55 - 142 U/L Final    Bilirubin, Total 07/13/2024 0.8  0.2 - 1.1 mg/dL  Final    Total Protein 07/13/2024 7.7  5.7 - 8.2 g/dL Final    Albumin 07/13/2024 4.8  3.2 - 4.8 g/dL Final    Globulin  07/13/2024 2.9  2.0 - 3.5 g/dL Final    A/G Ratio 07/13/2024 1.7  1.0 - 2.0 Final    Patient Fasting for CMP? 07/13/2024 Yes   Final    Cholesterol, Total 07/13/2024 160  <200 mg/dL Final    HDL Cholesterol 07/13/2024 56  40 - 59 mg/dL Final    Triglycerides 07/13/2024 61  30 - 149 mg/dL Final    LDL Cholesterol 07/13/2024 92  <100 mg/dL Final    VLDL 07/13/2024 10  0 - 30 mg/dL Final    Non HDL Chol 07/13/2024 104  <130 mg/dL Final    Patient Fasting for Lipid? 07/13/2024 Yes   Final    TSH 07/13/2024 2.505  0.550 - 4.780 mIU/mL Final    WBC 07/13/2024 7.4  4.0 - 11.0 x10(3) uL Final    RBC 07/13/2024 3.42 (L)  3.80 - 5.30 x10(6)uL Final    HGB 07/13/2024 11.3 (L)  12.0 - 16.0 g/dL Final    HCT 07/13/2024 33.9 (L)  35.0 - 48.0 % Final    MCV 07/13/2024 99.1  80.0 - 100.0 fL Final    MCH 07/13/2024 33.0  26.0 - 34.0 pg Final    MCHC 07/13/2024 33.3  31.0 - 37.0 g/dL Final    RDW-SD 07/13/2024 48.2 (H)  35.1 - 46.3 fL Final    RDW 07/13/2024 13.2  11.0 - 15.0 % Final    PLT 07/13/2024 237.0  150.0 - 450.0 10(3)uL Final    Neutrophil Absolute Prelim 07/13/2024 5.12  1.50 - 7.70 x10 (3) uL Final    Neutrophil Absolute 07/13/2024 5.12  1.50 - 7.70 x10(3) uL Final    Lymphocyte Absolute 07/13/2024 1.26  1.00 - 4.00 x10(3) uL Final    Monocyte Absolute 07/13/2024 0.62  0.10 - 1.00 x10(3) uL Final    Eosinophil Absolute 07/13/2024 0.25  0.00 - 0.70 x10(3) uL Final    Basophil Absolute 07/13/2024 0.10  0.00 - 0.20 x10(3) uL Final    Immature Granulocyte Absolute 07/13/2024 0.03  0.00 - 1.00 x10(3) uL Final    Neutrophil % 07/13/2024 69.3  % Final    Lymphocyte % 07/13/2024 17.1  % Final    Monocyte % 07/13/2024 8.4  % Final    Eosinophil % 07/13/2024 3.4  % Final    Basophil % 07/13/2024 1.4  % Final    Immature Granulocyte % 07/13/2024 0.4  % Final    Vitamin D, 25OH, Total 07/13/2024 50.9  30.0 - 100.0  ng/mL Final    Ventricular rate 07/13/2024 57  BPM Final    Atrial rate 07/13/2024 57  BPM Final    P-R Interval 07/13/2024 168  ms Final    QRS Duration 07/13/2024 76  ms Final    Q-T Interval 07/13/2024 410  ms Final    QTC Calculation (Bezet) 07/13/2024 399  ms Final    P Axis 07/13/2024 37  degrees Final    R Axis 07/13/2024 9  degrees Final    T Axis 07/13/2024 43  degrees Final   ]      Radiology Imaging:  I reviewed with the patient her X-ray and MRI of the lumbar spine   XR LUMBAR SPINE AP LAT FLEX EXT (CPT=72110)  Narrative: PROCEDURE: XR LUMBAR SPINE AP LAT FLEX EXT EM (CPT=72120)     COMPARISON: MRI SPINE LUMBAR (CPT=72148), 8/28/2017, 2:49 PM.  [facility], CT ABDOMEN + PELVIS (CONTRAST ONLY) (CPT=74177), 7/24/2024, 12:41 PM.  [facility], XR LUMBAR SPINE (MIN 2 VIEWS) (CPT=72100), 2/10/2023,   2:34 PM.     INDICATIONS: Chronic lower back pain.     TECHNIQUE: Lumbar spine radiographs, 4 views (AP, lateral, flexion, and extension views)       FINDINGS:       ALIGNMENT:   There is stable moderate dextroscoliosis to the lumbar spine.  The lumbar lordosis is preserved.  The lumbar spine is otherwise well aligned.  VERTEBRAL BODIES:   There are 5 lumbar type vertebrae.  Vertebral body heights are maintained.  DISC SPACES: There is stable moderate disc space narrowing at L1-L2 through L4-L5.  There is stable severe disc space narrowing at L5-S1.  Endplate spurring is noted throughout the lumbar spine.  SACROILIAC JOINTS: Stable mild osteoarthritis bilaterally.  FLEXION/EXTENSION VIEWS:   Normal range of motion.  No subluxation during flexion and extension.    OTHER:   Atherosclerotic calcifications are seen throughout the abdominal aorta.  Surgical clips in the right upper quadrant are consistent with a previous cholecystectomy.  There is a partially imaged right total hip arthroplasty.              Impression: CONCLUSION:   Stable moderate dextroscoliosis and advanced  multilevel spondylosis.           Dictated by (CST): Julio Sutton MD on 8/23/2024 at 9:09 AM       Finalized by (CST): Julio Sutton MD on 8/23/2024 at 9:13 AM            PROCEDURE: MRI SPINE LUMBAR (CPT=72148)     COMPARISON: [facility], SUNNY SCREENING BILAT (CPT=77067), 11/16/2017, 2:04 [facility], CT ABDOMEN + PELVIS KIDNEYSTONE 2D RNDR (NO IV NO ORAL) (CPT=741, 11/04/2020, 1:33 PM.  [facility],  MRI SPINE LUMBAR (CPT=72148), 8/28/2017, 2:49 PM.     INDICATIONS: M54.16 Lumbar radiculopathy     TECHNIQUE: A variety of imaging planes and parameters were utilized for visualization of suspected pathology.     Counting Reference: Lumbosacral junction.     For the purposes of this exam, it will be assumed that there are 5 lumbar-type vertebral bodies. L4-L5 is at the level of the iliac crest.     FINDINGS:  PARASPINAL AREA: Normal with no visible mass.    BONES: There are hypertrophic changes of the lower lumbar facet joints. Small anterior endplate osteophytes seen within the lumbar vertebral bodies.  There is a sacral insufficiency fracture seen involving left sacral ala with extensive marrow edema seen   on the STIR sequences.  To a lesser extent, there is mild marrow edema seen within the right sacral ala without a definite fracture line.  1.1 cm hypointense lesion is seen within the right iliac bone which appears unchanged since 8/28/2017 and is  suggestive of red marrow hyperplasia.  Osseous hemangiomas are seen within the T10 and L1 vertebral bodies.  CORD/CAUDA EQUINA: Normal caliber, contour, and signal intensity.    OTHER: There is S-shaped scoliosis of the thoracolumbar spine..     LUMBAR DISC LEVELS:  L1-L2: Disk desiccation with bulging disk, facet, and ligamentous hypertrophy results in mild central canal narrowing and mild bilateral neural foraminal narrowing.  L2-L3: Disk desiccation with bulging disk, facet, and ligamentous  hypertrophy results in mild central canal narrowing and mild bilateral neural foraminal narrowing.  L3-L4: Disk desiccation with bulging disk, facet, and ligamentous hypertrophy results in moderate central canal narrowing and moderate left and severe right neural foraminal narrowing.  L4-L5: Disk desiccation with bulging disk, facet, and ligamentous hypertrophy results in mild central canal narrowing and mild left and moderate right neural foraminal narrowing.  L5-S1: Disk desiccation with bulging disk, facet, and ligamentous hypertrophy results in mild central canal narrowing and mild bilateral neural foraminal narrowing.               Impression   CONCLUSION:     Sacral insufficiency fracture of the left sacral ala, new since the prior exams..     Stress changes within the right sacral ala without fracture at this point.  This is also new.     Degenerative disc and facet disease throughout the lumbar spine, most prominent at L3-L4, where there is moderate narrowing of the canal and severe narrowing of the right neural foramen.  Overall appearance is not significantly changed since 8/28/2017.     S shaped scoliosis of thoracolumbar spine, unchanged.                 Dictated by (CST): Devon Maravilla MD on 4/17/2021 at 11:36 AM      Finalized by (CST): Devon Maravilla MD on 4/17/2021 at 11:50 AM            ASSESSMENT AND PLAN:  Mari is a pleasant 78-year-old female presents for follow-up of her bilateral axial low back pain without radicular symptoms.  She is status post bilateral L4-L5 and L5-S1 medial branch blocks which provided about 100% relief that lasted for 12 hours but her pain returned after that.  I believe these are positive medial branch blocks.  I am recommending repeating the medial branch blocks as a confirmatory test.  If positive, then we will proceed with radiofrequency ablation.  If negative, then I would recommend an MRI of the lumbar spine.  In the meantime, she should use Tylenol and continue her  home exercise program.       RTC in 2 days after MBB  Discharge Instructions were provided as documented in AVS summary.  The patient was in agreement with the assessment and plan.  All questions were answered.  There were no barriers to learning.         1. Facet syndrome, lumbar    2. Myalgia    3. Mechanical low back pain    4. Lumbar spondylosis    5. Spinal stenosis of lumbar region with neurogenic claudication    6. Lumbar foraminal stenosis    7. DDD (degenerative disc disease), lumbosacral    8. Bulge of lumbar disc without myelopathy        Alex B. Behar MD  Physical Medicine and Rehabilitation/Sports Medicine  [facility]

## 2024-09-27 NOTE — TELEPHONE ENCOUNTER
Estimated body mass index is 40.68 kg/m² as calculated from the following:    Height as of 9/23/24: 64\".    Weight as of 9/23/24: 237 lb.  BMI > 35    Currently taking ASA 325mg.    Confirmed with MD if he'd like for patient to continue ASA 325mg    Patient has been scheduled for BIlateral L4-5 and L5-S1 medial branch blocks  on 10/2/24 at the [facility] with Dr. Behar.   Anesthesia type:  Local  Please note: The [facility] will call the business day prior to discuss the exact time/arrival and additional instructions for your appointment.  Patient was advised that if he/she does receive the covid vaccine it needs to be at least 2 weeks before or after the injection.  Medications and allergies reviewed.  Educated to hold NSAIDS (Aleve, Ibuprofen, Motrin, Advil) and anti-inflammatories (Meloxicam, Naproxen, Diclofenac, Celebrex) and for cervical injections must hold Multi-Vitamins, Vitamin E, Fish Oil/Omega-3.  If patient is receiving MAC/IVCS, weight loss oral/injectable medications will need to be held for 7 days prior to injection.  Patient informed to fast 8 hours prior to procedure and 10-12 hours prior to procedure with IVCS/MAC if patient is on a weight loss medication.   If on blood thinner, clearance has been received and approved to hold this medication by provider.   Patient informed of [facility]'s  policy:  he/she will need a  to and from procedure and must be on site for their entirety of their visit, if their ride is unable to the procedure will be cancelled.   [facility] is located in the [facility] 1st floor [address] [place] [address].   may park in the yellow/purple parking lot.  Patient verbalized understanding and agrees with plan.  Scheduled in Epic: Yes  Scheduled in Surgical Case: Yes  Follow up appointment made: NOV: Visit date not found

## 2024-09-27 NOTE — PATIENT INSTRUCTIONS
1) My office will call you to schedule the bilateral L4-L5 and L5-S1 medial branch blocks under local anesthesia once the procedure is approved by your insurance carrier.    2) if positive, we will proceed with radiofrequency ablation to these levels.  If negative, then we will order MRI of the lumbar spine.  3) Tylenol 500-1000 mg every 6-8 hours as needed for pain.  No more than 3000 mg daily.  4) Continue home exercise program

## 2024-09-27 NOTE — TELEPHONE ENCOUNTER
Per CMS Guidelines -no authorization is required for BILATERAL L4-L5 and L5-S1 MBB   CPT codes: 52053-46, 77551 x's 2       Status: Authorization is not required based on medical necessity however is not a guarantee of payment and may be subject to review once claim is submitted-Covered Benefit.  This will be #2 in a rolling 12 months

## 2024-09-28 DIAGNOSIS — F50.81 BINGE EATING DISORDER: ICD-10-CM

## 2024-09-28 DIAGNOSIS — E66.01 MORBID OBESITY WITH BMI OF 40.0-44.9, ADULT (HCC): ICD-10-CM

## 2024-09-28 DIAGNOSIS — M48.061 SPINAL STENOSIS OF LUMBAR REGION, UNSPECIFIED WHETHER NEUROGENIC CLAUDICATION PRESENT: ICD-10-CM

## 2024-09-28 NOTE — TELEPHONE ENCOUNTER
Patient needs a refill on:    HYDROcodone-acetaminophen 5-325 MG Oral Tab 30 tablet 0 7/18/2024 --   Sig:   Take 1 tablet by mouth every 4 (four) hours as needed.     Route:   Oral           [id number] - [address] [phone], [phone]

## 2024-09-28 NOTE — PROGRESS NOTES
Nephrology Consultation Note    Reason for Consult:   Chief Complaint   Patient presents with    Consult     Dr. Mckeon referred her for CKD 3a.         HPI:     78 year old female with past medical history of HTN, HLD, CKD 3a, nephrolithiasis, decreased hearing, obesity, anxiety, osteoarthritis s/p bilateral total knee replacement and R total hip replacement is referred for CKD 3a.    2018: Cr 0.8-1.2  2019: Cr 0.6-0.7  2020: Cr 0.88-1.15  2021: Cr 0.96 4/24 2023: Cr 1.05 1/7, 1.19 7/31, 1.04 12/23 2024: Cr 1.17 7/13    Denies NSAIDs use. Has arthralgias.     UA neg for protein and blood 1/15/21, 5/13/22, 5/16/23  CT abd/plevis (7/24/24) showed: stable 5 mm stone in the inferior right renal pole, new 3 mm in the inferior right renal pole\"  She reports she had significant right flank pain about 6 years ago. She was told that she probably passed a kidney. No other history of renal colic.    HTN: Taking Telmisartan-hydrochlorothiazide 80-25 mg daily and Spironolactone 25 mg daily. /71 today.      HLD: Chol 160, Trig 61, HDL 56, LDL 92 7/2024. Taking Crestor 20 mg daily.      Anemia: Hb 11.4 7/2024       FHx:   Mother (D) - DM, HTN  Father (D) - Colon surgery  No family history of CKD.      SHx: History of smoking 1 PPD x 35 yrs, quit 2002. Does not drink alcohol currently.   . Lives alone. Has 1 son.   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       HISTORY:  Past Medical History:    Anxiety state    Back problem    Hx herniated discs    Calculus of kidney    Deaf    Left ear    Diverticulosis    Hearing loss    High blood pressure    High cholesterol    History of shingles    1980's    Meniere disease    Morbid obesity with BMI of 40.0-44.9, adult (HCC)    Osteoarthritis    Pneumonia due to organism    as an infant    SCC (squamous cell carcinoma)    right jawline    Tinnitus    Visual impairment    reading glasses      Past Surgical History:   Procedure Laterality Date    Cataract extraction w/  intraocular lens  implant Right 2011    RJM    Cholecystectomy      Colonoscopy N/A 10/01/2019    Procedure: COLONOSCOPY;  Surgeon: John Puri MD;  Location: [facility] ENDOSCOPY    Colonoscopy N/A 09/10/2024    Procedure: COLONOSCOPY;  Surgeon: John Puri MD;  Location: [facility] ENDOSCOPY    Knee surgery      Oophorectomy Bilateral     per [employer]:  \"laparoscopic bilateral oophorectomy\"    Other surgical history      Ear surgery    Spine surgery procedure unlisted      for right sciatica issue    Total hip replacement Right     Total knee replacement Right     Tubal ligation      Yag capsulotomy - od - right eye Right 2014    RJM      Family History   Problem Relation Age of Onset    Diabetes Mother     Hypertension Mother     Other (colon surgery) Father     Other (hx of SC ( unknown type)) Father     Diabetes Paternal Grandmother     Cancer Self     Glaucoma Neg         No Family h/o Glaucoma    Macular degeneration Neg         No Family h/o Macular degeneration    Breast Cancer Neg     Ovarian Cancer Neg       Social History:   Social History     Socioeconomic History    Marital status:    Tobacco Use    Smoking status: Former     Current packs/day: 0.00     Types: Cigarettes     Start date: 1966     Quit date: 2002     Years since quittin.7    Smokeless tobacco: Never   Vaping Use    Vaping status: Never Used   Substance and Sexual Activity    Alcohol use: Yes     Alcohol/week: 0.0 standard drinks of alcohol     Comment: social    Drug use: No   Other Topics Concern    Caffeine Concern No     Comment: Coffee, 2 cups daily    Exercise No    Grew up on a farm No    History of tanning Yes    Outdoor occupation No    Pt has a pacemaker No    Pt has a defibrillator No    Breast feeding No    Reaction to local anesthetic No   Social History Narrative    The patient does not use an assistive device..      The patient does not live in a home with stairs.        Medications (Active prior  to today's visit):  Current Outpatient Medications   Medication Sig Dispense Refill    Phentermine HCl 30 MG Oral Cap Take 1 capsule (30 mg total) by mouth every morning. For weight loss 90 capsule 1    gabapentin 600 MG Oral Tab Take 1 tablet (600 mg total) by mouth 2 (two) times daily. 180 tablet 3    fluticasone propionate 50 MCG/ACT Nasal Suspension 2 sprays by Nasal route daily. 3 each 2    aspirin 325 MG Oral Tab Take 1 tablet (325 mg total) by mouth daily. 90 tablet 0    SPIRONOLACTONE 25 MG Oral Tab TAKE ONE TABLET BY MOUTH ONE TIME DAILY 90 tablet 3    sertraline 100 MG Oral Tab TAKE ONE TABLET BY MOUTH ONE TIME DAILY 90 tablet 3    rosuvastatin 20 MG Oral Tab Take 1 tablet (20 mg total) by mouth once daily. 90 tablet 3    Telmisartan-HCTZ 80-25 MG Oral Tab Take 1 tablet by mouth daily. 90 tablet 3    Cholecalciferol (VITAMIN D3) 3000 units Oral Tab Take 3,000 Units by mouth daily.        Multiple Vitamins-Minerals (PRESERVISION AREDS) Oral Tab Take by mouth.      HYDROcodone-acetaminophen 5-325 MG Oral Tab Take 1 tablet by mouth every 4 (four) hours as needed. 30 tablet 0       Allergies:  No Known Allergies      ROS:     Review of Systems   Constitutional:  Negative for appetite change, chills, fatigue and fever.   HENT:  Negative for ear pain, rhinorrhea, sore throat and trouble swallowing.    Eyes:  Negative for pain and discharge.   Respiratory:  Negative for cough, chest tightness and shortness of breath.    Cardiovascular:  Negative for chest pain and leg swelling.   Gastrointestinal:  Negative for abdominal pain, constipation, diarrhea and vomiting.   Genitourinary:  Negative for decreased urine volume, dysuria and flank pain.   Musculoskeletal:  Positive for arthralgias. Negative for back pain, gait problem and myalgias.   Skin:  Negative for rash.   Neurological:  Negative for dizziness, speech difficulty, weakness, numbness and headaches.   Psychiatric/Behavioral:  Negative for agitation and  confusion.           Vitals:    09/23/24 0855   BP: 147/71   Pulse: 71       PHYSICAL EXAM:   Physical Exam  Constitutional:       Appearance: Normal appearance.   HENT:      Head: Normocephalic and atraumatic.      Nose: Nose normal.   Eyes:      General: No scleral icterus.  Cardiovascular:      Rate and Rhythm: Normal rate and regular rhythm.      Heart sounds: Normal heart sounds. No murmur heard.  Pulmonary:      Effort: Pulmonary effort is normal.      Breath sounds: Normal breath sounds.   Abdominal:      General: Bowel sounds are normal. There is no distension.      Palpations: Abdomen is soft.   Musculoskeletal:         General: No tenderness. Normal range of motion.      Cervical back: Normal range of motion and neck supple.      Comments: Neg edema   Skin:     General: Skin is warm and dry.      Findings: No rash.   Neurological:      General: No focal deficit present.      Mental Status: She is alert and oriented to person, place, and time. Mental status is at baseline.   Psychiatric:         Mood and Affect: Mood normal.         Behavior: Behavior normal.         Thought Content: Thought content normal.             ASSESSMENT/PLAN:   Assessment   Encounter Diagnoses   Name Primary?    Stage 3a chronic kidney disease (HCC) Yes    Nephrolithiasis     Primary hypertension     Mixed hyperlipidemia        CKD 3a:   Likely due to hypertensive nephrosclerosis  Recent Cr 1.17 eGFR 48 7/13/24, likely new baseline.   Advised to avoid NSAIDs and take Tylenol/acetominophen for pain.   Asked to restrict sodium to 2 grams per day and instructions on low protein diet given.   Advised tight control of HTN to prevent complications including progressive CKD, CAD or CVA.  Cont Telmisartan for renal protection.     Nephrolithiasis:   Asked to increase water intake to about 64 oz per day.   Not interested in 24 hour urine collection for stone analysis.    HTN:   Elevated today, but usually better controlled. Cont meds.         HLD:   Stable. Cont statin.       Labs ordered to be done soon.   Follow up in 6 months.        Orders This Visit:  Orders Placed This Encounter   Procedures    CBC With Differential With Platelet    Comp Metabolic Panel (14)    Vitamin D    PTH, Intact    Phosphorus    Urinalysis, Routine    Microalb/Creat Ratio, Random Urine       Meds This Visit:  Requested Prescriptions      No prescriptions requested or ordered in this encounter       Imaging & Referrals:  None       50 minutes spent in the care of the patient which included: reviewing prior records, obtaining history and examining patient, discussing lab results and treatment plan including diet, ordering labs, and documentation.      Treasure Bonner MD  9/23/2024

## 2024-09-30 DIAGNOSIS — M48.061 SPINAL STENOSIS OF LUMBAR REGION, UNSPECIFIED WHETHER NEUROGENIC CLAUDICATION PRESENT: ICD-10-CM

## 2024-09-30 RX ORDER — PHENTERMINE HYDROCHLORIDE 15 MG/1
15 CAPSULE ORAL EVERY MORNING
Qty: 90 CAPSULE | Refills: 0 | OUTPATIENT
Start: 2024-09-30

## 2024-10-02 RX ORDER — HYDROCODONE BITARTRATE AND ACETAMINOPHEN 5; 325 MG/1; MG/1
1 TABLET ORAL EVERY 4 HOURS PRN
Qty: 30 TABLET | Refills: 0 | OUTPATIENT
Start: 2024-10-02

## 2024-10-04 ENCOUNTER — TELEPHONE (OUTPATIENT)
Dept: PHYSICAL MEDICINE AND REHAB | Facility: CLINIC | Age: 79
End: 2024-10-04

## 2024-10-04 NOTE — TELEPHONE ENCOUNTER
Patient calling to provide a condition update after her injection, the day of the inj it was great with no pain, the next day in the afternoon the pain came back the same as before she had the injection and continues the same.

## 2024-10-07 RX ORDER — HYDROCODONE BITARTRATE AND ACETAMINOPHEN 5; 325 MG/1; MG/1
1 TABLET ORAL EVERY 4 HOURS PRN
Qty: 30 TABLET | Refills: 0 | Status: SHIPPED | OUTPATIENT
Start: 2024-10-07

## 2024-10-07 NOTE — PROGRESS NOTES
Mari Gaspar is a 78 year old female.  HPI:     CC:    Chief Complaint   Patient presents with    Full Skin Exam     Hx of SCC.  LOV 2017.  NEW pt presenting for FBSE.  Lesion of concern face and scalp.  Denies bleeding or pain.            HISTORY:    Past Medical History:    Anxiety state    Back problem    Hx herniated discs    Calculus of kidney    Deaf    Left ear    Diverticulosis    Hearing loss    High blood pressure    High cholesterol    History of shingles    1980's    Meniere disease    Morbid obesity with BMI of 40.0-44.9, adult (HCC)    Osteoarthritis    Pneumonia due to organism    as an infant    SCC (squamous cell carcinoma)    right jawline    Tinnitus    Visual impairment    reading glasses      Past Surgical History:   Procedure Laterality Date    Cataract extraction w/  intraocular lens implant Right 09/12/2011    RJM    Cholecystectomy      Colonoscopy N/A 10/01/2019    Procedure: COLONOSCOPY;  Surgeon: John Puri MD;  Location: [facility] ENDOSCOPY    Colonoscopy N/A 09/10/2024    Procedure: COLONOSCOPY;  Surgeon: John Puri MD;  Location: [facility] ENDOSCOPY    Knee surgery      Oophorectomy Bilateral     per [employer]:  \"laparoscopic bilateral oophorectomy\"    Other surgical history      Ear surgery    Spine surgery procedure unlisted      for right sciatica issue    Total hip replacement Right     Total knee replacement Right     Tubal ligation      Yag capsulotomy - od - right eye Right 02/12/2014    RJM      Family History   Problem Relation Age of Onset    Diabetes Mother     Hypertension Mother     Other (colon surgery) Father     Other (hx of SC ( unknown type)) Father     Diabetes Paternal Grandmother     Cancer Self     Glaucoma Neg         No Family h/o Glaucoma    Macular degeneration Neg         No Family h/o Macular degeneration    Breast Cancer Neg     Ovarian Cancer Neg       Social History     Socioeconomic History    Marital status:    Tobacco Use     Smoking status: Former     Current packs/day: 0.00     Types: Cigarettes     Start date: 1966     Quit date: 2002     Years since quittin.7    Smokeless tobacco: Never   Vaping Use    Vaping status: Never Used   Substance and Sexual Activity    Alcohol use: Yes     Alcohol/week: 0.0 standard drinks of alcohol     Comment: social    Drug use: No   Other Topics Concern    Caffeine Concern No     Comment: Coffee, 2 cups daily    Exercise No    Grew up on a farm No    History of tanning Yes    Outdoor occupation No    Pt has a pacemaker No    Pt has a defibrillator No    Breast feeding No    Reaction to local anesthetic No   Social History Narrative    The patient does not use an assistive device..      The patient does not live in a home with stairs.        Current Outpatient Medications   Medication Sig Dispense Refill    Phentermine HCl 30 MG Oral Cap Take 1 capsule (30 mg total) by mouth every morning. For weight loss 90 capsule 1    HYDROcodone-acetaminophen 5-325 MG Oral Tab Take 1 tablet by mouth every 4 (four) hours as needed. 30 tablet 0    gabapentin 600 MG Oral Tab Take 1 tablet (600 mg total) by mouth 2 (two) times daily. 180 tablet 3    fluticasone propionate 50 MCG/ACT Nasal Suspension 2 sprays by Nasal route daily. 3 each 2    aspirin 325 MG Oral Tab Take 1 tablet (325 mg total) by mouth daily. 90 tablet 0    SPIRONOLACTONE 25 MG Oral Tab TAKE ONE TABLET BY MOUTH ONE TIME DAILY 90 tablet 3    sertraline 100 MG Oral Tab TAKE ONE TABLET BY MOUTH ONE TIME DAILY 90 tablet 3    rosuvastatin 20 MG Oral Tab Take 1 tablet (20 mg total) by mouth once daily. 90 tablet 3    Telmisartan-HCTZ 80-25 MG Oral Tab Take 1 tablet by mouth daily. 90 tablet 3    Cholecalciferol (VITAMIN D3) 3000 units Oral Tab Take 3,000 Units by mouth daily.        Multiple Vitamins-Minerals (PRESERVISION AREDS) Oral Tab Take by mouth.       Allergies:   No Known Allergies    Past Medical History:    Anxiety state    Back  problem    Hx herniated discs    Calculus of kidney    Deaf    Left ear    Diverticulosis    Hearing loss    High blood pressure    High cholesterol    History of shingles        Meniere disease    Morbid obesity with BMI of 40.0-44.9, adult (HCC)    Osteoarthritis    Pneumonia due to organism    as an infant    SCC (squamous cell carcinoma)    right jawline    Tinnitus    Visual impairment    reading glasses     Past Surgical History:   Procedure Laterality Date    Cataract extraction w/  intraocular lens implant Right 2011    RJM    Cholecystectomy      Colonoscopy N/A 10/01/2019    Procedure: COLONOSCOPY;  Surgeon: John Puri MD;  Location: [facility] ENDOSCOPY    Colonoscopy N/A 09/10/2024    Procedure: COLONOSCOPY;  Surgeon: John Puri MD;  Location: [facility] ENDOSCOPY    Knee surgery      Oophorectomy Bilateral     per [employer]:  \"laparoscopic bilateral oophorectomy\"    Other surgical history      Ear surgery    Spine surgery procedure unlisted      for right sciatica issue    Total hip replacement Right     Total knee replacement Right     Tubal ligation      Yag capsulotomy - od - right eye Right 2014    [facility]     Social History     Socioeconomic History    Marital status:      Spouse name: Not on file    Number of children: Not on file    Years of education: Not on file    Highest education level: Not on file   Occupational History    Not on file   Tobacco Use    Smoking status: Former     Current packs/day: 0.00     Types: Cigarettes     Start date: 1966     Quit date: 2002     Years since quittin.7    Smokeless tobacco: Never   Vaping Use    Vaping status: Never Used   Substance and Sexual Activity    Alcohol use: Yes     Alcohol/week: 0.0 standard drinks of alcohol     Comment: social    Drug use: No    Sexual activity: Not on file   Other Topics Concern     Service Not Asked    Blood Transfusions Not Asked    Caffeine Concern No     Comment:  Coffee, 2 cups daily    Occupational Exposure Not Asked    Hobby Hazards Not Asked    Sleep Concern Not Asked    Stress Concern Not Asked    Weight Concern Not Asked    Special Diet Not Asked    Back Care Not Asked    Exercise No    Bike Helmet Not Asked    Seat Belt Not Asked    Self-Exams Not Asked    Grew up on a farm No    History of tanning Yes    Outdoor occupation No    Pt has a pacemaker No    Pt has a defibrillator No    Breast feeding No    Reaction to local anesthetic No   Social History Narrative    The patient does not use an assistive device..      The patient does not live in a home with stairs.     Social Determinants of Health     Financial Resource Strain: Not on file   Food Insecurity: Not on file   Transportation Needs: Not on file   Physical Activity: Not on file   Stress: Not on file   Social Connections: Not on file   Housing Stability: Not on file     Family History   Problem Relation Age of Onset    Diabetes Mother     Hypertension Mother     Other (colon surgery) Father     Other (hx of SC ( unknown type)) Father     Diabetes Paternal Grandmother     Cancer Self     Glaucoma Neg         No Family h/o Glaucoma    Macular degeneration Neg         No Family h/o Macular degeneration    Breast Cancer Neg     Ovarian Cancer Neg        There were no vitals filed for this visit.    HPI:  Chief Complaint   Patient presents with    Full Skin Exam     Hx of SCC.  LOV 2017.  NEW pt presenting for FBSE.  Lesion of concern face and scalp.  Denies bleeding or pain.        Follow-up history SCC last office visit 2017   personal history of skin cancer SCC right jawline    family history of skin cancer-father per notes unknown skin cancer   Patient presents with concerns above.    Patient has been in their usual state of health.     Past notes/ records and appropriate/relevant lab results including pathology and past body maps reviewed. Including outside notes/ PCP notes as appropriate. Updated and new  information noted in current visit.     ROS:  Denies other relevant systemic complaints.      History, medications, allergies reviewed as noted.    Allergies:  Patient has no known allergies.     Physical Examination:     Well-developed well-nourished patient alert oriented in no acute distress.  Exam performed, including scalp, head, neck, face,nails, hair, external eyes, including conjunctival mucosa, eyelids, lips external ears , arms, digits,palms.     Multiple light to medium brown, well marginated, uniformly pigmented, macules and papules 6 mm and less scattered on exam. pigmented lesions examined with dermoscopy benign-appearing patterns.     Waxy tannish keratotic papules scattered, cherry-red vascular papules scattered.    See map today's date for lesions noted .  See assessment and plan below for specific lesions.    Otherwise remarkable for lesions as noted on map.    See A/P  below for additional information:    Assessment / plan:    No orders of the defined types were placed in this encounter.      Meds & Refills for this Visit:  Requested Prescriptions      No prescriptions requested or ordered in this encounter         Encounter Diagnoses   Name Primary?    SK (seborrheic keratosis) Yes    Lentigo     Multiple nevi     Benign neoplasm of skin, unspecified location     Encounter for follow-up surveillance of skin cancer        History SCC no evidence recurrence.    Numerous benign seborrheic keratoses more inflamed lesions along the back, temples hairline reassurance.  Consider trial of cryo.  Monitor.  Continue sun protection    Benign-appearing nevi, no atypical features on dermoscopy reassurance given monitor.     Waxy tan keratotic papules lesions in areas of concern as noted reassurance given.  Benign nature discussed.  Possibility of cryo, alphahydroxy acids over-the-counter retinol's discussed.     No other susupicious lesions on todays  exam.    Please refer to map for specific lesions.  See  additional diagnoses.  Pros cons of various therapies, risks benefits discussed.Pathophysiology discussed with patient.  Therapeutic options reviewed.  See  Medications in grid.  Instructions reviewed at length.    Benign nevi, seborrheic  keratoses, cherry angiomas:  Reassurance regarding other benign skin lesions.Signs and symptoms of skin cancer, ABCDE's of melanoma discussed with patient. Sunscreen (broad-spectrum, ideally mineral-based-UVA/UVB -SPF 30 or higher) use encouraged, sun protection/sun protective clothing, self exams reviewed Followup as noted RTC ---routine checkup    6 mos -one year or p.r.n.    Encounter Times   Including precharting, reviewing chart, prior notes obtaining history: 10 minutes, medical exam :10 minutes, notes on body map, plan, counseling 10minutes My total time spent caring for the patient on the day of the encounter: 30 minutes     The patient indicates understanding of these issues and agrees to the plan.  The patient is asked to return as noted in follow-up/ above.    This note was generated using Dragon voice recognition software.  Please contact me regarding any confusion resulting from errors in recognition..  Note to patient and family: The 21st Century Cures Act makes medical notes like these available to patients. However, be advised this is a medical document. It is intended as [id number] communication and monitoring of a patient's care needs. It is written in medical language and may contain abbreviations or verbiage that are unfamiliar. It may appear blunt or direct. Medical documents are intended to carry relevant information, facts as evident and the clinical opinion of the practitioner.

## 2024-10-08 ENCOUNTER — MED REC SCAN ONLY (OUTPATIENT)
Dept: PHYSICAL MEDICINE AND REHAB | Facility: CLINIC | Age: 79
End: 2024-10-08

## 2024-10-08 NOTE — TELEPHONE ENCOUNTER
Condition update after Procedure.    - Patient had BIlateral L4-5 and L5-S1 medial branch blocks  (specific procedure) on 10/02/2024 (date) with Dr.Behar.    Pain level prior to procedure: 0  1   2   3   4   5   6   7   8   9   10  (No Pain) 0  to  10 (Worst Pain); Please [place] corresponding number above.     Pain level immediately following procedure: 0  1   2   3   4   5   6   7   8   9   10  (No Pain) 0  to  10 (Worst Pain); Please [place] corresponding number above.     Please keep a close record of your pain for the next 12 hours to refer back to when speaking with office staff.      Percentage (%) of Relief:   0% - 100%  (0% = No Relief; 100% = Total Relief)   2 Hours After Procedure:  100%   4 Hours After Procedure:  100%   6 Hours After Procedure:  100%   8 Hours After Procedure:  100%   12 Hours After Procedure: 100%     Pain returned mid to late morning on 10/03/2024.    This is patient's 2nd MBB to same area- 1st time same location- 40% improvement.    Advised patient that will update Dr. Behar and see if ok to proceed with RFN or if 3rd MBB due to differing results.

## 2024-10-12 DIAGNOSIS — I10 ESSENTIAL HYPERTENSION: ICD-10-CM

## 2024-10-15 NOTE — TELEPHONE ENCOUNTER
Please review. Protocol Failed; No Protocol    Future Appointments   Date Time Provider Department Center   10/29/2024 12:30 PM ADO MRI RM1 (1.5T) [facility] [place]   11/18/2024  9:45 AM [facility]         Requested Prescriptions   Pending Prescriptions Disp Refills    TELMISARTAN-HCTZ 80-25 MG Oral Tab [Pharmacy Med Name: Telmisartan/Hydrochlorothiazide 80-25 Mg Tab Beatris] 90 tablet 0     Sig: Take 1 tablet by mouth daily.       Hypertension Medications Protocol Failed - 10/15/2024  9:27 AM        Failed - EGFRCR or GFRNAA > 50     GFR Evaluation  EGFRCR: 47 , resulted on 7/24/2024          Passed - CMP or BMP in past 12 months        Passed - Last BP reading less than 140/90     BP Readings from Last 1 Encounters:   10/02/24 110/71               Passed - In person appointment or virtual visit in the past 12 mos or appointment in next 3 mos     Recent Outpatient Visits              2 weeks ago Facet syndrome, lumbar    [facility] Alex, MD    Telemedicine    2 weeks ago SK (seborrheic keratosis)    [facility] [place] Laina Dee MD    Office Visit    3 weeks ago Stage 3a chronic kidney disease (HCC)    [facility], Treasure Seth MD    Office Visit    4 weeks ago Renal insufficiency    [facility] Anant Mckeon DO    Office Visit    1 month ago Facet syndrome, lumbar    [facility] Behar, Alex, MD    Office Visit          Future Appointments         Provider Department Appt Notes    In 2 weeks ADO MRI RM1 (1.5T) [facility] MRI - [place]     In 1 month Anant Mckeon DO [facility] Return in about 2 months (around 11/16/2024) for Weight followup.                           Future Appointments         Provider Department Appt Notes     In 2 weeks ADO MRI RM1 (1.5T) [facility] MRI - [place]     In 1 month Anant Mckeon DO [facility] Return in about 2 months (around 11/16/2024) for Weight followup.          Recent Outpatient Visits              2 weeks ago Facet syndrome, lumbar    [facility] Alex[facility] MD    Telemedicine    2 weeks ago SK (seborrheic keratosis)    [facility], Laina Oconnell MD    Office Visit    3 weeks ago Stage 3a chronic kidney disease (HCC)    [facility]    Office Visit    4 weeks ago Renal insufficiency    [facility] Anant Ford DO    Office Visit    1 month ago Facet syndrome, lumbar    [facility] [place] Behar, Alex, MD    Office Visit

## 2024-10-20 RX ORDER — TELMISARTAN AND HYDROCHLORTHIAZIDE 80; 25 MG/1; MG/1
1 TABLET ORAL DAILY
Qty: 90 TABLET | Refills: 3 | Status: SHIPPED | OUTPATIENT
Start: 2024-10-20

## 2024-10-21 ENCOUNTER — TELEPHONE (OUTPATIENT)
Dept: PHYSICAL MEDICINE AND REHAB | Facility: CLINIC | Age: 79
End: 2024-10-21

## 2024-10-21 DIAGNOSIS — M47.816 FACET SYNDROME, LUMBAR: Primary | ICD-10-CM

## 2024-10-21 NOTE — TELEPHONE ENCOUNTER
Per CMS Guidelines -no authorization is required for Bilateral L4-L5 and L5-S1 Medial branch radiofrequency ablation under IVCS   CPT codes: 71741-92, 81060 x's 2       Status: Authorization is not required based on medical necessity however is not a guarantee of payment and may be subject to review once claim is submitted-Covered Benefit.  Fyi, per CMS LCD limitations-Use of Moderate or Deep Sedation, General Anesthesia, or Monitored Anesthesia Care (MAC) is usually unnecessary or rarely indicated for these procedures and therefore, is not considered medically reasonable and necessary. Even in patients with a needle phobia and anxiety, typically oral anxiolytics suffice. In exceptional and unique cases, documentation must clearly establish the need for such sedation in the specific patient.

## 2024-10-24 ENCOUNTER — TELEPHONE (OUTPATIENT)
Dept: NEPHROLOGY | Facility: CLINIC | Age: 79
End: 2024-10-24

## 2024-10-24 NOTE — TELEPHONE ENCOUNTER
Instructed to do future labs on or after 3/1/25.  Appt scheduled for 6 month follow up    AVS mailed to patient to see instructions; not comfortable with my chart.

## 2024-10-24 NOTE — TELEPHONE ENCOUNTER
Patient called with questions about orders for testing that she needs to have done.  Please call.

## 2024-10-29 ENCOUNTER — HOSPITAL ENCOUNTER (OUTPATIENT)
Dept: MRI IMAGING | Age: 79
Discharge: HOME | End: 2024-10-29
Attending: PHYSICAL MEDICINE & REHABILITATION
Payer: MEDICARE

## 2024-10-29 DIAGNOSIS — M51.369 BULGE OF LUMBAR DISC WITHOUT MYELOPATHY: ICD-10-CM

## 2024-10-29 DIAGNOSIS — M48.062 SPINAL STENOSIS OF LUMBAR REGION WITH NEUROGENIC CLAUDICATION: ICD-10-CM

## 2024-10-29 DIAGNOSIS — M47.816 LUMBAR SPONDYLOSIS: ICD-10-CM

## 2024-10-29 DIAGNOSIS — M54.59 MECHANICAL LOW BACK PAIN: ICD-10-CM

## 2024-10-29 DIAGNOSIS — M47.816 FACET SYNDROME, LUMBAR: ICD-10-CM

## 2024-10-29 DIAGNOSIS — M48.061 LUMBAR FORAMINAL STENOSIS: ICD-10-CM

## 2024-10-29 DIAGNOSIS — M51.379 DDD (DEGENERATIVE DISC DISEASE), LUMBOSACRAL: ICD-10-CM

## 2024-10-29 DIAGNOSIS — M79.10 MYALGIA: ICD-10-CM

## 2024-10-29 PROCEDURE — 72148 MRI LUMBAR SPINE W/O DYE: CPT | Performed by: PHYSICAL MEDICINE & REHABILITATION

## 2024-11-07 ENCOUNTER — TELEPHONE (OUTPATIENT)
Dept: PHYSICAL MEDICINE AND REHAB | Facility: CLINIC | Age: 79
End: 2024-11-07

## 2024-11-07 NOTE — TELEPHONE ENCOUNTER
Spoke with patient who asked to schedule a follow up appointment after her RFN.    Virtual appointment scheduled.    Nothing further needed.

## 2024-11-16 DIAGNOSIS — M48.061 SPINAL STENOSIS OF LUMBAR REGION, UNSPECIFIED WHETHER NEUROGENIC CLAUDICATION PRESENT: ICD-10-CM

## 2024-11-18 ENCOUNTER — OFFICE VISIT (OUTPATIENT)
Dept: FAMILY MEDICINE CLINIC | Facility: CLINIC | Age: 79
End: 2024-11-18
Payer: MEDICARE

## 2024-11-18 VITALS
SYSTOLIC BLOOD PRESSURE: 138 MMHG | BODY MASS INDEX: 39.09 KG/M2 | HEART RATE: 80 BPM | TEMPERATURE: 97 F | HEIGHT: 64 IN | WEIGHT: 229 LBS | DIASTOLIC BLOOD PRESSURE: 70 MMHG

## 2024-11-18 DIAGNOSIS — M25.50 POLYARTHRALGIA: Primary | ICD-10-CM

## 2024-11-18 DIAGNOSIS — F50.819 BINGE EATING DISORDER: ICD-10-CM

## 2024-11-18 DIAGNOSIS — N28.9 RENAL INSUFFICIENCY: ICD-10-CM

## 2024-11-18 DIAGNOSIS — M51.26 LUMBAR HERNIATED DISC: ICD-10-CM

## 2024-11-18 DIAGNOSIS — E66.01 MORBID OBESITY WITH BMI OF 40.0-44.9, ADULT (HCC): ICD-10-CM

## 2024-11-18 DIAGNOSIS — I10 HTN (HYPERTENSION), BENIGN: ICD-10-CM

## 2024-11-18 DIAGNOSIS — E66.01 SEVERE OBESITY (BMI 35.0-35.9 WITH COMORBIDITY) (HCC): ICD-10-CM

## 2024-11-18 PROCEDURE — G2211 COMPLEX E/M VISIT ADD ON: HCPCS | Performed by: FAMILY MEDICINE

## 2024-11-18 PROCEDURE — 99214 OFFICE O/P EST MOD 30 MIN: CPT | Performed by: FAMILY MEDICINE

## 2024-11-18 RX ORDER — GABAPENTIN 600 MG/1
600 TABLET ORAL 3 TIMES DAILY
Qty: 270 TABLET | Refills: 2 | Status: SHIPPED | OUTPATIENT
Start: 2024-11-18

## 2024-11-18 RX ORDER — PHENTERMINE HYDROCHLORIDE 30 MG/1
30 CAPSULE ORAL EVERY MORNING
Qty: 90 CAPSULE | Refills: 1 | Status: SHIPPED | OUTPATIENT
Start: 2024-11-18

## 2024-11-18 NOTE — PROGRESS NOTES
Patient ID: Mari Gaspar is a 79 year old female.    HPI  Chief Complaint   Patient presents with    Weight Check     Follow up      Last seen on 09/16/2024.    She has lost 10 pounds since 10/02/2024. She felt that the phentermine aided in weight loss.  She denies any chest pain or palpitations with the medication.    Pt regularly consults with nephrology and has an appointment scheduled for March, 2025. Her nephrologist already put in labs for her to complete prior to her appointment.     During her consultation with nephrology she was told that she should no longer be taking meloxicam. Pt  is concerned because she feels the meloxicam provides relief for her polyarthralgia. She had tried to be off of it for a week or two in the past and felt her pain worsened. She states that when the pain is bad she can hardly walk to the bathroom due to knee pain and leg pain. She does not take hydrocodone daily and states it helps to some degree but not as much as she would like. She last consulted with rheumatology in February, 2023. Pt regularly consults with Dr. Behar, physiatrist, for back pain and her next appointment is 11/26/2024. I discussed with her.      Wt Readings from Last 6 Encounters:   11/18/24 229 lb   10/28/24 234 lb   09/27/24 239 lb   09/23/24 237 lb   09/16/24 235 lb   09/10/24 239 lb       BMI Readings from Last 6 Encounters:   11/18/24 39.31 kg/m²   10/28/24 40.17 kg/m²   09/27/24 41.02 kg/m²   09/23/24 40.68 kg/m²   09/16/24 40.34 kg/m²   09/10/24 41.02 kg/m²       BP Readings from Last 6 Encounters:   11/18/24 138/70   11/06/24 130/74   10/02/24 110/71   09/23/24 147/71   09/16/24 140/70   09/10/24 106/77         Review of Systems   Respiratory:  Negative for shortness of breath.    Cardiovascular:  Negative for chest pain.   Musculoskeletal:  Positive for arthralgias.           Medical History:      Past Medical History:    Anxiety state    Back problem    Hx herniated discs    Calculus of  kidney    Deaf    Left ear    Diverticulosis    Hearing loss    High blood pressure    High cholesterol    History of shingles    1980's    Meniere disease    Morbid obesity with BMI of 40.0-44.9, adult (HCC)    Osteoarthritis    Pneumonia due to organism    as an infant    SCC (squamous cell carcinoma)    right jawline    Tinnitus    Visual impairment    reading glasses       Past Surgical History:   Procedure Laterality Date    Cataract extraction w/  intraocular lens implant Right 09/12/2011    RJM    Cholecystectomy      Colonoscopy N/A 10/01/2019    Procedure: COLONOSCOPY;  Surgeon: John Puri MD;  Location: [facility] ENDOSCOPY    Colonoscopy N/A 09/10/2024    Procedure: COLONOSCOPY;  Surgeon: John Puri MD;  Location: [facility] ENDOSCOPY    Knee surgery      Oophorectomy Bilateral     per [employer]:  \"laparoscopic bilateral oophorectomy\"    Other surgical history      Ear surgery    Spine surgery procedure unlisted      for right sciatica issue    Total hip replacement Right     Total knee replacement Right     Tubal ligation      Yag capsulotomy - od - right eye Right 02/12/2014    RJM          Current Outpatient Medications   Medication Sig Dispense Refill    Telmisartan-HCTZ 80-25 MG Oral Tab Take 1 tablet by mouth daily. 90 tablet 3    HYDROcodone-acetaminophen 5-325 MG Oral Tab Take 1 tablet by mouth every 4 (four) hours as needed. 30 tablet 0    Phentermine HCl 30 MG Oral Cap Take 1 capsule (30 mg total) by mouth every morning. For weight loss 90 capsule 1    gabapentin 600 MG Oral Tab Take 1 tablet (600 mg total) by mouth 2 (two) times daily. 180 tablet 3    fluticasone propionate 50 MCG/ACT Nasal Suspension 2 sprays by Nasal route daily. 3 each 2    aspirin 325 MG Oral Tab Take 1 tablet (325 mg total) by mouth daily. 90 tablet 0    SPIRONOLACTONE 25 MG Oral Tab TAKE ONE TABLET BY MOUTH ONE TIME DAILY 90 tablet 3    sertraline 100 MG Oral Tab TAKE ONE TABLET BY MOUTH ONE TIME DAILY 90  tablet 3    rosuvastatin 20 MG Oral Tab Take 1 tablet (20 mg total) by mouth once daily. 90 tablet 3    Cholecalciferol (VITAMIN D3) 3000 units Oral Tab Take 3,000 Units by mouth daily.        Multiple Vitamins-Minerals (PRESERVISION AREDS) Oral Tab Take by mouth.       Allergies:Allergies[1]     Physical Exam:       Physical Exam  Blood pressure 151/82, pulse 80, temperature 97.2 °F (36.2 °C), temperature source Temporal, height 5' 4\" (1.626 m), weight 229 lb, not currently breastfeeding.    Vitals:    11/18/24 0930 11/18/24 0944   BP: 151/82 138/70   Pulse: 80    Temp: 97.2 °F (36.2 °C)    TempSrc: Temporal    Weight: 229 lb    Height: 5' 4\" (1.626 m)         Physical Exam   Constitutional: Patient is oriented to person, place, and time. Patient appears well-developed and well-nourished. No distress.   Cardiovascular: Normal rate, regular rhythm and normal heart sounds.    Pulmonary/Chest: Effort normal and breath sounds normal. No respiratory distress.   Lymphadenopathy: Patient has no cervical adenopathy.  Neurological: Patient is alert and oriented to person, place, and time.   Skin: Skin is warm.   Psychiatry: Normal mood and affect.  No edema of the legs    Vitals reviewed.           Assessment/Plan:      Diagnoses and all orders for this visit:    Polyarthralgia  -     gabapentin 600 MG Oral Tab; Take 1 tablet (600 mg total) by mouth 3 (three) times daily.  -     RHEUMATOLOGY - INTERNAL  We are going to bump up the gabapentin to 3 times daily but I do want her to see rheumatology as she should not be on NSAIDs due to renal insufficiency.  L5-S1 right paracentral mild herniated disc  -     gabapentin 600 MG Oral Tab; Take 1 tablet (600 mg total) by mouth 3 (three) times daily.  Continues to see Dr. Alex Behar  Morbid obesity with BMI of 40.0-44.9, adult (HCC)  Doing very well with weight loss medication and will continue  Binge eating disorder  -     Phentermine HCl 30 MG Oral Cap; Take 1 capsule (30 mg  total) by mouth every morning. For weight loss    Severe obesity (BMI 35.0-35.9 with comorbidity) (HCC)  -     Phentermine HCl 30 MG Oral Cap; Take 1 capsule (30 mg total) by mouth every morning. For weight loss    Renal insufficiency  Has already seen nephrology and has an appointment for next year  HTN (hypertension), benign  Controlled on recheck.      Referrals (if applicable)  Orders Placed This Encounter   Procedures    RHEUMATOLOGY - INTERNAL     Nephrology wants her to be off the meloxicam but she states that one of her medications that really helps her polyarthralgia.  I bumped up her gabapentin to 3 times daily from twice daily.  Do have any other suggestions?     Referral Priority:   Routine     Referral Type:   OFFICE VISIT     Referred to Provider:   Ramin Dominique MD     Requested Specialty:   RHEUMATOLOGY     Number of Visits Requested:   3       Follow up if symptoms persist.  Take medicine (if given) as prescribed.  Approach to treatment discussed and patient/family member understands and agrees to plan.     Return in about 3 months (around 2/18/2025) for Weight followup.    There are no Patient Instructions on file for this visit.    Charito Moon    11/18/2024    By signing my name below, ICharito,  attest that this documentation has been prepared under the direction and in the presence of Anant Mckeon DO.   Electronically Signed: Charito Moon, 11/18/2024, 9:28 AM.    I, Anant Mckeon DO,  personally performed the services described in this documentation. All medical record entries made by the scribe were at my direction and in my presence.  I have reviewed the chart and discharge instructions (if applicable) and agree that the record reflects my personal performance and is accurate and complete.  Anant Mckeon DO, 11/18/2024, 10:25 AM                  [1] No Known Allergies

## 2024-11-20 DIAGNOSIS — M48.061 SPINAL STENOSIS OF LUMBAR REGION, UNSPECIFIED WHETHER NEUROGENIC CLAUDICATION PRESENT: ICD-10-CM

## 2024-11-21 NOTE — TELEPHONE ENCOUNTER
Please review; protocol failed/no protocol.     Requested Prescriptions   Pending Prescriptions Disp Refills    HYDROcodone-acetaminophen 5-325 MG Oral Tab 30 tablet 0     Sig: Take 1 tablet by mouth every 4 (four) hours as needed.       Controlled Substance Medication Failed - 11/20/2024  8:53 PM        Failed - This medication is a controlled substance - forward to provider to refill              Recent Outpatient Visits              [facility]    1 month ago Facet syndrome, lumbar    [facility] Behar, Alex, MD    Telemedicine    1 month ago SK (seborrheic keratosis)    [facility] Laina Dee MD    Office Visit    1 month ago Stage 3a chronic kidney disease (HCC)    [facility] Treasure Bonner MD    Office Visit    2 months ago [facility]al insufficiency    [facility]    [facility]             Future Appointments         Provider Department Appt Notes    In 6 days Behar, Alex, MD [facility] F/u after RFA    In 3 months Ebony Thrasher DO [facility] M25.50 (ICD-10-CM) - 719.49 (ICD-9-CM) - Polyarthralgia    In 3 months Shelby French MD [facility] follow up, previosly with Dr. Bonner

## 2024-11-23 RX ORDER — HYDROCODONE BITARTRATE AND ACETAMINOPHEN 5; 325 MG/1; MG/1
1 TABLET ORAL EVERY 4 HOURS PRN
Qty: 30 TABLET | Refills: 0 | Status: SHIPPED | OUTPATIENT
Start: 2024-11-23

## 2024-11-25 RX ORDER — HYDROCODONE BITARTRATE AND ACETAMINOPHEN 5; 325 MG/1; MG/1
1 TABLET ORAL EVERY 4 HOURS PRN
Qty: 30 TABLET | Refills: 0 | OUTPATIENT
Start: 2024-11-25

## 2024-11-26 ENCOUNTER — TELEPHONE (OUTPATIENT)
Dept: PHYSICAL MEDICINE AND REHAB | Facility: CLINIC | Age: 79
End: 2024-11-26

## 2024-11-26 ENCOUNTER — TELEMEDICINE (OUTPATIENT)
Dept: PHYSICAL MEDICINE AND REHAB | Facility: CLINIC | Age: 79
End: 2024-11-26
Payer: MEDICARE

## 2024-11-26 DIAGNOSIS — M54.59 MECHANICAL LOW BACK PAIN: ICD-10-CM

## 2024-11-26 DIAGNOSIS — M51.370 DEGENERATION OF INTERVERTEBRAL DISC OF LUMBOSACRAL REGION WITH DISCOGENIC BACK PAIN: ICD-10-CM

## 2024-11-26 DIAGNOSIS — I70.90 ATHEROSCLEROSIS: ICD-10-CM

## 2024-11-26 DIAGNOSIS — M48.061 LUMBAR FORAMINAL STENOSIS: ICD-10-CM

## 2024-11-26 DIAGNOSIS — M51.372 DEGENERATION OF INTERVERTEBRAL DISC OF LUMBOSACRAL REGION WITH DISCOGENIC BACK PAIN AND LOWER EXTREMITY PAIN: ICD-10-CM

## 2024-11-26 DIAGNOSIS — M51.369 BULGE OF LUMBAR DISC WITHOUT MYELOPATHY: ICD-10-CM

## 2024-11-26 DIAGNOSIS — E78.2 MIXED HYPERLIPIDEMIA: ICD-10-CM

## 2024-11-26 DIAGNOSIS — M47.816 FACET SYNDROME, LUMBAR: Primary | ICD-10-CM

## 2024-11-26 DIAGNOSIS — M79.10 MYALGIA: ICD-10-CM

## 2024-11-26 DIAGNOSIS — M48.062 SPINAL STENOSIS OF LUMBAR REGION WITH NEUROGENIC CLAUDICATION: ICD-10-CM

## 2024-11-26 DIAGNOSIS — M47.816 LUMBAR SPONDYLOSIS: ICD-10-CM

## 2024-11-26 PROCEDURE — 99214 OFFICE O/P EST MOD 30 MIN: CPT | Performed by: PHYSICAL MEDICINE & REHABILITATION

## 2024-11-26 NOTE — PATIENT INSTRUCTIONS
1) My office will call you to schedule the bilateral L3-L4, L4-L5, and L5-S1 facet joint injections under local anesthesia once the procedure is approved by your insurance carrier.    2) if symptoms continue, then I would recommend a caudal epidural steroid injection to try to target the nerves that are follow-up with me 2 weeks after the procedure.  Compressed at your level of stenosis of L3-L4.  3) Tylenol 500-1000 mg every 6-8 hours as needed for pain.  No more than 3000 mg daily.

## 2024-11-26 NOTE — PROGRESS NOTES
[facility]  Video Visit Progress Note  CHIEF COMPLAINT:    Chief Complaint   Patient presents with    Injection    Low Back Pain    Imaging       History of Present Illness:  The patient is a 79 year old  RIGHT handed female with significant past medical history of HTN and HLD who presents with bilateral low back pain without radicular symptoms.  She is status post bilateral L4-L5 and L5-S1 medial branch radiofrequency ablation on 11/6/2024 after completing 2 medial branch blocks that were both confirmatory.  She continues to have 7-8 out of 10 pain with standing and rates her pain a 0 out of 10 while sitting.  She denies any radicular symptoms including paresthesias or weakness.  She denies any radicular pain.    PAST MEDICAL HISTORY:  Past Medical History:    Anxiety state    Back problem    Hx herniated discs    Calculus of kidney    Deaf    Left ear    Diverticulosis    Hearing loss    High blood pressure    High cholesterol    History of shingles    1980's    Meniere disease    Morbid obesity with BMI of 40.0-44.9, adult (HCC)    Osteoarthritis    Pneumonia due to organism    as an infant    SCC (squamous cell carcinoma)    right jawline    Tinnitus    Visual impairment    reading glasses       SURGICAL HISTORY:  Past Surgical History:   Procedure Laterality Date    Cataract extraction w/  intraocular lens implant Right 09/12/2011    RJM    Cholecystectomy      Colonoscopy N/A 10/01/2019    Procedure: COLONOSCOPY;  Surgeon: John Puri MD;  Location: [facility] ENDOSCOPY    Colonoscopy N/A 09/10/2024    Procedure: COLONOSCOPY;  Surgeon: John Puri MD;  Location: [facility] ENDOSCOPY    Knee surgery      Oophorectomy Bilateral     per [employer]:  \"laparoscopic bilateral oophorectomy\"    Other surgical history      Ear surgery    Spine surgery procedure unlisted      for right sciatica issue    Total hip replacement Right     Total knee replacement  Right     Tubal ligation      Yag capsulotomy - od - right eye Right 2014    RJM       SOCIAL HISTORY:   Social History     Occupational History    Not on file   Tobacco Use    Smoking status: Former     Current packs/day: 0.00     Types: Cigarettes     Start date: 1966     Quit date: 2002     Years since quittin.9    Smokeless tobacco: Never   Vaping Use    Vaping status: Never Used   Substance and Sexual Activity    Alcohol use: Yes     Alcohol/week: 0.0 standard drinks of alcohol     Comment: social    Drug use: No    Sexual activity: Not on file       FAMILY HISTORY:   Family History   Problem Relation Age of Onset    Diabetes Mother     Hypertension Mother     Other (colon surgery) Father     Other (hx of SC ( unknown type)) Father     Diabetes Paternal Grandmother     Cancer Self     Glaucoma Neg         No Family h/o Glaucoma    Macular degeneration Neg         No Family h/o Macular degeneration    Breast Cancer Neg     Ovarian Cancer Neg        CURRENT MEDICATIONS:   Current Outpatient Medications   Medication Sig Dispense Refill    HYDROcodone-acetaminophen 5-325 MG Oral Tab Take 1 tablet by mouth every 4 (four) hours as needed. 30 tablet 0    gabapentin 600 MG Oral Tab Take 1 tablet (600 mg total) by mouth 3 (three) times daily. 270 tablet 2    Phentermine HCl 30 MG Oral Cap Take 1 capsule (30 mg total) by mouth every morning. For weight loss 90 capsule 1    Telmisartan-HCTZ 80-25 MG Oral Tab Take 1 tablet by mouth daily. 90 tablet 3    fluticasone propionate 50 MCG/ACT Nasal Suspension 2 sprays by Nasal route daily. 3 each 2    aspirin 325 MG Oral Tab Take 1 tablet (325 mg total) by mouth daily. 90 tablet 0    SPIRONOLACTONE 25 MG Oral Tab TAKE ONE TABLET BY MOUTH ONE TIME DAILY 90 tablet 3    sertraline 100 MG Oral Tab TAKE ONE TABLET BY MOUTH ONE TIME DAILY 90 tablet 3    rosuvastatin 20 MG Oral Tab Take 1 tablet (20 mg total) by mouth once daily. 90 tablet 3    Cholecalciferol  (VITAMIN D3) 3000 units Oral Tab Take 3,000 Units by mouth daily.        Multiple Vitamins-Minerals (PRESERVISION AREDS) Oral Tab Take by mouth.         ALLERGIES:   Allergies[1]    REVIEW OF SYSTEMS:   Review of Systems   Constitutional: Negative.    HENT: Negative.    Eyes: Negative.    Respiratory: Negative.    Cardiovascular: Negative.    Gastrointestinal: Negative.    Genitourinary: Negative.    Musculoskeletal: As per HPI  Skin: Negative.    Neurological: As per HPI  Endo/Heme/Allergies: Negative.    Psychiatric/Behavioral: Negative.      All other systems reviewed and are negative. Pertinent positives and negatives noted in the HPI.        PHYSICAL EXAM:     There is no height or weight on file to calculate BMI.    General: No immediate distress  Head: Normocephalic/ Atraumatic  Eyes: Extra-occular movements intact  Ears/Nose/Throat:  External appearance identifies normal appearance without obvious deformity  Cardiovascular: No cyanosis, clubbing or edema  Respiratory: Non-labored respirations  Skin: No lesions noted   Neurological: alert & oriented x 3, attentive, able to follow commands, comprehention intact, spontaneous speech intact  Psychiatric: Mood and affect appropriate        Data  Admission on 09/10/2024, Discharged on 09/10/2024   Component Date Value Ref Range Status    Case Report 09/10/2024    Final                    Value:Surgical Pathology                                Case: [id number]                                  Authorizing Provider:  John Puri,  Collected:           09/10/2024 09:47 AM                                 MD                                                                           Ordering Location:     [facility]          Received:            09/10/2024 11:35 AM                                 [facility]                                                         Pathologist:           Yaakov Laboy MD                                                              Specimens:   A) - Colon biopsy, random colon biopsies                                                            B) - Colon ascending, polyp x1                                                                      C) - Sigmoid colon, polyp x1                                                               Final Diagnosis: 09/10/2024    Final                    Value:                          A. Random colon; biopsy:                          Colonic mucosa with no significant pathologic changes.                          No granulomas, active colitis, dysplasia or changes associated with                           chronicity identified.                                                    B. Ascending colon polyp x1:                          Polypoid fragments of colonic mucosa with features suggestive of tubular                           adenoma.                                                    C. Sigmoid colon polyp x1:                           Tubular adenoma.                              Clinical Information 09/10/2024    Final                    Value:Z80.0 Family History Of Colon Cancer.  Z86.010 Hx Of Colonic Polyps.       Gross Description 09/10/2024    Final                    Value:A.  The specimen is received in formalin labeled \"Chasity, random colon                           biopsies\" and consists of multiple fragments of pink-tan soft tissue                           measuring in aggregate 1.0 x 0.6 x 0.2 cm. The specimen is submitted                           entirely in one cassette.                                                    B.  The specimen is received in formalin labeled \"Chasity, ascending                           colon polyp x 1\" and consists of four fragments of pink-tan soft tissue                           measuring in aggregate 0.6 x 0.4 x 0.2 cm. The specimen is submitted                           entirely in one cassette.                                                     C.  The specimen is received in formalin labeled \"Chasity, sigmoid colon                           polyp x 1\" and consists of one fragment of pink-tan soft tissue measuring                           0.4 cm in greatest dimension. The specimen is submitted entirely in one                           cassette.  ([facility])                                                    Yaakov Laboy M.D.                              Interpretation 09/10/2024 Benign    Final   Admission on 07/24/2024, Discharged on 07/24/2024   Component Date Value Ref Range Status    WBC IC 07/24/2024 9.8  4.0 - 11.0 x10ˆ3/uL Final    RBC IC 07/24/2024 3.60 (L)  3.80 - 5.30 X10ˆ6/uL Final    HGB IC 07/24/2024 11.4 (L)  12.0 - 16.0 g/dL Final    HCT IC 07/24/2024 34.2 (L)  35.0 - 48.0 % Final    MCV IC 07/24/2024 95.0  80.0 - 100.0 fL Final    MCH IC 07/24/2024 31.7  26.0 - 34.0 pg Final    MCHC IC 07/24/2024 33.3  31.0 - 37.0 g/dL Final    PLT IC 07/24/2024 250.0  150.0 - 450.0 X10ˆ3/uL Final    # Neutrophil 07/24/2024 7.1  1.5 - 7.7 X10ˆ3/uL Final    # Lymphocyte 07/24/2024 1.9  1.0 - 4.0 X10ˆ3/uL Final    # Mixed Cells 07/24/2024 0.8  0.1 - 1.0 X10ˆ3/uL Final    Neutrophil % 07/24/2024 72.0  % Final    Lymphocyte % 07/24/2024 19.8  % Final    Mixed Cell % 07/24/2024 8.2  % Final    Urine Color 07/24/2024 Yellow  Yellow Final    Urine Clarity 07/24/2024 Cloudy (A)  Clear Final    Specific Gravity, Urine 07/24/2024 1.015  1.005 - 1.030 Final    PH, Urine 07/24/2024 6.5  5.0 - 8.0 Final    Protein urine 07/24/2024 Negative  Negative mg/dL Final    Glucose, Urine 07/24/2024 Negative  Negative mg/dL Final    Ketone, Urine 07/24/2024 Negative  Negative mg/dL Final    Bilirubin, Urine 07/24/2024 Negative  Negative Final    Blood, Urine 07/24/2024 Negative  Negative Final    Nitrite Urine 07/24/2024 Negative  Negative Final    Urobilinogen urine 07/24/2024 <2.0  <2.0 mg/dL Final    Leukocyte esterase urine 07/24/2024 Negative  Negative Final    ISTAT  Sodium 07/24/2024 139  136 - 145 mmol/L Final    ISTAT BUN 07/24/2024 28 (H)  7 - 18 mg/dL Final    ISTAT Potassium 07/24/2024 4.8  3.6 - 5.1 mmol/L Final    ISTAT Chloride 07/24/2024 103  98 - 112 mmol/L Final    ISTAT Ionized Calcium 07/24/2024 1.26  1.12 - 1.32 mmol/L Final    ISTAT Hematocrit 07/24/2024 36  34 - 50 % Final    ISTAT Glucose 07/24/2024 105 (H)  70 - 99 mg/dL Final    ISTAT TCO2 07/24/2024 30  21 - 32 mmol/L Final    ISTAT Creatinine 07/24/2024 1.20 (H)  0.55 - 1.02 mg/dL Final    eGFR-Cr 07/24/2024 47 (L)  >=60 mL/min/1.73m2 Final   [facility] Lab Encounter on 07/13/2024   Component Date Value Ref Range Status    Glucose 07/13/2024 105 (H)  70 - 99 mg/dL Final    Sodium 07/13/2024 143  136 - 145 mmol/L Final    Potassium 07/13/2024 4.9  3.5 - 5.1 mmol/L Final    Chloride 07/13/2024 108  98 - 112 mmol/L Final    CO2 07/13/2024 29.0  21.0 - 32.0 mmol/L Final    Anion Gap 07/13/2024 6  0 - 18 mmol/L Final    BUN 07/13/2024 27 (H)  9 - 23 mg/dL Final    Creatinine 07/13/2024 1.17 (H)  0.55 - 1.02 mg/dL Final    BUN/CREA Ratio 07/13/2024 23.1 (H)  10.0 - 20.0 Final    Calcium, Total 07/13/2024 9.8  8.7 - 10.4 mg/dL Final    Calculated Osmolality 07/13/2024 301 (H)  275 - 295 mOsm/kg Final    eGFR-Cr 07/13/2024 48 (L)  >=60 mL/min/1.73m2 Final    ALT 07/13/2024 17  10 - 49 U/L Final    AST 07/13/2024 24  <=34 U/L Final    Alkaline Phosphatase 07/13/2024 88  55 - 142 U/L Final    Bilirubin, Total 07/13/2024 0.8  0.2 - 1.1 mg/dL Final    Total Protein 07/13/2024 7.7  5.7 - 8.2 g/dL Final    Albumin 07/13/2024 4.8  3.2 - 4.8 g/dL Final    Globulin  07/13/2024 2.9  2.0 - 3.5 g/dL Final    A/G Ratio 07/13/2024 1.7  1.0 - 2.0 Final    Patient Fasting for CMP? 07/13/2024 Yes   Final    Cholesterol, Total 07/13/2024 160  <200 mg/dL Final    HDL Cholesterol 07/13/2024 56  40 - 59 mg/dL Final    Triglycerides 07/13/2024 61  30 - 149 mg/dL Final    LDL Cholesterol 07/13/2024 92  <100 mg/dL Final    VLDL 07/13/2024 10  0  - 30 mg/dL Final    Non HDL Chol 07/13/2024 104  <130 mg/dL Final    Patient Fasting for Lipid? 07/13/2024 Yes   Final    TSH 07/13/2024 2.505  0.550 - 4.780 mIU/mL Final    WBC 07/13/2024 7.4  4.0 - 11.0 x10(3) uL Final    RBC 07/13/2024 3.42 (L)  3.80 - 5.30 x10(6)uL Final    HGB 07/13/2024 11.3 (L)  12.0 - 16.0 g/dL Final    HCT 07/13/2024 33.9 (L)  35.0 - 48.0 % Final    MCV 07/13/2024 99.1  80.0 - 100.0 fL Final    MCH 07/13/2024 33.0  26.0 - 34.0 pg Final    MCHC 07/13/2024 33.3  31.0 - 37.0 g/dL Final    RDW-SD 07/13/2024 48.2 (H)  35.1 - 46.3 fL Final    RDW 07/13/2024 13.2  11.0 - 15.0 % Final    PLT 07/13/2024 237.0  150.0 - 450.0 10(3)uL Final    Neutrophil Absolute Prelim 07/13/2024 5.12  1.50 - 7.70 x10 (3) uL Final    Neutrophil Absolute 07/13/2024 5.12  1.50 - 7.70 x10(3) uL Final    Lymphocyte Absolute 07/13/2024 1.26  1.00 - 4.00 x10(3) uL Final    Monocyte Absolute 07/13/2024 0.62  0.10 - 1.00 x10(3) uL Final    Eosinophil Absolute 07/13/2024 0.25  0.00 - 0.70 x10(3) uL Final    Basophil Absolute 07/13/2024 0.10  0.00 - 0.20 x10(3) uL Final    Immature Granulocyte Absolute 07/13/2024 0.03  0.00 - 1.00 x10(3) uL Final    Neutrophil % 07/13/2024 69.3  % Final    Lymphocyte % 07/13/2024 17.1  % Final    Monocyte % 07/13/2024 8.4  % Final    Eosinophil % 07/13/2024 3.4  % Final    Basophil % 07/13/2024 1.4  % Final    Immature Granulocyte % 07/13/2024 0.4  % Final    Vitamin D, 25OH, Total 07/13/2024 50.9  30.0 - 100.0 ng/mL Final    Ventricular rate 07/13/2024 57  BPM Final    Atrial rate 07/13/2024 57  BPM Final    P-R Interval 07/13/2024 168  ms Final    QRS Duration 07/13/2024 76  ms Final    Q-T Interval 07/13/2024 410  ms Final    QTC Calculation (Bezet) 07/13/2024 399  ms Final    P Axis 07/13/2024 37  degrees Final    R Axis 07/13/2024 9  degrees Final    T Axis 07/13/2024 43  degrees Final   ]      Radiology Imaging:  I reviewed with the patient her MRI of the lumbar spine from 10/29/2024  MRI  SPINE LUMBAR (CPT=72148)  Narrative: PROCEDURE: MRI SPINE LUMBAR (CPT=72148)     COMPARISON: [facility], XR LUMBAR SPINE AP LAT FLEX EXT (CPT=72110), 8/23/2024, 8:16 AM.  [facility], CT ABDOMEN + PELVIS (CONTRAST ONLY) (CPT=74177), 7/24/2024, 12:41 PM.  [facility], MRI SPINE LUMBAR   (CPT=72148), 8/28/2017, 2:49 PM.  [facility], MRI SPINE LUMBAR (CPT=72148), 4/17/2021, 9:23 AM.     INDICATIONS: M51.369 Bulge of lumbar disc without myelopathy M51.379 DDD (degenerative disc disease), lumbosacral M48.061 Lumbar foraminal stenosis M48.062 Spinal stenosis *     TECHNIQUE: A variety of imaging planes and parameters were utilized for visualization of suspected pathology.     FINDINGS:   NUMERATION: There are 5 lumbar type vertebrae.  LORDOSIS: The lumbar lordosis is preserved.  ALIGNMENT: There is stable moderate dextroscoliosis to the lumbar spine.  The lumbar spine is otherwise well aligned.  BONES: No fracture or suspicious osseous lesion is evident.  There are stable hemangiomas in the T12, L1, L2 and L5 vertebral bodies.  CORD/CAUDA EQUINA: The conus medullaris terminates at the level of the L1-L2 disc space. The distal cord and nerve roots have normal caliber, contour, and signal intensity.  DISCS: Disc desiccation and loss of disc space height are seen at multiple levels with scattered associated Schmorl's nodes and mild endplate degenerative signal changes.  PARASPINAL AREA: No visible mass.    OTHER:             Negative.     LUMBAR DISC LEVELS:  L1-L2: There is a stable mild asymmetric generalized circumferential disc bulge, left greater than right, with ligamentum flavum laxity and dorsal epidural lipomatosis.  There is stable mild central vertebral canal stenosis with left neural foraminal   narrowing.  L2-L3: There is a stable asymmetric generalized circumferential disc bulge, left greater than right, with ligamentum flavum laxity, dorsal epidural  lipomatosis and mild bilateral facet joint hypertrophy.  There is stable mild central vertebral canal   stenosis with left neural foraminal stenosis.  L3-L4: There is a stable generalized circumferential disc bulge with ligamentum flavum laxity, dorsal epidural lipomatosis and moderate bilateral facet joint hypertrophy.  There is stable moderate central vertebral canal stenosis with bilateral lateral   recess stenosis and bilateral neural foraminal stenosis.  L4-L5: There is a stable asymmetric generalized circumferential disc bulge, right greater than left, with ligamentum flavum laxity and mild bilateral facet joint hypertrophy.  There is stable mild central vertebral canal stenosis and right neural   foraminal stenosis.  L5-S1: There is a stable asymmetric generalized circumferential disc bulge, right greater than left, with mild bilateral facet joint hypertrophy.  There is no central vertebral canal stenosis.  There is stable right neural foraminal stenosis and milder   left neural foraminal narrowing.              Impression: CONCLUSION:   1. Moderate dextroscoliosis again noted to the lumbar spine with moderate to severe multilevel spondylosis and greatest involvement of the mid lumbar spine, all grossly stable compared to the 04/17/2021 MRI.  Significant levels detailed below:     2. L1-L2:  Stable mild central vertebral canal stenosis and left neural foraminal narrowing.  3. L2-L3:  Stable mild central vertebral canal stenosis and left neural foraminal stenosis.  4. L3-L4:  Stable moderate central vertebral canal stenosis, bilateral lateral recess stenosis and bilateral neural foraminal stenosis.  5. L4-L5:  Stable mild central vertebral canal stenosis and right neural foraminal stenosis.  6. L5-S1:  Stable right neural foraminal stenosis and mild left neural foraminal narrowing.             Dictated by (CST): Julio Sutton MD on 10/29/2024 at 3:13 PM       Finalized by (UMA): Julio Sutton MD on  10/29/2024 at 3:22 PM              ASSESSMENT AND PLAN:  Mari is a pleasant 79-year-old female presents for follow-up of her continued bilateral low back pain without radicular symptoms which are aggravated by standing and walking and improved by sitting.  Her symptoms may be related to the severe facet arthropathy and spondylosis noted throughout her lumbar spine primarily at L3-L4, L4-L5, L5-S1 where she has severe facet hypertrophy.  I am recommending bilateral L3-L4, L4-L5, and L5-S1 facet joint injections under local anesthesia.  If that is not helpful, then I would recommend a caudal epidural steroid injection as she does have moderate stenosis at L3-L4 leading to compression of the L4, L5, and S1 nerve roots..  She will follow-up with me 2 weeks after the procedure.       RTC in 2 weeks after procedure  Discharge Instructions were provided as documented in AVS summary.  The patient was in agreement with the assessment and plan.  All questions were answered.  There were no barriers to learning.         1. Facet syndrome, lumbar    2. Myalgia    3. Mechanical low back pain    4. Lumbar spondylosis    5. Spinal stenosis of lumbar region with neurogenic claudication    6. Lumbar foraminal stenosis    7. Degeneration of intervertebral disc of lumbosacral region with discogenic back pain    8. Bulge of lumbar disc without myelopathy    9. Degeneration of intervertebral disc of lumbosacral region with discogenic back pain and lower extremity pain        Alex B. Behar MD  Physical Medicine and Rehabilitation/Sports Medicine  [facility]         [1] No Known Allergies

## 2024-11-26 NOTE — TELEPHONE ENCOUNTER
This will be fourth (53760 and 39392) procedure.  Estimated body mass index is 39.31 kg/m² as calculated from the following:    Height as of 11/18/24: 64\".    Weight as of 11/18/24: 229 lb.  BMI > 35.   Patient has been scheduled for bilateral L3-4 and L4-5 facet joint injections on 12/4/24 at the [facility] with Dr. Behar.   Anesthesia type:  Local  Please note: The [facility] will call the business day prior to discuss the exact time/arrival and additional instructions for your appointment.  Patient was advised that if he/she does receive the covid vaccine it needs to be at least 2 weeks before or after the injection.  Medications and allergies reviewed.  Educated to hold NSAIDS (Aleve, Ibuprofen, Motrin, Advil) and anti-inflammatories (Meloxicam, Naproxen, Diclofenac, Celebrex) and for cervical injections must hold Multi-Vitamins, Vitamin E, Fish Oil/Omega-3.  Patient informed of [facility]'s  policy:  The patient will require transportation arrangements to and from the procedure, with the  present on site for the entire visit.  Without a , the appointment is subject to cancellation.    [facility] is located in the [facility] 1st floor [address], [place] [address].   may park in the yellow/purple parking lot.  Patient verbalized understanding and agrees with plan.  Scheduled in Epic: Yes  Scheduled in Surgical Case: Yes  Follow up appointment made: NOV:Patient will call/msg 24hrs post procedure for a condition update.

## 2024-11-26 NOTE — TELEPHONE ENCOUNTER
Per CMS Guidelines -no authorization is required for Bilateral L3-L4 and L4-L5 facet joint injections   CPT codes: 08113-14, 19544 x's2       Status: Authorization is not required based on medical necessity however is not a guarantee of payment and may be subject to review once claim is submitted-Covered Benefit.  Per CMS Guidelines-One to two levels, either unilateral or bilateral, are allowed per session per spine region. The need for a three or four-level procedure bilaterally may be considered under unique circumstances and with sufficient documentation of medical necessity on appeal. A session is a time period, which includes all procedures (i.e., medial branch block (MBB), intraarticular injections (IA), facet cyst ruptures, and RFA ablations that are performed during the same day. Frequency Limitation: For each covered spinal region no more than two (2) radiofrequency sessions will be reimbursed per rolling 12 months.      This will be #4 in a rolling 12 month period

## 2024-11-29 DIAGNOSIS — I10 ESSENTIAL HYPERTENSION: ICD-10-CM

## 2024-11-29 RX ORDER — ROSUVASTATIN CALCIUM 20 MG/1
20 TABLET, COATED ORAL DAILY
Qty: 90 TABLET | Refills: 3 | Status: SHIPPED | OUTPATIENT
Start: 2024-11-29

## 2024-11-29 NOTE — TELEPHONE ENCOUNTER
Refill Passed Per Protocol    Requested Prescriptions   Pending Prescriptions Disp Refills    ROSUVASTATIN 20 MG Oral Tab [Pharmacy Med Name: Rosuvastatin Calcium 20 Mg Tab Nort] 90 tablet 0     Sig: TAKE ONE TABLET BY MOUTH ONE TIME DAILY       Cholesterol Medication Protocol Passed - 11/29/2024 12:56 AM        Passed - ALT < 80     Lab Results   Component Value Date    ALT 17 07/13/2024             Passed - ALT resulted within past year        Passed - Lipid panel within past 12 months     Lab Results   Component Value Date    CHOLEST 160 07/13/2024    TRIG 61 07/13/2024    HDL 56 07/13/2024    LDL 92 07/13/2024    VLDL 10 07/13/2024    NONHDLC 104 07/13/2024             Passed - In person appointment or virtual visit in the past 12 mos or appointment in next 3 mos     Recent Outpatient Visits              3 days ago Facet syndrome, lumbar    [facility] Behar, Alex, MD    Telemedicine    1 week ago [facility]    [facility]    2 months ago Facet syndrome, lumbar    [facility] Behar, Alex, MD    Telemedicine    2 months ago SK (seborrheic keratosis)    [facility] Laina Dee MD    Office Visit    2 months ago Stage 3a chronic kidney disease (HCC)    [facility] Treasure Bonner MD    Office Visit          Future Appointments         Provider Department Appt Notes    In 5 days Behar, Alex, MD [facility]: bilateral L3-4 and L4-5 facet joint injections- local    In 2 months Ebony Thrasher DO [facility] M25.50 (ICD-10-CM) - 719.49 (ICD-9-CM) - Polyarthralgia    In 3 months Shelby French MD [facility] follow up, previosly with Dr. Bonner                          Future Appointments         Provider Department Appt Notes    In 5 days Behar, Alex, MD [facility]: bilateral L3-4 and L4-5 facet joint injections- local    In 2 months Ebony Thrasher DO [facility] M25.50 (ICD-10-CM) - 719.49 (ICD-9-CM) - Polyarthralgia    In 3 months Shelby French MD [facility] follow up, previosly with Dr. Bonner          Recent Outpatient Visits              3 days ago Facet syndrome, lumbar    [facility] Behar, Alex, MD    Telemedicine    1 week ago Polyarthralgia    [facility], Anant Ford DO    Office Visit    2 months ago Facet syndrome, lumbar    [facility] Behar, Alex, MD    Telemedicine    2 months ago SK (seborrheic keratosis)    [facility] Laina Dee MD    Office Visit    2 months ago Stage 3a chronic kidney disease (HCC)    [facility] Treasure Bonner MD    Office Visit

## 2024-12-02 RX ORDER — TELMISARTAN AND HYDROCHLORTHIAZIDE 80; 25 MG/1; MG/1
1 TABLET ORAL DAILY
Qty: 90 TABLET | Refills: 0 | OUTPATIENT
Start: 2024-12-02

## 2024-12-11 DIAGNOSIS — I10 ESSENTIAL HYPERTENSION: ICD-10-CM

## 2024-12-13 DIAGNOSIS — I10 ESSENTIAL HYPERTENSION: ICD-10-CM

## 2024-12-16 DIAGNOSIS — I10 ESSENTIAL HYPERTENSION: ICD-10-CM

## 2024-12-16 RX ORDER — TELMISARTAN AND HYDROCHLORTHIAZIDE 80; 25 MG/1; MG/1
1 TABLET ORAL DAILY
Qty: 90 TABLET | Refills: 3 | Status: SHIPPED | OUTPATIENT
Start: 2024-12-16

## 2024-12-16 RX ORDER — TELMISARTAN AND HYDROCHLORTHIAZIDE 80; 25 MG/1; MG/1
1 TABLET ORAL DAILY
Qty: 90 TABLET | Refills: 3 | OUTPATIENT
Start: 2024-12-16

## 2024-12-16 RX ORDER — TELMISARTAN AND HYDROCHLORTHIAZIDE 80; 25 MG/1; MG/1
1 TABLET ORAL DAILY
Qty: 90 TABLET | Refills: 0 | OUTPATIENT
Start: 2024-12-16

## 2024-12-16 NOTE — TELEPHONE ENCOUNTER
Per [facility] pharmacy still has not received despite receipt confirmation    E-Prescribing Status: Receipt confirmed by pharmacy (12/16/2024  1:43 PM CST)     Contacted pharmacy and verbal given as they did not receive

## 2024-12-16 NOTE — TELEPHONE ENCOUNTER
Please review pended refill request as unable to refill due to High / Very High drug interaction or warning copied here:    High  Drug-Drug: spironolactone and Telmisartan-HCTZThe risk of hyperkalemia may be increased when potassium-sparing diuretics are co-administered with angiotensin II receptor antagonists.      Called [facility], they did not get prescription we sent 10/20/24. Sending again.     Disp Refills Start End    Telmisartan-HCTZ 80-25 MG Oral Tab 90 tablet 3 10/20/2024 --    Sig - Route: Take 1 tablet by mouth daily. - Oral    Sent to pharmacy as: Telmisartan-HCTZ 80-25 MG Oral Tablet (MICARDIS HCT)    E-Prescribing Status: Receipt confirmed by pharmacy (10/20/2024  1:12 PM CDT)    Renewals    Renewal provider: Flor Sandoval MD       Associated Diagnoses  Essential hypertension      Pharmacy  [id number] - [address] [phone], [phone]

## 2024-12-21 ENCOUNTER — APPOINTMENT (OUTPATIENT)
Dept: CT IMAGING | Facility: HOSPITAL | Age: 79
End: 2024-12-21
Attending: EMERGENCY MEDICINE
Payer: MEDICARE

## 2024-12-21 ENCOUNTER — HOSPITAL ENCOUNTER (EMERGENCY)
Facility: HOSPITAL | Age: 79
Discharge: HOME | End: 2024-12-21
Attending: EMERGENCY MEDICINE
Payer: MEDICARE

## 2024-12-21 VITALS
WEIGHT: 222 LBS | RESPIRATION RATE: 19 BRPM | HEIGHT: 65 IN | BODY MASS INDEX: 36.99 KG/M2 | SYSTOLIC BLOOD PRESSURE: 116 MMHG | HEART RATE: 72 BPM | DIASTOLIC BLOOD PRESSURE: 56 MMHG | OXYGEN SATURATION: 92 % | TEMPERATURE: 98 F

## 2024-12-21 DIAGNOSIS — K57.92 ACUTE DIVERTICULITIS: Primary | ICD-10-CM

## 2024-12-21 DIAGNOSIS — K31.9 GASTRIC LESION: ICD-10-CM

## 2024-12-21 LAB
ALBUMIN SERPL-MCNC: 4.9 G/DL (ref 3.2–4.8)
ALBUMIN/GLOB SERPL: 1.9 {RATIO} (ref 1–2)
ALP LIVER SERPL-CCNC: 98 U/L
ALT SERPL-CCNC: 22 U/L
ANION GAP SERPL CALC-SCNC: 6 MMOL/L (ref 0–18)
AST SERPL-CCNC: 20 U/L (ref ?–34)
BASOPHILS # BLD AUTO: 0.09 X10(3) UL (ref 0–0.2)
BASOPHILS NFR BLD AUTO: 0.6 %
BILIRUB SERPL-MCNC: 0.8 MG/DL (ref 0.2–1.1)
BILIRUB UR QL: NEGATIVE
BUN BLD-MCNC: 30 MG/DL (ref 9–23)
BUN/CREAT SERPL: 22.1 (ref 10–20)
CALCIUM BLD-MCNC: 10.1 MG/DL (ref 8.7–10.4)
CHLORIDE SERPL-SCNC: 106 MMOL/L (ref 98–112)
CLARITY UR: CLEAR
CO2 SERPL-SCNC: 26 MMOL/L (ref 21–32)
COLOR UR: COLORLESS
CREAT BLD-MCNC: 1.36 MG/DL
DEPRECATED RDW RBC AUTO: 49.1 FL (ref 35.1–46.3)
EGFRCR SERPLBLD CKD-EPI 2021: 40 ML/MIN/1.73M2 (ref 60–?)
EOSINOPHIL # BLD AUTO: 0.1 X10(3) UL (ref 0–0.7)
EOSINOPHIL NFR BLD AUTO: 0.7 %
ERYTHROCYTE [DISTWIDTH] IN BLOOD BY AUTOMATED COUNT: 13.7 % (ref 11–15)
GLOBULIN PLAS-MCNC: 2.6 G/DL (ref 2–3.5)
GLUCOSE BLD-MCNC: 119 MG/DL (ref 70–99)
GLUCOSE UR-MCNC: NORMAL MG/DL
HCT VFR BLD AUTO: 35.1 %
HGB BLD-MCNC: 11.6 G/DL
HGB UR QL STRIP.AUTO: NEGATIVE
IMM GRANULOCYTES # BLD AUTO: 0.06 X10(3) UL (ref 0–1)
IMM GRANULOCYTES NFR BLD: 0.4 %
KETONES UR-MCNC: NEGATIVE MG/DL
LEUKOCYTE ESTERASE UR QL STRIP.AUTO: 250
LYMPHOCYTES # BLD AUTO: 1.45 X10(3) UL (ref 1–4)
LYMPHOCYTES NFR BLD AUTO: 9.6 %
MCH RBC QN AUTO: 32.2 PG (ref 26–34)
MCHC RBC AUTO-ENTMCNC: 33 G/DL (ref 31–37)
MCV RBC AUTO: 97.5 FL
MONOCYTES # BLD AUTO: 1.06 X10(3) UL (ref 0.1–1)
MONOCYTES NFR BLD AUTO: 7 %
NEUTROPHILS # BLD AUTO: 12.4 X10 (3) UL (ref 1.5–7.7)
NEUTROPHILS # BLD AUTO: 12.4 X10(3) UL (ref 1.5–7.7)
NEUTROPHILS NFR BLD AUTO: 81.7 %
NITRITE UR QL STRIP.AUTO: NEGATIVE
OSMOLALITY SERPL CALC.SUM OF ELEC: 293 MOSM/KG (ref 275–295)
PH UR: 6.5 [PH] (ref 5–8)
PLATELET # BLD AUTO: 348 10(3)UL (ref 150–450)
POTASSIUM SERPL-SCNC: 4 MMOL/L (ref 3.5–5.1)
PROT SERPL-MCNC: 7.5 G/DL (ref 5.7–8.2)
PROT UR-MCNC: NEGATIVE MG/DL
RBC # BLD AUTO: 3.6 X10(6)UL
SODIUM SERPL-SCNC: 138 MMOL/L (ref 136–145)
SP GR UR STRIP: >1.03 (ref 1–1.03)
UROBILINOGEN UR STRIP-ACNC: NORMAL
WBC # BLD AUTO: 15.2 X10(3) UL (ref 4–11)

## 2024-12-21 PROCEDURE — 87186 SC STD MICRODIL/AGAR DIL: CPT | Performed by: EMERGENCY MEDICINE

## 2024-12-21 PROCEDURE — 96360 HYDRATION IV INFUSION INIT: CPT

## 2024-12-21 PROCEDURE — 85025 COMPLETE CBC W/AUTO DIFF WBC: CPT | Performed by: EMERGENCY MEDICINE

## 2024-12-21 PROCEDURE — 87086 URINE CULTURE/COLONY COUNT: CPT | Performed by: EMERGENCY MEDICINE

## 2024-12-21 PROCEDURE — 99285 EMERGENCY DEPT VISIT HI MDM: CPT

## 2024-12-21 PROCEDURE — 81001 URINALYSIS AUTO W/SCOPE: CPT | Performed by: EMERGENCY MEDICINE

## 2024-12-21 PROCEDURE — 74177 CT ABD & PELVIS W/CONTRAST: CPT | Performed by: EMERGENCY MEDICINE

## 2024-12-21 PROCEDURE — 80053 COMPREHEN METABOLIC PANEL: CPT | Performed by: EMERGENCY MEDICINE

## 2024-12-21 PROCEDURE — 99284 EMERGENCY DEPT VISIT MOD MDM: CPT

## 2024-12-21 PROCEDURE — 87077 CULTURE AEROBIC IDENTIFY: CPT | Performed by: EMERGENCY MEDICINE

## 2024-12-21 NOTE — ED INITIAL ASSESSMENT (HPI)
Pt presents for left flank pain for the last three days, states woke her from sleep. Pt denies fevers. Pt denies urinary complaints.

## 2024-12-21 NOTE — ED PROVIDER NOTES
Patient Seen in: [facility] Emergency Department      History     Chief Complaint   Patient presents with    Abdomen/Flank Pain     Stated Complaint: Flank Pain    Subjective:   HPI      79-year-old female presents for evaluation for abdominal pain.  Patient reports that the pain began 3 days ago but worsened overnight and woke her from her sleep.  Pain has been left-sided, is now constant, moderate in intensity.  She denies any fevers, chills, nausea, vomiting, diarrhea, constipation, dysuria, hematuria.    Objective:     Past Medical History:    Anxiety state    Back problem    Hx herniated discs    Calculus of kidney    Deaf    Left ear    Diverticulosis    Essential hypertension    Hearing loss    High blood pressure    High cholesterol    History of shingles    1980's    Hyperlipidemia    Meniere disease    Morbid obesity with BMI of 40.0-44.9, adult (HCC)    Osteoarthritis    Pneumonia due to organism    as an infant    SCC (squamous cell carcinoma)    right jawline    Tinnitus    Visual impairment    reading glasses              Past Surgical History:   Procedure Laterality Date    Cataract extraction w/  intraocular lens implant Right 09/12/2011    RJM    Cholecystectomy      Colonoscopy N/A 10/01/2019    Procedure: COLONOSCOPY;  Surgeon: John Puri MD;  Location: [facility] ENDOSCOPY    Colonoscopy N/A 09/10/2024    Procedure: COLONOSCOPY;  Surgeon: John Puri MD;  Location: [facility] ENDOSCOPY    Knee surgery      Oophorectomy Bilateral     per [employer]:  \"laparoscopic bilateral oophorectomy\"    Other surgical history      Ear surgery    Spine surgery procedure unlisted      for right sciatica issue    Total hip replacement Right     Total knee replacement Right     Tubal ligation      Yag capsulotomy - od - right eye Right 02/12/2014    RJM                Social History     Socioeconomic History    Marital status:    Tobacco Use    Smoking status: Former     Current  packs/day: 0.00     Types: Cigarettes     Start date: 1966     Quit date: 2002     Years since quittin.9    Smokeless tobacco: Never   Vaping Use    Vaping status: Never Used   Substance and Sexual Activity    Alcohol use: Yes     Alcohol/week: 0.0 standard drinks of alcohol     Comment: social    Drug use: No   Other Topics Concern    Caffeine Concern No     Comment: Coffee, 2 cups daily    Exercise No    Grew up on a farm No    History of tanning Yes    Outdoor occupation No    Pt has a pacemaker No    Pt has a defibrillator No    Breast feeding No    Reaction to local anesthetic No   Social History Narrative    The patient does not use an assistive device..      The patient does not live in a home with stairs.                  Physical Exam     ED Triage Vitals [24 0927]   /49   Pulse 90   Resp 19   Temp 97.8 °F (36.6 °C)   Temp src Oral   SpO2 99 %   O2 Device None (Room air)       Current Vitals:   Vital Signs  BP: 116/56  Pulse: 72  Resp: 19  Temp: 97.8 °F (36.6 °C)  Temp src: Oral  MAP (mmHg): 71    Oxygen Therapy  SpO2: 92 %  O2 Device: None (Room air)        Physical Exam  Vitals and nursing note reviewed.   Constitutional:       Appearance: Normal appearance.   HENT:      Head: Normocephalic and atraumatic.   Cardiovascular:      Rate and Rhythm: Normal rate and regular rhythm.      Pulses: Normal pulses.   Pulmonary:      Effort: Pulmonary effort is normal.      Breath sounds: Normal breath sounds.   Abdominal:      General: Bowel sounds are normal.      Palpations: Abdomen is soft.      Tenderness: There is abdominal tenderness in the epigastric area, left upper quadrant and left lower quadrant. There is no guarding or rebound.   Musculoskeletal:         General: Normal range of motion.      Cervical back: Normal range of motion.   Skin:     General: Skin is warm and dry.   Neurological:      General: No focal deficit present.      Mental Status: She is alert.             ED  Course     Labs Reviewed   CBC WITH DIFFERENTIAL WITH PLATELET - Abnormal; Notable for the following components:       Result Value    WBC 15.2 (*)     RBC 3.60 (*)     HGB 11.6 (*)     RDW-SD 49.1 (*)     Neutrophil Absolute Prelim 12.40 (*)     Neutrophil Absolute 12.40 (*)     Monocyte Absolute 1.06 (*)     All other components within normal limits   COMP METABOLIC PANEL (14) - Abnormal; Notable for the following components:    Glucose 119 (*)     BUN 30 (*)     Creatinine 1.36 (*)     BUN/CREA Ratio 22.1 (*)     eGFR-Cr 40 (*)     Albumin 4.9 (*)     All other components within normal limits   URINALYSIS WITH CULTURE REFLEX - Abnormal; Notable for the following components:    Urine Color Colorless (*)     Spec Gravity >1.030 (*)     Leukocyte Esterase Urine 250 (*)     WBC Urine 21-50 (*)     Squamous Epi. Cells Few (*)     All other components within normal limits   URINE CULTURE, ROUTINE       ED Course as of 12/21/24 1155  ------------------------------------------------------------  Time: 12/21 1148  Value: CT ABDOMEN+PELVIS(CONTRAST ONLY)(CPT=74177)  Comment: Per my independent interpretation, patient's CT Abdomen and Pelvis demonstrates acute diverticulitis.                MDM              Medical Decision Making  Differential diagnosis includes but is not limited to diverticulitis, ureterolithiasis, constipation, urinary tract infection, etc    CBC shows a leukocytosis.  Chemistry was unremarkable.  Urinalysis with some inflammatory findings, patient is asymptomatic with regards to UTI.  CT imaging consistent with acute diverticulitis.  Patient will be started on Augmentin.  Patient informed of the gastric wall thickening with need to follow-up with PCP for further workup as she may need upper endoscopy to rule out malignancy.  She is given return precautions.    Medical Record Review: I personally reviewed available prior medical records for any recent pertinent discharge summaries, testing, and  procedures, and reviewed those reports.    Complicating factors: The patient  has a past medical history of Anxiety state, Back problem, Calculus of kidney, Deaf, Diverticulosis, Essential hypertension, Hearing loss, High blood pressure, High cholesterol, History of shingles, Hyperlipidemia, Meniere disease, Morbid obesity with BMI of 40.0-44.9, adult (HCC), Osteoarthritis, Pneumonia due to organism, SCC (squamous cell carcinoma) (2016), Tinnitus, and Visual impairment. and  has a past surgical history that includes cholecystectomy; tubal ligation; knee surgery; other surgical history; oophorectomy (Bilateral); Cataract extraction w/  intraocular lens implant (Right, 09/12/2011); Yag Capsulotomy - OD - Right Eye (Right, 02/12/2014); spine surgery procedure unlisted; colonoscopy (N/A, 10/01/2019); total hip replacement (Right); total knee replacement (Right); and colonoscopy (N/A, 09/10/2024). that contribute to the medical complexity of this ED evaluation.     Clinical impression as well as any lab results and radiology findings were discussed with the patient and/or caregiver. I personally reviewed all laboratory results and radiology images myself. Patient is advised to follow up with PCP for reevaluation. I provided discharge instructions and return precautions. Patient and/or caregiver voices understanding and agreement with the treatment plan. All questions were addressed and answered.         Problems Addressed:  Acute diverticulitis: acute illness or injury with systemic symptoms  Gastric lesion: undiagnosed new problem with uncertain prognosis    Amount and/or Complexity of Data Reviewed  External Data Reviewed: radiology.     Details: CT scan from 7/24/24 reviewed.  It demonstrated mild acute diverticulitis of the mid descending colon as well as right sided nephrolithiasis.   Labs: ordered. Decision-making details documented in ED Course.  Radiology: ordered and independent interpretation performed.  Decision-making details documented in ED Course.    Risk  Prescription drug management.        Disposition and Plan     Clinical Impression:  1. Acute diverticulitis    2. Gastric lesion         Disposition:  Discharge  12/21/2024 11:51 am    Follow-up:  No follow-up provider specified.  We recommend that you schedule follow up care with a primary care provider within the next three months to obtain basic health screening including reassessment of your blood pressure.      Medications Prescribed:  Current Discharge Medication List        START taking these medications    Details   amoxicillin clavulanate 875-125 MG Oral Tab Take 1 tablet by mouth 2 (two) times daily for 10 days.  Qty: 20 tablet, Refills: 0                 Supplementary Documentation:

## 2024-12-21 NOTE — DISCHARGE INSTRUCTIONS
Please follow up with your primary care physician as you may need an upper endoscopy to better evaluate the thickening of the stomach wall that was noted on your CT scan.

## 2024-12-27 ENCOUNTER — TELEPHONE (OUTPATIENT)
Facility: CLINIC | Age: 79
End: 2024-12-27

## 2024-12-27 DIAGNOSIS — R10.13 DYSPEPSIA: Primary | ICD-10-CM

## 2024-12-27 DIAGNOSIS — R93.89 ABNORMAL CT SCAN: ICD-10-CM

## 2024-12-27 NOTE — TELEPHONE ENCOUNTER
Dr Puri,    Pt was recently in the ER 12/21/2024 for acute uncomplicated diverticulitis    Treated with Augmentin    Colonoscopy just completed on 09/10/2024    Pt states the ER MD is recommending a EGD based on the CT findings     Please provide orders if appropriate

## 2024-12-27 NOTE — TELEPHONE ENCOUNTER
Patient calling to schedule Esophagogastroduodenoscopy, unsure if needs to do consult first or can be scheduled. Please call.

## 2025-01-02 NOTE — TELEPHONE ENCOUNTER
Thanks all.    Recent ED CT scan 12/21/2024 reviewed.    Colonic diverticulosis with descending colon diverticulitis described.    \"There is also suspected circumferential wall thickening of the distal stomach/proximal duodenum with some faint adjacent fat stranding. \"    Yes, we should get Ms. Gaspar in for an EGD examination.    [facility]    BMI Readings from Last 1 Encounters:   12/21/24 36.94 kg/m²       MAC anesthesia    DX = Dyspepsia, abnormal stomach on CT scan    Medication instructions:    Hold DIET PILLS Phentermine 10 days before procedure

## 2025-01-03 NOTE — TELEPHONE ENCOUNTER
Scheduled for:  EGD 54628  Provider Name:     Date:  Thursday, 01/16/2025  Location:  [facility]   Sedation:  Mac  Time:  2pm (pt is aware [facility] will call with arrival time     Prep:  Npo  Meds/Allergies Reconciled?:  Yes    Diagnosis with codes:  Dyspepsia R10.13  abnormal stomach on CT scan R93.8  Was patient informed to call insurance with codes (Y/N):  Yes     Referral sent?:  Referral was sent at the time of electronic surgical scheduling.    [facility] or [facility] notified?:  I sent an electronic request to Endo Scheduling and received a confirmation today.       Medication Orders:  Hold DIET PILLS Phentermine 10 days before procedure   Misc Orders:  None     Further instructions given by staff:  I discussed the prep instructions with the patient which she verbally understood and is aware that I will sent the instructions today.via [employer]

## 2025-01-08 DIAGNOSIS — M48.061 SPINAL STENOSIS OF LUMBAR REGION, UNSPECIFIED WHETHER NEUROGENIC CLAUDICATION PRESENT: ICD-10-CM

## 2025-01-10 ENCOUNTER — NURSE TRIAGE (OUTPATIENT)
Dept: FAMILY MEDICINE CLINIC | Facility: CLINIC | Age: 80
End: 2025-01-10

## 2025-01-10 DIAGNOSIS — F41.1 ANXIETY STATE: ICD-10-CM

## 2025-01-10 NOTE — TELEPHONE ENCOUNTER
Action Requested: Summary for Provider     []  Critical Lab, Recommendations Needed  [x] Need Additional Advice  []   FYI    []   Need Orders  [] Need Medications Sent to Pharmacy  []  Other     SUMMARY: Patient states she is going to get an esophagogastroduodenoscopy (EGD)  by  on 1/16/25 and she had to answer some questions. Patient states she gets short of breath walking up 2 flights of stairs. She was told to contact .   are we able to use res 24 for patient?    Patient denies shortness of breath at rest,chest pain,or cough.     Reason for call: Chest Pain Angina  Onset: today    Reason for Disposition   Patient wants to be seen    Protocols used: Breathing Difficulty-A-OH

## 2025-01-11 ENCOUNTER — HOSPITAL ENCOUNTER (OUTPATIENT)
Dept: GENERAL RADIOLOGY | Age: 80
Discharge: HOME | End: 2025-01-11
Attending: FAMILY MEDICINE
Payer: MEDICARE

## 2025-01-11 ENCOUNTER — OFFICE VISIT (OUTPATIENT)
Dept: FAMILY MEDICINE CLINIC | Facility: CLINIC | Age: 80
End: 2025-01-11

## 2025-01-11 ENCOUNTER — EKG ENCOUNTER (OUTPATIENT)
Dept: LAB | Age: 80
End: 2025-01-11
Attending: FAMILY MEDICINE
Payer: MEDICARE

## 2025-01-11 VITALS
DIASTOLIC BLOOD PRESSURE: 69 MMHG | BODY MASS INDEX: 37.65 KG/M2 | HEART RATE: 74 BPM | OXYGEN SATURATION: 96 % | WEIGHT: 226 LBS | TEMPERATURE: 98 F | HEIGHT: 65 IN | SYSTOLIC BLOOD PRESSURE: 112 MMHG

## 2025-01-11 DIAGNOSIS — R06.09 DOE (DYSPNEA ON EXERTION): Primary | ICD-10-CM

## 2025-01-11 DIAGNOSIS — R10.13 DYSPEPSIA: ICD-10-CM

## 2025-01-11 DIAGNOSIS — K29.50 CHRONIC GASTRITIS WITHOUT BLEEDING, UNSPECIFIED GASTRITIS TYPE: ICD-10-CM

## 2025-01-11 DIAGNOSIS — R06.09 DOE (DYSPNEA ON EXERTION): ICD-10-CM

## 2025-01-11 DIAGNOSIS — I10 HTN (HYPERTENSION), BENIGN: ICD-10-CM

## 2025-01-11 LAB
ATRIAL RATE: 68 BPM
P AXIS: 23 DEGREES
P-R INTERVAL: 154 MS
Q-T INTERVAL: 354 MS
QRS DURATION: 66 MS
QTC CALCULATION (BEZET): 376 MS
R AXIS: -3 DEGREES
T AXIS: 49 DEGREES
VENTRICULAR RATE: 68 BPM

## 2025-01-11 PROCEDURE — 99214 OFFICE O/P EST MOD 30 MIN: CPT | Performed by: FAMILY MEDICINE

## 2025-01-11 PROCEDURE — 93010 ELECTROCARDIOGRAM REPORT: CPT | Performed by: INTERNAL MEDICINE

## 2025-01-11 PROCEDURE — G2211 COMPLEX E/M VISIT ADD ON: HCPCS | Performed by: FAMILY MEDICINE

## 2025-01-11 PROCEDURE — 71046 X-RAY EXAM CHEST 2 VIEWS: CPT | Performed by: FAMILY MEDICINE

## 2025-01-11 PROCEDURE — 93005 ELECTROCARDIOGRAM TRACING: CPT

## 2025-01-11 NOTE — TELEPHONE ENCOUNTER
I called and left message for patient to call back.  If symptoms changing or more severe generic message left in message that she needs to go to ER.    Contact Information  [phone] (Mobile)   [phone] (Home Phone)      also sent

## 2025-01-11 NOTE — PROGRESS NOTES
Patient ID: Mari Gaspar is a 79 year old female.    HPI  Chief Complaint   Patient presents with    Pre-Op Exam     SOB with stairs was sent from        She states she is due for an EGD January 16, 2025 but when she was answering questions for anesthesia she admitted that if she has to walk up 2 flights of stairs she may feel a bit short of breath.  She states she is not diaphoretic or having any chest pain or passing out or dizziness.  She states she is able to walk on flat surfaces without any issues.  She thinks she would be fine if she had to walk up 1 flight of stairs.  She is not having any coughing or fevers.  She is up-to-date on her colonoscopy.  Anesthesia needs clearance from me before they can proceed with her EGD.  I reviewed the CAT scan of her abdomen as well showing the thickness of the stomach wall.    Wt Readings from Last 6 Encounters:   01/11/25 226 lb (102.5 kg)   01/07/25 222 lb (100.7 kg)   12/21/24 222 lb (100.7 kg)   11/27/24 229 lb (103.9 kg)   11/18/24 229 lb (103.9 kg)   10/28/24 234 lb (106.1 kg)       BMI Readings from Last 6 Encounters:   01/11/25 37.61 kg/m²   01/07/25 36.94 kg/m²   12/21/24 36.94 kg/m²   11/27/24 39.31 kg/m²   11/18/24 39.31 kg/m²   10/28/24 40.17 kg/m²       BP Readings from Last 6 Encounters:   01/11/25 112/69   12/21/24 116/56   12/04/24 112/53   11/18/24 138/70   11/06/24 130/74   10/02/24 110/71         Review of Systems  See HPI above.  There is no shortness of breath at rest.      Medical History:      Past Medical History:    Anxiety state    Back problem    Hx herniated discs    Calculus of kidney    Deaf    Left ear    Diverticulosis    Essential hypertension    Hearing loss    High blood pressure    High cholesterol    History of shingles    1980's    Hyperlipidemia    Meniere disease    Morbid obesity with BMI of 40.0-44.9, adult (HCC)    Osteoarthritis    Pneumonia due to organism    as an infant    SCC (squamous cell carcinoma)    right  jawline    Tinnitus    Visual impairment    reading glasses       Past Surgical History:   Procedure Laterality Date    Cataract extraction w/  intraocular lens implant Right 09/12/2011    RJM    Cholecystectomy      Colonoscopy N/A 10/01/2019    Procedure: COLONOSCOPY;  Surgeon: John Puri MD;  Location: [facility] ENDOSCOPY    Colonoscopy N/A 09/10/2024    Procedure: COLONOSCOPY;  Surgeon: John Puri MD;  Location: [facility] ENDOSCOPY    Knee surgery      Oophorectomy Bilateral     per [employer]:  \"laparoscopic bilateral oophorectomy\"    Other surgical history      Ear surgery    Spine surgery procedure unlisted      for right sciatica issue    Total hip replacement Right     Total knee replacement Right     Tubal ligation      Yag capsulotomy - od - right eye Right 02/12/2014    RJM          Current Outpatient Medications   Medication Sig Dispense Refill    TELMISARTAN-HCTZ 80-25 MG Oral Tab Take 1 tablet by mouth daily. 90 tablet 3    rosuvastatin 20 MG Oral Tab Take 1 tablet (20 mg total) by mouth daily. 90 tablet 3    HYDROcodone-acetaminophen 5-325 MG Oral Tab Take 1 tablet by mouth every 4 (four) hours as needed. 30 tablet 0    gabapentin 600 MG Oral Tab Take 1 tablet (600 mg total) by mouth 3 (three) times daily. 270 tablet 2    Phentermine HCl 30 MG Oral Cap Take 1 capsule (30 mg total) by mouth every morning. For weight loss 90 capsule 1    aspirin 325 MG Oral Tab Take 1 tablet (325 mg total) by mouth daily. 90 tablet 0    SPIRONOLACTONE 25 MG Oral Tab TAKE ONE TABLET BY MOUTH ONE TIME DAILY 90 tablet 3    sertraline 100 MG Oral Tab TAKE ONE TABLET BY MOUTH ONE TIME DAILY 90 tablet 3    Cholecalciferol (VITAMIN D3) 3000 units Oral Tab Take 3,000 Units by mouth daily.        Multiple Vitamins-Minerals (PRESERVISION AREDS) Oral Tab Take by mouth.      fluticasone propionate 50 MCG/ACT Nasal Suspension 2 sprays by Nasal route daily. (Patient not taking: Reported on 1/11/2025) 3 each 2      Allergies:Allergies[1]     Physical Exam:       Physical Exam  Blood pressure 112/69, pulse 74, temperature 97.6 °F (36.4 °C), temperature source Tympanic, height 5' 5\" (1.651 m), weight 226 lb (102.5 kg), SpO2 96%, not currently breastfeeding.  Physical Exam   Constitutional: Patient is oriented to person, place, and time. Patient appears well-developed and well-nourished. No distress.   HENT:   Head: Normocephalic and atraumatic.   Oropharynx is clear.  No obstruction.  Neck: Normal range of motion. Neck supple. No thyromegaly present.   Lymphadenopathy:     Has  no cervical adenopathy.   Cardiovascular: Normal rate, regular rhythm and no murmur heard.  Pulmonary/Chest: Effort normal and breath sounds normal. No respiratory distress.  Speaking clearly.  She is not winded when she speaks.  Neurological: Patient is alert and oriented to person, place, and time.   Skin: Skin is warm and dry.  No pallor or diaphoresis.  Psychiatric: Patient has a normal mood and affect.   No edema of the legs  Nursing note and vitals reviewed.           Assessment/Plan:      Diagnoses and all orders for this visit:    NICHOLS (dyspnea on exertion)  -     EKG 12 Lead; Future  -     XR CHEST PA + LAT CHEST (CPT=71046); Future  She 79 years of age and that she has some slight shortness of breath after climbing up 2 flights of stairs does not surprise me.  We will go ahead and do a chest x-ray and EKG.  Clinically she looks great.  Chronic gastritis without bleeding, unspecified gastritis type  Will be getting an EGD with Dr. Jaxson Belloepsia  As above  HTN (hypertension), benign  Nicely controlled and she will continue her medications.      Referrals (if applicable)  No orders of the defined types were placed in this encounter.        Follow up if symptoms persist.  Take medicine (if given) as prescribed.  Approach to treatment discussed and patient/family member understands and agrees to plan.     No follow-ups on file.        Anant  DO Mike  1/11/2025       [1] No Known Allergies

## 2025-01-11 NOTE — TELEPHONE ENCOUNTER
Future Appointments   Date Time Provider Department Center   1/11/2025 10:30 AM Anant Mckeon DO [facility]   1/16/2025  2:00 PM FREDRICK, PROCEDURE [id number] None   2/19/2025  8:30 AM Ebony Thrasher, DO [facility]   3/13/2025 10:20 AM Shelby French MD [id number]     Spoke with patient and willing to come today.   Problem focused visit, she states understanding.

## 2025-01-11 NOTE — TELEPHONE ENCOUNTER
I have an opening at 9:30 AM on Monday I think.  You could put her in for a focused visit for the shortness of breath with exertion but if this is getting worse or if she is having any type of chest pain then she truly needs to go to the emergency room and get evaluated

## 2025-01-12 DIAGNOSIS — E78.2 MIXED HYPERLIPIDEMIA: ICD-10-CM

## 2025-01-12 DIAGNOSIS — I70.90 ATHEROSCLEROSIS: ICD-10-CM

## 2025-01-12 DIAGNOSIS — I10 HTN (HYPERTENSION), BENIGN: ICD-10-CM

## 2025-01-12 DIAGNOSIS — E66.01 SEVERE OBESITY (BMI 35.0-35.9 WITH COMORBIDITY) (HCC): ICD-10-CM

## 2025-01-12 DIAGNOSIS — R06.09 DOE (DYSPNEA ON EXERTION): Primary | ICD-10-CM

## 2025-01-12 DIAGNOSIS — I10 ESSENTIAL HYPERTENSION: ICD-10-CM

## 2025-01-13 NOTE — TELEPHONE ENCOUNTER
Please review;  [facility] protocol failed/ No protocol       Last office visit = 1/11/2025   Last refill = 7/18, 10/7, 11/29/2024    Requested Prescriptions   Pending Prescriptions Disp Refills    HYDROcodone-acetaminophen 5-325 MG Oral Tab 30 tablet 0     Sig: Take 1 tablet by mouth every 4 (four) hours as needed.       Controlled Substance Medication Failed - 1/13/2025  2:36 PM        Failed - This medication is a controlled substance - forward to provider to refill        Passed - Medication is active on med list           Future Appointments         Provider Department Appt Notes    In 3 days FREDRICK, PROCEDURE [facility] EGD w/Mac @[facility]    In 1 month Ebony Thrasher DO [facility] M25.50 (ICD-10-CM) - 719.49 (ICD-9-CM) - Polyarthralgia    In 1 month Shelby French MD [facility] follow up, previosly with Dr. Bonner          Recent Outpatient Visits              2 days ago [facility]ICHOLS (dyspnea [facility] exertion)    [facility]    1 month ago Facet syndrome, lumbar    [facility] Behar, Alex, MD    Telemedicine    1 month ago [facility]arthralgia    [facility]     [facility]    3 months ago Facet syndrome, lumbar    [facility] Behar, Alex, MD    Telemedicine    3 months ago SK (seborrheic keratosis)    [facility] Laina Dee MD    Office Visit

## 2025-01-15 DIAGNOSIS — M48.061 SPINAL STENOSIS OF LUMBAR REGION, UNSPECIFIED WHETHER NEUROGENIC CLAUDICATION PRESENT: ICD-10-CM

## 2025-01-15 RX ORDER — SERTRALINE HYDROCHLORIDE 100 MG/1
TABLET, FILM COATED ORAL
Qty: 90 TABLET | Refills: 3 | Status: SHIPPED | OUTPATIENT
Start: 2025-01-15

## 2025-01-15 NOTE — TELEPHONE ENCOUNTER
Refill passed per [facility] protocol.    Requested Prescriptions   Pending Prescriptions Disp Refills    sertraline 100 MG Oral Tab 90 tablet 3     Sig: TAKE ONE TABLET BY MOUTH ONE TIME DAILY       Psychiatric Non-Scheduled (Anti-Anxiety) Passed - 1/15/2025 12:15 PM        Passed - In person appointment or virtual visit in the past 6 mos or appointment in next 3 mos     Recent Outpatient Visits              4 days ago NICHOLS (dyspnea on exertion)    [facility]     Office Visit    1 month ago Facet syndrome, lumbar    [facility] Behar, Alex, MD    Telemedicine    1 month ago Polyarthralgia    [facility] Anant Mckeon,     Office Visit    3 months ago Facet syndrome, lumbar    [facility] Behar, Alex, MD    Telemedicine    3 months ago SK (seborrheic keratosis)    [facility] Laina Dee MD    Office Visit          Future Appointments         Provider Department Appt Notes    Tomorrow FREDRICK, HAYLIE [facility]GD w/Mac @[facility]    In 1 month Ebony Thrasher DO [facility] M25.50 (ICD-10-CM) - 719.49 (ICD-9-CM) - Polyarthralgia    In 1 month Shelby French MD [facility] follow up, previosly with Dr. Bonner                    Passed - Depression Screening completed within the past 12 months        Passed - Medication is active on med list             Recent Outpatient Visits              4 days ago NICHOLS (dyspnea on exertion)    [facility] Anant Ford,     Office Visit    1 month ago Facet syndrome, lumbar    [facility] [place] Behar, Alex, MD    Telemedicine    1 month ago  Polyarthralgia    [facility] An[facility] Ford DO    Office Visit    3 months ago Facet syndrome, lumbar    [facility] Behar, Alex, MD    Telemedicine    3 months ago SK (seborrheic keratosis)    [facility] Laina Dee MD    Office Visit            Future Appointments         Provider Department Appt Notes    Tomorrow FREDRICK, HAYLIE [facility] EGD w/Mac @[facility]    In 1 month Ebony Thrasher DO [facility] M25.50 (ICD-10-CM) - 719.49 (ICD-9-CM) - Polyarthralgia    In 1 month Shelby French MD [facility] follow up, previosly with Dr. Bonner

## 2025-01-16 PROBLEM — R10.13 DYSPEPSIA: Status: ACTIVE | Noted: 2025-01-16

## 2025-01-16 PROCEDURE — 88312 SPECIAL STAINS GROUP 1: CPT | Performed by: INTERNAL MEDICINE

## 2025-01-16 PROCEDURE — 88305 TISSUE EXAM BY PATHOLOGIST: CPT | Performed by: INTERNAL MEDICINE

## 2025-01-17 RX ORDER — HYDROCODONE BITARTRATE AND ACETAMINOPHEN 5; 325 MG/1; MG/1
1 TABLET ORAL EVERY 4 HOURS PRN
Qty: 30 TABLET | Refills: 0 | Status: SHIPPED | OUTPATIENT
Start: 2025-01-17

## 2025-01-20 RX ORDER — HYDROCODONE BITARTRATE AND ACETAMINOPHEN 5; 325 MG/1; MG/1
1 TABLET ORAL EVERY 4 HOURS PRN
Qty: 30 TABLET | Refills: 0 | OUTPATIENT
Start: 2025-01-20

## 2025-01-20 NOTE — TELEPHONE ENCOUNTER
Outpatient Medication Detail     Disp Refills Start End    HYDROcodone-acetaminophen 5-325 MG Oral Tab 30 tablet 0 1/17/2025 --    Sig - Route: Take 1 tablet by mouth every 4 (four) hours as needed. - Oral    Sent to pharmacy as: HYDROcodone-Acetaminophen 5-325 MG Oral Tablet (Norco)    Earliest Fill Date: 1/17/2025    E-Prescribing Status: Receipt confirmed by pharmacy (1/17/2025  4:55 PM CST)      Associated Diagnoses    Spinal stenosis of lumbar region, unspecified whether neurogenic claudication present        Pharmacy    [id number] - [address] [phone], [phone]

## 2025-02-03 ENCOUNTER — PATIENT MESSAGE (OUTPATIENT)
Facility: CLINIC | Age: 80
End: 2025-02-03

## 2025-02-18 NOTE — PROGRESS NOTES
RHEUMATOLOGY CLINIC- NEW PATIENT    Mari Gaspar is a 79 year old female.    ASSESSMENT/PLAN:       ICD-10-CM    1. Degeneration of intervertebral disc of lumbar region, unspecified whether pain present  M51.369       2. Osteoarthritis of multiple joints, unspecified osteoarthritis type  M15.9         DISCUSSION:  Patient presents as a new outpatient referral for evaluation of longstanding back pain with prior imaging reviewed, as below.  No features at this time diagnostic of an inflammatory arthritis including no synovitis on current exam.  Imaging consistent with an presentation underlying degenerative, osteoarthritis.  Discussed with patient conservative management, as below.      PLAN:  - For pain relief, patient can take Tylenol-arthritis strength at 2 tablets every 8 hours as needed. May also apply topical Voltaren (diclofenac gel) to their affected areas of pain up to 4 times daily   -Has tried PT in the past  - Consult/evaluation communicated with referring physician/provider.    Pt will f/u JEREMYN    Ebony Thrasher DO  2/19/2025   9:25 AM      HPI:     2/18/2025 Initial Consult: I had the pleasure of seeing Mari Gaspar on 2/18/2025 as a new outpatient consultation for polyarthralgias. The patient was originally referred by her PCP.     79 year old female w/ PMH HLD, HTN, atherosclerosis, lumbar DDD, HTN, HLD, diverticulitis  with radiculopathy, bilateral knee replacements, hip replacement, cataracts who presents to clinic today.  Patient notes ongoing low back pain for several years.  She has tried therapy, steroid shots without lasting relief.  She can only walk for a few minutes before the pain \"takes her breath away \".  Resting helps her symptoms so she has to rest every few minutes whenever she is doing things around the house for example.  Her PCP prescribed her as needed Norco 5 for pain with only minimal relief.  No family history of autoimmune diseases such as SLE, spondyloarthropathy,  rheumatoid arthritis, psoriatic arthritis.  ROS otherwise negative for unexplained fever, chills, photosensitive rash, Raynaud's phenomenon, serositis, uveitis/iritis.    Current Medications:  PRN Norco- per PCP     Medication History:  N/A    Interval History:   See Above    HISTORY:  Past Medical History:    Anxiety state    Back problem    Hx herniated discs    Calculus of kidney    Deaf    Left ear    Diverticulosis    Essential hypertension    Hearing loss    High blood pressure    High cholesterol    History of shingles    1980's    Hyperlipidemia    Meniere disease    Morbid obesity with BMI of 40.0-44.9, adult (HCC)    Osteoarthritis    Pneumonia due to organism    as an infant    SCC (squamous cell carcinoma)    right jawline    Tinnitus    Visual impairment    reading glasses      Social Hx Reviewed   Family Hx Reviewed     Medications (Active prior to today's visit):  Current Outpatient Medications   Medication Sig Dispense Refill    HYDROcodone-acetaminophen 5-325 MG Oral Tab Take 1 tablet by mouth every 4 (four) hours as needed. 30 tablet 0    sertraline 100 MG Oral Tab TAKE ONE TABLET BY MOUTH ONE TIME DAILY 90 tablet 3    TELMISARTAN-HCTZ 80-25 MG Oral Tab Take 1 tablet by mouth daily. 90 tablet 3    rosuvastatin 20 MG Oral Tab Take 1 tablet (20 mg total) by mouth daily. 90 tablet 3    gabapentin 600 MG Oral Tab Take 1 tablet (600 mg total) by mouth 3 (three) times daily. 270 tablet 2    Phentermine HCl 30 MG Oral Cap Take 1 capsule (30 mg total) by mouth every morning. For weight loss 90 capsule 1    fluticasone propionate 50 MCG/ACT Nasal Suspension 2 sprays by Nasal route daily. (Patient not taking: Reported on 1/11/2025) 3 each 2    aspirin 325 MG Oral Tab Take 1 tablet (325 mg total) by mouth daily. 90 tablet 0    SPIRONOLACTONE 25 MG Oral Tab TAKE ONE TABLET BY MOUTH ONE TIME DAILY 90 tablet 3    Cholecalciferol (VITAMIN D3) 3000 units Oral Tab Take 3,000 Units by mouth daily.        Multiple  Vitamins-Minerals (PRESERVISION AREDS) Oral Tab Take by mouth.         Allergies:  Allergies[1]      ROS:   Review of Systems   Constitutional:  Negative for chills and fever.   HENT:  Negative for congestion, hearing loss, mouth sores, nosebleeds and trouble swallowing.    Eyes:  Negative for photophobia, pain, redness and visual disturbance.   Respiratory:  Negative for cough and shortness of breath.    Cardiovascular:  Negative for chest pain, palpitations and leg swelling.   Gastrointestinal:  Negative for abdominal pain, blood in stool, diarrhea and nausea.   Endocrine: Negative for cold intolerance and heat intolerance.   Genitourinary:  Negative for dysuria, frequency and hematuria.   Musculoskeletal:  Positive for arthralgias, back pain and gait problem. Negative for joint swelling, myalgias, neck pain and neck stiffness.   Skin:  Negative for color change and rash.   Neurological:  Negative for dizziness, weakness, numbness and headaches.   Psychiatric/Behavioral:  Negative for confusion and dysphoric mood.         PHYSICAL EXAM:     Constitutional:  Well developed, Well nourished, No acute distress  HENT:  Normocephalic, Atraumatic, Bilateral external ears normal, Oropharynx moist, No oral exudates.  Neck: Normal range of motion, No tenderness, Supple, No stridor.  Eyes:  PERRL, EOMI, Conjunctiva normal, No discharge.  Respiratory:  Normal breath sounds, No respiratory distress, No wheezing.  Cardiovascular:  Normal heart rate, Normal rhythm, No murmurs, No rubs, No gallops.  GI:  Bowel sounds normal, Soft, No tenderness, No masses, No pulsatile masses.  : No CVA tenderness.  Musculoskeletal:  A comprehensive 28 count joint exam was done and was negative for swelling or tenderness except as noted. Inspections for misalignment, asymmetry, crepitation, defects, tenderness, masses, nodules, effusions, range of motion, and stability in the upper and lower extremities bilaterally are all normal unless noted.            Integument:  Warm, Dry, No erythema, No rash.  Lymphatic:  No lymphadenopathy noted.  Neurologic:  Alert & oriented x 3, Normal motor function, Normal sensory function, No focal deficits noted.  Psychiatric:  Affect normal, Judgment normal, Mood normal.    LABS:     Prior lab work reviewed and notable for:     2024:  CMP with BUN 30, CR 1.36 (high), AST/ALT/alk phos WNL, no gamma gap  CBC with WBC 15.2 (high), Hg 11.6 normocytic,  WNL    Imagin/21/24 CT AP:     BONES:   Spondylosis lower thoracic and lumbar spine.  Partially imaged right hip arthroplasty noted with streak artifact limiting evaluation of regional structures.     10/29/24 Lumbar MRI:      Impression   CONCLUSION:  1. Moderate dextroscoliosis again noted to the lumbar spine with moderate to severe multilevel spondylosis and greatest involvement of the mid lumbar spine, all grossly stable compared to the 2021 MRI.  Significant levels detailed below:     2. L1-L2:  Stable mild central vertebral canal stenosis and left neural foraminal narrowing.  3. L2-L3:  Stable mild central vertebral canal stenosis and left neural foraminal stenosis.  4. L3-L4:  Stable moderate central vertebral canal stenosis, bilateral lateral recess stenosis and bilateral neural foraminal stenosis.  5. L4-L5:  Stable mild central vertebral canal stenosis and right neural foraminal stenosis.  6. L5-S1:  Stable right neural foraminal stenosis and mild left neural foraminal narrowing.      23 Foot XR:   Impression   CONCLUSION:  1. Chronic-appearing cortical deformity at the 5th toe distal phalanx.  Please ensure that the patient has no corresponding pain at this site.  2. No other acute fracture or dislocation of the left foot.  3. Flattening of the 2nd metatarsal head.  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  4. Demineralization.  There are multiple hammertoe deformities.   -recommended referral to  Podiatry     2/10/23 SI XR:   Impression   CONCLUSION:     Mild degenerative changes within the sacroiliac joints without sacroiliitis.     1/7/20 L Knee XR:   Impression   CONCLUSION:  1. Complete left knee arthroplasty in satisfactory alignment and position.  No gross evidence of loosening.  Postoperative soft tissue air.     2/4/19 R Knee XR:   Impression   CONCLUSION:     * Total knee arthroplasty is identified.  * The prostheses are well seated in the femur, tibia and patella.  * A drain is seen in place.  * No obvious complication.          [1] No Known Allergies

## 2025-02-19 ENCOUNTER — OFFICE VISIT (OUTPATIENT)
Dept: RHEUMATOLOGY | Facility: CLINIC | Age: 80
End: 2025-02-19
Payer: MEDICARE

## 2025-02-19 VITALS
HEIGHT: 65 IN | SYSTOLIC BLOOD PRESSURE: 125 MMHG | WEIGHT: 226 LBS | DIASTOLIC BLOOD PRESSURE: 74 MMHG | BODY MASS INDEX: 37.65 KG/M2 | HEART RATE: 75 BPM

## 2025-02-19 DIAGNOSIS — M51.369 DEGENERATION OF INTERVERTEBRAL DISC OF LUMBAR REGION, UNSPECIFIED WHETHER PAIN PRESENT: Primary | ICD-10-CM

## 2025-02-19 DIAGNOSIS — M15.9 OSTEOARTHRITIS OF MULTIPLE JOINTS, UNSPECIFIED OSTEOARTHRITIS TYPE: ICD-10-CM

## 2025-02-19 PROCEDURE — 99204 OFFICE O/P NEW MOD 45 MIN: CPT | Performed by: INTERNAL MEDICINE

## 2025-03-03 ENCOUNTER — LAB ENCOUNTER (OUTPATIENT)
Dept: LAB | Age: 80
End: 2025-03-03
Attending: INTERNAL MEDICINE
Payer: MEDICARE

## 2025-03-03 DIAGNOSIS — N18.31 STAGE 3A CHRONIC KIDNEY DISEASE (HCC): ICD-10-CM

## 2025-03-03 LAB
ALBUMIN SERPL-MCNC: 4.8 G/DL (ref 3.2–4.8)
ALBUMIN/GLOB SERPL: 1.8 {RATIO} (ref 1–2)
ALP LIVER SERPL-CCNC: 84 U/L
ALT SERPL-CCNC: 27 U/L
ANION GAP SERPL CALC-SCNC: 6 MMOL/L (ref 0–18)
AST SERPL-CCNC: 28 U/L (ref ?–34)
BASOPHILS # BLD AUTO: 0.07 X10(3) UL (ref 0–0.2)
BASOPHILS NFR BLD AUTO: 0.9 %
BILIRUB SERPL-MCNC: 0.5 MG/DL (ref 0.2–1.1)
BILIRUB UR QL: NEGATIVE
BUN BLD-MCNC: 31 MG/DL (ref 9–23)
BUN/CREAT SERPL: 22.8 (ref 10–20)
CALCIUM BLD-MCNC: 9.7 MG/DL (ref 8.7–10.4)
CHLORIDE SERPL-SCNC: 104 MMOL/L (ref 98–112)
CLARITY UR: CLEAR
CO2 SERPL-SCNC: 28 MMOL/L (ref 21–32)
COLOR UR: YELLOW
CREAT BLD-MCNC: 1.36 MG/DL
CREAT UR-SCNC: 191.7 MG/DL
DEPRECATED RDW RBC AUTO: 48.2 FL (ref 35.1–46.3)
EGFRCR SERPLBLD CKD-EPI 2021: 40 ML/MIN/1.73M2 (ref 60–?)
EOSINOPHIL # BLD AUTO: 0.25 X10(3) UL (ref 0–0.7)
EOSINOPHIL NFR BLD AUTO: 3.3 %
ERYTHROCYTE [DISTWIDTH] IN BLOOD BY AUTOMATED COUNT: 13.3 % (ref 11–15)
FASTING STATUS PATIENT QL REPORTED: NO
GLOBULIN PLAS-MCNC: 2.6 G/DL (ref 2–3.5)
GLUCOSE BLD-MCNC: 103 MG/DL (ref 70–99)
GLUCOSE UR-MCNC: NORMAL MG/DL
GRAN CASTS #/AREA URNS LPF: PRESENT /LPF
HCT VFR BLD AUTO: 34.2 %
HGB BLD-MCNC: 11.1 G/DL
HGB UR QL STRIP.AUTO: NEGATIVE
HYALINE CASTS #/AREA URNS AUTO: PRESENT /LPF
IMM GRANULOCYTES # BLD AUTO: 0.07 X10(3) UL (ref 0–1)
IMM GRANULOCYTES NFR BLD: 0.9 %
KETONES UR-MCNC: NEGATIVE MG/DL
LEUKOCYTE ESTERASE UR QL STRIP.AUTO: 75
LYMPHOCYTES # BLD AUTO: 2.07 X10(3) UL (ref 1–4)
LYMPHOCYTES NFR BLD AUTO: 27.6 %
MCH RBC QN AUTO: 31.7 PG (ref 26–34)
MCHC RBC AUTO-ENTMCNC: 32.5 G/DL (ref 31–37)
MCV RBC AUTO: 97.7 FL
MICROALBUMIN UR-MCNC: 0.7 MG/DL
MICROALBUMIN/CREAT 24H UR-RTO: 3.7 UG/MG (ref ?–30)
MONOCYTES # BLD AUTO: 0.54 X10(3) UL (ref 0.1–1)
MONOCYTES NFR BLD AUTO: 7.2 %
NEUTROPHILS # BLD AUTO: 4.5 X10 (3) UL (ref 1.5–7.7)
NEUTROPHILS # BLD AUTO: 4.5 X10(3) UL (ref 1.5–7.7)
NEUTROPHILS NFR BLD AUTO: 60.1 %
NITRITE UR QL STRIP.AUTO: NEGATIVE
OSMOLALITY SERPL CALC.SUM OF ELEC: 293 MOSM/KG (ref 275–295)
PH UR: 5.5 [PH] (ref 5–8)
PHOSPHATE SERPL-MCNC: 3.2 MG/DL (ref 2.4–5.1)
PLATELET # BLD AUTO: 310 10(3)UL (ref 150–450)
POTASSIUM SERPL-SCNC: 4.9 MMOL/L (ref 3.5–5.1)
PROT SERPL-MCNC: 7.4 G/DL (ref 5.7–8.2)
PTH-INTACT SERPL-MCNC: 90.5 PG/ML (ref 18.5–88)
RBC # BLD AUTO: 3.5 X10(6)UL
SODIUM SERPL-SCNC: 138 MMOL/L (ref 136–145)
SP GR UR STRIP: 1.02 (ref 1–1.03)
UROBILINOGEN UR STRIP-ACNC: NORMAL
VIT D+METAB SERPL-MCNC: 62.3 NG/ML (ref 30–100)
WBC # BLD AUTO: 7.5 X10(3) UL (ref 4–11)

## 2025-03-03 PROCEDURE — 84100 ASSAY OF PHOSPHORUS: CPT

## 2025-03-03 PROCEDURE — 36415 COLL VENOUS BLD VENIPUNCTURE: CPT

## 2025-03-03 PROCEDURE — 81001 URINALYSIS AUTO W/SCOPE: CPT

## 2025-03-03 PROCEDURE — 80053 COMPREHEN METABOLIC PANEL: CPT

## 2025-03-03 PROCEDURE — 83970 ASSAY OF PARATHORMONE: CPT

## 2025-03-03 PROCEDURE — 82570 ASSAY OF URINE CREATININE: CPT

## 2025-03-03 PROCEDURE — 82043 UR ALBUMIN QUANTITATIVE: CPT

## 2025-03-03 PROCEDURE — 85025 COMPLETE CBC W/AUTO DIFF WBC: CPT

## 2025-03-03 PROCEDURE — 82306 VITAMIN D 25 HYDROXY: CPT

## 2025-03-13 ENCOUNTER — OFFICE VISIT (OUTPATIENT)
Facility: CLINIC | Age: 80
End: 2025-03-13
Payer: MEDICARE

## 2025-03-13 ENCOUNTER — TELEPHONE (OUTPATIENT)
Dept: FAMILY MEDICINE CLINIC | Facility: CLINIC | Age: 80
End: 2025-03-13

## 2025-03-13 VITALS
HEART RATE: 79 BPM | DIASTOLIC BLOOD PRESSURE: 66 MMHG | BODY MASS INDEX: 38 KG/M2 | SYSTOLIC BLOOD PRESSURE: 113 MMHG | WEIGHT: 228 LBS

## 2025-03-13 DIAGNOSIS — N18.31 HYPERTENSIVE KIDNEY DISEASE WITH STAGE 3A CHRONIC KIDNEY DISEASE (HCC): ICD-10-CM

## 2025-03-13 DIAGNOSIS — I12.9 HYPERTENSIVE KIDNEY DISEASE WITH STAGE 3A CHRONIC KIDNEY DISEASE (HCC): ICD-10-CM

## 2025-03-13 DIAGNOSIS — N20.0 NEPHROLITHIASIS: ICD-10-CM

## 2025-03-13 DIAGNOSIS — E78.2 MIXED HYPERLIPIDEMIA: ICD-10-CM

## 2025-03-13 DIAGNOSIS — N18.31 STAGE 3A CHRONIC KIDNEY DISEASE (HCC): Primary | ICD-10-CM

## 2025-03-13 PROCEDURE — 99214 OFFICE O/P EST MOD 30 MIN: CPT | Performed by: INTERNAL MEDICINE

## 2025-03-13 NOTE — TELEPHONE ENCOUNTER
Received call from Giovana from [facility] stating patient is trying to obtain a lower copay for medication Telmisartan-hydrochlorothiazide, additional information needed.   Confirmed diagnosis as I10 and medications patient has tried and failed: Amlodipine, Lisinopril-HCTZ.   Advised patient has been stable on this medication since 2019.   Nothing further needed at this time.

## 2025-03-13 NOTE — PROGRESS NOTES
Progress Note     Mari Gaspar    Here for follow up  The patient had previously seen Dr. Bonner in September.  She had CKD 3 with a GFR in the mid to high 40s.    She is trying to lose weight she started phentermine in November.  GFR after this was 40      HISTORY:  Past Medical History:    Anxiety state    Back problem    Hx herniated discs    Calculus of kidney    Deaf    Left ear    Diverticulosis    Essential hypertension    Hearing loss    High blood pressure    High cholesterol    History of shingles    1980's    Hyperlipidemia    Meniere disease    Morbid obesity with BMI of 40.0-44.9, adult (HCC)    Osteoarthritis    Pneumonia due to organism    as an infant    SCC (squamous cell carcinoma)    right jawline    Tinnitus    Visual impairment    reading glasses      Past Surgical History:   Procedure Laterality Date    Cataract extraction w/  intraocular lens implant Right 09/12/2011    RJM    Cholecystectomy      Colonoscopy N/A 10/01/2019    Procedure: COLONOSCOPY;  Surgeon: John Puri MD;  Location: [facility] ENDOSCOPY    Colonoscopy N/A 09/10/2024    Procedure: COLONOSCOPY;  Surgeon: John Puri MD;  Location: [facility] ENDOSCOPY    Knee surgery      Oophorectomy Bilateral     per [employer]:  \"laparoscopic bilateral oophorectomy\"    Other surgical history      Ear surgery    Spine surgery procedure unlisted      for right sciatica issue    Total hip replacement Right     Total knee replacement Right     Tubal ligation      Yag capsulotomy - od - right eye Right 02/12/2014    RJM           Medications (Active prior to today's visit):  Current Outpatient Medications   Medication Sig Dispense Refill    sertraline 100 MG Oral Tab TAKE ONE TABLET BY MOUTH ONE TIME DAILY 90 tablet 3    TELMISARTAN-HCTZ 80-25 MG Oral Tab Take 1 tablet by mouth daily. 90 tablet 3    rosuvastatin 20 MG Oral Tab Take 1 tablet (20 mg total) by mouth daily. 90 tablet 3    gabapentin 600 MG Oral Tab Take 1  tablet (600 mg total) by mouth 3 (three) times daily. 270 tablet 2    Phentermine HCl 30 MG Oral Cap Take 1 capsule (30 mg total) by mouth every morning. For weight loss 90 capsule 1    fluticasone propionate 50 MCG/ACT Nasal Suspension 2 sprays by Nasal route daily. 3 each 2    aspirin 325 MG Oral Tab Take 1 tablet (325 mg total) by mouth daily. 90 tablet 0    SPIRONOLACTONE 25 MG Oral Tab TAKE ONE TABLET BY MOUTH ONE TIME DAILY 90 tablet 3    Cholecalciferol (VITAMIN D3) 3000 units Oral Tab Take 3,000 Units by mouth daily.        Multiple Vitamins-Minerals (PRESERVISION AREDS) Oral Tab Take by mouth.      HYDROcodone-acetaminophen 5-325 MG Oral Tab Take 1 tablet by mouth every 4 (four) hours as needed. (Patient not taking: Reported on 3/13/2025) 30 tablet 0       Allergies:  Allergies[1]      ROS:     Constitutional:  Negative for decreased activity, fever, irritability and lethargy  ENMT:  Negative for ear drainage, hearing loss and nasal drainage  Eyes:  Negative for eye discharge and vision loss  Cardiovascular:  Negative for chest pain, sob  Respiratory:  Negative for cough, dyspnea and wheezing  Gastrointestinal:  Negative for abdominal pain, constipation  Genitourinary:  Negative for dysuria and hematuria  Endocrine:  Negative for abnormal sleep patterns  Hema/Lymph:  Negative for easy bleeding and easy bruising  Integumentary:  Negative for pruritus and rash  Musculoskeletal:  Negative for bone/joint symptoms  Neurological:  Negative for gait disturbance  Psychiatric:  Negative for inappropriate interaction and psychiatric symptoms      Vitals:    03/13/25 1011   BP: 113/66   Pulse: 79       PHYSICAL EXAM:   Constitutional: appears well hydrated alert and responsive   Head/Face: normocephalic  Eyes/Vision: normal extraocular motion is intact  Nose/Mouth/Throat:mucous membranes are moist   Neck/Thyroid: neck is supple without adenopathy  Lymphatic: no abnormal cervical, supraclavicular adenopathy is  noted  Respiratory:  lungs are clear to auscultation bilaterally  Cardiovascular: regular rate and rhythm   Abdomen: soft, non-tender, non-distended, BS normal  Skin/Hair: no unusual rashes present, no abnormal bruising noted  Musculoskeletal: no deformities  Extremities: no edema  Neurological:  Grossly normal       Lab Results   Component Value Date     (H) 03/03/2025     03/03/2025    K 4.9 03/03/2025     03/03/2025    CO2 28.0 03/03/2025    ANIONGAP 6 03/03/2025    BUN 31 (H) 03/03/2025    CREATSERUM 1.36 (H) 03/03/2025    CA 9.7 03/03/2025    OSMOCALC 293 03/03/2025    EGFRCR 40 (L) 03/03/2025    ALB 4.8 03/03/2025    PHOS 3.2 03/03/2025         ASSESSMENT/PLAN:   Assessment   1. Stage 3a chronic kidney disease (HCC)  The patient has not been successful with weight loss in terms of taking her phentermine.  I asked her to discontinue it to see if it improves the kidney function at all.  Will check labs again in 2 months    I advised her to continue weight loss, we discussed possibly starting Ozempic.  She will talk to Dr. Mckeon about the    2. Nephrolithiasis  Continue aggressive hydration    3. Hypertensive kidney disease with stage 3a chronic kidney disease (HCC)  Blood pressure controlled on telmisartan/hydrochlorothiazide    4. Mixed hyperlipidemia  She is on a statin    Plan to recheck labs in 2 months to see if stopping phentermine improve the kidney function.  I will plan to see her back in 6 months       Orders This Visit:  No orders of the defined types were placed in this encounter.      Meds This Visit:  Requested Prescriptions      No prescriptions requested or ordered in this encounter       Imaging & Referrals:  None     3/13/2025   SUMI CORDON MD    No follow-ups on file.       [1] No Known Allergies

## 2025-03-17 NOTE — MR AVS SNAPSHOT
Dr. Larson reminded of cultures to be collected in OR per Dr. Nieves.   [place]  [address]  [phone]               Thank you for choosing us for your health care visit with Shira Zaragoza MD.  We are glad to serve you and happy to provide you with this summary information so you can schedule your appointment. Please wait 3 working days to schedule your appointment unless notified. Referral for Evaluation  Comprehensive Hearing Evaluation and Tympanometry    Assoc Dx:   Meniere's disease (cochlear hydrops), MULTI-VITAMINS Tabs   Take  by mouth. take 1 tablet by ORAL route  every day with food           predniSONE 20 MG Tabs   Take 3 tabs by oral route for 7 days, then taper 20 mg every other day until off.    Commonly known as:  Luiz Lopez

## 2025-05-08 ENCOUNTER — OFFICE VISIT (OUTPATIENT)
Dept: FAMILY MEDICINE CLINIC | Facility: CLINIC | Age: 80
End: 2025-05-08

## 2025-05-08 ENCOUNTER — LAB ENCOUNTER (OUTPATIENT)
Dept: LAB | Age: 80
End: 2025-05-08
Attending: INTERNAL MEDICINE
Payer: MEDICARE

## 2025-05-08 VITALS
DIASTOLIC BLOOD PRESSURE: 58 MMHG | TEMPERATURE: 97 F | WEIGHT: 233.63 LBS | HEART RATE: 76 BPM | HEIGHT: 65 IN | BODY MASS INDEX: 38.92 KG/M2 | SYSTOLIC BLOOD PRESSURE: 107 MMHG

## 2025-05-08 DIAGNOSIS — R73.9 HYPERGLYCEMIA: ICD-10-CM

## 2025-05-08 DIAGNOSIS — N18.31 STAGE 3A CHRONIC KIDNEY DISEASE (HCC): Primary | ICD-10-CM

## 2025-05-08 DIAGNOSIS — N18.31 STAGE 3A CHRONIC KIDNEY DISEASE (HCC): ICD-10-CM

## 2025-05-08 DIAGNOSIS — E66.01 SEVERE OBESITY (BMI 35.0-35.9 WITH COMORBIDITY) (HCC): ICD-10-CM

## 2025-05-08 LAB
ALBUMIN SERPL-MCNC: 4.7 G/DL (ref 3.2–4.8)
ANION GAP SERPL CALC-SCNC: 6 MMOL/L (ref 0–18)
BUN BLD-MCNC: 25 MG/DL (ref 9–23)
BUN/CREAT SERPL: 20.2 (ref 10–20)
CALCIUM BLD-MCNC: 9.5 MG/DL (ref 8.7–10.4)
CHLORIDE SERPL-SCNC: 106 MMOL/L (ref 98–112)
CO2 SERPL-SCNC: 27 MMOL/L (ref 21–32)
CREAT BLD-MCNC: 1.24 MG/DL (ref 0.55–1.02)
EGFRCR SERPLBLD CKD-EPI 2021: 45 ML/MIN/1.73M2 (ref 60–?)
EST. AVERAGE GLUCOSE BLD GHB EST-MCNC: 123 MG/DL (ref 68–126)
GLUCOSE BLD-MCNC: 92 MG/DL (ref 70–99)
HBA1C MFR BLD: 5.9 % (ref ?–5.7)
OSMOLALITY SERPL CALC.SUM OF ELEC: 292 MOSM/KG (ref 275–295)
PHOSPHATE SERPL-MCNC: 3.5 MG/DL (ref 2.4–5.1)
POTASSIUM SERPL-SCNC: 4.9 MMOL/L (ref 3.5–5.1)
SODIUM SERPL-SCNC: 139 MMOL/L (ref 136–145)
VIT D+METAB SERPL-MCNC: 62.3 NG/ML (ref 30–100)

## 2025-05-08 PROCEDURE — 36415 COLL VENOUS BLD VENIPUNCTURE: CPT

## 2025-05-08 PROCEDURE — G2211 COMPLEX E/M VISIT ADD ON: HCPCS | Performed by: FAMILY MEDICINE

## 2025-05-08 PROCEDURE — 80069 RENAL FUNCTION PANEL: CPT

## 2025-05-08 PROCEDURE — 82306 VITAMIN D 25 HYDROXY: CPT

## 2025-05-08 PROCEDURE — 99214 OFFICE O/P EST MOD 30 MIN: CPT | Performed by: FAMILY MEDICINE

## 2025-05-08 PROCEDURE — 83036 HEMOGLOBIN GLYCOSYLATED A1C: CPT

## 2025-05-08 NOTE — PROGRESS NOTES
Patient ID: Mari Gaspar is a 79 year old female.         The following individual(s) verbally consented to be recorded using ambient AI listening technology and understand that they can each withdraw their consent to this listening technology at any point by asking the clinician to turn off or pause the recording:    Patient name: Mari Gaspar  Additional names:           HPI  Chief Complaint   Patient presents with    Medication Request      recommends her to go on ozempic        History of Present Illness  Mari Gaspar is a 79 year old female with chronic kidney disease stage 3A who presents for weight management.    She has been unable to lose weight despite trying phentermine and other diets, and has gained weight recently. She is concerned about her weight in relation to her chronic kidney disease, which is stage 3A. She is not diabetic but has slightly elevated blood sugar levels.    She is currently not on any specific medication for weight loss.  I did review the note from Dr. French indicating she would benefit from Ozempic or medication that similar as she has been unable to lose weight and weight loss would definitely help her kidney function.    Wt Readings from Last 6 Encounters:   05/08/25 233 lb 9.6 oz (106 kg)   03/13/25 228 lb (103.4 kg)   02/19/25 226 lb (102.5 kg)   01/07/25 222 lb (100.7 kg)   01/11/25 226 lb (102.5 kg)   12/21/24 222 lb (100.7 kg)       BMI Readings from Last 6 Encounters:   05/08/25 38.87 kg/m²   03/13/25 37.94 kg/m²   02/19/25 37.61 kg/m²   01/07/25 36.94 kg/m²   01/11/25 37.61 kg/m²   12/21/24 36.94 kg/m²       BP Readings from Last 6 Encounters:   05/08/25 107/58   03/13/25 113/66   02/19/25 125/74   01/16/25 149/64   01/11/25 112/69   12/21/24 116/56       Results  DIAGNOSTIC  EKG: Normal (2025)    Review of Systems  No exertional cardiac chest pain or shortness of breath unless stated in HPI which would take precedence.  See HPI for further  review of systems.        Medical History:      Past Medical History:    Anxiety state    Back problem    Hx herniated discs    Calculus of kidney    Deaf    Left ear    Diverticulosis    Essential hypertension    Hearing loss    High blood pressure    High cholesterol    History of shingles    1980's    Hyperlipidemia    Meniere disease    Morbid obesity with BMI of 40.0-44.9, adult (HCC)    Osteoarthritis    Pneumonia due to organism    as an infant    SCC (squamous cell carcinoma)    right jawline    Tinnitus    Visual impairment    reading glasses       Past Surgical History:   Procedure Laterality Date    Cataract extraction w/  intraocular lens implant Right 09/12/2011    RJM    Cholecystectomy      Colonoscopy N/A 10/01/2019    Procedure: COLONOSCOPY;  Surgeon: John Puri MD;  Location: [facility] ENDOSCOPY    Colonoscopy N/A 09/10/2024    Procedure: COLONOSCOPY;  Surgeon: John Puri MD;  Location: [facility] ENDOSCOPY    Knee surgery      Oophorectomy Bilateral     per [employer]:  \"laparoscopic bilateral oophorectomy\"    Other surgical history      Ear surgery    Spine surgery procedure unlisted      for right sciatica issue    Total hip replacement Right     Total knee replacement Right     Tubal ligation      Yag capsulotomy - od - right eye Right 02/12/2014    RJM          Current Outpatient Medications   Medication Sig Dispense Refill    sertraline 100 MG Oral Tab TAKE ONE TABLET BY MOUTH ONE TIME DAILY 90 tablet 3    TELMISARTAN-HCTZ 80-25 MG Oral Tab Take 1 tablet by mouth daily. 90 tablet 3    rosuvastatin 20 MG Oral Tab Take 1 tablet (20 mg total) by mouth daily. 90 tablet 3    gabapentin 600 MG Oral Tab Take 1 tablet (600 mg total) by mouth 3 (three) times daily. 270 tablet 2    fluticasone propionate 50 MCG/ACT Nasal Suspension 2 sprays by Nasal route daily. 3 each 2    aspirin 325 MG Oral Tab Take 1 tablet (325 mg total) by mouth daily. 90 tablet 0    SPIRONOLACTONE 25 MG Oral Tab  TAKE ONE TABLET BY MOUTH ONE TIME DAILY 90 tablet 3    Cholecalciferol (VITAMIN D3) 3000 units Oral Tab Take 3,000 Units by mouth daily.        Multiple Vitamins-Minerals (PRESERVISION AREDS) Oral Tab Take by mouth.      HYDROcodone-acetaminophen 5-325 MG Oral Tab Take 1 tablet by mouth every 4 (four) hours as needed. (Patient not taking: Reported on 5/8/2025) 30 tablet 0     Allergies:No Known Allergies     Physical Exam:       Physical Exam  Blood pressure 107/58, pulse 76, temperature 97.2 °F (36.2 °C), temperature source Tympanic, height 5' 5\" (1.651 m), weight 233 lb 9.6 oz (106 kg), not currently breastfeeding.         Physical Exam   Constitutional: Patient is oriented to person, place, and time. Patient appears well-developed and well-nourished. No distress.   HENT:   Head: Normocephalic and atraumatic.   Neck: Normal range of motion. Neck supple. No thyromegaly present.   Lymphadenopathy:     Has no cervical adenopathy.   Cardiovascular: Normal rate, regular rhythm and no murmur heard.  Pulmonary/Chest: Effort normal and breath sounds normal. No respiratory distress.   Neurological: Patient is alert and oriented to person, place, and time.   Skin: Skin is warm and dry.  No pallor or diaphoresis.  Psychiatric: Patient has a normal mood and affect.       Nursing note and vitals reviewed.    Physical Exam  NECK: No cervical lymphadenopathy.  CHEST: Clear to auscultation bilaterally.  CARDIOVASCULAR: Heart regular rate and rhythm.      Assessment/Plan:        Diagnoses and all orders for this visit:    Stage 3a chronic kidney disease (HCC)  -     semaglutide-weight management 0.25 MG/0.5ML Subcutaneous Solution Auto-injector; Inject 0.5 mL (0.25 mg total) into the skin once a week for 4 doses. After 4 doses you will increase to the 0.5 mg once weekly for 4 doses as well.  She does have hyperglycemia.  I would like to check a hemoglobin A1c just to make sure she is not diabetic.  She continues to gain weight  even though she has tried multiple diets and phentermine without success.  Her nephrologist feels that a drug such as Ozempic would be helpful to help protect her kidneys but also get the weight down.  I agree and medication sent to the pharmacy.  Severe obesity (BMI 35.0-35.9 with comorbidity) (HCC)  -     semaglutide-weight management 0.25 MG/0.5ML Subcutaneous Solution Auto-injector; Inject 0.5 mL (0.25 mg total) into the skin once a week for 4 doses. After 4 doses you will increase to the 0.5 mg once weekly for 4 doses as well.    Hyperglycemia  -     semaglutide-weight management 0.25 MG/0.5ML Subcutaneous Solution Auto-injector; Inject 0.5 mL (0.25 mg total) into the skin once a week for 4 doses. After 4 doses you will increase to the 0.5 mg once weekly for 4 doses as well.  -     Hemoglobin A1C; Future        Referrals (if applicable)  No orders of the defined types were placed in this encounter.        Follow up if symptoms persist.  Take medicine (if given) as prescribed.  Approach to treatment discussed and patient/family member understands and agrees to plan.     She has a Medicare exam already scheduled for July 15, 2025      Assessment & Plan  Chronic Kidney Disease, Stage 3A  Chronic kidney disease stage 3A, well-managed. Kidney function remains above dialysis threshold. No diabetes, but elevated blood glucose present. Nephrologist plans vitamin D and kidney function tests.  - Monitor kidney function with nephrologist-ordered blood tests.  - Continue nephrology follow-up in September.  - Order hemoglobin A1c to assess for diabetes.    Elevated blood glucose  Elevated blood glucose without diabetes diagnosis. Hemoglobin A1c test will determine diabetic status, facilitating medication coverage if diabetic.  - Order hemoglobin A1c to assess for diabetes.  - Discuss Zepbound for weight management if non-diabetic.    Overweight  Overweight with high BMI and previous unsuccessful weight loss attempts using  phentermine and diets. Considering Zepbound for weight management due to significant weight loss results, potentially avoiding surgery. Medicare may cover with documentation of high BMI and elevated blood glucose.  - Initiate Zepbound 0.25 mg weekly for 4 weeks, then increase to 0.5 mg weekly for another 4 weeks.  - Obtain prior authorization for Zepbound with Medicare, documenting high BMI and elevated blood glucose.    Anant Mckeon,   5/8/2025

## 2025-05-09 ENCOUNTER — TELEPHONE (OUTPATIENT)
Dept: NEPHROLOGY | Facility: CLINIC | Age: 80
End: 2025-05-09

## 2025-05-09 DIAGNOSIS — N18.31 STAGE 3A CHRONIC KIDNEY DISEASE (HCC): Primary | ICD-10-CM

## 2025-05-09 NOTE — TELEPHONE ENCOUNTER
----- Message from SUMI FRENCH sent at 5/9/2025  8:38 AM CDT -----  Pls let her know that GFR is up to 45 on these labs (it was 40 when I saw her in [place])    Advise stay away from phenteramine for weight loss  ----- Message -----  From: Lab, Background User  Sent: 5/8/2025  10:13 PM CDT  To: Sumi French MD

## 2025-05-14 ENCOUNTER — NURSE TRIAGE (OUTPATIENT)
Dept: FAMILY MEDICINE CLINIC | Facility: CLINIC | Age: 80
End: 2025-05-14

## 2025-05-14 NOTE — TELEPHONE ENCOUNTER
Action Requested: Summary for Provider     []  Critical Lab, Recommendations Needed  [] Need Additional Advice  []   FYI    []   Need Orders  [] Need Medications Sent to Pharmacy  []  Other     SUMMARY: Patient reports severe throat pain. Appointment scheduled with provider.     Reason for call: Sore Throat  Onset: This morning    Patient states she woke up with severe throat pain. States pain is a 10 and it is painful to swallow. Denies cough, red eyes, difficulty breathing, headache, rash or pus on tonsil. Offered appointment to day with provider. Patient declined would prefer [place] or [place] location. Appointment scheduled for tomorrow.                 Reason for Disposition   SEVERE sore throat pain    Protocols used: Sore Throat-A-[place]

## 2025-05-15 ENCOUNTER — TELEPHONE (OUTPATIENT)
Dept: FAMILY MEDICINE CLINIC | Facility: CLINIC | Age: 80
End: 2025-05-15

## 2025-05-15 ENCOUNTER — OFFICE VISIT (OUTPATIENT)
Dept: FAMILY MEDICINE CLINIC | Facility: CLINIC | Age: 80
End: 2025-05-15

## 2025-05-15 VITALS
SYSTOLIC BLOOD PRESSURE: 126 MMHG | TEMPERATURE: 97 F | HEIGHT: 65 IN | BODY MASS INDEX: 39.32 KG/M2 | WEIGHT: 236 LBS | HEART RATE: 79 BPM | DIASTOLIC BLOOD PRESSURE: 70 MMHG

## 2025-05-15 DIAGNOSIS — E66.01 MORBID OBESITY WITH BMI OF 40.0-44.9, ADULT (HCC): Primary | ICD-10-CM

## 2025-05-15 DIAGNOSIS — R73.09 ELEVATED HEMOGLOBIN A1C MEASUREMENT: ICD-10-CM

## 2025-05-15 DIAGNOSIS — J02.9 SORE THROAT: Primary | ICD-10-CM

## 2025-05-15 DIAGNOSIS — E88.819 INSULIN RESISTANCE: ICD-10-CM

## 2025-05-15 PROCEDURE — 87880 STREP A ASSAY W/OPTIC: CPT | Performed by: PHYSICIAN ASSISTANT

## 2025-05-15 PROCEDURE — 99213 OFFICE O/P EST LOW 20 MIN: CPT | Performed by: PHYSICIAN ASSISTANT

## 2025-05-15 NOTE — PROGRESS NOTES
HPI:     Sore Throat   This is a new problem. Episode onset: 2 days. There has been no fever. Associated symptoms include coughing. Pertinent negatives include no abdominal pain, congestion, diarrhea, ear discharge, ear pain, headaches, hoarse voice, shortness of breath, swollen glands or vomiting. She has tried acetaminophen for the symptoms.         Medications:   Current Medications[1]    Allergies:   Allergies[2]    History:     Health Maintenance   Topic Date Due    Zoster Vaccines (1 of 2) Never done    COVID-19 Vaccine ( season) 2024    Annual Depression Screening  2025    Fall Risk Screening (Annual)  2025    Annual Physical  2025    Mammogram  2025    Influenza Vaccine (Season Ended) 10/01/2025    Colorectal Cancer Screening  09/10/2031    DEXA Scan  Completed    Pneumococcal Vaccine: 50+ Years  Completed    Meningococcal B Vaccine  Aged Out       No LMP recorded. Patient is postmenopausal.   Past Medical History:   Past Medical History[3]    Past Surgical History:   Past Surgical History[4]    Family History:   Family History[5]    Social History:     Social History     Socioeconomic History    Marital status:      Spouse name: Not on file    Number of children: Not on file    Years of education: Not on file    Highest education level: Not on file   Occupational History    Not on file   Tobacco Use    Smoking status: Former     Current packs/day: 0.00     Types: Cigarettes     Start date: 1966     Quit date: 2002     Years since quittin.3     Passive exposure: Never    Smokeless tobacco: Never   Vaping Use    Vaping status: Never Used   Substance and Sexual Activity    Alcohol use: Yes     Alcohol/week: 0.0 standard drinks of alcohol     Comment: social    Drug use: Yes     Types: methamphetamine    Sexual activity: Not on file   Other Topics Concern     Service Not Asked    Blood Transfusions Not Asked    Caffeine Concern No     Comment:  Coffee, 2 cups daily    Occupational Exposure Not Asked    Hobby Hazards Not Asked    Sleep Concern Not Asked    Stress Concern Not Asked    Weight Concern Not Asked    Special Diet Not Asked    Back Care Not Asked    Exercise No    Bike Helmet Not Asked    Seat Belt Not Asked    Self-Exams Not Asked    Grew up on a farm No    History of tanning Yes    Outdoor occupation No    Pt has a pacemaker No    Pt has a defibrillator No    Breast feeding No    Reaction to local anesthetic No   Social History Narrative    The patient does not use an assistive device..      The patient does not live in a home with stairs.     Social Drivers of Health     Food Insecurity: Not on file   Transportation Needs: Not on file   Stress: Not on file   Housing Stability: Not on file       Review of Systems:   Review of Systems   Constitutional:  Negative for activity change, chills, fatigue and fever.   HENT:  Positive for sore throat. Negative for congestion, ear discharge, ear pain, hoarse voice, postnasal drip, rhinorrhea, sinus pressure and sinus pain.    Respiratory:  Positive for cough. Negative for chest tightness, shortness of breath and wheezing.    Cardiovascular:  Negative for chest pain and palpitations.   Gastrointestinal:  Negative for abdominal distention, abdominal pain, blood in stool, constipation, diarrhea, nausea and vomiting.   Skin:  Negative for rash.   Neurological:  Negative for headaches.        Vitals:    05/15/25 1333   BP: 126/70   Pulse: 79   Temp: 97.3 °F (36.3 °C)   TempSrc: Temporal   Weight: 236 lb (107 kg)   Height: 5' 5\" (1.651 m)     Body mass index is 39.27 kg/m².    Physical Exam:   Physical Exam  Vitals reviewed.   Constitutional:       General: She is not in acute distress.     Appearance: She is well-developed.   HENT:      Right Ear: Tympanic membrane, ear canal and external ear normal. There is no impacted cerumen.      Left Ear: Tympanic membrane, ear canal and external ear normal. There is no  impacted cerumen.      Nose: Nose normal.      Mouth/Throat:      Mouth: Mucous membranes are moist.      Pharynx: Oropharynx is clear. Posterior oropharyngeal erythema present. No oropharyngeal exudate.   Eyes:      General:         Right eye: No discharge.         Left eye: No discharge.      Conjunctiva/sclera: Conjunctivae normal.   Cardiovascular:      Rate and Rhythm: Normal rate and regular rhythm.      Heart sounds: Normal heart sounds, S1 normal and S2 normal. No murmur heard.  Pulmonary:      Effort: Pulmonary effort is normal.      Breath sounds: Normal breath sounds. No wheezing or rales.   Chest:      Chest wall: No tenderness.   Lymphadenopathy:      Cervical: No cervical adenopathy.   Skin:     Findings: No rash.   Neurological:      Mental Status: She is alert and oriented to person, place, and time.   Psychiatric:         Behavior: Behavior is cooperative.          Assessment and Plan::     Assessment & Plan  Sore throat    Orders:    Strep A Assay W/Optic    Supportive care includes:    encourage fluid intake   Advise patient to take Tylenol/Ibuprofen as needed for pain.  warm salt water gargles   humidifier        Discussed plan of care with pt and pt is in agreement.All questions answered. Pt to call with questions or concerns.    RTC in 2-3 days if symptom persists.       [1]   Current Outpatient Medications   Medication Sig Dispense Refill    sertraline 100 MG Oral Tab TAKE ONE TABLET BY MOUTH ONE TIME DAILY 90 tablet 3    TELMISARTAN-HCTZ 80-25 MG Oral Tab Take 1 tablet by mouth daily. 90 tablet 3    rosuvastatin 20 MG Oral Tab Take 1 tablet (20 mg total) by mouth daily. 90 tablet 3    gabapentin 600 MG Oral Tab Take 1 tablet (600 mg total) by mouth 3 (three) times daily. 270 tablet 2    fluticasone propionate 50 MCG/ACT Nasal Suspension 2 sprays by Nasal route daily. 3 each 2    aspirin 325 MG Oral Tab Take 1 tablet (325 mg total) by mouth daily. 90 tablet 0    SPIRONOLACTONE 25 MG Oral Tab  TAKE ONE TABLET BY MOUTH ONE TIME DAILY 90 tablet 3    Cholecalciferol (VITAMIN D3) 3000 units Oral Tab Take 3,000 Units by mouth daily.        Multiple Vitamins-Minerals (PRESERVISION AREDS) Oral Tab Take by mouth.      semaglutide-weight management 0.25 MG/0.5ML Subcutaneous Solution Auto-injector Inject 0.5 mL (0.25 mg total) into the skin once a week for 4 doses. After 4 doses you will increase to the 0.5 mg once weekly for 4 doses as well. (Patient not taking: Reported on 5/15/2025) 2 mL 0   [2] No Known Allergies  [3]   Past Medical History:   Anxiety state    Back problem    Hx herniated discs    Calculus of kidney    Deaf    Left ear    Diverticulosis    Essential hypertension    Hearing loss    High blood pressure    High cholesterol    History of shingles    1980's    Hyperlipidemia    Meniere disease    Morbid obesity with BMI of 40.0-44.9, adult (HCC)    Osteoarthritis    Pneumonia due to organism    as an infant    SCC (squamous cell carcinoma)    right jawline    Tinnitus    Visual impairment    reading glasses   [4]   Past Surgical History:  Procedure Laterality Date    Cataract extraction w/  intraocular lens implant Right 09/12/2011    RJM    Cholecystectomy      Colonoscopy N/A 10/01/2019    Procedure: COLONOSCOPY;  Surgeon: John Puri MD;  Location: [facility] ENDOSCOPY    Colonoscopy N/A 09/10/2024    Procedure: COLONOSCOPY;  Surgeon: John Puri MD;  Location: [facility] ENDOSCOPY    Knee surgery      Oophorectomy Bilateral     per [employer]:  \"laparoscopic bilateral oophorectomy\"    Other surgical history      Ear surgery    Spine surgery procedure unlisted      for right sciatica issue    Total hip replacement Right     Total knee replacement Right     Tubal ligation      Yag capsulotomy - od - right eye Right 02/12/2014    JOEY   [5]   Family History  Problem Relation Age of Onset    Diabetes Mother     Hypertension Mother     Other (colon surgery) Father     Other (hx of SC (  unknown type)) Father     Diabetes Paternal Grandmother     Cancer Self     Glaucoma Neg         No Family h/o Glaucoma    Macular degeneration Neg         No Family h/o Macular degeneration    Breast Cancer Neg     Ovarian Cancer Neg

## 2025-05-15 NOTE — TELEPHONE ENCOUNTER
Patient stated that the medication below needs a PA. Triage support please assist. Thank you.       semaglutide-weight management 0.25 MG/0.5ML Subcutaneous Solution Auto-injector2 mL05/8/20255/30/2025Sig - Route: Inject 0.5 mL (0.25 mg total) into the skin once a week for 4 doses. After 4 doses you will increase to the 0.5 mg once weekly for 4 doses as well. - SubcutaneousPatient not taking: Reported on 5/15/2025Sent to pharmacy as: Semaglutide-Weight Management 0.25 MG/0.5ML Subcutaneous Solution Auto-injector (Weg[employer])E-Prescribing Status: Receipt confirmed by pharmacy (5/8/2025  1:31 PM CDT)

## 2025-05-19 ENCOUNTER — OFFICE VISIT (OUTPATIENT)
Dept: FAMILY MEDICINE CLINIC | Facility: CLINIC | Age: 80
End: 2025-05-19

## 2025-05-19 ENCOUNTER — HOSPITAL ENCOUNTER (OUTPATIENT)
Dept: GENERAL RADIOLOGY | Age: 80
Discharge: HOME | End: 2025-05-19
Attending: FAMILY MEDICINE
Payer: MEDICARE

## 2025-05-19 VITALS
WEIGHT: 235.63 LBS | OXYGEN SATURATION: 94 % | HEIGHT: 65 IN | SYSTOLIC BLOOD PRESSURE: 134 MMHG | BODY MASS INDEX: 39.26 KG/M2 | TEMPERATURE: 99 F | HEART RATE: 94 BPM | DIASTOLIC BLOOD PRESSURE: 61 MMHG

## 2025-05-19 DIAGNOSIS — R05.1 ACUTE COUGH: ICD-10-CM

## 2025-05-19 DIAGNOSIS — R19.7 DIARRHEA, UNSPECIFIED TYPE: ICD-10-CM

## 2025-05-19 DIAGNOSIS — R06.09 DOE (DYSPNEA ON EXERTION): Primary | ICD-10-CM

## 2025-05-19 DIAGNOSIS — R09.89 ABNORMAL LUNG SOUNDS: ICD-10-CM

## 2025-05-19 DIAGNOSIS — R06.09 DOE (DYSPNEA ON EXERTION): ICD-10-CM

## 2025-05-19 DIAGNOSIS — J02.9 ACUTE PHARYNGITIS, UNSPECIFIED ETIOLOGY: ICD-10-CM

## 2025-05-19 PROCEDURE — 71046 X-RAY EXAM CHEST 2 VIEWS: CPT | Performed by: FAMILY MEDICINE

## 2025-05-19 PROCEDURE — 99214 OFFICE O/P EST MOD 30 MIN: CPT | Performed by: FAMILY MEDICINE

## 2025-05-19 PROCEDURE — G2211 COMPLEX E/M VISIT ADD ON: HCPCS | Performed by: FAMILY MEDICINE

## 2025-05-19 RX ORDER — ALBUTEROL SULFATE 90 UG/1
2 INHALANT RESPIRATORY (INHALATION) EVERY 6 HOURS PRN
Qty: 1 EACH | Refills: 0 | Status: SHIPPED | OUTPATIENT
Start: 2025-05-19

## 2025-05-19 RX ORDER — PREDNISONE 20 MG/1
TABLET ORAL
Qty: 10 TABLET | Refills: 0 | Status: SHIPPED | OUTPATIENT
Start: 2025-05-19

## 2025-05-19 RX ORDER — AZITHROMYCIN 250 MG/1
TABLET, FILM COATED ORAL
Qty: 6 TABLET | Refills: 0 | Status: SHIPPED | OUTPATIENT
Start: 2025-05-19 | End: 2025-05-24

## 2025-05-19 NOTE — TELEPHONE ENCOUNTER
Patient called back reporting no improvement in her sore throat.  Congestion.  Acute follow up scheduled.

## 2025-05-19 NOTE — PROGRESS NOTES
Patient ID: Mari Gaspar is a 79 year old female.         The following individual(s) verbally consented to be recorded using ambient AI listening technology and understand that they can each withdraw their consent to this listening technology at any point by asking the clinician to turn off or pause the recording:    Patient name: Mari Gaspar  Additional names:           HPI  Chief Complaint   Patient presents with    Shortness Of Breath    Diarrhea     2 weeks with diarrhea    Sore Throat     With a cough and sore throat for 2 weeks       History of Present Illness  Mari Gaspar is a 79 year old female who presents with persistent cough, shortness of breath, and diarrhea.    She has been experiencing a persistent cough with dark phlegm for the past two weeks, accompanied by shortness of breath, particularly noticeable during exertion, such as walking. No fever or chest pain. A sore throat that was severe two weeks ago has slightly improved. A strep test conducted on May 15th was negative.    She has had diarrhea for two weeks, which started to improve today. No recent antibiotic use.    She has a runny nose, which is a constant issue, and occasionally feels stuffy, though not at the moment. She feels 'frail like an old lady'.        Wt Readings from Last 6 Encounters:   05/19/25 235 lb 9.6 oz (106.9 kg)   05/15/25 236 lb (107 kg)   05/08/25 233 lb 9.6 oz (106 kg)   03/13/25 228 lb (103.4 kg)   02/19/25 226 lb (102.5 kg)   01/07/25 222 lb (100.7 kg)       BMI Readings from Last 6 Encounters:   05/19/25 39.21 kg/m²   05/15/25 39.27 kg/m²   05/08/25 38.87 kg/m²   03/13/25 37.94 kg/m²   02/19/25 37.61 kg/m²   01/07/25 36.94 kg/m²       BP Readings from Last 6 Encounters:   05/19/25 134/61   05/15/25 126/70   05/08/25 107/58   03/13/25 113/66   02/19/25 125/74   01/16/25 149/64       Results  LABS  Strep test: negative (05/15/2025)    Review of Systems  No exertional cardiac chest pain unless stated  in HPI which would take precedence.  See HPI for further review of systems.        Medical History:      Past Medical History:    Anxiety state    Back problem    Hx herniated discs    Calculus of kidney    Deaf    Left ear    Diverticulosis    Essential hypertension    Hearing loss    High blood pressure    High cholesterol    History of shingles    1980's    Hyperlipidemia    Meniere disease    Morbid obesity with BMI of 40.0-44.9, adult (HCC)    Osteoarthritis    Pneumonia due to organism    as an infant    SCC (squamous cell carcinoma)    right jawline    Tinnitus    Visual impairment    reading glasses       Past Surgical History:   Procedure Laterality Date    Cataract extraction w/  intraocular lens implant Right 09/12/2011    [facility]    Cholecystectomy      Colonoscopy N/A 10/01/2019    Procedure: COLONOSCOPY;  Surgeon: John Puri MD;  Location: [facility] ENDOSCOPY    Colonoscopy N/A 09/10/2024    Procedure: COLONOSCOPY;  Surgeon: John Puri MD;  Location: [facility] ENDOSCOPY    Knee surgery      Oophorectomy Bilateral     per [employer]:  \"laparoscopic bilateral oophorectomy\"    Other surgical history      Ear surgery    Spine surgery procedure unlisted      for right sciatica issue    Total hip replacement Right     Total knee replacement Right     Tubal ligation      Yag capsulotomy - od - right eye Right 02/12/2014    [facility]          Current Outpatient Medications   Medication Sig Dispense Refill    predniSONE 20 MG Oral Tab Take 2 by mouth at same time daily for 5 days. (Best taken at BREAKFAST or LUNCH) 10 tablet 0    azithromycin (ZITHROMAX Z-REJI) 250 MG Oral Tab Take 2 tablets (500 mg total) by mouth daily for 1 day, THEN 1 tablet (250 mg total) daily for 4 days. 6 tablet 0    albuterol 108 (90 Base) MCG/ACT Inhalation Aero Soln Inhale 2 puffs into the lungs every 6 (six) hours as needed for Wheezing or Shortness of Breath. 1 each 0    sertraline 100 MG Oral Tab TAKE ONE TABLET BY MOUTH ONE  TIME DAILY 90 tablet 3    TELMISARTAN-HCTZ 80-25 MG Oral Tab Take 1 tablet by mouth daily. 90 tablet 3    rosuvastatin 20 MG Oral Tab Take 1 tablet (20 mg total) by mouth daily. 90 tablet 3    gabapentin 600 MG Oral Tab Take 1 tablet (600 mg total) by mouth 3 (three) times daily. 270 tablet 2    fluticasone propionate 50 MCG/ACT Nasal Suspension 2 sprays by Nasal route daily. 3 each 2    aspirin 325 MG Oral Tab Take 1 tablet (325 mg total) by mouth daily. 90 tablet 0    SPIRONOLACTONE 25 MG Oral Tab TAKE ONE TABLET BY MOUTH ONE TIME DAILY 90 tablet 3    Cholecalciferol (VITAMIN D3) 3000 units Oral Tab Take 3,000 Units by mouth daily.        Multiple Vitamins-Minerals (PRESERVISION AREDS) Oral Tab Take by mouth.      semaglutide-weight management 0.25 MG/0.5ML Subcutaneous Solution Auto-injector Inject 0.5 mL (0.25 mg total) into the skin once a week for 4 doses. After 4 doses you will increase to the 0.5 mg once weekly for 4 doses as well. (Patient not taking: Reported on 5/19/2025) 2 mL 0     Allergies:No Known Allergies     Physical Exam:       Physical Exam  Blood pressure 134/61, pulse 94, temperature 98.8 °F (37.1 °C), temperature source Tympanic, height 5' 5\" (1.651 m), weight 235 lb 9.6 oz (106.9 kg), SpO2 94%, not currently breastfeeding.  Vitals:    05/19/25 0929   BP: 134/61   BP Location: Right arm   Patient Position: Sitting   Cuff Size: large   Pulse: 94   Temp: 98.8 °F (37.1 °C)   TempSrc: Tympanic   SpO2: 94%   Weight: 235 lb 9.6 oz (106.9 kg)   Height: 5' 5\" (1.651 m)          Physical Exam   Constitutional: Patient is oriented to person, place, and time. Patient appears well-developed and well-nourished. No distress but does look tired.   HENT:   Head: Normocephalic.   Right Ear: Tympanic membrane normal.   Left Ear: Tympanic membrane normal.   Nose: Mucosal edema and rhinorrhea present. No sinus tenderness   Nose: Pale boggy nasal mucosa with clear rhinorrhea.  Turbinates: moderate  congestion.  Oropharynx: clear post nasal drainage without cobblestoning.  Tonsils: normal   Eyes: Conjunctivae and EOM are normal.   Neck: Neck supple. No thyromegaly present.   Cardiovascular: Normal rate, regular rhythm and normal heart sounds.    Pulmonary/Chest: Effort normal but she has audible rhonchi and rattling even without the stethoscope.  With the stethoscope she has wheezing on end inspiration and rhonchi with expiration.  Good air entry in all lung fields though.  Lymphadenopathy:     Has  no cervical adenopathy.   Neurological: Is alert and oriented to person, place, and time.   Skin: Skin is warm.  No pallor or diaphoresis or cyanosis.  Psychiatric: has a normal mood and affect.   Vitals reviewed.      Physical Exam  VITALS: SaO2- 94%  HEENT: Ears normal. Pale, boggy nasal mucosa. Throat normal.  NECK: Neck supple.        Assessment/Plan:        Diagnoses and all orders for this visit:    NICHOLS (dyspnea on exertion)  -     predniSONE 20 MG Oral Tab; Take 2 by mouth at same time daily for 5 days. (Best taken at BREAKFAST or LUNCH)  -     azithromycin (ZITHROMAX Z-REJI) 250 MG Oral Tab; Take 2 tablets (500 mg total) by mouth daily for 1 day, THEN 1 tablet (250 mg total) daily for 4 days.  -     albuterol 108 (90 Base) MCG/ACT Inhalation Aero Soln; Inhale 2 puffs into the lungs every 6 (six) hours as needed for Wheezing or Shortness of Breath.  -     XR CHEST PA + LAT CHEST (CPT=71046); Future  Gave her instructions on when to go to the emergency room.  I ordered a stat chest x-ray.  Started on prednisone, Zithromax and albuterol as directed.  She understands the plan.  Abnormal lung sounds  -     predniSONE 20 MG Oral Tab; Take 2 by mouth at same time daily for 5 days. (Best taken at BREAKFAST or LUNCH)  -     azithromycin (ZITHROMAX Z-REJI) 250 MG Oral Tab; Take 2 tablets (500 mg total) by mouth daily for 1 day, THEN 1 tablet (250 mg total) daily for 4 days.  -     albuterol 108 (90 Base) MCG/ACT  Inhalation Aero Soln; Inhale 2 puffs into the lungs every 6 (six) hours as needed for Wheezing or Shortness of Breath.  -     XR CHEST PA + LAT CHEST (CPT=71046); Future    Acute cough  -     predniSONE 20 MG Oral Tab; Take 2 by mouth at same time daily for 5 days. (Best taken at BREAKFAST or LUNCH)  -     albuterol 108 (90 Base) MCG/ACT Inhalation Aero Soln; Inhale 2 puffs into the lungs every 6 (six) hours as needed for Wheezing or Shortness of Breath.  -     XR CHEST PA + LAT CHEST (CPT=71046); Future    Acute pharyngitis, unspecified etiology  -     predniSONE 20 MG Oral Tab; Take 2 by mouth at same time daily for 5 days. (Best taken at BREAKFAST or LUNCH)    Diarrhea, unspecified type  She states no diarrhea today and she feels things are getting better.  Not dehydrated.      Referrals (if applicable)  No orders of the defined types were placed in this encounter.        Follow up if symptoms persist.  Take medicine (if given) as prescribed.  Approach to treatment discussed and patient/family member understands and agrees to plan.     No follow-ups on file.      Assessment & Plan  Acute bronchitis  Acute bronchitis with wheezing and rhonchi on auscultation. Symptoms include cough with dark phlegm, exertional dyspnea, and recent diarrhea. Differential diagnosis includes pneumonia; a chest x-ray is necessary to rule out pneumonia. No recent antibiotic use to explain diarrhea. Pulse oximetry at 94% indicates mild hypoxemia. No fever or chest pain reported.  - Order chest x-ray stat to rule out pneumonia.  - Prescribe prednisone, 2 tablets daily for 5 days.  - Prescribe Zithromax (azithromycin), 2 pills today, then 1 pill daily for the next 4 days.  - Prescribe an inhaler, 2 puffs three times a day.  - Advise emergency room visit if symptoms worsen or if experiencing weakness or dizziness, as IV fluids and antibiotics may be necessary if oral medications are insufficient.    Anant Mckeon, DO  5/19/2025

## 2025-05-20 RX ORDER — TIRZEPATIDE 2.5 MG/.5ML
2.5 INJECTION, SOLUTION SUBCUTANEOUS
Qty: 2 ML | Refills: 0 | Status: SHIPPED | OUTPATIENT
Start: 2025-05-20

## 2025-05-20 NOTE — TELEPHONE ENCOUNTER
I am going to send in Zepbound and see if that is covered as she does have insulin resistance with an elevated hemoglobin A1c and morbid obesity.

## 2025-05-21 ENCOUNTER — TELEPHONE (OUTPATIENT)
Dept: FAMILY MEDICINE CLINIC | Facility: CLINIC | Age: 80
End: 2025-05-21

## 2025-05-21 RX ORDER — BENZONATATE 200 MG/1
200 CAPSULE ORAL 3 TIMES DAILY PRN
Qty: 30 CAPSULE | Refills: 0 | Status: SHIPPED | OUTPATIENT
Start: 2025-05-21

## 2025-05-21 NOTE — TELEPHONE ENCOUNTER
for .  Patient was seen by  on Monday and informed she has a medium sized pneumonia in the right lower lobe. Patient is feeling a little better but she is coughing a lot. She is coughing during the day and at night patient also has wheezing.Patient is coughing up phlegm that is brownish.  Patient denied worsening shortness of breath. No fever or chest pain but she does feel rib pain when coughing. Patient will like a medication to help with the cough. Please advise

## 2025-05-21 NOTE — TELEPHONE ENCOUNTER
Patient was called and inform  sent in the cough medication.   Patient was inform to call us back if not better with her cough.

## 2025-05-22 NOTE — TELEPHONE ENCOUNTER
Please inform her that she will need to see Dr. Garcia as Medicare has excluded these weight loss injections.

## 2025-06-18 ENCOUNTER — OFFICE VISIT (OUTPATIENT)
Dept: DERMATOLOGY CLINIC | Facility: CLINIC | Age: 80
End: 2025-06-18
Payer: MEDICARE

## 2025-06-18 DIAGNOSIS — D23.9 BENIGN NEOPLASM OF SKIN, UNSPECIFIED LOCATION: ICD-10-CM

## 2025-06-18 DIAGNOSIS — L81.4 LENTIGO: ICD-10-CM

## 2025-06-18 DIAGNOSIS — L65.9 HAIR LOSS: ICD-10-CM

## 2025-06-18 DIAGNOSIS — L82.1 SK (SEBORRHEIC KERATOSIS): ICD-10-CM

## 2025-06-18 DIAGNOSIS — L91.8 INFLAMED ACROCHORDON: ICD-10-CM

## 2025-06-18 DIAGNOSIS — D48.5 NEOPLASM OF UNCERTAIN BEHAVIOR OF SKIN: Primary | ICD-10-CM

## 2025-06-18 DIAGNOSIS — Z85.828 ENCOUNTER FOR FOLLOW-UP SURVEILLANCE OF SKIN CANCER: ICD-10-CM

## 2025-06-18 DIAGNOSIS — Z08 ENCOUNTER FOR FOLLOW-UP SURVEILLANCE OF SKIN CANCER: ICD-10-CM

## 2025-06-18 DIAGNOSIS — D22.9 MULTIPLE NEVI: ICD-10-CM

## 2025-06-18 PROCEDURE — 11102 TANGNTL BX SKIN SINGLE LES: CPT | Performed by: DERMATOLOGY

## 2025-06-18 PROCEDURE — 11200 RMVL SKIN TAGS UP TO&INC 15: CPT | Performed by: DERMATOLOGY

## 2025-06-18 PROCEDURE — 88305 TISSUE EXAM BY PATHOLOGIST: CPT | Performed by: DERMATOLOGY

## 2025-06-18 PROCEDURE — 99214 OFFICE O/P EST MOD 30 MIN: CPT | Performed by: DERMATOLOGY

## 2025-06-18 RX ORDER — FINASTERIDE 5 MG/1
2.5 TABLET, FILM COATED ORAL DAILY
Qty: 45 TABLET | Refills: 1 | Status: SHIPPED | OUTPATIENT
Start: 2025-06-18

## 2025-06-18 NOTE — PROGRESS NOTES
The following individual(s) verbally consented to be recorded using ambient AI listening technology and understand that they can each withdraw their consent to this listening technology at any point by asking the clinician to turn off or pause the recording:    Patient name: Mari Gaspar  Additional names:

## 2025-06-21 NOTE — PROGRESS NOTES
The pathology report from last visit showed    , forehead, shave biopsy:  - Seborrheic keratosis, irritated.  Please log in test results, send biopsy results letter.  Pt to rtc 1 year or prn.

## 2025-06-23 NOTE — PROGRESS NOTES
Mari Gaspar is a 79 year old female.  HPI:     CC:    Chief Complaint   Patient presents with    Lesion     Hx of SCC. LOV 9/2024.  Lesion(s) of concern to the   1. Face- Denies bleeding or pain  2. Neck -jewelry/clothing getting caught.            HISTORY:    Past Medical History:    Anxiety state    Back problem    Hx herniated discs    Calculus of kidney    Deaf    Left ear    Diverticulosis    Essential hypertension    Hearing loss    High blood pressure    High cholesterol    History of shingles    1980's    Hyperlipidemia    Meniere disease    Morbid obesity with BMI of 40.0-44.9, adult (HCC)    Osteoarthritis    Pneumonia due to organism    as an infant    SCC (squamous cell carcinoma)    right jawline    Tinnitus    Visual impairment    reading glasses      Past Surgical History:   Procedure Laterality Date    Cataract extraction w/  intraocular lens implant Right 09/12/2011    [facility]    Cholecystectomy      Colonoscopy N/A 10/01/2019    Procedure: COLONOSCOPY;  Surgeon: John Puri MD;  Location: [facility] ENDOSCOPY    Colonoscopy N/A 09/10/2024    Procedure: COLONOSCOPY;  Surgeon: John Puri MD;  Location: [facility] ENDOSCOPY    Knee surgery      Oophorectomy Bilateral     per [employer]:  \"laparoscopic bilateral oophorectomy\"    Other surgical history      Ear surgery    Spine surgery procedure unlisted      for right sciatica issue    Total hip replacement Right     Total knee replacement Right     Tubal ligation      Yag capsulotomy - od - right eye Right 02/12/2014    [facility]      Family History   Problem Relation Age of Onset    Diabetes Mother     Hypertension Mother     Other (colon surgery) Father     Other (hx of SC ( unknown type)) Father     Diabetes Paternal Grandmother     Cancer Self     Glaucoma Neg         No Family h/o Glaucoma    Macular degeneration Neg         No Family h/o Macular degeneration    Breast Cancer Neg     Ovarian Cancer Neg       Social History      Socioeconomic History    Marital status:    Tobacco Use    Smoking status: Former     Current packs/day: 0.00     Types: Cigarettes     Start date: 1966     Quit date: 2002     Years since quittin.4     Passive exposure: Never    Smokeless tobacco: Never   Vaping Use    Vaping status: Never Used   Substance and Sexual Activity    Alcohol use: Yes     Alcohol/week: 0.0 standard drinks of alcohol     Comment: social    Drug use: Yes     Types: methamphetamine   Other Topics Concern    Caffeine Concern No     Comment: Coffee, 2 cups daily    Exercise No    Grew up on a farm No    History of tanning Yes    Outdoor occupation No    Pt has a pacemaker No    Pt has a defibrillator No    Breast feeding No    Reaction to local anesthetic No   Social History Narrative    The patient does not use an assistive device..      The patient does not live in a home with stairs.        Current Outpatient Medications   Medication Sig Dispense Refill    finasteride 5 MG Oral Tab Take 0.5 tablets (2.5 mg total) by mouth daily. 45 tablet 1    benzonatate 200 MG Oral Cap Take 1 capsule (200 mg total) by mouth 3 (three) times daily as needed. 30 capsule 0    Tirzepatide-Weight Management (ZEPBOUND) 2.5 MG/0.5ML Subcutaneous Solution Auto-injector Inject 2.5 mg into the skin every 7 days. Patient needs 4 pens.  This is for obesity.  If able to tolerate this medication we will increase the dose to 5 mg after the fourth dose.  HgA1c in the prediabetic range and patient has morbid obesity. 2 mL 0    predniSONE 20 MG Oral Tab Take 2 by mouth at same time daily for 5 days. (Best taken at BREAKFAST or LUNCH) 10 tablet 0    albuterol 108 (90 Base) MCG/ACT Inhalation Aero Soln Inhale 2 puffs into the lungs every 6 (six) hours as needed for Wheezing or Shortness of Breath. 1 each 0    sertraline 100 MG Oral Tab TAKE ONE TABLET BY MOUTH ONE TIME DAILY 90 tablet 3    TELMISARTAN-HCTZ 80-25 MG Oral Tab Take 1 tablet by mouth  daily. 90 tablet 3    rosuvastatin 20 MG Oral Tab Take 1 tablet (20 mg total) by mouth daily. 90 tablet 3    gabapentin 600 MG Oral Tab Take 1 tablet (600 mg total) by mouth 3 (three) times daily. 270 tablet 2    fluticasone propionate 50 MCG/ACT Nasal Suspension 2 sprays by Nasal route daily. 3 each 2    aspirin 325 MG Oral Tab Take 1 tablet (325 mg total) by mouth daily. 90 tablet 0    SPIRONOLACTONE 25 MG Oral Tab TAKE ONE TABLET BY MOUTH ONE TIME DAILY 90 tablet 3    Cholecalciferol (VITAMIN D3) 3000 units Oral Tab Take 3,000 Units by mouth daily.        Multiple Vitamins-Minerals (PRESERVISION AREDS) Oral Tab Take by mouth.       Allergies:   No Known Allergies    Past Medical History:    Anxiety state    Back problem    Hx herniated discs    Calculus of kidney    Deaf    Left ear    Diverticulosis    Essential hypertension    Hearing loss    High blood pressure    High cholesterol    History of shingles    1980's    Hyperlipidemia    Meniere disease    Morbid obesity with BMI of 40.0-44.9, adult (HCC)    Osteoarthritis    Pneumonia due to organism    as an infant    SCC (squamous cell carcinoma)    right jawline    Tinnitus    Visual impairment    reading glasses     Past Surgical History:   Procedure Laterality Date    Cataract extraction w/  intraocular lens implant Right 09/12/2011    [facility]    Cholecystectomy      Colonoscopy N/A 10/01/2019    Procedure: COLONOSCOPY;  Surgeon: John Puri MD;  Location: [facility] ENDOSCOPY    Colonoscopy N/A 09/10/2024    Procedure: COLONOSCOPY;  Surgeon: John Puri MD;  Location: [facility] ENDOSCOPY    Knee surgery      Oophorectomy Bilateral     per [employer]:  \"laparoscopic bilateral oophorectomy\"    Other surgical history      Ear surgery    Spine surgery procedure unlisted      for right sciatica issue    Total hip replacement Right     Total knee replacement Right     Tubal ligation      Yag capsulotomy - od - right eye Right 02/12/2014    [facility]     Social  History     Socioeconomic History    Marital status:      Spouse name: Not on file    Number of children: Not on file    Years of education: Not on file    Highest education level: Not on file   Occupational History    Not on file   Tobacco Use    Smoking status: Former     Current packs/day: 0.00     Types: Cigarettes     Start date: 1966     Quit date: 2002     Years since quittin.4     Passive exposure: Never    Smokeless tobacco: Never   Vaping Use    Vaping status: Never Used   Substance and Sexual Activity    Alcohol use: Yes     Alcohol/week: 0.0 standard drinks of alcohol     Comment: social    Drug use: Yes     Types: methamphetamine    Sexual activity: Not on file   Other Topics Concern     Service Not Asked    Blood Transfusions Not Asked    Caffeine Concern No     Comment: Coffee, 2 cups daily    Occupational Exposure Not Asked    Hobby Hazards Not Asked    Sleep Concern Not Asked    Stress Concern Not Asked    Weight Concern Not Asked    Special Diet Not Asked    Back Care Not Asked    Exercise No    Bike Helmet Not Asked    Seat Belt Not Asked    Self-Exams Not Asked    Grew up on a farm No    History of tanning Yes    Outdoor occupation No    Pt has a pacemaker No    Pt has a defibrillator No    Breast feeding No    Reaction to local anesthetic No   Social History Narrative    The patient does not use an assistive device..      The patient does not live in a home with stairs.     Social Drivers of Health     Food Insecurity: Not on file   Transportation Needs: Not on file   Stress: Not on file   Housing Stability: Not on file     Family History   Problem Relation Age of Onset    Diabetes Mother     Hypertension Mother     Other (colon surgery) Father     Other (hx of SC ( unknown type)) Father     Diabetes Paternal Grandmother     Cancer Self     Glaucoma Neg         No Family h/o Glaucoma    Macular degeneration Neg         No Family h/o Macular degeneration    Breast  Cancer Neg     Ovarian Cancer Neg        There were no vitals filed for this visit.    HPI:  Chief Complaint   Patient presents with    Lesion     Hx of SCC. LOV 9/2024.  Lesion(s) of concern to the   1. Face- Denies bleeding or pain  2. Neck -jewelry/clothing getting caught.        Follow-up history SCC last office visit 2017   personal history of skin cancer SCC right jawline    family history of skin cancer-father per notes unknown skin cancer   Patient presents with concerns above.    Patient has been in their usual state of health.     Past notes/ records and appropriate/relevant lab results including pathology and past body maps reviewed. Including outside notes/ PCP notes as appropriate. Updated and new information noted in current visit.     ROS:  Denies other relevant systemic complaints.      History, medications, allergies reviewed as noted.    Allergies:  Patient has no known allergies.     Physical Examination:     Well-developed well-nourished patient alert oriented in no acute distress.  Exam performed, including scalp, head, neck, face,nails, hair, external eyes, including conjunctival mucosa, eyelids, lips external ears , arms, digits,palms.     Multiple light to medium brown, well marginated, uniformly pigmented, macules and papules 6 mm and less scattered on exam. pigmented lesions examined with dermoscopy benign-appearing patterns.     Waxy tannish keratotic papules scattered, cherry-red vascular papules scattered.    See map today's date for lesions noted .  See assessment and plan below for specific lesions.    Otherwise remarkable for lesions as noted on map.    See A/P  below for additional information:    Assessment / plan:    Orders Placed This Encounter   Procedures    Specimen to Pathology, Tissue [[facility] Pt to [facility] lab]       Meds & Refills for this Visit:  Requested Prescriptions     Signed Prescriptions Disp Refills    finasteride 5 MG Oral Tab 45 tablet 1     Sig: Take 0.5 tablets (2.5  mg total) by mouth daily.         Encounter Diagnoses   Name Primary?    Neoplasm of uncertain behavior of skin Yes    SK (seborrheic keratosis)     Multiple nevi     Lentigo     Benign neoplasm of skin, unspecified location     Encounter for follow-up surveillance of skin cancer     Hair loss          Physical Exam  SKIN: AV lesion on neck, lesion on forehead, and multiple lesions on neck.    Results  Procedure: Excision of skin lesion  Description: The lesion on the forehead biopsy after local anesthesia was administered.    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  Labs reviewed including vitamin D normal creatinine mildly elevated A1c 5.9 PTH is elevated TSH is okay mild anemia noted on CBC all likely contributory continue management of underlying health conditions treatment options discussed include topical minoxidil and oral medications such as finasteride and dutasteride. Finasteride is preferred for its cost-effectiveness and long-term use history. Potential side effects include facial hair growth, mood changes, and depression. Emphasized the need for a higher dose in women and the importance of avoiding pregnancy while on finasteride. Minoxidil can lower blood pressure, so topical application is safer with current medications. Spironolactone was also mentioned as a helpful option.  - Prescribe finasteride for pattern hair loss  - Consider topical minoxidil as an alternative  Overall hair regimen discussed including B. vitamin supplementation, biotin to 10 mg daily-stop 3 days before any blood tests-, vitamin D3 2000 units daily, iron as appropriate, adequate protein intake (40 to 80 g daily), use of volumizing shampoos and antidandruff shampoos as appropriate.  Use of minoxidil 5% foam twice daily as tolerated if patient desires.  The fact this may take 2-4 months to see any significant change discussed.  The fact regimen better and maintaining hair rather than  regrowth reviewed      Multiple Nevi  Multiple nevi present on the neck and forehead, with some flat and others more prominent.  - Numb and remove nevi on the forehead    Recording duration: 6 minutes           Patient with lesion growing irritated pearly flesh colored papule 1cm r/o BCC,     Shave/ tangential biopsy performed, operative note and consent in chart further plans pending pathology    Inflamed pedunculated pigmented papules along the lateral neck collar line irritated acrochordons  Scissors removal, irritated skin tags medically necessary as inflamed.  Lesions treated with scissors removal  Medically necessary as lesion inflamed and irritated. #5    History SCC no evidence recurrence.    Numerous benign seborrheic keratoses more inflamed lesions along the back, temples hairline reassurance.  Consider trial of cryo.  Monitor.  Continue sun protection    Benign-appearing nevi, no atypical features on dermoscopy reassurance given monitor.     Waxy tan keratotic papules lesions in areas of concern as noted reassurance given.  Benign nature discussed.  Possibility of cryo, alphahydroxy acids over-the-counter retinol's discussed.     No other susupicious lesions on todays  exam.    Please refer to map for specific lesions.  See additional diagnoses.  Pros cons of various therapies, risks benefits discussed.Pathophysiology discussed with patient.  Therapeutic options reviewed.  See  Medications in grid.  Instructions reviewed at length.    Benign nevi, seborrheic  keratoses, cherry angiomas:  Reassurance regarding other benign skin lesions.Signs and symptoms of skin cancer, ABCDE's of melanoma discussed with patient. Sunscreen (broad-spectrum, ideally mineral-based-UVA/UVB -SPF 30 or higher) use encouraged, sun protection/sun protective clothing, self exams reviewed Followup as noted RTC ---routine checkup    6 mos -one year or p.r.n.    Encounter Times   Including precharting, reviewing chart, prior notes  obtaining history: 10 minutes, medical exam :10 minutes, notes on body map, plan, counseling 10minutes My total time spent caring for the patient on the day of the encounter: 30 minutes     The patient indicates understanding of these issues and agrees to the plan.  The patient is asked to return as noted in follow-up/ above.    This note was generated using Dragon voice recognition software.  Please contact me regarding any confusion resulting from errors in recognition..  Note to patient and family: The 21st Century Cures Act makes medical notes like these available to patients. However, be advised this is a medical document. It is intended as [id number] communication and monitoring of a patient's care needs. It is written in medical language and may contain abbreviations or verbiage that are unfamiliar. It may appear blunt or direct. Medical documents are intended to carry relevant information, facts as evident and the clinical opinion of the practitioner.

## 2025-06-23 NOTE — PROGRESS NOTES
Operative Report                     Shave/  Tangential biopsy     Clinical diagnosis:    Size of lesion:     Patient with lesion growing irritated pearly flesh colored papule 1cm r/o BCC,     Location:    Procedure:    With patient in appropriate position the skin of the above was scrubbed with alcohol.  Anesthesia was obtained with 1% Xylocaine with epinephrine.  The skin surrounding the lesion was placed under tension and the lesion was incised using a #15 scalpel blade.  The specimen was sent for histopathologic exam.    Hemostasis was obtained with electrocautery/aluminum chloride.  Estimated blood loss less than 2 cc.    Biopsy dressed with Polysporin, bandage.    Pressure dressing:   No    Complications: None    Written instructions given and reviewed with patient    Await pathology    Contact information reviewed.    Procedural physician:  Laina Dee MD

## 2025-07-31 ENCOUNTER — LAB ENCOUNTER (OUTPATIENT)
Dept: LAB | Age: 80
End: 2025-07-31
Attending: FAMILY MEDICINE

## 2025-07-31 ENCOUNTER — HOSPITAL ENCOUNTER (OUTPATIENT)
Dept: GENERAL RADIOLOGY | Age: 80
Discharge: HOME | End: 2025-07-31
Attending: FAMILY MEDICINE

## 2025-07-31 ENCOUNTER — OFFICE VISIT (OUTPATIENT)
Dept: FAMILY MEDICINE CLINIC | Facility: CLINIC | Age: 80
End: 2025-07-31
Payer: MEDICARE

## 2025-07-31 VITALS
BODY MASS INDEX: 38.99 KG/M2 | TEMPERATURE: 97 F | HEART RATE: 71 BPM | HEIGHT: 65 IN | WEIGHT: 234 LBS | SYSTOLIC BLOOD PRESSURE: 132 MMHG | DIASTOLIC BLOOD PRESSURE: 70 MMHG

## 2025-07-31 DIAGNOSIS — Z00.00 ENCOUNTER FOR ANNUAL HEALTH EXAMINATION: ICD-10-CM

## 2025-07-31 DIAGNOSIS — I10 HTN (HYPERTENSION), BENIGN: ICD-10-CM

## 2025-07-31 DIAGNOSIS — Z12.31 VISIT FOR SCREENING MAMMOGRAM: ICD-10-CM

## 2025-07-31 DIAGNOSIS — E78.2 MIXED HYPERLIPIDEMIA: ICD-10-CM

## 2025-07-31 DIAGNOSIS — E66.01 SEVERE OBESITY (BMI 35.0-35.9 WITH COMORBIDITY) (HCC): ICD-10-CM

## 2025-07-31 DIAGNOSIS — I10 ESSENTIAL HYPERTENSION: ICD-10-CM

## 2025-07-31 DIAGNOSIS — E88.819 INSULIN RESISTANCE: ICD-10-CM

## 2025-07-31 DIAGNOSIS — M48.061 SPINAL STENOSIS OF LUMBAR REGION, UNSPECIFIED WHETHER NEUROGENIC CLAUDICATION PRESENT: ICD-10-CM

## 2025-07-31 DIAGNOSIS — Z96.652 S/P TKR (TOTAL KNEE REPLACEMENT), LEFT: ICD-10-CM

## 2025-07-31 DIAGNOSIS — F41.1 ANXIETY STATE: ICD-10-CM

## 2025-07-31 DIAGNOSIS — E55.9 VITAMIN D DEFICIENCY: ICD-10-CM

## 2025-07-31 DIAGNOSIS — R73.09 ELEVATED HEMOGLOBIN A1C MEASUREMENT: ICD-10-CM

## 2025-07-31 DIAGNOSIS — N18.31 STAGE 3A CHRONIC KIDNEY DISEASE (HCC): ICD-10-CM

## 2025-07-31 DIAGNOSIS — I83.891 VARICOSE VEINS OF LEG WITH EDEMA, RIGHT: ICD-10-CM

## 2025-07-31 DIAGNOSIS — S30.0XXA SACRAL CONTUSION, INITIAL ENCOUNTER: ICD-10-CM

## 2025-07-31 DIAGNOSIS — J30.89 OTHER ALLERGIC RHINITIS: ICD-10-CM

## 2025-07-31 DIAGNOSIS — I70.90 ATHEROSCLEROSIS: ICD-10-CM

## 2025-07-31 DIAGNOSIS — Z00.00 ADULT GENERAL MEDICAL EXAM: Primary | ICD-10-CM

## 2025-07-31 LAB
BASOPHILS # BLD AUTO: 0.1 X10(3) UL (ref 0–0.2)
BASOPHILS NFR BLD AUTO: 1.1 %
CHOLEST SERPL-MCNC: 163 MG/DL (ref ?–200)
DEPRECATED RDW RBC AUTO: 48.8 FL (ref 35.1–46.3)
EOSINOPHIL # BLD AUTO: 0.13 X10(3) UL (ref 0–0.7)
EOSINOPHIL NFR BLD AUTO: 1.5 %
ERYTHROCYTE [DISTWIDTH] IN BLOOD BY AUTOMATED COUNT: 13.3 % (ref 11–15)
FASTING PATIENT LIPID ANSWER: YES
HCT VFR BLD AUTO: 33.3 % (ref 35–48)
HDLC SERPL-MCNC: 61 MG/DL (ref 40–59)
HGB BLD-MCNC: 10.9 G/DL (ref 12–16)
IMM GRANULOCYTES # BLD AUTO: 0.03 X10(3) UL (ref 0–1)
IMM GRANULOCYTES NFR BLD: 0.3 %
LDLC SERPL CALC-MCNC: 93 MG/DL (ref ?–100)
LYMPHOCYTES # BLD AUTO: 1.67 X10(3) UL (ref 1–4)
LYMPHOCYTES NFR BLD AUTO: 18.8 %
MCH RBC QN AUTO: 32.4 PG (ref 26–34)
MCHC RBC AUTO-ENTMCNC: 32.7 G/DL (ref 31–37)
MCV RBC AUTO: 99.1 FL (ref 80–100)
MONOCYTES # BLD AUTO: 0.68 X10(3) UL (ref 0.1–1)
MONOCYTES NFR BLD AUTO: 7.7 %
NEUTROPHILS # BLD AUTO: 6.25 X10 (3) UL (ref 1.5–7.7)
NEUTROPHILS # BLD AUTO: 6.25 X10(3) UL (ref 1.5–7.7)
NEUTROPHILS NFR BLD AUTO: 70.6 %
NONHDLC SERPL-MCNC: 102 MG/DL (ref ?–130)
PLATELET # BLD AUTO: 291 10(3)UL (ref 150–450)
RBC # BLD AUTO: 3.36 X10(6)UL (ref 3.8–5.3)
TRIGL SERPL-MCNC: 43 MG/DL (ref 30–149)
TSI SER-ACNC: 1.34 UIU/ML (ref 0.55–4.78)
VLDLC SERPL CALC-MCNC: 7 MG/DL (ref 0–30)
WBC # BLD AUTO: 8.9 X10(3) UL (ref 4–11)

## 2025-07-31 PROCEDURE — 84443 ASSAY THYROID STIM HORMONE: CPT

## 2025-07-31 PROCEDURE — 72220 X-RAY EXAM SACRUM TAILBONE: CPT | Performed by: FAMILY MEDICINE

## 2025-07-31 PROCEDURE — 85025 COMPLETE CBC W/AUTO DIFF WBC: CPT

## 2025-07-31 PROCEDURE — 80061 LIPID PANEL: CPT

## 2025-07-31 PROCEDURE — 36415 COLL VENOUS BLD VENIPUNCTURE: CPT

## 2025-07-31 RX ORDER — HYDROCODONE BITARTRATE AND ACETAMINOPHEN 5; 325 MG/1; MG/1
1 TABLET ORAL DAILY PRN
Qty: 20 TABLET | Refills: 0 | Status: SHIPPED | OUTPATIENT
Start: 2025-07-31

## 2025-07-31 RX ORDER — TELMISARTAN AND HYDROCHLORTHIAZIDE 80; 25 MG/1; MG/1
1 TABLET ORAL DAILY
Qty: 90 TABLET | Refills: 3 | Status: SHIPPED | OUTPATIENT
Start: 2025-07-31

## 2025-07-31 RX ORDER — SPIRONOLACTONE 25 MG/1
25 TABLET ORAL DAILY
Qty: 90 TABLET | Refills: 3 | Status: SHIPPED | OUTPATIENT
Start: 2025-07-31

## 2025-07-31 RX ORDER — SERTRALINE HYDROCHLORIDE 100 MG/1
TABLET, FILM COATED ORAL
Qty: 90 TABLET | Refills: 3 | Status: SHIPPED | OUTPATIENT
Start: 2025-07-31

## 2025-08-04 ENCOUNTER — TELEPHONE (OUTPATIENT)
Dept: FAMILY MEDICINE CLINIC | Facility: CLINIC | Age: 80
End: 2025-08-04

## 2025-08-11 ENCOUNTER — HOSPITAL ENCOUNTER (OUTPATIENT)
Dept: MAMMOGRAPHY | Facility: HOSPITAL | Age: 80
Discharge: HOME | End: 2025-08-11
Attending: FAMILY MEDICINE

## 2025-08-11 DIAGNOSIS — Z12.31 VISIT FOR SCREENING MAMMOGRAM: ICD-10-CM

## 2025-08-11 PROCEDURE — 77067 SCR MAMMO BI INCL CAD: CPT | Performed by: FAMILY MEDICINE

## 2025-08-11 PROCEDURE — 77063 BREAST TOMOSYNTHESIS BI: CPT | Performed by: FAMILY MEDICINE

## (undated) DIAGNOSIS — I10 ESSENTIAL HYPERTENSION: ICD-10-CM

## (undated) DIAGNOSIS — J30.1 SEASONAL ALLERGIC RHINITIS DUE TO POLLEN: ICD-10-CM

## (undated) DIAGNOSIS — M51.26 LUMBAR HERNIATED DISC: ICD-10-CM

## (undated) DEVICE — BANDAGE FLXMSTR 11YDX6IN STRL

## (undated) DEVICE — GAUZE SPONGES,12 PLY: Brand: CURITY

## (undated) DEVICE — DRAPE SHEET LG

## (undated) DEVICE — VIOLET BRAIDED (POLYGLACTIN 910), SYNTHETIC ABSORBABLE SUTURE: Brand: COATED VICRYL

## (undated) DEVICE — TRI FLAT PIN-MED

## (undated) DEVICE — CS5/5+ FASTPACK, 125ML, 150µ RES: Brand: HAEMONETICS CELL SAVER 5/5+ SYSTEMS

## (undated) DEVICE — TRAY SRGPRP PVP IOD WT SCRB SM

## (undated) DEVICE — T5 HOOD WITH PEEL AWAY FACE SHIELD

## (undated) DEVICE — BATTERY

## (undated) DEVICE — 35 ML SYRINGE REGULAR TIP: Brand: MONOJECT

## (undated) DEVICE — PEN: MARKING STD PT 100/CS: Brand: MEDICAL ACTION INDUSTRIES

## (undated) DEVICE — ENCORE® LATEX MICRO SIZE 7.5, STERILE LATEX POWDER-FREE SURGICAL GLOVE: Brand: ENCORE

## (undated) DEVICE — GOWN SURG AERO BLUE PERF XLG

## (undated) DEVICE — 3M™ MICROFOAM™ TAPE 1528-4: Brand: 3M™ MICROFOAM™

## (undated) DEVICE — DRESSING 10X4IN ANMC SAFETAC

## (undated) DEVICE — KIT CLEAN ENDOKIT 1.1OZ GOWNX2

## (undated) DEVICE — INTENDED FOR TISSUE SEPARATION, AND OTHER PROCEDURES THAT REQUIRE A SHARP SURGICAL BLADE TO PUNCTURE OR CUT.: Brand: BARD-PARKER ® STAINLESS STEEL BLADES

## (undated) DEVICE — PETROLATUM GAUZE CISION DRESSING: Brand: VASELINE

## (undated) DEVICE — STERILE LATEX POWDER-FREE SURGICAL GLOVESWITH NITRILE COATING: Brand: PROTEXIS

## (undated) DEVICE — PACK CDS TOTAL HIP

## (undated) DEVICE — 3M™ STERI-DRAPE™ U-DRAPE 1015: Brand: STERI-DRAPE™

## (undated) DEVICE — SUTURE ETHILON 3-0 669H

## (undated) DEVICE — DUAL CUT SAGITTAL BLADE

## (undated) DEVICE — GAMMEX® NON-LATEX PI ORTHO SIZE 9, STERILE POLYISOPRENE POWDER-FREE SURGICAL GLOVE: Brand: GAMMEX

## (undated) DEVICE — SOL  .9 3000ML

## (undated) DEVICE — Device: Brand: STABLECUT®

## (undated) DEVICE — 2DE14 2-0 PDO 24 X 24: Brand: 2DE14 2-0 PDO 24 X 24

## (undated) DEVICE — DRAPE SRG U 124X80IN U REINF

## (undated) DEVICE — KIT MFLD FOR SPEC COLL

## (undated) DEVICE — SURETRANS AUTOTRANSFUSION SYSTEM FOR ORTHOPAEDICS WITH PVC DRAIN AND 2 TROCARS: Brand: SURETRANS AUTOTRANSFUSION SYSTEM FOR ORTHOPAEDICS

## (undated) DEVICE — SOL  .9 1000ML BTL

## (undated) DEVICE — COTTON ROLL: Brand: DEROYAL

## (undated) DEVICE — TRAY SKIN PREP PVP-1

## (undated) DEVICE — TRAY FOLEY BDX 16F STATLOCK

## (undated) DEVICE — BANDAGE ROLL,100% COTTON, 6 PLY, LARGE: Brand: KERLIX

## (undated) DEVICE — CEMENT MIXING SYSTEM WITH FEMORAL BREAKWAY NOZZLE: Brand: REVOLUTION

## (undated) DEVICE — SYRINGE, LUER SLIP, STERILE, 60ML: Brand: MEDLINE

## (undated) DEVICE — SUTURE SILK 0 FSL

## (undated) DEVICE — CONTAINER SPEC STR 4OZ GRY LID

## (undated) DEVICE — SOLUTION SURG DURA PREP HAZMAT

## (undated) DEVICE — Device

## (undated) DEVICE — SUTURE ETHIBOND 2 V-37

## (undated) DEVICE — CO2 CANNULA,SSOFT,ADLT,7O2,4CO2,FEMALE: Brand: MEDLINE

## (undated) DEVICE — SUTURE VICRYL 0 J340H

## (undated) DEVICE — MEDI-VAC NON-CONDUCTIVE SUCTION TUBING 6MM X 1.8M (6FT.) L: Brand: CARDINAL HEALTH

## (undated) DEVICE — PILLOW ABDUCTION HIP MED

## (undated) DEVICE — GAMMEX® NON-LATEX PI ORTHO SIZE 8.5, STERILE POLYISOPRENE POWDER-FREE SURGICAL GLOVE: Brand: GAMMEX

## (undated) DEVICE — GAMMEX® PI HYBRID SIZE 9, STERILE POWDER-FREE SURGICAL GLOVE, POLYISOPRENE AND NEOPRENE BLEND: Brand: GAMMEX

## (undated) DEVICE — DERMABOND LIQUID ADHESIVE

## (undated) DEVICE — KIT ENDO ORCAPOD 160/180/190

## (undated) DEVICE — SUTURE VICRYL 2-0 FS-1

## (undated) DEVICE — BLADE 20 SHRP BP SS SRG STRL

## (undated) DEVICE — NEEDLE SPINAL 20X3-1/2 YELLOW

## (undated) DEVICE — 60 ML SYRINGE LUER-LOCK TIP: Brand: MONOJECT

## (undated) DEVICE — PAD THRP 16IN WRPON MU LNG STM

## (undated) DEVICE — SUTURE ETHIBOND 1 CT-1

## (undated) DEVICE — MYKNEE PPS MIS TIBCUTBLOCK-MRI-GMK-LM-#2: Brand: MYKNEE PPS

## (undated) DEVICE — ZIMMER® STERILE DISPOSABLE TOURNIQUET CUFF WITH PLC, DUAL PORT, SINGLE BLADDER, 34 IN. (86 CM)

## (undated) DEVICE — BLADE SW .5IN MCHC 2.75IN

## (undated) DEVICE — 2T11 #2 PDO 36 X 36: Brand: 2T11 #2 PDO 36 X 36

## (undated) DEVICE — KIT DRN 3/16IN PVC DRN 3 SPRG

## (undated) DEVICE — POLAR CARE CUBE COOLING UNIT

## (undated) DEVICE — Device: Brand: DEFENDO AIR/WATER/SUCTION AND BIOPSY VALVE

## (undated) DEVICE — MYKNEE FEMUR BONE MODEL LEFT: Brand: MY KNEE BONE MODELS

## (undated) DEVICE — TOTAL KNEE: Brand: MEDLINE INDUSTRIES, INC.

## (undated) DEVICE — BLADE SAW SAGITTAL 19.5

## (undated) DEVICE — CHLORAPREP 26ML APPLICATOR

## (undated) DEVICE — MYKNEE MIS TIBIAL BONE MODEL LEFT MEDIAL: Brand: MY KNEE BONE MODELS

## (undated) DEVICE — HOVERMATT 34IN SINGLE USE

## (undated) DEVICE — BLADE ELECTRODE: Brand: EDGE

## (undated) DEVICE — LASSO POLYPECTOMY SNARE: Brand: LASSO

## (undated) DEVICE — GIJAW SINGLE-USE BIOPSY FORCEPS WITH NEEDLE: Brand: GIJAW

## (undated) DEVICE — 3.5 FEMALE HEX HEAD PIN-Z

## (undated) DEVICE — LINE MNTR ADLT SET O2 INTMD

## (undated) DEVICE — Device: Brand: CUSTOM PROCEDURE KIT

## (undated) DEVICE — MYKNEE PPS STD FEMDISCUTBLOCK-MRI-GMK-LM-#3+: Brand: MYKNEE PPS

## (undated) NOTE — LETTER
AUTHORIZATION FOR SURGICAL OPERATION OR OTHER PROCEDURE    1.  I hereby authorize Dr. Wander Burr and the [facility] staff assigned to my case to perform the following operation and/or procedure at the [facility]:    Right hip joint steroid injection under ultr Time:  ________ A. M.  P.M.        Patient Name:  ______________________________________________________  (please print)       Patient signature:  ___________________________________________________             Relationship to Patient:

## (undated) NOTE — LETTER
[facility]  [address], [place], [place]    Authorization for Surgical Operation and Procedure                               I hereby authorize John Puir MD, my physician and his/her assistants (if applicable), which may include medical students, residents, and/or fellows, to perform the following surgical operation/ procedure and administer such anesthesia as may be determined necessary by my physician: Operation/Procedure name (s) COLONOSCOPY on Marijuanita Gaspar   2.   I recognize that during the surgical operation/procedure, unforeseen conditions may necessitate additional or different procedures than those listed above.  I, therefore, further authorize and request that the above-named surgeon, assistants, or designees perform such procedures as are, in their judgment, necessary and desirable.    3.   My surgeon/physician has discussed prior to my surgery the potential benefits, risks and side effects of this procedure; the likelihood of achieving goals; and potential problems that might occur during recuperation.  They also discussed reasonable alternatives to the procedure, including risks, benefits, and side effects related to the alternatives and risks related to not receiving this procedure.  I have had all my questions answered and I acknowledge that no guarantee has been made as to the result that may be obtained.    4.   Should the need arise during my operation/procedure, which includes change of level of care prior to discharge, I also consent to the administration of blood and/or blood products.  Further, I understand that despite careful testing and screening of blood or blood products by collecting agencies, I may still be subject to ill effects as a result of receiving a blood transfusion and/or blood products.  The following are some, but not all, of the potential risks that can occur: fever and allergic reactions, hemolytic reactions, transmission of  diseases such as Hepatitis, AIDS and Cytomegalovirus (CMV) and fluid overload.  In the event that I wish to have an autologous transfusion of my own blood, or a directed donor transfusion, I will discuss this with my physician.  Check only if Refusing Blood or Blood Products  I understand refusal of blood or blood products as deemed necessary by my physician may have serious consequences to my condition to include possible death. I hereby assume responsibility for my refusal and release the hospital, its personnel, and my physicians from any responsibility for the consequences of my refusal.    o  Refuse   5.   I authorize the use of any specimen, organs, tissues, body parts or foreign objects that may be removed from my body during the operation/procedure for diagnosis, research or teaching purposes and their subsequent disposal by hospital authorities.  I also authorize the release of specimen test results and/or written reports to my treating physician on the hospital medical staff or other referring or consulting physicians involved in my care, at the discretion of the Pathologist or my treating physician.    6.   I consent to the photographing or videotaping of the operations or procedures to be performed, including appropriate portions of my body for medical, scientific, or educational purposes, provided my identity is not revealed by the pictures or by descriptive texts accompanying them.  If the procedure has been photographed/videotaped, the surgeon will obtain the original picture, image, videotape or CD.  The hospital will not be responsible for storage, release or maintenance of the picture, image, tape or CD.    7.   I consent to the presence of a  or observers in the operating room as deemed necessary by my physician or their designees.    8.   I recognize that in the event my procedure results in extended X-Ray/fluoroscopy time, I may develop a skin reaction.    9. If I have a Do Not  Attempt Resuscitation (DNAR) order in place, that status will be suspended while in the operating room, procedural suite, and during the recovery period unless otherwise explicitly stated by me (or a person authorized to consent on my behalf). The surgeon or my attending physician will determine when the applicable recovery period ends for purposes of reinstating the DNAR order.  10. Patients having a sterilization procedure: I understand that if the procedure is successful the results will be permanent and it will therefore be impossible for me to inseminate, conceive, or bear children.  I also understand that the procedure is intended to result in sterility, although the result has not been guaranteed.   11. I acknowledge that my physician has explained sedation/analgesia administration to me including the risk and benefits I consent to the administration of sedation/analgesia as may be necessary or desirable in the judgment of my physician.    I CERTIFY THAT I HAVE READ AND FULLY UNDERSTAND THE ABOVE CONSENT TO OPERATION and/or OTHER PROCEDURE.     ____________________________________  _________________________________        ______________________________  Signature of Patient    Signature of Responsible Person                Printed Name of Responsible Person                                      ____________________________________  _____________________________                ________________________________  Signature of Witness        Date  Time         Relationship to Patient    STATEMENT OF PHYSICIAN My signature below affirms that prior to the time of the procedure; I have explained to the patient and/or his/her legal representative, the risks and benefits involved in the proposed treatment and any reasonable alternative to the proposed treatment. I have also explained the risks and benefits involved in refusal of the proposed treatment and alternatives to the proposed treatment and have answered the  patient's questions. If I have a significant financial interest in a co-management agreement or a significant financial interest in any product or implant, or other significant relationship used in this procedure/surgery, I have disclosed this and had a discussion with my patient.     _____________________________________________________              _____________________________  (Signature of Physician)                                                                                         (Date)                                   (Time)  Patient Name: Mari HUI Chasity      : 1945      Printed: 2024     Medical Record #: [id number]                                      Page 1 of 1

## (undated) NOTE — LETTER
[facility]  A. Notifier:    CATALINA River  [id number]    Advance Beneficiary Notice of Noncoverage (ABN)    Note:  If Medicare doesn't pay for D.  Right hip joint steroid injection under ultrasound guidance  below, you may have to pa following the directions on the MSN. If Medicare does pay, you will refund any payments I made to you, less copays or deductibles. Option 2:  I want the D.   Right hip joint steroid injection under ultrasound guidance  listed above, but do not bill M

## (undated) NOTE — LETTER
04/17/21 [facility]      Dear Tri Jang records indicate that you have outstanding lab work and or testing that was ordered for you and has not yet been completed:  Orders Placed This Encounter

## (undated) NOTE — MR AVS SNAPSHOT
[facility] [employer] [facility]  [address], [address] [facility]  [address] [facility] [phone]               Thank you for choosing us for your health care visit with Linda Alanis MD.  We are glad to serve you and happy to provide you with this summary of your your appointment immediately. However, if you are unsure about the requirements for authorization, please wait 5-7 days and then contact your physician's office.  At that time, you will be provided with any authorization numbers or be assured that none are prescription. · These prescriptions can only be dispensed as a 30 day supply. · These prescriptions cannot be given additional refills. Refill policies:  · Allow 2-3 business days for refills; controlled substances may take longer.   · Contact our offi CALCIO DEL MAR 1250 (500 Ca) MG Tabs   Generic drug:  Calcium Carbonate   Take by mouth daily. Fexofenadine HCl 180 MG Tabs   Take 1 tablet (180 mg total) by mouth daily. Commonly known as:  ALLEGRA           Flaxseed Misc   daily.  FLAX OIL (u Summaries. If you've been to the Emergency Department or your doctor's office, you can view your past visit information in [facility] by going to Visits < Visit Summaries. [facility]na questions? Call [phone] for help.   [facility] is NOT to be used for urge

## (undated) NOTE — LETTER
January 10, 2018    [address] [place] [address]      Dear Mariajose Bonilla:   It was a pleasure speaking with you over the phone recently regarding our Chronic Care Management program. To follow up, I wanted to send you some contact i

## (undated) NOTE — IP AVS SNAPSHOT
Patient Demographics     Address  [address] [address] Phone  [phone] [facility])  [phone] (Work)  [phone] (Mobile) *Preferred* E-mail Address  [email or web address]      Emergency Contact(s)     Name Relation Home Work Mobile TAKE ONE TABLET BY MOUTH ONE TIME DAILY IN THE EVENING   Roman Reyes MD         HYDROcodone-acetaminophen 5-325 MG Tabs  Commonly known as:  NORCO      Take 1 tablet by mouth every 4 (four) hours as needed for Pain.    MD Buck Saucedo Please  your prescriptions at the location directed by your doctor or nurse    Bring a paper prescription for each of these medications  ferrous sulfate 325 (65 FE) MG Tbec  HYDROcodone-acetaminophen 5-325 MG Tabs           [id number] - MAR ACTION RE [id number] Enoxaparin Sodium (LOVENOX) 30 MG/0.3ML injection 30 mg 09/19/18 2106 Given              Recent Vital Signs       Most Recent Value   Vitals  113/38 Filed at 09/20/2018 0846   Pulse  71 Filed at 09/20/2018 0846   Resp  16 Filed at 09/20/2018 084 CBC W/ DIFFERENTIAL[[id number]]          Abnormal            Final result                 Please view results for these tests on the individual orders.     Specimen Information    Type Source Collected On   Blood — 09/20/18 0431            Testing Performed Rosuvastatin Calcium 20 MG Oral Tab Take 1 tablet (20 mg total) by mouth once daily. Disp: 90 tablet Rfl: 2 9/17/2018 at Unknown time   Montelukast Sodium 10 MG Oral Tab Take 1 tablet (10 mg total) by mouth once daily.  Disp: 90 tablet Rfl: 0 Taking   Sertr • Back problem     Hx herniated discs   • Calculus of kidney    • Deaf     Left ear   • Diverticulosis    • Hearing impairment    • Hearing loss    • High blood pressure    • High cholesterol    • History of shingles     1980's   • Meniere disease    • Jaylyn Rodriguez ANKLES / HINDFEET / TOES ARE ALL 5/5, NORMAL LIGHT TOUCH IS INTACT TO ALL AREAS OF BOTH FEET.  DECREASED PROM OF RIGHT HIP WITH PAIN AT END RANGE, LEFT KNEE VARUS   OTHER        [ x ] I have discussed the risks and benefits and alternatives with the patient it was best and important, after this statement, patient reports feeling \"foggy\". Patient assisted to toilet with mod A x 2, difficulty turning with rolling walker and is able to report only 2/3 posterior precautions.  Patient does not have the physical s -   Need to walk in hospital room?: A Little   -   Climbing 3-5 steps with a railing?: A Lot     AM-PAC Score:  Raw Score: 17   PT Approx Degree of Impairment Score: 50.57%   Standardized Score (AM-PAC Scale): 42.13   CMS Modifier (G-Code): CK    FUNCTIONA Filed:  9/19/2018  8:41 AM Date of Service:  9/19/2018  8:33 AM Status:  Signed    :  Kathrine Mujica PT (Physical Therapist)       PHYSICAL THERAPY HIP TREATMENT NOTE - INPATIENT    Room Number: 952/746-D            Presenting Problem: R JUAN training;Transfer training;Balance training;Strengthening;Range of motion    SUBJECTIVE  \"I feel woozy\"    OBJECTIVE  Precautions: Limb alert - right;JUAN - posterior;Hip abduction pillow    WEIGHT BEARING RESTRICTION  Weight Bearing Restriction: R lower Patient Goal Patient's self-stated goal is: to go home   Goal #1 Patient is able to demonstrate supine - sit EOB @ level: modified independent   Goal #1   Current Status Not tested    Goal #2 Patient is able to demonstrate transfers Sit to/from Stand at as increased time to perform. Patient is able to sit to/from stand with rolling walker with min A x 1, verbal cues provided for hand placement. Patient is able to ambulate 45ft with rolling walker with min A x 1, somewhat unsteady while ambulating.  Patient pa -   Moving to and from a bed to a chair (including a wheelchair)?: A Little   -   Need to walk in hospital room?: A Little   -   Climbing 3-5 steps with a railing?: A Lot     AM-PAC Score:  Raw Score: 17   PT Approx Degree of Impairment Score: 50.57%   Sta Physical Therapy Note signed by Annie Ku PT at 9/18/2018 11:10 AM  Version 1 of 1    Author:  Annie Ku PT Service:  — Author Type:  Physical Therapist    Filed:  9/18/2018 11:10 AM Date of Service:  9/18/2018 11:04 AM Status:  Signed deficits in preparation for discharge. DISCHARGE RECOMMENDATIONS[JG.1]  PT Discharge Recommendations: 24 hour care/supervision;Sub-acute rehabilitation[JG.2]    PLAN[JG.1]  PT Treatment Plan: Bed mobility; Body mechanics; Patient education;Gait training;S Lives With: Alone(son lives close by and friend lives in same [facility])  Drives: Yes  Patient Owned Equipment: Cane  Patient Regularly Uses: None[JG.2]    Prior Level of [address]: Patient reports being independent in ADL's and ambulation with no assistive Raw Score: 17   PT Approx Degree of Impairment Score: 50.57%   Standardized Score (AM-PAC Scale): 42.13   CMS Modifier (G-Code):  CK[JG.2]    FUNCTIONAL ABILITY STATUS  Gait Assessment[JG.1]   Gait Assistance: Minimum assistance  Distance (ft): 8ft x 1 and Occupational Therapy Note signed by Ricki Johansen OT at 9/19/2018  8:42 AM  Version 1 of 1    Author:  Ricki Johansen OT Service:  Rehab Author Type:  Occupational Therapist    Filed:  9/19/2018  8:42 AM Date of Service:  9/19/2018  8:36 AM Status:  [facility] training; Endurance training;Patient/Family education;Equipment eval/education    SUBJECTIVE  \"I can't read my phone\"    OBJECTIVE  Precautions: Limb alert - right;JUAN - posterior;Hip abduction pillow    WEIGHT BEARING RESTRICTION  Weight Bearing Restrict aware of session/findings; All patient questions and concerns addressed    OT Goals:       Patient will complete LE dressing with SBA  Comment: min a     Patient will complete toilet transfer with SBA  Comment: NT - unable to tolerate    Patient will comple with walker for sit to stand, room ambulate for several feet and chair transfer. Reviewed LE dressing using LHAE, but unable to complete due to multiple lines. Reviewed posterior hip precautions. Pt has a reacher at home. May need a sock aid.  Pt lives [place] No date: SPINE SURGERY PROCEDURE UNLISTED      Comment:  for right sciatica issue  No date: TUBAL LIGATION  2/12/14: YAG CAPSULOTOMY - OD - RIGHT EYE; Right      Comment:  JOEY[SS.2]    HOME SITUATION[SS.1]  Type of Home: Condo  Home Layout: One level  Live -   Eating meals?: None[SS. 2]    AM-PAC Score:[SS.1]  Score: 18  Approx Degree of Impairment: 46.65%  Standardized Score (AM-PAC Scale): 38.66  CMS Modifier (G-Code): CK[SS.2]    FUNCTIONAL TRANSFER ASSESSMENT[SS.1]  Supine to Sit : Moderate assistance  Si INFLUENZA 11/12/13     INFLUENZA 10/15/11     INFLUENZA 09/01/09     Pneumococcal (Prevnar 13) 06/16/17     Pneumovax 23 07/27/18     Pneumovax 23 12/12/13       Future Appointments        Provider Barrett Caceres    12/13/2018 3:20 PM Marylu Reyes Crisoforo Bi

## (undated) NOTE — IP AVS SNAPSHOT
Patient Demographics     Address  [address] [address] Phone  [phone] [facility])  [phone] (Work)  [phone] (Mobile) *Preferred* E-mail Address  [email or web address]      Emergency Contact(s)     Name Relation Home Work Mobile spinal or epidural anesthesia. · If you had laparoscopic surgery, to feel shoulder pain or discomfort on the day of surgery.    · For some patients to have nausea after surgery/anesthesia    If you feel nausea or experience vomiting:   · Try to move aroun Commonly known as:  CRESTOR  Take 1 tablet (20 mg total) by mouth once daily. Sertraline HCl 100 MG Tabs  Commonly known as:  ZOLOFT  TAKE 1 TABLET (100 MG TOTAL) BY MOUTH ONCE DAILY.      THERA/BETA-CAROTENE Tabs        STOP taking these medications TAKE ONE TABLET BY MOUTH EVERY EVENING   Anant Mckeon DO         HYDROcodone-acetaminophen 5-325 MG Tabs  Commonly known as:  Ritika Cadet  Next dose due:  MAY TAKE AS DIRECTED IF NEEDED FOR PAIN      TAKE ONE TO TWO TABLETS BY MOUTH THREE TIMES A DAY AS NEEDE [id number] HYDROcodone-acetaminophen (NORCO)  MG per tab 1 tablet 02/06/19 1620 Given      [id number] HYDROcodone-acetaminophen (NORCO)  MG per tab 1 tablet 02/06/19 2016 Given      [id number] HYDROcodone-acetaminophen (NORCO)  MG per tab 1 Type Source Collected On   Blood — 02/07/19 0446          Components    Component Value Reference Range Flag Lab   Glucose 114 70 - 99 mg/dL [facility]   Sodium 136 136 - 144 mmol/L — [facility]   Potassium 3.6 3.3 - 5.1 mmol/L [facility] PROTHROMBIN TIME (PT) [[id number]] (Abnormal)  Resulted: 02/07/19 0513, Result status: Final result   Ordering provider:  Azar Gruber MD  02/06/19 4421 Resulting lab:  [facility]    Specimen Information    Type Source Collected On   Blood — 02/07/19 04 PM  Version 1 of 1    Author:  Suzanne Orozco PTA Service:  Rehab Author Type:  Physical Therapist    Filed:  2/6/2019  4:34 PM Date of Service:  2/6/2019  4:27 PM Status:  Signed    :  Suzanne Orozco PTA (Physical Therapist)       PHYSIC -   Turning over in bed (including adjusting bedclothes, sheets and blankets)?: A Little   -   Sitting down on and standing up from a chair with arms (e.g., wheelchair, bedside commode, etc.): A Little   -   Moving from lying on back to sitting on the side ROM/strengthening per the instructions provided in preparation for discharge. Goal #4   Current Status In progress   Goal #5  AROM 0 degrees extension to 95 degrees flexion     Goal #5   Current Status In progress   Goal #6    Goal #6  Current Status[CK. Cj CURRAN Pt was educated on and given handout of therapeutic exercises; pt verbalized understanding. At the end of the session, pt was left sitting in recliner chair w/ RN present in room. [ST.1]     PM treatment session: Pt was received supine in bed w/ RN dakota • History of shingles     1980's   • Meniere disease    • Osteoarthritis    • Pneumonia due to organism     as an infant   • PONV (postoperative nausea and vomiting)    • SCC (squamous cell carcinoma) 2016    right jawline   • Tinnitus    • Unspecified ess How much difficulty does the patient currently have. ..  -   Turning over in bed (including adjusting bedclothes, sheets and blankets)?: A Little   -   Sitting down on and standing up from a chair with arms (e.g., wheelchair, bedside commode, etc.): A Oneltl discharge. Goal #4   Current Status    Goal #5  AROM 0 degrees extension to 95 degrees flexion     Goal #5   Current Status    Goal #6    Goal #6  Current Status[ST. 1]         Attribution Blair    ST. 1 Yvonne Sanchez PT on 2/5/2019 11:29 AM  ST.2 - Galileo Linn verbalized understanding. At the end of the session, pt was left sitting in recliner chair w/ RN present in room.      The patient's Approx Degree of Impairment: 50.57% has been calculated based on documentation in the [facility] '6 clicks' [address] [address] [employer] Past Surgical History  Past Surgical History:   Procedure Laterality Date   • CATARACT EXTRACTION W/  INTRAOCULAR LENS IMPLANT Right 9/12/11    RJM   • CHOLECYSTECTOMY     • HIP TOTAL REPLACEMENT Right 9/17/2018    Performed by Ángel Goyal MD at [place] [address] How much help from another person does the patient currently need. ..   -   Moving to and from a bed to a chair (including a wheelchair)?: A Little   -   Need to walk in hospital room?: A Little   -   Climbing 3-5 steps with a railing?: A Lot     AM-PAC Sco Occupational Therapy Notes (last 72 hours) (Notes from 2/4/2019 11:51 AM through 2/7/2019 11:51 AM)      Occupational Therapy Note signed by Aleksandar Garcia OT at 2/5/2019 11:26 AM  Version 1 of 1    Author:  Aleksandar Garcia OT Service:  Dyllan [place] Author Typ requires assist for LE self care and mobility tasks, therefore recommend NIRMAL.     DISCHARGE RECOMMENDATIONS[SF.1]  OT Discharge Recommendations: Sub-acute rehabilitation[SF.2]       OCCUPATIONAL THERAPY MEDICAL/SOCIAL HISTORY     Problem List[SF.1]   [place] Occupation/Status: ( and  (1x/week))     Drives: JSUREKHA[BROWN.9]       Stairs in Home: elevator access  Assistive Device(s) Used: SC, or walker as needed (typically I without AD)    Prior Level of [place]: I in all areas including Education Provided: role of OT/POC[SF.1]    Patient End of Session: Up in chair;Needs met;Call light within reach;RN aware of session/findings[SF.2]    OT Goals  Patient self-stated goal is: \"not to have to come back and have the doctor bend it\"     Alma Delia

## (undated) NOTE — Clinical Note
[facility] SURGERY SCHEDULING FORM   [address] [facility]   [phone] (scheduling phone) [phone] (scheduling fax)     PATIENT INFORMATION   Patient Name:    Leopold Shearer   :    1945 Completed by:    Pramod Rosado      Date:    5/16/2017    [facility] will follow its Pre-Admission Assessment and Screening Policy for pre-admission testing.   Physicians that require   additional testing must order this directly and have results faxed to [facility] at

## (undated) NOTE — MR AVS SNAPSHOT
[place]  [address]  [phone]               Thank you for choosing us for your health care visit with Rossy Wagner.   We are glad to serve you and happy to provide you with this summary Montelukast Sodium 10 MG Tabs   Take 1 tablet (10 mg total) by mouth once daily. Commonly known as:  SINGULAIR           MULTI-VITAMINS Tabs   Take  by mouth.  take 1 tablet by ORAL route  every day with food           Rosuvastatin Calcium 20 MG T

## (undated) NOTE — LETTER
AUTHORIZATION FOR SURGICAL OPERATION OR OTHER PROCEDURE    1.  I hereby authorize Dr. Cora Al and the [facility] staff assigned to my case to perform the following operation and/or procedure at the [facility]:    Right hip joint steroid injection under ultr Time:  ________ A. M.  P.M.        Patient Name:  ______________________________________________________  (please print)       Patient signature:  ___________________________________________________             Relationship to Patient:

## (undated) NOTE — LETTER
12/30/2019              Rebecca Garces        [address]         To Dr. Ifrah Suh,    Rebecca Garces is currently a patient under my medical care. Patient was recently seen and is medically optimized for procedure and can proceed with her orthopedic surgery. If you require additional information please do not hesitate to contact my office.         Sincerely,    DO Tea Ceron , [address], [facility]  [phone]        Document electronically generated by:  Barb Mac

## (undated) NOTE — LETTER
[facility]  Administration of Anesthesia  I, Mari Gaspar agree to be cared for by a physician anesthesiologist alone and/or with a nurse anesthetist, who is specially trained to monitor me and give me medicine to put me to sleep or keep me comfortable during my procedure    I understand that my anesthesiologist and/or anesthetist is not an employee or agent of [facility] or [employer]. He or she works for [facility]    As the patient asking for anesthesia services, I agree to:  Allow the anesthesiologist (anesthesia doctor) to give me medicine and do additional procedures as necessary. Some examples are: Starting or using an “IV” to give me medicine, fluids or blood during my procedure, and having a breathing tube placed to help me breathe when I’m asleep (intubation). In the event that my heart stops working properly, I understand that my anesthesiologist will make every effort to sustain my life, unless otherwise directed by [facility] Do Not Resuscitate documents.  Tell my anesthesia doctor before my procedure:  If I am pregnant.  The last time that I ate or drank.  iii. All of the medicines I take (including prescriptions, herbal supplements, and pills I can buy without a prescription (including street drugs/illegal medications). Failure to inform my anesthesiologist about these medicines may increase my risk of anesthetic complications.  iv.If I am allergic to anything or have had a reaction to anesthesia before.  I understand how the anesthesia medicine will help me (benefits).  I understand that with any type of anesthesia medicine there are risks:  The most common risks are: nausea, vomiting, sore throat, muscle soreness, damage to my eyes, mouth, or teeth (from breathing tube placement).  Rare risks include: remembering what happened during my procedure, allergic reactions to medications, injury to my airway, heart, lungs, vision, nerves, or  muscles and in extremely rare instances death.  My doctor has explained to me other choices available to me for my care (alternatives).  Pregnant Patients (“epidural”):  I understand that the risks of having an epidural (medicine given into my back to help control pain during labor), include itching, low blood pressure, difficulty urinating, headache or slowing of the baby’s heart. Very rare risks include infection, bleeding, seizure, irregular heart rhythms and nerve injury.  Regional Anesthesia (“spinal”, “epidural”, & “nerve blocks”):  I understand that rare but potential complications include headache, bleeding, infection, seizure, irregular heart rhythms, and nerve injury.    _____________________________________________________________________________  Patient (or Representative) Signature/Relationship to Patient  Date   Time    _____________________________________________________________________________   Name (if used)    Language/Organization   Time    _____________________________________________________________________________  Nurse Anesthetist Signature     Date   Time  _____________________________________________________________________________  Anesthesiologist Signature     Date   Time  I have discussed the procedure and information above with the patient (or patient’s representative) and answered their questions. The patient or their representative has agreed to have anesthesia services.    _____________________________________________________________________________  Witness        Date   Time  I have verified that the signature is that of the patient or patient’s representative, and that it was signed before the procedure  Patient Name: Mari Gaspar     : 1945                 Printed: 2024 at 9:03 AM    Medical Record #: [id number]                                            Page 1 of 1  ----------ANESTHESIA CONSENT----------

## (undated) NOTE — LETTER
8/14/2018      Janeen Salazar MD  Physical Medicine and Rehabilitation  [address] [place] [address]  Dept: [phone]  Dept Fax: [phone]        RE: Consultation for Luis Navarro        Dear Volodymyr Burnett DO,    Thank

## (undated) NOTE — IP AVS SNAPSHOT
Patient Demographics     Address  [address] [address] Phone  [phone] [facility])  [phone] (Work)  [phone] (Mobile) *Preferred* E-mail Address  [email or web address]      Emergency Contact(s)     Name Relation Home Work Mobile Take 1 tablet (325 mg total) by mouth daily with breakfast.   OXANA BERG PA-C         Fluticasone Propionate 50 MCG/ACT Susp  Commonly known as:  FLONASE  Start taking on:  January 11, 2020  Next dose due:  TOMORROW MORNING      1 spray b These medications were sent to [address] [address] [address] [place], [place] [address]    Phone:  [phone]   DM-guaiFENesin ER  MG Tb12  Fluticasone Propionate 50 MCG/ACT [employer] [id number] docusate sodium (COLACE) cap 100 mg 01/10/20 0747 Given      [id number] famoTIDine (PEPCID) tab 20 mg (Or Linked Group #2) 01/09/20 2110 Given      [id number] famoTIDine (PEPCID) tab 20 mg 01/10/20 0748 Given      [id number] ferrous sulfate EC tab Lab - Abbreviation Name Director Address Valid Date Range    [address] [facility] Annette Rees. Clau Mnotana M.D. [facility]  [facility] [id number] 04/29/14 1047 - Present            Microbiology Results (All)     None         H&P - H&P Note      H • CATARACT EXTRACTION W/  INTRAOCULAR LENS IMPLANT Right 9/12/11    RJVANESSA   • CHOLECYSTECTOMY     • COLONOSCOPY N/A 10/1/2019    Performed by Aisha Zimmerman MD at [place] [address] Right 9/17/2018    Performed by Donnie Claudio varus deformity of the left knee. She has full extension and 110 degrees of flexion. She can plantar flex and dorsiflex the left foot. She has 2+ dorsalis pedis pulse on the left foot.  She has a well healed scar in the medial aspect of the left knee from a CO.2 Gwen Newton PA-C on 1/6/2020 10:24 AM                        Discharge Summary - D/C Summary      Discharge Summary signed by Mayra Calderon MD at 1/10/2020 10:18 AM  Version 1 of 1    Author:  Mayra Calderon MD Service: Viral upper respiratory tract infection     Status post total right knee replacement using cement     Primary osteoarthritis of right knee     Preop testing     Cystitis     Status post right knee replacement     Primary osteoarthritis of left knee and surgical history and medications etc. are listed in the EMR.[RS.2]    Hospital Course:[RS.1]     Primary osteoarthritis of left knee  S/P L KNEE ARTHROPLASTY  S/P SPINAL BLOCK  PAIN CONTROL ADEQUATE  CONT ASA FOR DVT PROPHYLAXIS  TOLERATING PT/OT  STAB Take 1 tablet by mouth every 4 (four) hours as needed.    Quantity:  50 tablet  Refills:  0        CONTINUE taking these medications      Instructions Prescription details   Albuterol Sulfate  (90 Base) MCG/ACT Aers      Inhale 2 puffs into the lung Bring a paper prescription for each of these medications  · aspirin 325 MG Tabs  · celecoxib 200 MG Caps  · docusate sodium 100 MG Caps  · ferrous sulfate 325 (65 FE) MG Tbec  · HYDROcodone-acetaminophen 5-325 MG Tabs[RS. 4]       Follow-up With  Details  W In AM: completed TKR exercises per protocol in sitting, AROM 0-90 degrees. Pt able to perform mobility at a SBA/supervision level ambulating X300'. Pt was left up in the chair with all needs in reach, nursing aware.      In PM: pt completed TKR exercises pe How much help from another person does the patient currently need. ..   -   Moving to and from a bed to a chair (including a wheelchair)?: A Little   -   Need to walk in hospital room?: A Little   -   Climbing 3-5 steps with a railing?: A Lot     AM-PAC Sco ROM/strengthening per the instructions provided in preparation for discharge.    Goal #6  Current Status Ongoing[MD.1]          Attribution Blair    MD.1 - Darío Torres PT on 1/9/2020  4:24 PM               Physical Therapy Note signed by Niels Flaherty Reason for Therapy: Mobility Dysfunction and Discharge Planning    PHYSICAL THERAPY ASSESSMENT     Patient is a 69year old female admitted 1/7/2020 for TKR LLE WBAT.  Patient's current functional deficits include decreased gait, transfers, ROM, strength, b Mixed hyperlipidemia    Essential hypertension      Past Medical History  Past Medical History:   Diagnosis Date   • Anxiety state    • Arthritis    • Back problem     Hx herniated discs   • Calculus of kidney    • Deaf     Left ear   • Diverticulosis when the surgical medical wears off that may change. I think I will need NIRMAL I lives alone and work full time so I want to make sure I am getting more frequency with my rehab daily to return to my PLOF.     PHYSICAL THERAPY EXAMINATION     OBJECTIVE  Precau Pattern: L Decreased stance time(decreased step length and heel strike)  Stoop/Curb Assistance: Not tested  Comment : Up to chair for LE therex s/p gait    Bed Mobility: SBA     Transfers: SBA with RW LLE    Exercise/Education Provided:  Bed mobility  Body Author:  Apurva Richards OT Service:  Rehab Author Type:  Occupational Therapist    Filed:  1/8/2020  9:30 AM Date of Service:  1/8/2020  9:21 AM Status:  Signed    :  Apurva Richards OT (Occupational Therapist)       OCCUPATIONAL THERAPY EVALUATION - INP Problem List  Principal Problem:    Primary osteoarthritis of left knee  Active Problems:    Mixed hyperlipidemia    Essential hypertension      Past Medical History  Past Medical History:   Diagnosis Date   • Anxiety state    • Arthritis    • Back problem Use of Assistive Device(s): None; has cane and RW available     Prior Level of [place]: Pt lives alone, reports she was independent with ADLs, IADLs, driving, and working as an .      SUBJECTIVE  \"I had my other knee done about a year ag Bathing: n/t   Toileting: SBA   Upper Extremity Dressing: set up   Lower Extremity Dressing: min.  A     Education Provided: Role of OT, transfer skills, ADLs   Patient End of Session: Up in chair;Needs met;Call light within reach;RN aware of session/findin

## (undated) NOTE — Clinical Note
[place], Do  [address]   [address]  [address] [facility], [address] [facility]       05/08/2017        Patient: Kerry Olivares   YOB: 1945   Date of Visit: 5/8/2017       Dear  Dr. Domenica Leonard,      Thank you for referring Kerry Olivares to

## (undated) NOTE — IP AVS SNAPSHOT
[facility]            (For Outpatient Use Only) Initial Admit Date: 9/17/2018   Inpt/Obs Admit Date: Inpt: 9/17/18 / Obs: N/A   Discharge Date:    Gaby Proper:  [de-identified]   MRN: [de-identified]   CSN: [id number]        ENCOUNTER  Patient Class Subscriber Name:  Bev Campos :    Subscriber ID:  Pt Rel to Subscriber:    Hospital Account Financial Class: Medicare    2018

## (undated) NOTE — MR AVS SNAPSHOT
[place]  [address]  [phone]               Thank you for choosing us for your health care visit with Paco Miles MD.  We are glad to serve you and happy to provide you with this summary physician or the clinic staff will provide you with the specialist information so you can schedule your appointment. Please wait 3 working days to schedule your appointment unless notified.         Referral for Evaluation  Audiology Evaluation    Assoc Dx: substitute for professional medical care. Always follow your healthcare professional's instructions.              Allergies as of Feb 22, 2017     No Known Allergies                Today's Vital Signs     BP Temp Height Weight BMI    153/69 mmHg 98.5 °F (36 Take 1 tablet (20 mg total) by mouth once daily. Commonly known as:  CRESTOR           Sertraline HCl 100 MG Tabs   Take 1 tablet (100 mg total) by mouth once daily.    Commonly known as:  ZOLOFT           TiZANidine HCl 2 MG Tabs   Take 1 tablet (2 mg to

## (undated) NOTE — LETTER
04/17/21 2000 [facility]      Dear Radha Lopez records indicate that you have outstanding lab work and or testing that was ordered for you and has not yet been completed:  Orders Placed This Encounter

## (undated) NOTE — MR AVS SNAPSHOT
[address]  [address] [facility]  [phone]               Thank you for choosing us for your health care visit with Karime Danielson DO.   We are glad to serve you and happy to provide you with this summary take 1 - 2 tablet by ORAL route 3 times every day as needed for pain   Commonly known as:  NORCO           levofloxacin 750 MG Tabs   Take 1 tablet (750 mg total) by mouth daily.    Commonly known as:  LEVAQUIN           Lisinopril-Hydrochlorothiazide 20-25 [place]tn

## (undated) NOTE — IP AVS SNAPSHOT
[facility]            (For Outpatient Use Only) Initial Admit Date: 2/4/2019   Inpt/Obs Admit Date: Inpt: 2/4/19 / Obs: N/A   Discharge Date:    Sienna Villanueva:  [de-identified]   MRN: [de-identified]   CSN: [id number]        ENCOUNTER  Patient Class: Subscriber Name:  Bobby Christensen :    Subscriber ID:  Pt Rel to Subscriber:    Hospital Account Financial Class: Medicare    2019

## (undated) NOTE — LETTER
AUTHORIZATION FOR SURGICAL OPERATION OR OTHER PROCEDURE    1.  I hereby authorize Dr. Eddie Zaragoza and the [facility] staff assigned to my case to perform the following operation and/or procedure at the [facility]:    Right hip injection under ultrasound luis eduardo Relationship to Patient:           []  Parent    Responsible person                          []  Spouse  In case of minor or                    [] Other  _____________   Incompetent name:  __________________________________________________

## (undated) NOTE — MR AVS SNAPSHOT
[place]  [address]  [phone]               Thank you for choosing us for your health care visit with Lamont Parham MD.  We are glad to serve you and happy to provide you with this summary Commonly known as:  FLONASE           HYDROcodone-acetaminophen 5-325 MG Tabs   take 1 - 2 tablet by ORAL route 3 times every day as needed for pain   Commonly known as:  NORCO           Lisinopril-Hydrochlorothiazide 20-25 MG Tabs   Take 1 tablet by mouth

## (undated) NOTE — MR AVS SNAPSHOT
[place]  [address]  [phone]               Thank you for choosing us for your health care visit with Rossy Olivas.   We are glad to serve you and happy to provide you with this summary take 1 - 2 tablet by ORAL route 3 times every day as needed for pain   Commonly known as:  NORCO           Lisinopril-Hydrochlorothiazide 20-25 MG Tabs   Take 1 tablet by mouth once daily.            Meloxicam 15 MG Tabs   TAKE 1 TABLET BY MOUTH EVERY DAY For medical emergencies, dial 911.            Visit [facility] online at  [email or web address]

## (undated) NOTE — LETTER
KORINA. Notifier: Doc/CÃ[employer]ras Libres   ROSALBA. Patient Name: Bruna Johnson. Identification Number: [id number]      Advance Beneficiary Notice of Noncoverage (ABN)  NOTE:  If Medicare doesn’t pay for D. Right hip injection under ultrasound guidance below, y I made to you, less co-pays or deductibles. ? OPTION 2. I want the D. Right hip injection under ultrasound guidance listed above, but do not bill Medicare. You may ask to be paid now as I am responsible for payment.  I cannot appeal if Medicare is not oracio

## (undated) NOTE — MR AVS SNAPSHOT
[address]  [address] [facility]  [phone]               Thank you for choosing us for your health care visit with Stephanie Palmer DO.   We are glad to serve you and happy to provide you with this summary physician's office. At that time, you will be provided with any authorization numbers or be assured that none are required. You can then schedule your appointment.  Failure to obtain required authorization numbers can create reimbursement difficulties for y Lisinopril-Hydrochlorothiazide 20-25 MG Tabs   Take 1 tablet by mouth once daily. Montelukast Sodium 10 MG Tabs   Take 1 tablet (10 mg total) by mouth once daily. Commonly known as:  SINGULAIR           MULTI-VITAMINS Tabs   Take  by mouth.  ta [employer] [facility] [address] [place], [place] [address] for making healthy food choices  -   Enjoy your food, but eat less. Fully enjoy your food when eating. Don’t eat while distracted and slow down. Avoid over sized portions. Don’t eat while when you’re bored.

## (undated) NOTE — LETTER
[facility] SURGERY SCHEDULING FORM   [address]  [address] [facility]   [phone] (scheduling phone) [phone] (scheduling fax)     PATIENT INFORMATION   Patient Name:    Ambrocio Acosta   :    1945 Completed by:    Jarvis Mccartney      Date:    6/29/2017    [facility] will follow its Pre-Admission Assessment and Screening Policy for pre-admission testing.   Physicians that require   additional testing must order this directly and have results faxed to [facility] at 3

## (undated) NOTE — MR AVS SNAPSHOT
[place]  [address]  [phone]               Thank you for choosing us for your [facility].   We are glad to serve you and happy to provide you with this summar take 1 - 2 tablet by ORAL route 3 times every day as needed for pain   Commonly known as:  NORCO           Lisinopril-Hydrochlorothiazide 20-25 MG Tabs   Take 1 tablet by mouth once daily.            Meloxicam 15 MG Tabs           Montelukast Sodium 10 MG T Call [phone] for help. [employer] is NOT to be used for urgent needs. For medical emergencies, dial 911.            Visit [facility] online at  [employer]

## (undated) NOTE — LETTER
[address] [facility]  A. Notifier:    B. Wilhelmena Alpers  [id number]    Advance Beneficiary Notice of Noncoverage (ABN)    Note:  If Medicare doesn't pay for D.  Right hip joint steroid injection under ultrasound guidance  below, you may have to pa following the directions on the MSN. If Medicare does pay, you will refund any payments I made to you, less copays or deductibles. Option 2:  I want the D.  Right hip joint steroid injection under ultrasound guidance  listed above, but do not bill Me

## (undated) NOTE — MR AVS SNAPSHOT
[address]  [address] [facility]  [phone]               Thank you for choosing us for your health care visit with Karime Danielson DO.   We are glad to serve you and happy to provide you with this summary Friday June 16, 2017     Imaging:  XR DEXA BONE DENSITOMETRY (CPT=77080)    Instructions:   To schedule a test at any [facility], call Central Scheduling at [phone], Monday through Friday between 7:30am to 6pm and on Satur PREVENTATIVE SERVICES  INDICATIONS AND SCHEDULE Internal Lab or Procedure External Lab or Procedure   Diabetes Screening      HbgA1C    At Least  Annually for Diabetics GLYCOHEMOGLOBIN (HGA1C) (L) (%)   Date Value   09/26/2014 5.9    No flowsheet data amber this or any previous visit. No flowsheet data found. Fecal Occult Blood   Covered Annually No results found for: FOB, OCCULTSTOOL No flowsheet data found.      Barium Enema-   uncomfortable but covered  Covered but uncomfortable   Glaucoma Screening No orders found for this or any previous visit.  Medium/high risk factors:   End-stage renal disease   Hemophiliacs who received Factor VIII or IX concentrates   Clients of institutions for the mentally retarded   Persons who live in the same house as a He AmLODIPine Besylate 5 MG Tabs   Take 1 tablet (5 mg total) by mouth once daily. Commonly known as:  NORVASC           CALCIO DEL MAR 1250 (500 Ca) MG Tabs   Generic drug:  Calcium Carbonate   Take by mouth daily.            Fexofenadine HCl 180 MG - Sertraline HCl 100 MG Tabs      You can get these medications from any pharmacy     Bring a paper prescription for each of these medications    - HYDROcodone-acetaminophen 5-325 MG Tabs            MyChart     Visit MyChart  You can access your MyChart to

## (undated) NOTE — IP AVS SNAPSHOT
[facility]            (For Outpatient Use Only) Initial Admit Date: 1/7/2020   Inpt/Obs Admit Date: Inpt: 1/7/20 / Obs: N/A   Discharge Date:    Víctor Salvador:  [de-identified]   MRN: [de-identified]   CSN: [id number]   CEID: [id number]        [facility] Subscriber Name:  Gatito Garay :    Subscriber ID:  Pt Rel to Subscriber:    Hospital Account Financial Class: Medicare    January 10, 2020